# Patient Record
Sex: MALE | Race: AMERICAN INDIAN OR ALASKA NATIVE | NOT HISPANIC OR LATINO | URBAN - METROPOLITAN AREA
[De-identification: names, ages, dates, MRNs, and addresses within clinical notes are randomized per-mention and may not be internally consistent; named-entity substitution may affect disease eponyms.]

---

## 2021-01-01 ENCOUNTER — INPATIENT (INPATIENT)
Facility: HOSPITAL | Age: 76
LOS: 1 days | Discharge: HOME | DRG: 639 | End: 2021-01-11
Attending: INTERNAL MEDICINE | Admitting: INTERNAL MEDICINE
Payer: MEDICARE

## 2021-01-01 ENCOUNTER — INPATIENT (INPATIENT)
Facility: HOSPITAL | Age: 76
LOS: 21 days | DRG: 871 | End: 2021-05-08
Attending: GENERAL PRACTICE | Admitting: GENERAL PRACTICE
Payer: COMMERCIAL

## 2021-01-01 VITALS
DIASTOLIC BLOOD PRESSURE: 82 MMHG | RESPIRATION RATE: 16 BRPM | TEMPERATURE: 98 F | OXYGEN SATURATION: 99 % | HEART RATE: 88 BPM | SYSTOLIC BLOOD PRESSURE: 130 MMHG

## 2021-01-01 VITALS
DIASTOLIC BLOOD PRESSURE: 81 MMHG | TEMPERATURE: 98 F | WEIGHT: 175.05 LBS | OXYGEN SATURATION: 99 % | HEIGHT: 68 IN | SYSTOLIC BLOOD PRESSURE: 129 MMHG | RESPIRATION RATE: 18 BRPM | HEART RATE: 91 BPM

## 2021-01-01 VITALS
DIASTOLIC BLOOD PRESSURE: 76 MMHG | OXYGEN SATURATION: 92 % | WEIGHT: 173.94 LBS | TEMPERATURE: 98 F | SYSTOLIC BLOOD PRESSURE: 114 MMHG | HEIGHT: 68 IN | HEART RATE: 112 BPM | RESPIRATION RATE: 91 BRPM

## 2021-01-01 VITALS
HEART RATE: 88 BPM | DIASTOLIC BLOOD PRESSURE: 60 MMHG | RESPIRATION RATE: 22 BRPM | SYSTOLIC BLOOD PRESSURE: 118 MMHG | OXYGEN SATURATION: 94 %

## 2021-01-01 DIAGNOSIS — Z29.9 ENCOUNTER FOR PROPHYLACTIC MEASURES, UNSPECIFIED: ICD-10-CM

## 2021-01-01 DIAGNOSIS — E11.9 TYPE 2 DIABETES MELLITUS WITHOUT COMPLICATIONS: ICD-10-CM

## 2021-01-01 DIAGNOSIS — Z02.9 ENCOUNTER FOR ADMINISTRATIVE EXAMINATIONS, UNSPECIFIED: ICD-10-CM

## 2021-01-01 DIAGNOSIS — J96.01 ACUTE RESPIRATORY FAILURE WITH HYPOXIA: ICD-10-CM

## 2021-01-01 DIAGNOSIS — I10 ESSENTIAL (PRIMARY) HYPERTENSION: ICD-10-CM

## 2021-01-01 DIAGNOSIS — E11.65 TYPE 2 DIABETES MELLITUS WITH HYPERGLYCEMIA: ICD-10-CM

## 2021-01-01 DIAGNOSIS — U07.1 COVID-19: ICD-10-CM
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
## 2021-01-01 DIAGNOSIS — E11.10 TYPE 2 DIABETES MELLITUS WITH KETOACIDOSIS WITHOUT COMA: ICD-10-CM

## 2021-01-01 DIAGNOSIS — N17.9 ACUTE KIDNEY FAILURE, UNSPECIFIED: ICD-10-CM

## 2021-01-01 DIAGNOSIS — E87.1 HYPO-OSMOLALITY AND HYPONATREMIA: ICD-10-CM

## 2021-01-01 DIAGNOSIS — J18.9 PNEUMONIA, UNSPECIFIED ORGANISM: ICD-10-CM

## 2021-01-01 DIAGNOSIS — E87.6 HYPOKALEMIA: ICD-10-CM

## 2021-01-01 LAB
A1C WITH ESTIMATED AVERAGE GLUCOSE RESULT: 13.4 % — HIGH (ref 4–5.6)
A1C WITH ESTIMATED AVERAGE GLUCOSE RESULT: 13.6 % — HIGH (ref 4–5.6)
A1C WITH ESTIMATED AVERAGE GLUCOSE RESULT: 13.8 % — HIGH (ref 4–5.6)
A1C WITH ESTIMATED AVERAGE GLUCOSE RESULT: 14 % — HIGH (ref 4–5.6)
ALBUMIN SERPL ELPH-MCNC: 1.5 G/DL — LOW (ref 3.5–5)
ALBUMIN SERPL ELPH-MCNC: 2 G/DL — LOW (ref 3.5–5)
ALBUMIN SERPL ELPH-MCNC: 2 G/DL — LOW (ref 3.5–5)
ALBUMIN SERPL ELPH-MCNC: 2.1 G/DL — LOW (ref 3.5–5)
ALBUMIN SERPL ELPH-MCNC: 2.2 G/DL — LOW (ref 3.5–5)
ALBUMIN SERPL ELPH-MCNC: 2.2 G/DL — LOW (ref 3.5–5)
ALBUMIN SERPL ELPH-MCNC: 2.4 G/DL — LOW (ref 3.5–5)
ALBUMIN SERPL ELPH-MCNC: 2.5 G/DL — LOW (ref 3.5–5)
ALBUMIN SERPL ELPH-MCNC: 2.6 G/DL — LOW (ref 3.5–5)
ALBUMIN SERPL ELPH-MCNC: 2.7 G/DL — LOW (ref 3.5–5)
ALBUMIN SERPL ELPH-MCNC: 2.8 G/DL — LOW (ref 3.5–5)
ALBUMIN SERPL ELPH-MCNC: 3 G/DL — LOW (ref 3.5–5)
ALBUMIN SERPL ELPH-MCNC: 4 G/DL — SIGNIFICANT CHANGE UP (ref 3.3–5)
ALP SERPL-CCNC: 100 U/L — SIGNIFICANT CHANGE UP (ref 40–120)
ALP SERPL-CCNC: 100 U/L — SIGNIFICANT CHANGE UP (ref 40–120)
ALP SERPL-CCNC: 111 U/L — SIGNIFICANT CHANGE UP (ref 40–120)
ALP SERPL-CCNC: 113 U/L — SIGNIFICANT CHANGE UP (ref 40–120)
ALP SERPL-CCNC: 128 U/L — HIGH (ref 40–120)
ALP SERPL-CCNC: 69 U/L — SIGNIFICANT CHANGE UP (ref 40–120)
ALP SERPL-CCNC: 70 U/L — SIGNIFICANT CHANGE UP (ref 40–120)
ALP SERPL-CCNC: 70 U/L — SIGNIFICANT CHANGE UP (ref 40–120)
ALP SERPL-CCNC: 72 U/L — SIGNIFICANT CHANGE UP (ref 40–120)
ALP SERPL-CCNC: 81 U/L — SIGNIFICANT CHANGE UP (ref 40–120)
ALP SERPL-CCNC: 86 U/L — SIGNIFICANT CHANGE UP (ref 40–120)
ALP SERPL-CCNC: 92 U/L — SIGNIFICANT CHANGE UP (ref 40–120)
ALP SERPL-CCNC: 95 U/L — SIGNIFICANT CHANGE UP (ref 40–120)
ALP SERPL-CCNC: 98 U/L — SIGNIFICANT CHANGE UP (ref 40–120)
ALP SERPL-CCNC: 98 U/L — SIGNIFICANT CHANGE UP (ref 40–120)
ALT FLD-CCNC: 14 U/L — SIGNIFICANT CHANGE UP (ref 10–45)
ALT FLD-CCNC: 26 U/L DA — SIGNIFICANT CHANGE UP (ref 10–60)
ALT FLD-CCNC: 26 U/L DA — SIGNIFICANT CHANGE UP (ref 10–60)
ALT FLD-CCNC: 29 U/L DA — SIGNIFICANT CHANGE UP (ref 10–60)
ALT FLD-CCNC: 31 U/L DA — SIGNIFICANT CHANGE UP (ref 10–60)
ALT FLD-CCNC: 32 U/L DA — SIGNIFICANT CHANGE UP (ref 10–60)
ALT FLD-CCNC: 33 U/L DA — SIGNIFICANT CHANGE UP (ref 10–60)
ALT FLD-CCNC: 33 U/L DA — SIGNIFICANT CHANGE UP (ref 10–60)
ALT FLD-CCNC: 37 U/L DA — SIGNIFICANT CHANGE UP (ref 10–60)
ALT FLD-CCNC: 38 U/L DA — SIGNIFICANT CHANGE UP (ref 10–60)
ALT FLD-CCNC: 41 U/L DA — SIGNIFICANT CHANGE UP (ref 10–60)
ALT FLD-CCNC: 42 U/L DA — SIGNIFICANT CHANGE UP (ref 10–60)
ALT FLD-CCNC: 51 U/L DA — SIGNIFICANT CHANGE UP (ref 10–60)
ANION GAP SERPL CALC-SCNC: 10 MMOL/L — SIGNIFICANT CHANGE UP (ref 5–17)
ANION GAP SERPL CALC-SCNC: 10 MMOL/L — SIGNIFICANT CHANGE UP (ref 5–17)
ANION GAP SERPL CALC-SCNC: 11 MMOL/L — SIGNIFICANT CHANGE UP (ref 5–17)
ANION GAP SERPL CALC-SCNC: 12 MMOL/L — SIGNIFICANT CHANGE UP (ref 5–17)
ANION GAP SERPL CALC-SCNC: 12 MMOL/L — SIGNIFICANT CHANGE UP (ref 5–17)
ANION GAP SERPL CALC-SCNC: 14 MMOL/L — SIGNIFICANT CHANGE UP (ref 5–17)
ANION GAP SERPL CALC-SCNC: 15 MMOL/L — SIGNIFICANT CHANGE UP (ref 5–17)
ANION GAP SERPL CALC-SCNC: 15 MMOL/L — SIGNIFICANT CHANGE UP (ref 5–17)
ANION GAP SERPL CALC-SCNC: 16 MMOL/L — SIGNIFICANT CHANGE UP (ref 5–17)
ANION GAP SERPL CALC-SCNC: 16 MMOL/L — SIGNIFICANT CHANGE UP (ref 5–17)
ANION GAP SERPL CALC-SCNC: 20 MMOL/L — HIGH (ref 5–17)
ANION GAP SERPL CALC-SCNC: 4 MMOL/L — LOW (ref 5–17)
ANION GAP SERPL CALC-SCNC: 5 MMOL/L — SIGNIFICANT CHANGE UP (ref 5–17)
ANION GAP SERPL CALC-SCNC: 5 MMOL/L — SIGNIFICANT CHANGE UP (ref 5–17)
ANION GAP SERPL CALC-SCNC: 6 MMOL/L — SIGNIFICANT CHANGE UP (ref 5–17)
ANION GAP SERPL CALC-SCNC: 8 MMOL/L — SIGNIFICANT CHANGE UP (ref 5–17)
ANION GAP SERPL CALC-SCNC: 9 MMOL/L — SIGNIFICANT CHANGE UP (ref 5–17)
APPEARANCE UR: CLEAR — SIGNIFICANT CHANGE UP
APTT BLD: 171.4 SEC — CRITICAL HIGH (ref 27.5–35.5)
APTT BLD: 29.7 SEC — SIGNIFICANT CHANGE UP (ref 27.5–35.5)
APTT BLD: 73.4 SEC — HIGH (ref 27.5–35.5)
APTT BLD: 90.8 SEC — HIGH (ref 27.5–35.5)
APTT BLD: >200 SEC — CRITICAL HIGH (ref 27.5–35.5)
AST SERPL-CCNC: 15 U/L — SIGNIFICANT CHANGE UP (ref 10–40)
AST SERPL-CCNC: 33 U/L — SIGNIFICANT CHANGE UP (ref 10–40)
AST SERPL-CCNC: 35 U/L — SIGNIFICANT CHANGE UP (ref 10–40)
AST SERPL-CCNC: 36 U/L — SIGNIFICANT CHANGE UP (ref 10–40)
AST SERPL-CCNC: 38 U/L — SIGNIFICANT CHANGE UP (ref 10–40)
AST SERPL-CCNC: 38 U/L — SIGNIFICANT CHANGE UP (ref 10–40)
AST SERPL-CCNC: 40 U/L — SIGNIFICANT CHANGE UP (ref 10–40)
AST SERPL-CCNC: 42 U/L — HIGH (ref 10–40)
AST SERPL-CCNC: 43 U/L — HIGH (ref 10–40)
AST SERPL-CCNC: 47 U/L — HIGH (ref 10–40)
AST SERPL-CCNC: 47 U/L — HIGH (ref 10–40)
AST SERPL-CCNC: 51 U/L — HIGH (ref 10–40)
AST SERPL-CCNC: 51 U/L — HIGH (ref 10–40)
AST SERPL-CCNC: 52 U/L — HIGH (ref 10–40)
AST SERPL-CCNC: 63 U/L — HIGH (ref 10–40)
B PERT DNA SPEC QL NAA+PROBE: SIGNIFICANT CHANGE UP
BACTERIA # UR AUTO: NEGATIVE — SIGNIFICANT CHANGE UP
BASE EXCESS BLDA CALC-SCNC: -0.8 MMOL/L — SIGNIFICANT CHANGE UP (ref -2–3)
BASE EXCESS BLDA CALC-SCNC: -1.5 MMOL/L — SIGNIFICANT CHANGE UP (ref -2–3)
BASE EXCESS BLDA CALC-SCNC: -3.4 MMOL/L — LOW (ref -2–3)
BASE EXCESS BLDA CALC-SCNC: -4.5 MMOL/L — LOW (ref -2–3)
BASE EXCESS BLDA CALC-SCNC: -6.4 MMOL/L — LOW (ref -2–3)
BASE EXCESS BLDV CALC-SCNC: -1.1 MMOL/L — SIGNIFICANT CHANGE UP (ref -2–2)
BASE EXCESS BLDV CALC-SCNC: -2.6 MMOL/L — LOW (ref -2–2)
BASE EXCESS BLDV CALC-SCNC: -3.9 MMOL/L — LOW (ref -2–2)
BASOPHILS # BLD AUTO: 0 K/UL — SIGNIFICANT CHANGE UP (ref 0–0.2)
BASOPHILS # BLD AUTO: 0.01 K/UL — SIGNIFICANT CHANGE UP (ref 0–0.2)
BASOPHILS # BLD AUTO: 0.02 K/UL — SIGNIFICANT CHANGE UP (ref 0–0.2)
BASOPHILS # BLD AUTO: 0.03 K/UL — SIGNIFICANT CHANGE UP (ref 0–0.2)
BASOPHILS NFR BLD AUTO: 0 % — SIGNIFICANT CHANGE UP (ref 0–2)
BASOPHILS NFR BLD AUTO: 0.1 % — SIGNIFICANT CHANGE UP (ref 0–2)
BASOPHILS NFR BLD AUTO: 0.2 % — SIGNIFICANT CHANGE UP (ref 0–2)
BASOPHILS NFR BLD AUTO: 0.3 % — SIGNIFICANT CHANGE UP (ref 0–2)
BASOPHILS NFR BLD AUTO: 0.4 % — SIGNIFICANT CHANGE UP (ref 0–2)
BILIRUB SERPL-MCNC: 0.6 MG/DL — SIGNIFICANT CHANGE UP (ref 0.2–1.2)
BILIRUB SERPL-MCNC: 0.7 MG/DL — SIGNIFICANT CHANGE UP (ref 0.2–1.2)
BILIRUB SERPL-MCNC: 0.9 MG/DL — SIGNIFICANT CHANGE UP (ref 0.2–1.2)
BILIRUB SERPL-MCNC: 0.9 MG/DL — SIGNIFICANT CHANGE UP (ref 0.2–1.2)
BILIRUB SERPL-MCNC: 1 MG/DL — SIGNIFICANT CHANGE UP (ref 0.2–1.2)
BILIRUB SERPL-MCNC: 1.3 MG/DL — HIGH (ref 0.2–1.2)
BILIRUB UR-MCNC: NEGATIVE — SIGNIFICANT CHANGE UP
BLOOD GAS COMMENTS ARTERIAL: SIGNIFICANT CHANGE UP
BUN SERPL-MCNC: 13 MG/DL — SIGNIFICANT CHANGE UP (ref 7–18)
BUN SERPL-MCNC: 15 MG/DL — SIGNIFICANT CHANGE UP (ref 7–18)
BUN SERPL-MCNC: 15 MG/DL — SIGNIFICANT CHANGE UP (ref 7–18)
BUN SERPL-MCNC: 17 MG/DL — SIGNIFICANT CHANGE UP (ref 7–18)
BUN SERPL-MCNC: 18 MG/DL — SIGNIFICANT CHANGE UP (ref 7–18)
BUN SERPL-MCNC: 19 MG/DL — SIGNIFICANT CHANGE UP (ref 7–23)
BUN SERPL-MCNC: 21 MG/DL — HIGH (ref 7–18)
BUN SERPL-MCNC: 22 MG/DL — HIGH (ref 7–18)
BUN SERPL-MCNC: 23 MG/DL — HIGH (ref 7–18)
BUN SERPL-MCNC: 27 MG/DL — HIGH (ref 7–18)
BUN SERPL-MCNC: 32 MG/DL — HIGH (ref 7–18)
BUN SERPL-MCNC: 33 MG/DL — HIGH (ref 7–18)
BUN SERPL-MCNC: 33 MG/DL — HIGH (ref 7–18)
BUN SERPL-MCNC: 34 MG/DL — HIGH (ref 7–18)
BUN SERPL-MCNC: 35 MG/DL — HIGH (ref 7–18)
BUN SERPL-MCNC: 36 MG/DL — HIGH (ref 7–18)
BUN SERPL-MCNC: 37 MG/DL — HIGH (ref 7–18)
BUN SERPL-MCNC: 38 MG/DL — HIGH (ref 7–18)
BUN SERPL-MCNC: 42 MG/DL — HIGH (ref 7–18)
BUN SERPL-MCNC: 42 MG/DL — HIGH (ref 7–18)
BUN SERPL-MCNC: 44 MG/DL — HIGH (ref 7–18)
BUN SERPL-MCNC: 44 MG/DL — HIGH (ref 7–18)
BUN SERPL-MCNC: 45 MG/DL — HIGH (ref 7–18)
BUN SERPL-MCNC: 46 MG/DL — HIGH (ref 7–18)
C PNEUM DNA SPEC QL NAA+PROBE: SIGNIFICANT CHANGE UP
CA-I SERPL-SCNC: 1.11 MMOL/L — LOW (ref 1.12–1.3)
CA-I SERPL-SCNC: 1.18 MMOL/L — SIGNIFICANT CHANGE UP (ref 1.12–1.3)
CA-I SERPL-SCNC: 1.25 MMOL/L — SIGNIFICANT CHANGE UP (ref 1.12–1.3)
CALCIUM SERPL-MCNC: 7.9 MG/DL — LOW (ref 8.4–10.5)
CALCIUM SERPL-MCNC: 8.1 MG/DL — LOW (ref 8.4–10.5)
CALCIUM SERPL-MCNC: 8.2 MG/DL — LOW (ref 8.4–10.5)
CALCIUM SERPL-MCNC: 8.4 MG/DL — SIGNIFICANT CHANGE UP (ref 8.4–10.5)
CALCIUM SERPL-MCNC: 8.5 MG/DL — SIGNIFICANT CHANGE UP (ref 8.4–10.5)
CALCIUM SERPL-MCNC: 8.6 MG/DL — SIGNIFICANT CHANGE UP (ref 8.4–10.5)
CALCIUM SERPL-MCNC: 8.7 MG/DL — SIGNIFICANT CHANGE UP (ref 8.4–10.5)
CALCIUM SERPL-MCNC: 8.7 MG/DL — SIGNIFICANT CHANGE UP (ref 8.4–10.5)
CALCIUM SERPL-MCNC: 8.8 MG/DL — SIGNIFICANT CHANGE UP (ref 8.4–10.5)
CALCIUM SERPL-MCNC: 8.9 MG/DL — SIGNIFICANT CHANGE UP (ref 8.4–10.5)
CALCIUM SERPL-MCNC: 9.7 MG/DL — SIGNIFICANT CHANGE UP (ref 8.4–10.5)
CHLORIDE BLDV-SCNC: 105 MMOL/L — SIGNIFICANT CHANGE UP (ref 96–108)
CHLORIDE BLDV-SCNC: 108 MMOL/L — SIGNIFICANT CHANGE UP (ref 96–108)
CHLORIDE BLDV-SCNC: 98 MMOL/L — SIGNIFICANT CHANGE UP (ref 96–108)
CHLORIDE SERPL-SCNC: 100 MMOL/L — SIGNIFICANT CHANGE UP (ref 96–108)
CHLORIDE SERPL-SCNC: 100 MMOL/L — SIGNIFICANT CHANGE UP (ref 96–108)
CHLORIDE SERPL-SCNC: 103 MMOL/L — SIGNIFICANT CHANGE UP (ref 96–108)
CHLORIDE SERPL-SCNC: 104 MMOL/L — SIGNIFICANT CHANGE UP (ref 96–108)
CHLORIDE SERPL-SCNC: 105 MMOL/L — SIGNIFICANT CHANGE UP (ref 96–108)
CHLORIDE SERPL-SCNC: 106 MMOL/L — SIGNIFICANT CHANGE UP (ref 96–108)
CHLORIDE SERPL-SCNC: 108 MMOL/L — SIGNIFICANT CHANGE UP (ref 96–108)
CHLORIDE SERPL-SCNC: 109 MMOL/L — HIGH (ref 96–108)
CHLORIDE SERPL-SCNC: 110 MMOL/L — HIGH (ref 96–108)
CHLORIDE SERPL-SCNC: 111 MMOL/L — HIGH (ref 96–108)
CHLORIDE SERPL-SCNC: 111 MMOL/L — HIGH (ref 96–108)
CHLORIDE SERPL-SCNC: 112 MMOL/L — HIGH (ref 96–108)
CHLORIDE SERPL-SCNC: 113 MMOL/L — HIGH (ref 96–108)
CHLORIDE SERPL-SCNC: 114 MMOL/L — HIGH (ref 96–108)
CHLORIDE SERPL-SCNC: 114 MMOL/L — HIGH (ref 96–108)
CHLORIDE SERPL-SCNC: 115 MMOL/L — HIGH (ref 96–108)
CHLORIDE SERPL-SCNC: 116 MMOL/L — HIGH (ref 96–108)
CHLORIDE SERPL-SCNC: 116 MMOL/L — HIGH (ref 96–108)
CHLORIDE SERPL-SCNC: 94 MMOL/L — LOW (ref 96–108)
CHLORIDE SERPL-SCNC: 94 MMOL/L — LOW (ref 96–108)
CHLORIDE SERPL-SCNC: 96 MMOL/L — SIGNIFICANT CHANGE UP (ref 96–108)
CHLORIDE SERPL-SCNC: 98 MMOL/L — SIGNIFICANT CHANGE UP (ref 96–108)
CHLORIDE SERPL-SCNC: 98 MMOL/L — SIGNIFICANT CHANGE UP (ref 96–108)
CHOLEST SERPL-MCNC: 96 MG/DL — SIGNIFICANT CHANGE UP
CO2 BLDV-SCNC: 23 MMOL/L — SIGNIFICANT CHANGE UP (ref 22–30)
CO2 BLDV-SCNC: 24 MMOL/L — SIGNIFICANT CHANGE UP (ref 22–30)
CO2 BLDV-SCNC: 25 MMOL/L — SIGNIFICANT CHANGE UP (ref 22–30)
CO2 SERPL-SCNC: 15 MMOL/L — LOW (ref 22–31)
CO2 SERPL-SCNC: 17 MMOL/L — LOW (ref 22–31)
CO2 SERPL-SCNC: 19 MMOL/L — LOW (ref 22–31)
CO2 SERPL-SCNC: 20 MMOL/L — LOW (ref 22–31)
CO2 SERPL-SCNC: 20 MMOL/L — LOW (ref 22–31)
CO2 SERPL-SCNC: 21 MMOL/L — LOW (ref 22–31)
CO2 SERPL-SCNC: 22 MMOL/L — SIGNIFICANT CHANGE UP (ref 22–31)
CO2 SERPL-SCNC: 23 MMOL/L — SIGNIFICANT CHANGE UP (ref 22–31)
CO2 SERPL-SCNC: 24 MMOL/L — SIGNIFICANT CHANGE UP (ref 22–31)
CO2 SERPL-SCNC: 24 MMOL/L — SIGNIFICANT CHANGE UP (ref 22–31)
CO2 SERPL-SCNC: 25 MMOL/L — SIGNIFICANT CHANGE UP (ref 22–31)
CO2 SERPL-SCNC: 26 MMOL/L — SIGNIFICANT CHANGE UP (ref 22–31)
CO2 SERPL-SCNC: 26 MMOL/L — SIGNIFICANT CHANGE UP (ref 22–31)
CO2 SERPL-SCNC: 27 MMOL/L — SIGNIFICANT CHANGE UP (ref 22–31)
CO2 SERPL-SCNC: 28 MMOL/L — SIGNIFICANT CHANGE UP (ref 22–31)
COLOR SPEC: SIGNIFICANT CHANGE UP
COLOR SPEC: YELLOW — SIGNIFICANT CHANGE UP
COLOR SPEC: YELLOW — SIGNIFICANT CHANGE UP
COVID-19 SPIKE DOMAIN AB INTERP: NEGATIVE — SIGNIFICANT CHANGE UP
COVID-19 SPIKE DOMAIN ANTIBODY RESULT: 0.59 U/ML — SIGNIFICANT CHANGE UP
CREAT ?TM UR-MCNC: 51 MG/DL — SIGNIFICANT CHANGE UP
CREAT SERPL-MCNC: 0.76 MG/DL — SIGNIFICANT CHANGE UP (ref 0.5–1.3)
CREAT SERPL-MCNC: 0.84 MG/DL — SIGNIFICANT CHANGE UP (ref 0.5–1.3)
CREAT SERPL-MCNC: 0.84 MG/DL — SIGNIFICANT CHANGE UP (ref 0.5–1.3)
CREAT SERPL-MCNC: 0.89 MG/DL — SIGNIFICANT CHANGE UP (ref 0.5–1.3)
CREAT SERPL-MCNC: 0.89 MG/DL — SIGNIFICANT CHANGE UP (ref 0.5–1.3)
CREAT SERPL-MCNC: 0.9 MG/DL — SIGNIFICANT CHANGE UP (ref 0.5–1.3)
CREAT SERPL-MCNC: 0.92 MG/DL — SIGNIFICANT CHANGE UP (ref 0.5–1.3)
CREAT SERPL-MCNC: 1.01 MG/DL — SIGNIFICANT CHANGE UP (ref 0.5–1.3)
CREAT SERPL-MCNC: 1.02 MG/DL — SIGNIFICANT CHANGE UP (ref 0.5–1.3)
CREAT SERPL-MCNC: 1.06 MG/DL — SIGNIFICANT CHANGE UP (ref 0.5–1.3)
CREAT SERPL-MCNC: 1.09 MG/DL — SIGNIFICANT CHANGE UP (ref 0.5–1.3)
CREAT SERPL-MCNC: 1.12 MG/DL — SIGNIFICANT CHANGE UP (ref 0.5–1.3)
CREAT SERPL-MCNC: 1.13 MG/DL — SIGNIFICANT CHANGE UP (ref 0.5–1.3)
CREAT SERPL-MCNC: 1.15 MG/DL — SIGNIFICANT CHANGE UP (ref 0.5–1.3)
CREAT SERPL-MCNC: 1.15 MG/DL — SIGNIFICANT CHANGE UP (ref 0.5–1.3)
CREAT SERPL-MCNC: 1.16 MG/DL — SIGNIFICANT CHANGE UP (ref 0.5–1.3)
CREAT SERPL-MCNC: 1.21 MG/DL — SIGNIFICANT CHANGE UP (ref 0.5–1.3)
CREAT SERPL-MCNC: 1.23 MG/DL — SIGNIFICANT CHANGE UP (ref 0.5–1.3)
CREAT SERPL-MCNC: 1.33 MG/DL — HIGH (ref 0.5–1.3)
CREAT SERPL-MCNC: 1.38 MG/DL — HIGH (ref 0.5–1.3)
CREAT SERPL-MCNC: 1.47 MG/DL — HIGH (ref 0.5–1.3)
CREAT SERPL-MCNC: 1.49 MG/DL — HIGH (ref 0.5–1.3)
CREAT SERPL-MCNC: 1.61 MG/DL — HIGH (ref 0.5–1.3)
CREAT SERPL-MCNC: 1.93 MG/DL — HIGH (ref 0.5–1.3)
CREAT SERPL-MCNC: 1.93 MG/DL — HIGH (ref 0.5–1.3)
CREAT SERPL-MCNC: 2 MG/DL — HIGH (ref 0.5–1.3)
CREAT SERPL-MCNC: 2.26 MG/DL — HIGH (ref 0.5–1.3)
CREAT SERPL-MCNC: 2.32 MG/DL — HIGH (ref 0.5–1.3)
CRP SERPL-MCNC: 102 MG/L — HIGH
CRP SERPL-MCNC: 113 MG/L — HIGH
CRP SERPL-MCNC: 116 MG/L — HIGH
CRP SERPL-MCNC: 122 MG/L — HIGH
CRP SERPL-MCNC: 125 MG/L — HIGH
CRP SERPL-MCNC: 140 MG/L — HIGH
CRP SERPL-MCNC: 142 MG/L — HIGH
CRP SERPL-MCNC: 148 MG/L — HIGH
CRP SERPL-MCNC: 175 MG/L — HIGH
CRP SERPL-MCNC: 184 MG/L — HIGH
CRP SERPL-MCNC: 39 MG/L — HIGH
CRP SERPL-MCNC: 48 MG/L — HIGH
CRP SERPL-MCNC: 53 MG/L — HIGH
CRP SERPL-MCNC: 76 MG/L — HIGH
CULTURE RESULTS: SIGNIFICANT CHANGE UP
D DIMER BLD IA.RAPID-MCNC: 1267 NG/ML DDU — HIGH
D DIMER BLD IA.RAPID-MCNC: 1717 NG/ML DDU — HIGH
D DIMER BLD IA.RAPID-MCNC: 1862 NG/ML DDU — HIGH
D DIMER BLD IA.RAPID-MCNC: 1903 NG/ML DDU — HIGH
D DIMER BLD IA.RAPID-MCNC: 262 NG/ML DDU — HIGH
D DIMER BLD IA.RAPID-MCNC: 263 NG/ML DDU — HIGH
D DIMER BLD IA.RAPID-MCNC: 324 NG/ML DDU — HIGH
D DIMER BLD IA.RAPID-MCNC: 3442 NG/ML DDU — HIGH
D DIMER BLD IA.RAPID-MCNC: 3458 NG/ML DDU — HIGH
D DIMER BLD IA.RAPID-MCNC: 4893 NG/ML DDU — HIGH
D DIMER BLD IA.RAPID-MCNC: 523 NG/ML DDU — HIGH
D DIMER BLD IA.RAPID-MCNC: 6387 NG/ML DDU — HIGH
D DIMER BLD IA.RAPID-MCNC: 6487 NG/ML DDU — HIGH
D DIMER BLD IA.RAPID-MCNC: 7390 NG/ML DDU — HIGH
D DIMER BLD IA.RAPID-MCNC: HIGH NG/ML DDU
DIFF PNL FLD: ABNORMAL
DIFF PNL FLD: ABNORMAL
DIFF PNL FLD: NEGATIVE — SIGNIFICANT CHANGE UP
EOSINOPHIL # BLD AUTO: 0 K/UL — SIGNIFICANT CHANGE UP (ref 0–0.5)
EOSINOPHIL # BLD AUTO: 0.03 K/UL — SIGNIFICANT CHANGE UP (ref 0–0.5)
EOSINOPHIL # BLD AUTO: 0.07 K/UL — SIGNIFICANT CHANGE UP (ref 0–0.5)
EOSINOPHIL # BLD AUTO: 0.07 K/UL — SIGNIFICANT CHANGE UP (ref 0–0.5)
EOSINOPHIL # BLD AUTO: 0.08 K/UL — SIGNIFICANT CHANGE UP (ref 0–0.5)
EOSINOPHIL # BLD AUTO: 0.28 K/UL — SIGNIFICANT CHANGE UP (ref 0–0.5)
EOSINOPHIL # BLD AUTO: 0.37 K/UL — SIGNIFICANT CHANGE UP (ref 0–0.5)
EOSINOPHIL NFR BLD AUTO: 0 % — SIGNIFICANT CHANGE UP (ref 0–6)
EOSINOPHIL NFR BLD AUTO: 0.2 % — SIGNIFICANT CHANGE UP (ref 0–6)
EOSINOPHIL NFR BLD AUTO: 0.5 % — SIGNIFICANT CHANGE UP (ref 0–6)
EOSINOPHIL NFR BLD AUTO: 0.5 % — SIGNIFICANT CHANGE UP (ref 0–6)
EOSINOPHIL NFR BLD AUTO: 0.9 % — SIGNIFICANT CHANGE UP (ref 0–6)
EOSINOPHIL NFR BLD AUTO: 2.4 % — SIGNIFICANT CHANGE UP (ref 0–6)
EOSINOPHIL NFR BLD AUTO: 3.4 % — SIGNIFICANT CHANGE UP (ref 0–6)
EPI CELLS # UR: 0 /HPF — SIGNIFICANT CHANGE UP
ERYTHROCYTE [SEDIMENTATION RATE] IN BLOOD: 42 MM/HR — HIGH (ref 0–20)
ESTIMATED AVERAGE GLUCOSE: 338 MG/DL — HIGH (ref 68–114)
ESTIMATED AVERAGE GLUCOSE: 344 MG/DL — HIGH (ref 68–114)
ESTIMATED AVERAGE GLUCOSE: 349 MG/DL — HIGH (ref 68–114)
ESTIMATED AVERAGE GLUCOSE: 355 MG/DL — HIGH (ref 68–114)
FERRITIN SERPL-MCNC: 1309 NG/ML — HIGH (ref 30–400)
FERRITIN SERPL-MCNC: 1376 NG/ML — HIGH (ref 30–400)
FERRITIN SERPL-MCNC: 1384 NG/ML — HIGH (ref 30–400)
FERRITIN SERPL-MCNC: 1410 NG/ML — HIGH (ref 30–400)
FERRITIN SERPL-MCNC: 1437 NG/ML — HIGH (ref 30–400)
FERRITIN SERPL-MCNC: 1699 NG/ML — HIGH (ref 30–400)
FERRITIN SERPL-MCNC: 1878 NG/ML — HIGH (ref 30–400)
FERRITIN SERPL-MCNC: 2509 NG/ML — HIGH (ref 30–400)
FERRITIN SERPL-MCNC: 2520 NG/ML — HIGH (ref 30–400)
FERRITIN SERPL-MCNC: 663 NG/ML — HIGH (ref 30–400)
FERRITIN SERPL-MCNC: 701 NG/ML — HIGH (ref 30–400)
FERRITIN SERPL-MCNC: 842 NG/ML — HIGH (ref 30–400)
FERRITIN SERPL-MCNC: 944 NG/ML — HIGH (ref 30–400)
FERRITIN SERPL-MCNC: 973 NG/ML — HIGH (ref 30–400)
FERRITIN SERPL-MCNC: 998 NG/ML — HIGH (ref 30–400)
FLUAV H1 2009 PAND RNA SPEC QL NAA+PROBE: SIGNIFICANT CHANGE UP
FLUAV H1 RNA SPEC QL NAA+PROBE: SIGNIFICANT CHANGE UP
FLUAV H3 RNA SPEC QL NAA+PROBE: SIGNIFICANT CHANGE UP
FLUAV SUBTYP SPEC NAA+PROBE: SIGNIFICANT CHANGE UP
FLUBV RNA SPEC QL NAA+PROBE: SIGNIFICANT CHANGE UP
GAS PNL BLDV: 128 MMOL/L — LOW (ref 135–145)
GAS PNL BLDV: 130 MMOL/L — LOW (ref 135–145)
GAS PNL BLDV: 133 MMOL/L — LOW (ref 135–145)
GAS PNL BLDV: SIGNIFICANT CHANGE UP
GLUCOSE BLDC GLUCOMTR-MCNC: 107 MG/DL — HIGH (ref 70–99)
GLUCOSE BLDC GLUCOMTR-MCNC: 107 MG/DL — HIGH (ref 70–99)
GLUCOSE BLDC GLUCOMTR-MCNC: 109 MG/DL — HIGH (ref 70–99)
GLUCOSE BLDC GLUCOMTR-MCNC: 123 MG/DL — HIGH (ref 70–99)
GLUCOSE BLDC GLUCOMTR-MCNC: 124 MG/DL — HIGH (ref 70–99)
GLUCOSE BLDC GLUCOMTR-MCNC: 124 MG/DL — HIGH (ref 70–99)
GLUCOSE BLDC GLUCOMTR-MCNC: 126 MG/DL — HIGH (ref 70–99)
GLUCOSE BLDC GLUCOMTR-MCNC: 128 MG/DL — HIGH (ref 70–99)
GLUCOSE BLDC GLUCOMTR-MCNC: 129 MG/DL — HIGH (ref 70–99)
GLUCOSE BLDC GLUCOMTR-MCNC: 138 MG/DL — HIGH (ref 70–99)
GLUCOSE BLDC GLUCOMTR-MCNC: 145 MG/DL — HIGH (ref 70–99)
GLUCOSE BLDC GLUCOMTR-MCNC: 147 MG/DL — HIGH (ref 70–99)
GLUCOSE BLDC GLUCOMTR-MCNC: 153 MG/DL — HIGH (ref 70–99)
GLUCOSE BLDC GLUCOMTR-MCNC: 155 MG/DL — HIGH (ref 70–99)
GLUCOSE BLDC GLUCOMTR-MCNC: 163 MG/DL — HIGH (ref 70–99)
GLUCOSE BLDC GLUCOMTR-MCNC: 166 MG/DL — HIGH (ref 70–99)
GLUCOSE BLDC GLUCOMTR-MCNC: 176 MG/DL — HIGH (ref 70–99)
GLUCOSE BLDC GLUCOMTR-MCNC: 182 MG/DL — HIGH (ref 70–99)
GLUCOSE BLDC GLUCOMTR-MCNC: 185 MG/DL — HIGH (ref 70–99)
GLUCOSE BLDC GLUCOMTR-MCNC: 200 MG/DL — HIGH (ref 70–99)
GLUCOSE BLDC GLUCOMTR-MCNC: 205 MG/DL — HIGH (ref 70–99)
GLUCOSE BLDC GLUCOMTR-MCNC: 211 MG/DL — HIGH (ref 70–99)
GLUCOSE BLDC GLUCOMTR-MCNC: 216 MG/DL — HIGH (ref 70–99)
GLUCOSE BLDC GLUCOMTR-MCNC: 220 MG/DL — HIGH (ref 70–99)
GLUCOSE BLDC GLUCOMTR-MCNC: 221 MG/DL — HIGH (ref 70–99)
GLUCOSE BLDC GLUCOMTR-MCNC: 248 MG/DL — HIGH (ref 70–99)
GLUCOSE BLDC GLUCOMTR-MCNC: 249 MG/DL — HIGH (ref 70–99)
GLUCOSE BLDC GLUCOMTR-MCNC: 253 MG/DL — HIGH (ref 70–99)
GLUCOSE BLDC GLUCOMTR-MCNC: 258 MG/DL — HIGH (ref 70–99)
GLUCOSE BLDC GLUCOMTR-MCNC: 263 MG/DL — HIGH (ref 70–99)
GLUCOSE BLDC GLUCOMTR-MCNC: 276 MG/DL — HIGH (ref 70–99)
GLUCOSE BLDC GLUCOMTR-MCNC: 292 MG/DL — HIGH (ref 70–99)
GLUCOSE BLDC GLUCOMTR-MCNC: 299 MG/DL — HIGH (ref 70–99)
GLUCOSE BLDC GLUCOMTR-MCNC: 304 MG/DL — HIGH (ref 70–99)
GLUCOSE BLDC GLUCOMTR-MCNC: 318 MG/DL — HIGH (ref 70–99)
GLUCOSE BLDC GLUCOMTR-MCNC: 321 MG/DL — HIGH (ref 70–99)
GLUCOSE BLDC GLUCOMTR-MCNC: 325 MG/DL — HIGH (ref 70–99)
GLUCOSE BLDC GLUCOMTR-MCNC: 329 MG/DL — HIGH (ref 70–99)
GLUCOSE BLDC GLUCOMTR-MCNC: 334 MG/DL — HIGH (ref 70–99)
GLUCOSE BLDC GLUCOMTR-MCNC: 339 MG/DL — HIGH (ref 70–99)
GLUCOSE BLDC GLUCOMTR-MCNC: 353 MG/DL — HIGH (ref 70–99)
GLUCOSE BLDC GLUCOMTR-MCNC: 364 MG/DL — HIGH (ref 70–99)
GLUCOSE BLDC GLUCOMTR-MCNC: 365 MG/DL — HIGH (ref 70–99)
GLUCOSE BLDC GLUCOMTR-MCNC: 380 MG/DL — HIGH (ref 70–99)
GLUCOSE BLDC GLUCOMTR-MCNC: 394 MG/DL — HIGH (ref 70–99)
GLUCOSE BLDC GLUCOMTR-MCNC: 81 MG/DL — SIGNIFICANT CHANGE UP (ref 70–99)
GLUCOSE BLDC GLUCOMTR-MCNC: 87 MG/DL — SIGNIFICANT CHANGE UP (ref 70–99)
GLUCOSE BLDV-MCNC: 346 MG/DL — HIGH (ref 70–99)
GLUCOSE BLDV-MCNC: 351 MG/DL — HIGH (ref 70–99)
GLUCOSE BLDV-MCNC: 532 MG/DL — CRITICAL HIGH (ref 70–99)
GLUCOSE SERPL-MCNC: 108 MG/DL — HIGH (ref 70–99)
GLUCOSE SERPL-MCNC: 116 MG/DL — HIGH (ref 70–99)
GLUCOSE SERPL-MCNC: 119 MG/DL — HIGH (ref 70–99)
GLUCOSE SERPL-MCNC: 141 MG/DL — HIGH (ref 70–99)
GLUCOSE SERPL-MCNC: 142 MG/DL — HIGH (ref 70–99)
GLUCOSE SERPL-MCNC: 148 MG/DL — HIGH (ref 70–99)
GLUCOSE SERPL-MCNC: 148 MG/DL — HIGH (ref 70–99)
GLUCOSE SERPL-MCNC: 149 MG/DL — HIGH (ref 70–99)
GLUCOSE SERPL-MCNC: 149 MG/DL — HIGH (ref 70–99)
GLUCOSE SERPL-MCNC: 150 MG/DL — HIGH (ref 70–99)
GLUCOSE SERPL-MCNC: 152 MG/DL — HIGH (ref 70–99)
GLUCOSE SERPL-MCNC: 156 MG/DL — HIGH (ref 70–99)
GLUCOSE SERPL-MCNC: 165 MG/DL — HIGH (ref 70–99)
GLUCOSE SERPL-MCNC: 168 MG/DL — HIGH (ref 70–99)
GLUCOSE SERPL-MCNC: 174 MG/DL — HIGH (ref 70–99)
GLUCOSE SERPL-MCNC: 185 MG/DL — HIGH (ref 70–99)
GLUCOSE SERPL-MCNC: 235 MG/DL — HIGH (ref 70–99)
GLUCOSE SERPL-MCNC: 258 MG/DL — HIGH (ref 70–99)
GLUCOSE SERPL-MCNC: 280 MG/DL — HIGH (ref 70–99)
GLUCOSE SERPL-MCNC: 317 MG/DL — HIGH (ref 70–99)
GLUCOSE SERPL-MCNC: 321 MG/DL — HIGH (ref 70–99)
GLUCOSE SERPL-MCNC: 323 MG/DL — HIGH (ref 70–99)
GLUCOSE SERPL-MCNC: 335 MG/DL — HIGH (ref 70–99)
GLUCOSE SERPL-MCNC: 553 MG/DL — CRITICAL HIGH (ref 70–99)
GLUCOSE SERPL-MCNC: 67 MG/DL — LOW (ref 70–99)
GLUCOSE SERPL-MCNC: 84 MG/DL — SIGNIFICANT CHANGE UP (ref 70–99)
GLUCOSE SERPL-MCNC: 86 MG/DL — SIGNIFICANT CHANGE UP (ref 70–99)
GLUCOSE SERPL-MCNC: 99 MG/DL — SIGNIFICANT CHANGE UP (ref 70–99)
GLUCOSE UR QL: 1000 MG/DL
GLUCOSE UR QL: 1000 MG/DL
GLUCOSE UR QL: ABNORMAL
HADV DNA SPEC QL NAA+PROBE: SIGNIFICANT CHANGE UP
HCO3 BLDA-SCNC: 12 MMOL/L — LOW (ref 21–28)
HCO3 BLDA-SCNC: 18 MMOL/L — LOW (ref 21–28)
HCO3 BLDA-SCNC: 19 MMOL/L — LOW (ref 21–28)
HCO3 BLDA-SCNC: 20 MMOL/L — LOW (ref 21–28)
HCO3 BLDA-SCNC: 22 MMOL/L — SIGNIFICANT CHANGE UP (ref 21–28)
HCO3 BLDV-SCNC: 22 MMOL/L — SIGNIFICANT CHANGE UP (ref 21–29)
HCO3 BLDV-SCNC: 23 MMOL/L — SIGNIFICANT CHANGE UP (ref 21–29)
HCO3 BLDV-SCNC: 24 MMOL/L — SIGNIFICANT CHANGE UP (ref 21–29)
HCOV PNL SPEC NAA+PROBE: SIGNIFICANT CHANGE UP
HCT VFR BLD CALC: 35.1 % — LOW (ref 39–50)
HCT VFR BLD CALC: 36.2 % — LOW (ref 39–50)
HCT VFR BLD CALC: 37.6 % — LOW (ref 39–50)
HCT VFR BLD CALC: 37.9 % — LOW (ref 39–50)
HCT VFR BLD CALC: 37.9 % — LOW (ref 39–50)
HCT VFR BLD CALC: 39.2 % — SIGNIFICANT CHANGE UP (ref 39–50)
HCT VFR BLD CALC: 42 % — SIGNIFICANT CHANGE UP (ref 39–50)
HCT VFR BLD CALC: 42.3 % — SIGNIFICANT CHANGE UP (ref 39–50)
HCT VFR BLD CALC: 42.6 % — SIGNIFICANT CHANGE UP (ref 39–50)
HCT VFR BLD CALC: 42.6 % — SIGNIFICANT CHANGE UP (ref 39–50)
HCT VFR BLD CALC: 43.2 % — SIGNIFICANT CHANGE UP (ref 39–50)
HCT VFR BLD CALC: 43.4 % — SIGNIFICANT CHANGE UP (ref 39–50)
HCT VFR BLD CALC: 43.5 % — SIGNIFICANT CHANGE UP (ref 39–50)
HCT VFR BLD CALC: 43.6 % — SIGNIFICANT CHANGE UP (ref 39–50)
HCT VFR BLD CALC: 44.2 % — SIGNIFICANT CHANGE UP (ref 39–50)
HCT VFR BLD CALC: 44.4 % — SIGNIFICANT CHANGE UP (ref 39–50)
HCT VFR BLD CALC: 44.5 % — SIGNIFICANT CHANGE UP (ref 39–50)
HCT VFR BLD CALC: 44.5 % — SIGNIFICANT CHANGE UP (ref 39–50)
HCT VFR BLD CALC: 44.6 % — SIGNIFICANT CHANGE UP (ref 39–50)
HCT VFR BLD CALC: 44.8 % — SIGNIFICANT CHANGE UP (ref 39–50)
HCT VFR BLD CALC: 44.8 % — SIGNIFICANT CHANGE UP (ref 39–50)
HCT VFR BLD CALC: 45.8 % — SIGNIFICANT CHANGE UP (ref 39–50)
HCT VFR BLD CALC: 45.9 % — SIGNIFICANT CHANGE UP (ref 39–50)
HCT VFR BLD CALC: 46.2 % — SIGNIFICANT CHANGE UP (ref 39–50)
HCT VFR BLD CALC: 46.2 % — SIGNIFICANT CHANGE UP (ref 39–50)
HCT VFR BLDA CALC: 41 % — SIGNIFICANT CHANGE UP (ref 39–50)
HCT VFR BLDA CALC: 45 % — SIGNIFICANT CHANGE UP (ref 39–50)
HCT VFR BLDA CALC: 47 % — SIGNIFICANT CHANGE UP (ref 39–50)
HCV AB S/CO SERPL IA: 0.09 S/CO — SIGNIFICANT CHANGE UP (ref 0–0.99)
HCV AB SERPL-IMP: SIGNIFICANT CHANGE UP
HDLC SERPL-MCNC: 42 MG/DL — SIGNIFICANT CHANGE UP
HEPARIN-PF4 AB RESULT: <0.6 U/ML — SIGNIFICANT CHANGE UP (ref 0–0.9)
HGB BLD CALC-MCNC: 13.3 G/DL — SIGNIFICANT CHANGE UP (ref 13–17)
HGB BLD CALC-MCNC: 14.6 G/DL — SIGNIFICANT CHANGE UP (ref 13–17)
HGB BLD CALC-MCNC: 15.3 G/DL — SIGNIFICANT CHANGE UP (ref 13–17)
HGB BLD-MCNC: 11.8 G/DL — LOW (ref 13–17)
HGB BLD-MCNC: 11.9 G/DL — LOW (ref 13–17)
HGB BLD-MCNC: 12.2 G/DL — LOW (ref 13–17)
HGB BLD-MCNC: 12.4 G/DL — LOW (ref 13–17)
HGB BLD-MCNC: 12.5 G/DL — LOW (ref 13–17)
HGB BLD-MCNC: 12.6 G/DL — LOW (ref 13–17)
HGB BLD-MCNC: 13.7 G/DL — SIGNIFICANT CHANGE UP (ref 13–17)
HGB BLD-MCNC: 14.1 G/DL — SIGNIFICANT CHANGE UP (ref 13–17)
HGB BLD-MCNC: 14.1 G/DL — SIGNIFICANT CHANGE UP (ref 13–17)
HGB BLD-MCNC: 14.2 G/DL — SIGNIFICANT CHANGE UP (ref 13–17)
HGB BLD-MCNC: 14.3 G/DL — SIGNIFICANT CHANGE UP (ref 13–17)
HGB BLD-MCNC: 14.5 G/DL — SIGNIFICANT CHANGE UP (ref 13–17)
HGB BLD-MCNC: 14.5 G/DL — SIGNIFICANT CHANGE UP (ref 13–17)
HGB BLD-MCNC: 14.8 G/DL — SIGNIFICANT CHANGE UP (ref 13–17)
HGB BLD-MCNC: 14.9 G/DL — SIGNIFICANT CHANGE UP (ref 13–17)
HGB BLD-MCNC: 15 G/DL — SIGNIFICANT CHANGE UP (ref 13–17)
HGB BLD-MCNC: 15.1 G/DL — SIGNIFICANT CHANGE UP (ref 13–17)
HGB BLD-MCNC: 15.2 G/DL — SIGNIFICANT CHANGE UP (ref 13–17)
HGB BLD-MCNC: 15.3 G/DL — SIGNIFICANT CHANGE UP (ref 13–17)
HGB BLD-MCNC: 15.7 G/DL — SIGNIFICANT CHANGE UP (ref 13–17)
HGB BLD-MCNC: 15.7 G/DL — SIGNIFICANT CHANGE UP (ref 13–17)
HMPV RNA SPEC QL NAA+PROBE: SIGNIFICANT CHANGE UP
HOROWITZ INDEX BLDA+IHG-RTO: 100 — SIGNIFICANT CHANGE UP
HOROWITZ INDEX BLDA+IHG-RTO: 80 — SIGNIFICANT CHANGE UP
HPIV1 RNA SPEC QL NAA+PROBE: SIGNIFICANT CHANGE UP
HPIV2 RNA SPEC QL NAA+PROBE: SIGNIFICANT CHANGE UP
HPIV3 RNA SPEC QL NAA+PROBE: SIGNIFICANT CHANGE UP
HPIV4 RNA SPEC QL NAA+PROBE: SIGNIFICANT CHANGE UP
HYALINE CASTS # UR AUTO: 0 /LPF — SIGNIFICANT CHANGE UP (ref 0–7)
IMM GRANULOCYTES NFR BLD AUTO: 0.3 % — SIGNIFICANT CHANGE UP (ref 0–1.5)
IMM GRANULOCYTES NFR BLD AUTO: 0.6 % — SIGNIFICANT CHANGE UP (ref 0–1.5)
IMM GRANULOCYTES NFR BLD AUTO: 0.9 % — SIGNIFICANT CHANGE UP (ref 0–1.5)
IMM GRANULOCYTES NFR BLD AUTO: 0.9 % — SIGNIFICANT CHANGE UP (ref 0–1.5)
IMM GRANULOCYTES NFR BLD AUTO: 1.1 % — SIGNIFICANT CHANGE UP (ref 0–1.5)
IMM GRANULOCYTES NFR BLD AUTO: 1.1 % — SIGNIFICANT CHANGE UP (ref 0–1.5)
IMM GRANULOCYTES NFR BLD AUTO: 1.2 % — SIGNIFICANT CHANGE UP (ref 0–1.5)
IMM GRANULOCYTES NFR BLD AUTO: 1.3 % — SIGNIFICANT CHANGE UP (ref 0–1.5)
INR BLD: 1.19 RATIO — HIGH (ref 0.88–1.16)
INR BLD: 1.26 RATIO — HIGH (ref 0.88–1.16)
INR BLD: 1.28 RATIO — HIGH (ref 0.88–1.16)
INR BLD: 1.36 RATIO — HIGH (ref 0.88–1.16)
KETONES UR-MCNC: ABNORMAL
KETONES UR-MCNC: NEGATIVE — SIGNIFICANT CHANGE UP
KETONES UR-MCNC: NEGATIVE — SIGNIFICANT CHANGE UP
LACTATE BLDV-MCNC: 1.3 MMOL/L — SIGNIFICANT CHANGE UP (ref 0.7–2)
LACTATE BLDV-MCNC: 1.3 MMOL/L — SIGNIFICANT CHANGE UP (ref 0.7–2)
LACTATE BLDV-MCNC: 2.2 MMOL/L — HIGH (ref 0.7–2)
LACTATE BLDV-MCNC: 2.6 MMOL/L — HIGH (ref 0.7–2)
LACTATE SERPL-SCNC: 1.3 MMOL/L — SIGNIFICANT CHANGE UP (ref 0.7–2)
LDH SERPL L TO P-CCNC: 267 U/L — HIGH (ref 120–225)
LDH SERPL L TO P-CCNC: 281 U/L — HIGH (ref 120–225)
LDH SERPL L TO P-CCNC: 298 U/L — HIGH (ref 120–225)
LDH SERPL L TO P-CCNC: 334 U/L — HIGH (ref 120–225)
LDH SERPL L TO P-CCNC: 357 U/L — HIGH (ref 120–225)
LDH SERPL L TO P-CCNC: 384 U/L — HIGH (ref 120–225)
LDH SERPL L TO P-CCNC: 387 U/L — HIGH (ref 120–225)
LDH SERPL L TO P-CCNC: 393 U/L — HIGH (ref 120–225)
LDH SERPL L TO P-CCNC: 407 U/L — HIGH (ref 120–225)
LDH SERPL L TO P-CCNC: 452 U/L — HIGH (ref 120–225)
LDH SERPL L TO P-CCNC: 477 U/L — HIGH (ref 120–225)
LDH SERPL L TO P-CCNC: 594 U/L — HIGH (ref 120–225)
LEUKOCYTE ESTERASE UR-ACNC: NEGATIVE — SIGNIFICANT CHANGE UP
LIPID PNL WITH DIRECT LDL SERPL: 39 MG/DL — SIGNIFICANT CHANGE UP
LYMPHOCYTES # BLD AUTO: 0.67 K/UL — LOW (ref 1–3.3)
LYMPHOCYTES # BLD AUTO: 0.83 K/UL — LOW (ref 1–3.3)
LYMPHOCYTES # BLD AUTO: 0.86 K/UL — LOW (ref 1–3.3)
LYMPHOCYTES # BLD AUTO: 0.9 K/UL — LOW (ref 1–3.3)
LYMPHOCYTES # BLD AUTO: 0.91 K/UL — LOW (ref 1–3.3)
LYMPHOCYTES # BLD AUTO: 1.01 K/UL — SIGNIFICANT CHANGE UP (ref 1–3.3)
LYMPHOCYTES # BLD AUTO: 1.02 K/UL — SIGNIFICANT CHANGE UP (ref 1–3.3)
LYMPHOCYTES # BLD AUTO: 1.09 K/UL — SIGNIFICANT CHANGE UP (ref 1–3.3)
LYMPHOCYTES # BLD AUTO: 1.12 K/UL — SIGNIFICANT CHANGE UP (ref 1–3.3)
LYMPHOCYTES # BLD AUTO: 1.39 K/UL — SIGNIFICANT CHANGE UP (ref 1–3.3)
LYMPHOCYTES # BLD AUTO: 12.4 % — LOW (ref 13–44)
LYMPHOCYTES # BLD AUTO: 17.4 % — SIGNIFICANT CHANGE UP (ref 13–44)
LYMPHOCYTES # BLD AUTO: 2.19 K/UL — SIGNIFICANT CHANGE UP (ref 1–3.3)
LYMPHOCYTES # BLD AUTO: 29.1 % — SIGNIFICANT CHANGE UP (ref 13–44)
LYMPHOCYTES # BLD AUTO: 5 % — LOW (ref 13–44)
LYMPHOCYTES # BLD AUTO: 6.1 % — LOW (ref 13–44)
LYMPHOCYTES # BLD AUTO: 6.2 % — LOW (ref 13–44)
LYMPHOCYTES # BLD AUTO: 6.2 % — LOW (ref 13–44)
LYMPHOCYTES # BLD AUTO: 6.6 % — LOW (ref 13–44)
LYMPHOCYTES # BLD AUTO: 6.7 % — LOW (ref 13–44)
LYMPHOCYTES # BLD AUTO: 7.9 % — LOW (ref 13–44)
LYMPHOCYTES # BLD AUTO: 8.8 % — LOW (ref 13–44)
MAGNESIUM SERPL-MCNC: 1.7 MG/DL — SIGNIFICANT CHANGE UP (ref 1.6–2.6)
MAGNESIUM SERPL-MCNC: 1.8 MG/DL — SIGNIFICANT CHANGE UP (ref 1.6–2.6)
MAGNESIUM SERPL-MCNC: 1.9 MG/DL — SIGNIFICANT CHANGE UP (ref 1.6–2.6)
MAGNESIUM SERPL-MCNC: 1.9 MG/DL — SIGNIFICANT CHANGE UP (ref 1.6–2.6)
MAGNESIUM SERPL-MCNC: 2 MG/DL — SIGNIFICANT CHANGE UP (ref 1.6–2.6)
MAGNESIUM SERPL-MCNC: 2.1 MG/DL — SIGNIFICANT CHANGE UP (ref 1.6–2.6)
MAGNESIUM SERPL-MCNC: 2.1 MG/DL — SIGNIFICANT CHANGE UP (ref 1.6–2.6)
MAGNESIUM SERPL-MCNC: 2.2 MG/DL — SIGNIFICANT CHANGE UP (ref 1.6–2.6)
MAGNESIUM SERPL-MCNC: 2.3 MG/DL — SIGNIFICANT CHANGE UP (ref 1.6–2.6)
MAGNESIUM SERPL-MCNC: 2.4 MG/DL — SIGNIFICANT CHANGE UP (ref 1.6–2.6)
MAGNESIUM SERPL-MCNC: 2.4 MG/DL — SIGNIFICANT CHANGE UP (ref 1.6–2.6)
MAGNESIUM SERPL-MCNC: 2.5 MG/DL — SIGNIFICANT CHANGE UP (ref 1.6–2.6)
MANUAL SMEAR VERIFICATION: SIGNIFICANT CHANGE UP
MCHC RBC-ENTMCNC: 27.9 PG — SIGNIFICANT CHANGE UP (ref 27–34)
MCHC RBC-ENTMCNC: 28.1 PG — SIGNIFICANT CHANGE UP (ref 27–34)
MCHC RBC-ENTMCNC: 28.2 PG — SIGNIFICANT CHANGE UP (ref 27–34)
MCHC RBC-ENTMCNC: 28.3 PG — SIGNIFICANT CHANGE UP (ref 27–34)
MCHC RBC-ENTMCNC: 28.4 PG — SIGNIFICANT CHANGE UP (ref 27–34)
MCHC RBC-ENTMCNC: 28.5 PG — SIGNIFICANT CHANGE UP (ref 27–34)
MCHC RBC-ENTMCNC: 28.6 PG — SIGNIFICANT CHANGE UP (ref 27–34)
MCHC RBC-ENTMCNC: 28.6 PG — SIGNIFICANT CHANGE UP (ref 27–34)
MCHC RBC-ENTMCNC: 28.8 PG — SIGNIFICANT CHANGE UP (ref 27–34)
MCHC RBC-ENTMCNC: 28.9 PG — SIGNIFICANT CHANGE UP (ref 27–34)
MCHC RBC-ENTMCNC: 29.8 PG — SIGNIFICANT CHANGE UP (ref 27–34)
MCHC RBC-ENTMCNC: 31.9 GM/DL — LOW (ref 32–36)
MCHC RBC-ENTMCNC: 32 GM/DL — SIGNIFICANT CHANGE UP (ref 32–36)
MCHC RBC-ENTMCNC: 32.2 GM/DL — SIGNIFICANT CHANGE UP (ref 32–36)
MCHC RBC-ENTMCNC: 32.4 GM/DL — SIGNIFICANT CHANGE UP (ref 32–36)
MCHC RBC-ENTMCNC: 32.4 GM/DL — SIGNIFICANT CHANGE UP (ref 32–36)
MCHC RBC-ENTMCNC: 32.5 GM/DL — SIGNIFICANT CHANGE UP (ref 32–36)
MCHC RBC-ENTMCNC: 32.5 GM/DL — SIGNIFICANT CHANGE UP (ref 32–36)
MCHC RBC-ENTMCNC: 32.7 GM/DL — SIGNIFICANT CHANGE UP (ref 32–36)
MCHC RBC-ENTMCNC: 32.9 GM/DL — SIGNIFICANT CHANGE UP (ref 32–36)
MCHC RBC-ENTMCNC: 33 GM/DL — SIGNIFICANT CHANGE UP (ref 32–36)
MCHC RBC-ENTMCNC: 33.1 GM/DL — SIGNIFICANT CHANGE UP (ref 32–36)
MCHC RBC-ENTMCNC: 33.1 GM/DL — SIGNIFICANT CHANGE UP (ref 32–36)
MCHC RBC-ENTMCNC: 33.2 GM/DL — SIGNIFICANT CHANGE UP (ref 32–36)
MCHC RBC-ENTMCNC: 33.5 GM/DL — SIGNIFICANT CHANGE UP (ref 32–36)
MCHC RBC-ENTMCNC: 33.6 GM/DL — SIGNIFICANT CHANGE UP (ref 32–36)
MCHC RBC-ENTMCNC: 33.8 GM/DL — SIGNIFICANT CHANGE UP (ref 32–36)
MCHC RBC-ENTMCNC: 33.9 GM/DL — SIGNIFICANT CHANGE UP (ref 32–36)
MCHC RBC-ENTMCNC: 33.9 GM/DL — SIGNIFICANT CHANGE UP (ref 32–36)
MCHC RBC-ENTMCNC: 34 GM/DL — SIGNIFICANT CHANGE UP (ref 32–36)
MCHC RBC-ENTMCNC: 34 GM/DL — SIGNIFICANT CHANGE UP (ref 32–36)
MCHC RBC-ENTMCNC: 34.2 GM/DL — SIGNIFICANT CHANGE UP (ref 32–36)
MCHC RBC-ENTMCNC: 34.2 GM/DL — SIGNIFICANT CHANGE UP (ref 32–36)
MCHC RBC-ENTMCNC: 34.4 GM/DL — SIGNIFICANT CHANGE UP (ref 32–36)
MCHC RBC-ENTMCNC: 34.5 GM/DL — SIGNIFICANT CHANGE UP (ref 32–36)
MCHC RBC-ENTMCNC: 35 GM/DL — SIGNIFICANT CHANGE UP (ref 32–36)
MCV RBC AUTO: 81.7 FL — SIGNIFICANT CHANGE UP (ref 80–100)
MCV RBC AUTO: 82.8 FL — SIGNIFICANT CHANGE UP (ref 80–100)
MCV RBC AUTO: 82.9 FL — SIGNIFICANT CHANGE UP (ref 80–100)
MCV RBC AUTO: 83.2 FL — SIGNIFICANT CHANGE UP (ref 80–100)
MCV RBC AUTO: 83.3 FL — SIGNIFICANT CHANGE UP (ref 80–100)
MCV RBC AUTO: 83.4 FL — SIGNIFICANT CHANGE UP (ref 80–100)
MCV RBC AUTO: 83.8 FL — SIGNIFICANT CHANGE UP (ref 80–100)
MCV RBC AUTO: 84 FL — SIGNIFICANT CHANGE UP (ref 80–100)
MCV RBC AUTO: 84.1 FL — SIGNIFICANT CHANGE UP (ref 80–100)
MCV RBC AUTO: 84.2 FL — SIGNIFICANT CHANGE UP (ref 80–100)
MCV RBC AUTO: 84.5 FL — SIGNIFICANT CHANGE UP (ref 80–100)
MCV RBC AUTO: 85.2 FL — SIGNIFICANT CHANGE UP (ref 80–100)
MCV RBC AUTO: 85.6 FL — SIGNIFICANT CHANGE UP (ref 80–100)
MCV RBC AUTO: 85.9 FL — SIGNIFICANT CHANGE UP (ref 80–100)
MCV RBC AUTO: 86.2 FL — SIGNIFICANT CHANGE UP (ref 80–100)
MCV RBC AUTO: 86.3 FL — SIGNIFICANT CHANGE UP (ref 80–100)
MCV RBC AUTO: 86.6 FL — SIGNIFICANT CHANGE UP (ref 80–100)
MCV RBC AUTO: 86.7 FL — SIGNIFICANT CHANGE UP (ref 80–100)
MCV RBC AUTO: 86.8 FL — SIGNIFICANT CHANGE UP (ref 80–100)
MCV RBC AUTO: 87.8 FL — SIGNIFICANT CHANGE UP (ref 80–100)
MCV RBC AUTO: 88.2 FL — SIGNIFICANT CHANGE UP (ref 80–100)
MCV RBC AUTO: 88.3 FL — SIGNIFICANT CHANGE UP (ref 80–100)
MCV RBC AUTO: 88.4 FL — SIGNIFICANT CHANGE UP (ref 80–100)
MCV RBC AUTO: 88.9 FL — SIGNIFICANT CHANGE UP (ref 80–100)
MCV RBC AUTO: 89.3 FL — SIGNIFICANT CHANGE UP (ref 80–100)
MONOCYTES # BLD AUTO: 0.43 K/UL — SIGNIFICANT CHANGE UP (ref 0–0.9)
MONOCYTES # BLD AUTO: 0.48 K/UL — SIGNIFICANT CHANGE UP (ref 0–0.9)
MONOCYTES # BLD AUTO: 0.61 K/UL — SIGNIFICANT CHANGE UP (ref 0–0.9)
MONOCYTES # BLD AUTO: 0.68 K/UL — SIGNIFICANT CHANGE UP (ref 0–0.9)
MONOCYTES # BLD AUTO: 0.73 K/UL — SIGNIFICANT CHANGE UP (ref 0–0.9)
MONOCYTES # BLD AUTO: 0.73 K/UL — SIGNIFICANT CHANGE UP (ref 0–0.9)
MONOCYTES # BLD AUTO: 0.82 K/UL — SIGNIFICANT CHANGE UP (ref 0–0.9)
MONOCYTES # BLD AUTO: 0.83 K/UL — SIGNIFICANT CHANGE UP (ref 0–0.9)
MONOCYTES # BLD AUTO: 0.84 K/UL — SIGNIFICANT CHANGE UP (ref 0–0.9)
MONOCYTES # BLD AUTO: 0.97 K/UL — HIGH (ref 0–0.9)
MONOCYTES # BLD AUTO: 0.98 K/UL — HIGH (ref 0–0.9)
MONOCYTES NFR BLD AUTO: 2.9 % — SIGNIFICANT CHANGE UP (ref 2–14)
MONOCYTES NFR BLD AUTO: 5 % — SIGNIFICANT CHANGE UP (ref 2–14)
MONOCYTES NFR BLD AUTO: 5.6 % — SIGNIFICANT CHANGE UP (ref 2–14)
MONOCYTES NFR BLD AUTO: 5.9 % — SIGNIFICANT CHANGE UP (ref 2–14)
MONOCYTES NFR BLD AUTO: 6 % — SIGNIFICANT CHANGE UP (ref 2–14)
MONOCYTES NFR BLD AUTO: 6.3 % — SIGNIFICANT CHANGE UP (ref 2–14)
MONOCYTES NFR BLD AUTO: 6.9 % — SIGNIFICANT CHANGE UP (ref 2–14)
MONOCYTES NFR BLD AUTO: 7.7 % — SIGNIFICANT CHANGE UP (ref 2–14)
MONOCYTES NFR BLD AUTO: 9.7 % — SIGNIFICANT CHANGE UP (ref 2–14)
MRSA PCR RESULT.: SIGNIFICANT CHANGE UP
NEUTROPHILS # BLD AUTO: 12.82 K/UL — HIGH (ref 1.8–7.4)
NEUTROPHILS # BLD AUTO: 13.1 K/UL — HIGH (ref 1.8–7.4)
NEUTROPHILS # BLD AUTO: 13.2 K/UL — HIGH (ref 1.8–7.4)
NEUTROPHILS # BLD AUTO: 14.41 K/UL — HIGH (ref 1.8–7.4)
NEUTROPHILS # BLD AUTO: 14.8 K/UL — HIGH (ref 1.8–7.4)
NEUTROPHILS # BLD AUTO: 4.49 K/UL — SIGNIFICANT CHANGE UP (ref 1.8–7.4)
NEUTROPHILS # BLD AUTO: 6.06 K/UL — SIGNIFICANT CHANGE UP (ref 1.8–7.4)
NEUTROPHILS # BLD AUTO: 7.01 K/UL — SIGNIFICANT CHANGE UP (ref 1.8–7.4)
NEUTROPHILS # BLD AUTO: 8.75 K/UL — HIGH (ref 1.8–7.4)
NEUTROPHILS # BLD AUTO: 9.27 K/UL — HIGH (ref 1.8–7.4)
NEUTROPHILS # BLD AUTO: 9.42 K/UL — HIGH (ref 1.8–7.4)
NEUTROPHILS NFR BLD AUTO: 59.6 % — SIGNIFICANT CHANGE UP (ref 43–77)
NEUTROPHILS NFR BLD AUTO: 75.6 % — SIGNIFICANT CHANGE UP (ref 43–77)
NEUTROPHILS NFR BLD AUTO: 79.9 % — HIGH (ref 43–77)
NEUTROPHILS NFR BLD AUTO: 80.2 % — HIGH (ref 43–77)
NEUTROPHILS NFR BLD AUTO: 81.6 % — HIGH (ref 43–77)
NEUTROPHILS NFR BLD AUTO: 85.3 % — HIGH (ref 43–77)
NEUTROPHILS NFR BLD AUTO: 86 % — HIGH (ref 43–77)
NEUTROPHILS NFR BLD AUTO: 86.4 % — HIGH (ref 43–77)
NEUTROPHILS NFR BLD AUTO: 86.8 % — HIGH (ref 43–77)
NEUTROPHILS NFR BLD AUTO: 89 % — HIGH (ref 43–77)
NEUTROPHILS NFR BLD AUTO: 89.2 % — HIGH (ref 43–77)
NITRITE UR-MCNC: NEGATIVE — SIGNIFICANT CHANGE UP
NON HDL CHOLESTEROL: 54 MG/DL — SIGNIFICANT CHANGE UP
NRBC # BLD: 0 /100 WBCS — SIGNIFICANT CHANGE UP (ref 0–0)
NRBC # BLD: 0 /100 — SIGNIFICANT CHANGE UP (ref 0–0)
NT-PROBNP SERPL-SCNC: 398 PG/ML — SIGNIFICANT CHANGE UP (ref 0–450)
NT-PROBNP SERPL-SCNC: 423 PG/ML — SIGNIFICANT CHANGE UP (ref 0–450)
OSMOLALITY SERPL: 312 MOSMOL/KG — HIGH (ref 280–301)
OSMOLALITY UR: 466 MOS/KG — SIGNIFICANT CHANGE UP (ref 50–1200)
OTHER CELLS CSF MANUAL: 17 ML/DL — LOW (ref 18–22)
PCO2 BLDA: 23 MMHG — LOW (ref 35–48)
PCO2 BLDA: 25 MMHG — LOW (ref 35–48)
PCO2 BLDA: 29 MMHG — LOW (ref 35–48)
PCO2 BLDA: 37 MMHG — SIGNIFICANT CHANGE UP (ref 35–48)
PCO2 BLDA: 46 MMHG — SIGNIFICANT CHANGE UP (ref 35–48)
PCO2 BLDV: 37 MMHG — SIGNIFICANT CHANGE UP (ref 35–50)
PCO2 BLDV: 42 MMHG — SIGNIFICANT CHANGE UP (ref 35–50)
PCO2 BLDV: 48 MMHG — SIGNIFICANT CHANGE UP (ref 35–50)
PF4 HEPARIN CMPLX AB SER-ACNC: NEGATIVE — SIGNIFICANT CHANGE UP
PH BLDA: 7.29 — LOW (ref 7.35–7.45)
PH BLDA: 7.32 — LOW (ref 7.35–7.45)
PH BLDA: 7.48 — HIGH (ref 7.35–7.45)
PH BLDA: 7.5 — HIGH (ref 7.35–7.45)
PH BLDA: 7.51 — HIGH (ref 7.35–7.45)
PH BLDV: 7.31 — LOW (ref 7.35–7.45)
PH BLDV: 7.32 — LOW (ref 7.35–7.45)
PH BLDV: 7.41 — SIGNIFICANT CHANGE UP (ref 7.35–7.45)
PH UR: 5 — SIGNIFICANT CHANGE UP (ref 5–8)
PH UR: 6 — SIGNIFICANT CHANGE UP (ref 5–8)
PH UR: 6.5 — SIGNIFICANT CHANGE UP (ref 5–8)
PHOSPHATE SERPL-MCNC: 2.6 MG/DL — SIGNIFICANT CHANGE UP (ref 2.5–4.5)
PHOSPHATE SERPL-MCNC: 2.9 MG/DL — SIGNIFICANT CHANGE UP (ref 2.5–4.5)
PHOSPHATE SERPL-MCNC: 3 MG/DL — SIGNIFICANT CHANGE UP (ref 2.5–4.5)
PHOSPHATE SERPL-MCNC: 3.1 MG/DL — SIGNIFICANT CHANGE UP (ref 2.5–4.5)
PHOSPHATE SERPL-MCNC: 3.1 MG/DL — SIGNIFICANT CHANGE UP (ref 2.5–4.5)
PHOSPHATE SERPL-MCNC: 3.2 MG/DL — SIGNIFICANT CHANGE UP (ref 2.5–4.5)
PHOSPHATE SERPL-MCNC: 3.2 MG/DL — SIGNIFICANT CHANGE UP (ref 2.5–4.5)
PHOSPHATE SERPL-MCNC: 3.3 MG/DL — SIGNIFICANT CHANGE UP (ref 2.5–4.5)
PHOSPHATE SERPL-MCNC: 3.5 MG/DL — SIGNIFICANT CHANGE UP (ref 2.5–4.5)
PHOSPHATE SERPL-MCNC: 3.6 MG/DL — SIGNIFICANT CHANGE UP (ref 2.5–4.5)
PHOSPHATE SERPL-MCNC: 3.8 MG/DL — SIGNIFICANT CHANGE UP (ref 2.5–4.5)
PHOSPHATE SERPL-MCNC: 4.1 MG/DL — SIGNIFICANT CHANGE UP (ref 2.5–4.5)
PHOSPHATE SERPL-MCNC: 4.3 MG/DL — SIGNIFICANT CHANGE UP (ref 2.5–4.5)
PHOSPHATE SERPL-MCNC: 4.5 MG/DL — SIGNIFICANT CHANGE UP (ref 2.5–4.5)
PLAT MORPH BLD: NORMAL — SIGNIFICANT CHANGE UP
PLATELET # BLD AUTO: 103 K/UL — LOW (ref 150–400)
PLATELET # BLD AUTO: 105 K/UL — LOW (ref 150–400)
PLATELET # BLD AUTO: 113 K/UL — LOW (ref 150–400)
PLATELET # BLD AUTO: 115 K/UL — LOW (ref 150–400)
PLATELET # BLD AUTO: 120 K/UL — LOW (ref 150–400)
PLATELET # BLD AUTO: 130 K/UL — LOW (ref 150–400)
PLATELET # BLD AUTO: 147 K/UL — LOW (ref 150–400)
PLATELET # BLD AUTO: 153 K/UL — SIGNIFICANT CHANGE UP (ref 150–400)
PLATELET # BLD AUTO: 158 K/UL — SIGNIFICANT CHANGE UP (ref 150–400)
PLATELET # BLD AUTO: 169 K/UL — SIGNIFICANT CHANGE UP (ref 150–400)
PLATELET # BLD AUTO: 176 K/UL — SIGNIFICANT CHANGE UP (ref 150–400)
PLATELET # BLD AUTO: 183 K/UL — SIGNIFICANT CHANGE UP (ref 150–400)
PLATELET # BLD AUTO: 232 K/UL — SIGNIFICANT CHANGE UP (ref 150–400)
PLATELET # BLD AUTO: 246 K/UL — SIGNIFICANT CHANGE UP (ref 150–400)
PLATELET # BLD AUTO: 250 K/UL — SIGNIFICANT CHANGE UP (ref 150–400)
PLATELET # BLD AUTO: 323 K/UL — SIGNIFICANT CHANGE UP (ref 150–400)
PLATELET # BLD AUTO: 374 K/UL — SIGNIFICANT CHANGE UP (ref 150–400)
PLATELET # BLD AUTO: 385 K/UL — SIGNIFICANT CHANGE UP (ref 150–400)
PLATELET # BLD AUTO: 388 K/UL — SIGNIFICANT CHANGE UP (ref 150–400)
PLATELET # BLD AUTO: 390 K/UL — SIGNIFICANT CHANGE UP (ref 150–400)
PLATELET # BLD AUTO: 399 K/UL — SIGNIFICANT CHANGE UP (ref 150–400)
PLATELET # BLD AUTO: 401 K/UL — HIGH (ref 150–400)
PLATELET # BLD AUTO: 408 K/UL — HIGH (ref 150–400)
PLATELET # BLD AUTO: 434 K/UL — HIGH (ref 150–400)
PLATELET # BLD AUTO: 95 K/UL — LOW (ref 150–400)
PLATELET COUNT - ESTIMATE: NORMAL — SIGNIFICANT CHANGE UP
PO2 BLDA: 126 MMHG — HIGH (ref 83–108)
PO2 BLDA: 63 MMHG — LOW (ref 83–108)
PO2 BLDA: 63 MMHG — LOW (ref 83–108)
PO2 BLDA: 76 MMHG — LOW (ref 83–108)
PO2 BLDA: 76 MMHG — LOW (ref 83–108)
PO2 BLDV: 27 MMHG — SIGNIFICANT CHANGE UP (ref 25–45)
PO2 BLDV: 40 MMHG — SIGNIFICANT CHANGE UP (ref 25–45)
PO2 BLDV: 64 MMHG — HIGH (ref 25–45)
POTASSIUM BLDV-SCNC: 3.8 MMOL/L — SIGNIFICANT CHANGE UP (ref 3.5–5.3)
POTASSIUM BLDV-SCNC: 4.1 MMOL/L — SIGNIFICANT CHANGE UP (ref 3.5–5.3)
POTASSIUM BLDV-SCNC: 4.2 MMOL/L — SIGNIFICANT CHANGE UP (ref 3.5–5.3)
POTASSIUM SERPL-MCNC: 3.2 MMOL/L — LOW (ref 3.5–5.3)
POTASSIUM SERPL-MCNC: 3.3 MMOL/L — LOW (ref 3.5–5.3)
POTASSIUM SERPL-MCNC: 3.3 MMOL/L — LOW (ref 3.5–5.3)
POTASSIUM SERPL-MCNC: 3.4 MMOL/L — LOW (ref 3.5–5.3)
POTASSIUM SERPL-MCNC: 3.4 MMOL/L — LOW (ref 3.5–5.3)
POTASSIUM SERPL-MCNC: 3.5 MMOL/L — SIGNIFICANT CHANGE UP (ref 3.5–5.3)
POTASSIUM SERPL-MCNC: 3.5 MMOL/L — SIGNIFICANT CHANGE UP (ref 3.5–5.3)
POTASSIUM SERPL-MCNC: 3.6 MMOL/L — SIGNIFICANT CHANGE UP (ref 3.5–5.3)
POTASSIUM SERPL-MCNC: 3.7 MMOL/L — SIGNIFICANT CHANGE UP (ref 3.5–5.3)
POTASSIUM SERPL-MCNC: 3.8 MMOL/L — SIGNIFICANT CHANGE UP (ref 3.5–5.3)
POTASSIUM SERPL-MCNC: 4 MMOL/L — SIGNIFICANT CHANGE UP (ref 3.5–5.3)
POTASSIUM SERPL-MCNC: 4.1 MMOL/L — SIGNIFICANT CHANGE UP (ref 3.5–5.3)
POTASSIUM SERPL-MCNC: 4.3 MMOL/L — SIGNIFICANT CHANGE UP (ref 3.5–5.3)
POTASSIUM SERPL-MCNC: 4.4 MMOL/L — SIGNIFICANT CHANGE UP (ref 3.5–5.3)
POTASSIUM SERPL-MCNC: 4.5 MMOL/L — SIGNIFICANT CHANGE UP (ref 3.5–5.3)
POTASSIUM SERPL-MCNC: 4.7 MMOL/L — SIGNIFICANT CHANGE UP (ref 3.5–5.3)
POTASSIUM SERPL-SCNC: 3.2 MMOL/L — LOW (ref 3.5–5.3)
POTASSIUM SERPL-SCNC: 3.3 MMOL/L — LOW (ref 3.5–5.3)
POTASSIUM SERPL-SCNC: 3.3 MMOL/L — LOW (ref 3.5–5.3)
POTASSIUM SERPL-SCNC: 3.4 MMOL/L — LOW (ref 3.5–5.3)
POTASSIUM SERPL-SCNC: 3.4 MMOL/L — LOW (ref 3.5–5.3)
POTASSIUM SERPL-SCNC: 3.5 MMOL/L — SIGNIFICANT CHANGE UP (ref 3.5–5.3)
POTASSIUM SERPL-SCNC: 3.5 MMOL/L — SIGNIFICANT CHANGE UP (ref 3.5–5.3)
POTASSIUM SERPL-SCNC: 3.6 MMOL/L — SIGNIFICANT CHANGE UP (ref 3.5–5.3)
POTASSIUM SERPL-SCNC: 3.7 MMOL/L — SIGNIFICANT CHANGE UP (ref 3.5–5.3)
POTASSIUM SERPL-SCNC: 3.8 MMOL/L — SIGNIFICANT CHANGE UP (ref 3.5–5.3)
POTASSIUM SERPL-SCNC: 4 MMOL/L — SIGNIFICANT CHANGE UP (ref 3.5–5.3)
POTASSIUM SERPL-SCNC: 4.1 MMOL/L — SIGNIFICANT CHANGE UP (ref 3.5–5.3)
POTASSIUM SERPL-SCNC: 4.3 MMOL/L — SIGNIFICANT CHANGE UP (ref 3.5–5.3)
POTASSIUM SERPL-SCNC: 4.4 MMOL/L — SIGNIFICANT CHANGE UP (ref 3.5–5.3)
POTASSIUM SERPL-SCNC: 4.5 MMOL/L — SIGNIFICANT CHANGE UP (ref 3.5–5.3)
POTASSIUM SERPL-SCNC: 4.7 MMOL/L — SIGNIFICANT CHANGE UP (ref 3.5–5.3)
PROCALCITONIN SERPL-MCNC: 0.16 NG/ML — HIGH (ref 0.02–0.1)
PROT SERPL-MCNC: 6.5 G/DL — SIGNIFICANT CHANGE UP (ref 6–8.3)
PROT SERPL-MCNC: 6.5 G/DL — SIGNIFICANT CHANGE UP (ref 6–8.3)
PROT SERPL-MCNC: 6.8 G/DL — SIGNIFICANT CHANGE UP (ref 6–8.3)
PROT SERPL-MCNC: 6.8 G/DL — SIGNIFICANT CHANGE UP (ref 6–8.3)
PROT SERPL-MCNC: 6.9 G/DL — SIGNIFICANT CHANGE UP (ref 6–8.3)
PROT SERPL-MCNC: 6.9 G/DL — SIGNIFICANT CHANGE UP (ref 6–8.3)
PROT SERPL-MCNC: 7.3 G/DL — SIGNIFICANT CHANGE UP (ref 6–8.3)
PROT SERPL-MCNC: 7.3 G/DL — SIGNIFICANT CHANGE UP (ref 6–8.3)
PROT SERPL-MCNC: 7.4 G/DL — SIGNIFICANT CHANGE UP (ref 6–8.3)
PROT SERPL-MCNC: 7.4 G/DL — SIGNIFICANT CHANGE UP (ref 6–8.3)
PROT SERPL-MCNC: 7.5 G/DL — SIGNIFICANT CHANGE UP (ref 6–8.3)
PROT SERPL-MCNC: 7.7 G/DL — SIGNIFICANT CHANGE UP (ref 6–8.3)
PROT SERPL-MCNC: 8.1 G/DL — SIGNIFICANT CHANGE UP (ref 6–8.3)
PROT SERPL-MCNC: 8.1 G/DL — SIGNIFICANT CHANGE UP (ref 6–8.3)
PROT SERPL-MCNC: 8.2 G/DL — SIGNIFICANT CHANGE UP (ref 6–8.3)
PROT UR-MCNC: 30 MG/DL
PROT UR-MCNC: 30 MG/DL
PROT UR-MCNC: NEGATIVE — SIGNIFICANT CHANGE UP
PROTHROM AB SERPL-ACNC: 14.1 SEC — HIGH (ref 10.6–13.6)
PROTHROM AB SERPL-ACNC: 14.8 SEC — HIGH (ref 10.6–13.6)
PROTHROM AB SERPL-ACNC: 15.1 SEC — HIGH (ref 10.6–13.6)
PROTHROM AB SERPL-ACNC: 16 SEC — HIGH (ref 10.6–13.6)
RAPID RVP RESULT: DETECTED
RAPID RVP RESULT: DETECTED
RBC # BLD: 4.17 M/UL — LOW (ref 4.2–5.8)
RBC # BLD: 4.19 M/UL — LOW (ref 4.2–5.8)
RBC # BLD: 4.34 M/UL — SIGNIFICANT CHANGE UP (ref 4.2–5.8)
RBC # BLD: 4.37 M/UL — SIGNIFICANT CHANGE UP (ref 4.2–5.8)
RBC # BLD: 4.39 M/UL — SIGNIFICANT CHANGE UP (ref 4.2–5.8)
RBC # BLD: 4.45 M/UL — SIGNIFICANT CHANGE UP (ref 4.2–5.8)
RBC # BLD: 4.83 M/UL — SIGNIFICANT CHANGE UP (ref 4.2–5.8)
RBC # BLD: 4.88 M/UL — SIGNIFICANT CHANGE UP (ref 4.2–5.8)
RBC # BLD: 4.96 M/UL — SIGNIFICANT CHANGE UP (ref 4.2–5.8)
RBC # BLD: 4.99 M/UL — SIGNIFICANT CHANGE UP (ref 4.2–5.8)
RBC # BLD: 5.01 M/UL — SIGNIFICANT CHANGE UP (ref 4.2–5.8)
RBC # BLD: 5.04 M/UL — SIGNIFICANT CHANGE UP (ref 4.2–5.8)
RBC # BLD: 5.07 M/UL — SIGNIFICANT CHANGE UP (ref 4.2–5.8)
RBC # BLD: 5.08 M/UL — SIGNIFICANT CHANGE UP (ref 4.2–5.8)
RBC # BLD: 5.18 M/UL — SIGNIFICANT CHANGE UP (ref 4.2–5.8)
RBC # BLD: 5.23 M/UL — SIGNIFICANT CHANGE UP (ref 4.2–5.8)
RBC # BLD: 5.23 M/UL — SIGNIFICANT CHANGE UP (ref 4.2–5.8)
RBC # BLD: 5.24 M/UL — SIGNIFICANT CHANGE UP (ref 4.2–5.8)
RBC # BLD: 5.29 M/UL — SIGNIFICANT CHANGE UP (ref 4.2–5.8)
RBC # BLD: 5.35 M/UL — SIGNIFICANT CHANGE UP (ref 4.2–5.8)
RBC # BLD: 5.36 M/UL — SIGNIFICANT CHANGE UP (ref 4.2–5.8)
RBC # BLD: 5.37 M/UL — SIGNIFICANT CHANGE UP (ref 4.2–5.8)
RBC # BLD: 5.37 M/UL — SIGNIFICANT CHANGE UP (ref 4.2–5.8)
RBC # BLD: 5.5 M/UL — SIGNIFICANT CHANGE UP (ref 4.2–5.8)
RBC # BLD: 5.51 M/UL — SIGNIFICANT CHANGE UP (ref 4.2–5.8)
RBC # FLD: 12 % — SIGNIFICANT CHANGE UP (ref 10.3–14.5)
RBC # FLD: 12.3 % — SIGNIFICANT CHANGE UP (ref 10.3–14.5)
RBC # FLD: 12.3 % — SIGNIFICANT CHANGE UP (ref 10.3–14.5)
RBC # FLD: 12.5 % — SIGNIFICANT CHANGE UP (ref 10.3–14.5)
RBC # FLD: 12.5 % — SIGNIFICANT CHANGE UP (ref 10.3–14.5)
RBC # FLD: 12.6 % — SIGNIFICANT CHANGE UP (ref 10.3–14.5)
RBC # FLD: 12.8 % — SIGNIFICANT CHANGE UP (ref 10.3–14.5)
RBC # FLD: 12.9 % — SIGNIFICANT CHANGE UP (ref 10.3–14.5)
RBC # FLD: 12.9 % — SIGNIFICANT CHANGE UP (ref 10.3–14.5)
RBC # FLD: 13 % — SIGNIFICANT CHANGE UP (ref 10.3–14.5)
RBC # FLD: 13.1 % — SIGNIFICANT CHANGE UP (ref 10.3–14.5)
RBC # FLD: 13.2 % — SIGNIFICANT CHANGE UP (ref 10.3–14.5)
RBC # FLD: 13.5 % — SIGNIFICANT CHANGE UP (ref 10.3–14.5)
RBC BLD AUTO: NORMAL — SIGNIFICANT CHANGE UP
RBC CASTS # UR COMP ASSIST: 2 /HPF — SIGNIFICANT CHANGE UP (ref 0–4)
RBC CASTS # UR COMP ASSIST: ABNORMAL /HPF (ref 0–2)
RV+EV RNA SPEC QL NAA+PROBE: SIGNIFICANT CHANGE UP
S AUREUS DNA NOSE QL NAA+PROBE: SIGNIFICANT CHANGE UP
SAO2 % BLDA: 91 % — SIGNIFICANT CHANGE UP
SAO2 % BLDA: 93 % — SIGNIFICANT CHANGE UP
SAO2 % BLDA: 94 % — SIGNIFICANT CHANGE UP
SAO2 % BLDA: 96 % — SIGNIFICANT CHANGE UP
SAO2 % BLDA: 99 % — SIGNIFICANT CHANGE UP
SAO2 % BLDV: 43 % — LOW (ref 67–88)
SAO2 % BLDV: 71 % — SIGNIFICANT CHANGE UP (ref 67–88)
SAO2 % BLDV: 92 % — HIGH (ref 67–88)
SARS-COV-2 IGG SERPL QL IA: NEGATIVE — SIGNIFICANT CHANGE UP
SARS-COV-2 IGG+IGM SERPL QL IA: 0.59 U/ML — SIGNIFICANT CHANGE UP
SARS-COV-2 IGG+IGM SERPL QL IA: NEGATIVE — SIGNIFICANT CHANGE UP
SARS-COV-2 IGM SERPL IA-ACNC: <0.1 INDEX — SIGNIFICANT CHANGE UP
SARS-COV-2 RNA SPEC QL NAA+PROBE: DETECTED
SARS-COV-2 RNA SPEC QL NAA+PROBE: DETECTED
SARS-COV-2 RNA SPEC QL NAA+PROBE: SIGNIFICANT CHANGE UP
SODIUM SERPL-SCNC: 125 MMOL/L — LOW (ref 135–145)
SODIUM SERPL-SCNC: 127 MMOL/L — LOW (ref 135–145)
SODIUM SERPL-SCNC: 129 MMOL/L — LOW (ref 135–145)
SODIUM SERPL-SCNC: 130 MMOL/L — LOW (ref 135–145)
SODIUM SERPL-SCNC: 133 MMOL/L — LOW (ref 135–145)
SODIUM SERPL-SCNC: 133 MMOL/L — LOW (ref 135–145)
SODIUM SERPL-SCNC: 134 MMOL/L — LOW (ref 135–145)
SODIUM SERPL-SCNC: 135 MMOL/L — SIGNIFICANT CHANGE UP (ref 135–145)
SODIUM SERPL-SCNC: 136 MMOL/L — SIGNIFICANT CHANGE UP (ref 135–145)
SODIUM SERPL-SCNC: 138 MMOL/L — SIGNIFICANT CHANGE UP (ref 135–145)
SODIUM SERPL-SCNC: 139 MMOL/L — SIGNIFICANT CHANGE UP (ref 135–145)
SODIUM SERPL-SCNC: 141 MMOL/L — SIGNIFICANT CHANGE UP (ref 135–145)
SODIUM SERPL-SCNC: 142 MMOL/L — SIGNIFICANT CHANGE UP (ref 135–145)
SODIUM SERPL-SCNC: 144 MMOL/L — SIGNIFICANT CHANGE UP (ref 135–145)
SODIUM SERPL-SCNC: 145 MMOL/L — SIGNIFICANT CHANGE UP (ref 135–145)
SODIUM SERPL-SCNC: 145 MMOL/L — SIGNIFICANT CHANGE UP (ref 135–145)
SODIUM SERPL-SCNC: 146 MMOL/L — HIGH (ref 135–145)
SODIUM UR-SCNC: 32 MMOL/L — SIGNIFICANT CHANGE UP
SODIUM UR-SCNC: 7 MMOL/L — SIGNIFICANT CHANGE UP
SP GR SPEC: 1.01 — SIGNIFICANT CHANGE UP (ref 1.01–1.02)
SP GR SPEC: 1.01 — SIGNIFICANT CHANGE UP (ref 1.01–1.02)
SP GR SPEC: 1.02 — SIGNIFICANT CHANGE UP (ref 1.01–1.02)
SPECIMEN SOURCE: SIGNIFICANT CHANGE UP
SRA INTERP SER-IMP: SIGNIFICANT CHANGE UP
TRIGL SERPL-MCNC: 77 MG/DL — SIGNIFICANT CHANGE UP
TROPONIN I SERPL-MCNC: <0.015 NG/ML — SIGNIFICANT CHANGE UP (ref 0–0.04)
TSH SERPL-MCNC: 0.96 UU/ML — SIGNIFICANT CHANGE UP (ref 0.34–4.82)
UROBILINOGEN FLD QL: NEGATIVE — SIGNIFICANT CHANGE UP
VARIANT LYMPHS # BLD: 1 % — SIGNIFICANT CHANGE UP (ref 0–6)
WBC # BLD: 10.74 K/UL — HIGH (ref 3.8–10.5)
WBC # BLD: 10.91 K/UL — HIGH (ref 3.8–10.5)
WBC # BLD: 11.43 K/UL — HIGH (ref 3.8–10.5)
WBC # BLD: 11.47 K/UL — HIGH (ref 3.8–10.5)
WBC # BLD: 11.54 K/UL — HIGH (ref 3.8–10.5)
WBC # BLD: 12 K/UL — HIGH (ref 3.8–10.5)
WBC # BLD: 12.64 K/UL — HIGH (ref 3.8–10.5)
WBC # BLD: 12.81 K/UL — HIGH (ref 3.8–10.5)
WBC # BLD: 13.06 K/UL — HIGH (ref 3.8–10.5)
WBC # BLD: 13.51 K/UL — HIGH (ref 3.8–10.5)
WBC # BLD: 13.69 K/UL — HIGH (ref 3.8–10.5)
WBC # BLD: 13.72 K/UL — HIGH (ref 3.8–10.5)
WBC # BLD: 14.31 K/UL — HIGH (ref 3.8–10.5)
WBC # BLD: 14.56 K/UL — HIGH (ref 3.8–10.5)
WBC # BLD: 14.73 K/UL — HIGH (ref 3.8–10.5)
WBC # BLD: 14.76 K/UL — HIGH (ref 3.8–10.5)
WBC # BLD: 14.8 K/UL — HIGH (ref 3.8–10.5)
WBC # BLD: 15.38 K/UL — HIGH (ref 3.8–10.5)
WBC # BLD: 16.56 K/UL — HIGH (ref 3.8–10.5)
WBC # BLD: 16.63 K/UL — HIGH (ref 3.8–10.5)
WBC # BLD: 16.71 K/UL — HIGH (ref 3.8–10.5)
WBC # BLD: 16.74 K/UL — HIGH (ref 3.8–10.5)
WBC # BLD: 7.53 K/UL — SIGNIFICANT CHANGE UP (ref 3.8–10.5)
WBC # BLD: 8.01 K/UL — SIGNIFICANT CHANGE UP (ref 3.8–10.5)
WBC # BLD: 8.78 K/UL — SIGNIFICANT CHANGE UP (ref 3.8–10.5)
WBC # FLD AUTO: 10.74 K/UL — HIGH (ref 3.8–10.5)
WBC # FLD AUTO: 10.91 K/UL — HIGH (ref 3.8–10.5)
WBC # FLD AUTO: 11.43 K/UL — HIGH (ref 3.8–10.5)
WBC # FLD AUTO: 11.47 K/UL — HIGH (ref 3.8–10.5)
WBC # FLD AUTO: 11.54 K/UL — HIGH (ref 3.8–10.5)
WBC # FLD AUTO: 12 K/UL — HIGH (ref 3.8–10.5)
WBC # FLD AUTO: 12.64 K/UL — HIGH (ref 3.8–10.5)
WBC # FLD AUTO: 12.81 K/UL — HIGH (ref 3.8–10.5)
WBC # FLD AUTO: 13.06 K/UL — HIGH (ref 3.8–10.5)
WBC # FLD AUTO: 13.51 K/UL — HIGH (ref 3.8–10.5)
WBC # FLD AUTO: 13.69 K/UL — HIGH (ref 3.8–10.5)
WBC # FLD AUTO: 13.72 K/UL — HIGH (ref 3.8–10.5)
WBC # FLD AUTO: 14.31 K/UL — HIGH (ref 3.8–10.5)
WBC # FLD AUTO: 14.56 K/UL — HIGH (ref 3.8–10.5)
WBC # FLD AUTO: 14.73 K/UL — HIGH (ref 3.8–10.5)
WBC # FLD AUTO: 14.76 K/UL — HIGH (ref 3.8–10.5)
WBC # FLD AUTO: 14.8 K/UL — HIGH (ref 3.8–10.5)
WBC # FLD AUTO: 15.38 K/UL — HIGH (ref 3.8–10.5)
WBC # FLD AUTO: 16.56 K/UL — HIGH (ref 3.8–10.5)
WBC # FLD AUTO: 16.63 K/UL — HIGH (ref 3.8–10.5)
WBC # FLD AUTO: 16.71 K/UL — HIGH (ref 3.8–10.5)
WBC # FLD AUTO: 16.74 K/UL — HIGH (ref 3.8–10.5)
WBC # FLD AUTO: 7.53 K/UL — SIGNIFICANT CHANGE UP (ref 3.8–10.5)
WBC # FLD AUTO: 8.01 K/UL — SIGNIFICANT CHANGE UP (ref 3.8–10.5)
WBC # FLD AUTO: 8.78 K/UL — SIGNIFICANT CHANGE UP (ref 3.8–10.5)
WBC UR QL: 0 /HPF — SIGNIFICANT CHANGE UP (ref 0–5)
WBC UR QL: SIGNIFICANT CHANGE UP /HPF (ref 0–5)

## 2021-01-01 PROCEDURE — 36415 COLL VENOUS BLD VENIPUNCTURE: CPT

## 2021-01-01 PROCEDURE — 84295 ASSAY OF SERUM SODIUM: CPT

## 2021-01-01 PROCEDURE — 71045 X-RAY EXAM CHEST 1 VIEW: CPT | Mod: 26

## 2021-01-01 PROCEDURE — 85027 COMPLETE CBC AUTOMATED: CPT

## 2021-01-01 PROCEDURE — 97530 THERAPEUTIC ACTIVITIES: CPT

## 2021-01-01 PROCEDURE — 97162 PT EVAL MOD COMPLEX 30 MIN: CPT

## 2021-01-01 PROCEDURE — 86022 PLATELET ANTIBODIES: CPT

## 2021-01-01 PROCEDURE — 85025 COMPLETE CBC W/AUTO DIFF WBC: CPT

## 2021-01-01 PROCEDURE — 80048 BASIC METABOLIC PNL TOTAL CA: CPT

## 2021-01-01 PROCEDURE — 85014 HEMATOCRIT: CPT

## 2021-01-01 PROCEDURE — 99291 CRITICAL CARE FIRST HOUR: CPT | Mod: GC

## 2021-01-01 PROCEDURE — 84443 ASSAY THYROID STIM HORMONE: CPT

## 2021-01-01 PROCEDURE — 84300 ASSAY OF URINE SODIUM: CPT

## 2021-01-01 PROCEDURE — 82803 BLOOD GASES ANY COMBINATION: CPT

## 2021-01-01 PROCEDURE — 70450 CT HEAD/BRAIN W/O DYE: CPT

## 2021-01-01 PROCEDURE — 87641 MR-STAPH DNA AMP PROBE: CPT

## 2021-01-01 PROCEDURE — 99285 EMERGENCY DEPT VISIT HI MDM: CPT

## 2021-01-01 PROCEDURE — 82330 ASSAY OF CALCIUM: CPT

## 2021-01-01 PROCEDURE — 71045 X-RAY EXAM CHEST 1 VIEW: CPT

## 2021-01-01 PROCEDURE — 0225U NFCT DS DNA&RNA 21 SARSCOV2: CPT

## 2021-01-01 PROCEDURE — 82435 ASSAY OF BLOOD CHLORIDE: CPT

## 2021-01-01 PROCEDURE — 93005 ELECTROCARDIOGRAM TRACING: CPT

## 2021-01-01 PROCEDURE — U0003: CPT

## 2021-01-01 PROCEDURE — 82728 ASSAY OF FERRITIN: CPT

## 2021-01-01 PROCEDURE — P9047: CPT

## 2021-01-01 PROCEDURE — 87086 URINE CULTURE/COLONY COUNT: CPT

## 2021-01-01 PROCEDURE — 71275 CT ANGIOGRAPHY CHEST: CPT

## 2021-01-01 PROCEDURE — 80053 COMPREHEN METABOLIC PANEL: CPT

## 2021-01-01 PROCEDURE — 84132 ASSAY OF SERUM POTASSIUM: CPT

## 2021-01-01 PROCEDURE — 99291 CRITICAL CARE FIRST HOUR: CPT | Mod: 25

## 2021-01-01 PROCEDURE — 86140 C-REACTIVE PROTEIN: CPT

## 2021-01-01 PROCEDURE — 85610 PROTHROMBIN TIME: CPT

## 2021-01-01 PROCEDURE — 82010 KETONE BODYS QUAN: CPT

## 2021-01-01 PROCEDURE — 80061 LIPID PANEL: CPT

## 2021-01-01 PROCEDURE — 83935 ASSAY OF URINE OSMOLALITY: CPT

## 2021-01-01 PROCEDURE — 83880 ASSAY OF NATRIURETIC PEPTIDE: CPT

## 2021-01-01 PROCEDURE — 94640 AIRWAY INHALATION TREATMENT: CPT

## 2021-01-01 PROCEDURE — 84100 ASSAY OF PHOSPHORUS: CPT

## 2021-01-01 PROCEDURE — 87640 STAPH A DNA AMP PROBE: CPT

## 2021-01-01 PROCEDURE — 70450 CT HEAD/BRAIN W/O DYE: CPT | Mod: 26,77

## 2021-01-01 PROCEDURE — 82962 GLUCOSE BLOOD TEST: CPT

## 2021-01-01 PROCEDURE — 82310 ASSAY OF CALCIUM: CPT

## 2021-01-01 PROCEDURE — 96374 THER/PROPH/DIAG INJ IV PUSH: CPT

## 2021-01-01 PROCEDURE — 93970 EXTREMITY STUDY: CPT | Mod: 26

## 2021-01-01 PROCEDURE — 70450 CT HEAD/BRAIN W/O DYE: CPT | Mod: 26

## 2021-01-01 PROCEDURE — 85652 RBC SED RATE AUTOMATED: CPT

## 2021-01-01 PROCEDURE — 83036 HEMOGLOBIN GLYCOSYLATED A1C: CPT

## 2021-01-01 PROCEDURE — 93970 EXTREMITY STUDY: CPT

## 2021-01-01 PROCEDURE — 99291 CRITICAL CARE FIRST HOUR: CPT

## 2021-01-01 PROCEDURE — 83930 ASSAY OF BLOOD OSMOLALITY: CPT

## 2021-01-01 PROCEDURE — 86803 HEPATITIS C AB TEST: CPT

## 2021-01-01 PROCEDURE — 86769 SARS-COV-2 COVID-19 ANTIBODY: CPT

## 2021-01-01 PROCEDURE — 71275 CT ANGIOGRAPHY CHEST: CPT | Mod: 26

## 2021-01-01 PROCEDURE — 81001 URINALYSIS AUTO W/SCOPE: CPT

## 2021-01-01 PROCEDURE — U0005: CPT

## 2021-01-01 PROCEDURE — 99285 EMERGENCY DEPT VISIT HI MDM: CPT | Mod: 25

## 2021-01-01 PROCEDURE — 97110 THERAPEUTIC EXERCISES: CPT

## 2021-01-01 PROCEDURE — 85018 HEMOGLOBIN: CPT

## 2021-01-01 PROCEDURE — 83735 ASSAY OF MAGNESIUM: CPT

## 2021-01-01 PROCEDURE — 82947 ASSAY GLUCOSE BLOOD QUANT: CPT

## 2021-01-01 PROCEDURE — 83605 ASSAY OF LACTIC ACID: CPT

## 2021-01-01 PROCEDURE — 83615 LACTATE (LD) (LDH) ENZYME: CPT

## 2021-01-01 PROCEDURE — 85730 THROMBOPLASTIN TIME PARTIAL: CPT

## 2021-01-01 PROCEDURE — 83970 ASSAY OF PARATHORMONE: CPT

## 2021-01-01 PROCEDURE — 97116 GAIT TRAINING THERAPY: CPT

## 2021-01-01 PROCEDURE — 82570 ASSAY OF URINE CREATININE: CPT

## 2021-01-01 PROCEDURE — 84145 PROCALCITONIN (PCT): CPT

## 2021-01-01 PROCEDURE — 85379 FIBRIN DEGRADATION QUANT: CPT

## 2021-01-01 PROCEDURE — 84484 ASSAY OF TROPONIN QUANT: CPT

## 2021-01-01 PROCEDURE — 94002 VENT MGMT INPAT INIT DAY: CPT

## 2021-01-01 RX ORDER — DEXMEDETOMIDINE HYDROCHLORIDE IN 0.9% SODIUM CHLORIDE 4 UG/ML
0.2 INJECTION INTRAVENOUS
Qty: 200 | Refills: 0 | Status: DISCONTINUED | OUTPATIENT
Start: 2021-01-01 | End: 2021-01-01

## 2021-01-01 RX ORDER — REMDESIVIR 5 MG/ML
INJECTION INTRAVENOUS
Refills: 0 | Status: COMPLETED | OUTPATIENT
Start: 2021-01-01 | End: 2021-01-01

## 2021-01-01 RX ORDER — ISOPROPYL ALCOHOL, BENZOCAINE .7; .06 ML/ML; ML/ML
1 SWAB TOPICAL
Qty: 1 | Refills: 0
Start: 2021-01-01 | End: 2021-01-01

## 2021-01-01 RX ORDER — FENTANYL CITRATE 50 UG/ML
0.5 INJECTION INTRAVENOUS
Qty: 2500 | Refills: 0 | Status: DISCONTINUED | OUTPATIENT
Start: 2021-01-01 | End: 2021-01-01

## 2021-01-01 RX ORDER — DEXTROSE 50 % IN WATER 50 %
25 SYRINGE (ML) INTRAVENOUS ONCE
Refills: 0 | Status: DISCONTINUED | OUTPATIENT
Start: 2021-01-01 | End: 2021-01-01

## 2021-01-01 RX ORDER — MIDAZOLAM HYDROCHLORIDE 1 MG/ML
0.02 INJECTION, SOLUTION INTRAMUSCULAR; INTRAVENOUS
Qty: 100 | Refills: 0 | Status: DISCONTINUED | OUTPATIENT
Start: 2021-01-01 | End: 2021-01-01

## 2021-01-01 RX ORDER — HALOPERIDOL DECANOATE 100 MG/ML
2 INJECTION INTRAMUSCULAR ONCE
Refills: 0 | Status: COMPLETED | OUTPATIENT
Start: 2021-01-01 | End: 2021-01-01

## 2021-01-01 RX ORDER — DEXTROSE 50 % IN WATER 50 %
12.5 SYRINGE (ML) INTRAVENOUS ONCE
Refills: 0 | Status: DISCONTINUED | OUTPATIENT
Start: 2021-01-01 | End: 2021-01-01

## 2021-01-01 RX ORDER — GLUCAGON INJECTION, SOLUTION 0.5 MG/.1ML
1 INJECTION, SOLUTION SUBCUTANEOUS ONCE
Refills: 0 | Status: DISCONTINUED | OUTPATIENT
Start: 2021-01-01 | End: 2021-01-01

## 2021-01-01 RX ORDER — HEPARIN SODIUM 5000 [USP'U]/ML
INJECTION INTRAVENOUS; SUBCUTANEOUS
Qty: 25000 | Refills: 0 | Status: DISCONTINUED | OUTPATIENT
Start: 2021-01-01 | End: 2021-01-01

## 2021-01-01 RX ORDER — PROPOFOL 10 MG/ML
5 INJECTION, EMULSION INTRAVENOUS
Qty: 1000 | Refills: 0 | Status: DISCONTINUED | OUTPATIENT
Start: 2021-01-01 | End: 2021-01-01

## 2021-01-01 RX ORDER — SODIUM CHLORIDE 9 MG/ML
1000 INJECTION, SOLUTION INTRAVENOUS
Refills: 0 | Status: DISCONTINUED | OUTPATIENT
Start: 2021-01-01 | End: 2021-01-01

## 2021-01-01 RX ORDER — CEFEPIME 1 G/1
2000 INJECTION, POWDER, FOR SOLUTION INTRAMUSCULAR; INTRAVENOUS ONCE
Refills: 0 | Status: COMPLETED | OUTPATIENT
Start: 2021-01-01 | End: 2021-01-01

## 2021-01-01 RX ORDER — CHLORHEXIDINE GLUCONATE 213 G/1000ML
1 SOLUTION TOPICAL
Refills: 0 | Status: DISCONTINUED | OUTPATIENT
Start: 2021-01-01 | End: 2021-01-01

## 2021-01-01 RX ORDER — HALOPERIDOL DECANOATE 100 MG/ML
5 INJECTION INTRAMUSCULAR ONCE
Refills: 0 | Status: COMPLETED | OUTPATIENT
Start: 2021-01-01 | End: 2021-01-01

## 2021-01-01 RX ORDER — APIXABAN 2.5 MG/1
2.5 TABLET, FILM COATED ORAL EVERY 12 HOURS
Refills: 0 | Status: DISCONTINUED | OUTPATIENT
Start: 2021-01-01 | End: 2021-01-01

## 2021-01-01 RX ORDER — INSULIN LISPRO 100/ML
VIAL (ML) SUBCUTANEOUS
Refills: 0 | Status: DISCONTINUED | OUTPATIENT
Start: 2021-01-01 | End: 2021-01-01

## 2021-01-01 RX ORDER — INSULIN GLARGINE 100 [IU]/ML
14 INJECTION, SOLUTION SUBCUTANEOUS AT BEDTIME
Refills: 0 | Status: DISCONTINUED | OUTPATIENT
Start: 2021-01-01 | End: 2021-01-01

## 2021-01-01 RX ORDER — SODIUM CHLORIDE 9 MG/ML
1000 INJECTION INTRAMUSCULAR; INTRAVENOUS; SUBCUTANEOUS
Refills: 0 | Status: DISCONTINUED | OUTPATIENT
Start: 2021-01-01 | End: 2021-01-01

## 2021-01-01 RX ORDER — ATORVASTATIN CALCIUM 80 MG/1
40 TABLET, FILM COATED ORAL AT BEDTIME
Refills: 0 | Status: DISCONTINUED | OUTPATIENT
Start: 2021-01-01 | End: 2021-01-01

## 2021-01-01 RX ORDER — DEXMEDETOMIDINE HYDROCHLORIDE IN 0.9% SODIUM CHLORIDE 4 UG/ML
0.05 INJECTION INTRAVENOUS
Qty: 400 | Refills: 0 | Status: DISCONTINUED | OUTPATIENT
Start: 2021-01-01 | End: 2021-01-01

## 2021-01-01 RX ORDER — ENOXAPARIN SODIUM 100 MG/ML
40 INJECTION SUBCUTANEOUS DAILY
Refills: 0 | Status: DISCONTINUED | OUTPATIENT
Start: 2021-01-01 | End: 2021-01-01

## 2021-01-01 RX ORDER — INSULIN LISPRO 100/ML
5 VIAL (ML) SUBCUTANEOUS
Refills: 0 | Status: DISCONTINUED | OUTPATIENT
Start: 2021-01-01 | End: 2021-01-01

## 2021-01-01 RX ORDER — SODIUM CHLORIDE 9 MG/ML
1000 INJECTION INTRAMUSCULAR; INTRAVENOUS; SUBCUTANEOUS ONCE
Refills: 0 | Status: COMPLETED | OUTPATIENT
Start: 2021-01-01 | End: 2021-01-01

## 2021-01-01 RX ORDER — DEXTROSE 50 % IN WATER 50 %
15 SYRINGE (ML) INTRAVENOUS ONCE
Refills: 0 | Status: DISCONTINUED | OUTPATIENT
Start: 2021-01-01 | End: 2021-01-01

## 2021-01-01 RX ORDER — INSULIN GLARGINE 100 [IU]/ML
10 INJECTION, SOLUTION SUBCUTANEOUS AT BEDTIME
Refills: 0 | Status: DISCONTINUED | OUTPATIENT
Start: 2021-01-01 | End: 2021-01-01

## 2021-01-01 RX ORDER — LISINOPRIL 2.5 MG/1
5 TABLET ORAL DAILY
Refills: 0 | Status: DISCONTINUED | OUTPATIENT
Start: 2021-01-01 | End: 2021-01-01

## 2021-01-01 RX ORDER — DRONABINOL 2.5 MG
2.5 CAPSULE ORAL
Refills: 0 | Status: DISCONTINUED | OUTPATIENT
Start: 2021-01-01 | End: 2021-01-01

## 2021-01-01 RX ORDER — POTASSIUM CHLORIDE 20 MEQ
40 PACKET (EA) ORAL EVERY 4 HOURS
Refills: 0 | Status: COMPLETED | OUTPATIENT
Start: 2021-01-01 | End: 2021-01-01

## 2021-01-01 RX ORDER — CEFEPIME 1 G/1
2000 INJECTION, POWDER, FOR SOLUTION INTRAMUSCULAR; INTRAVENOUS EVERY 8 HOURS
Refills: 0 | Status: DISCONTINUED | OUTPATIENT
Start: 2021-01-01 | End: 2021-01-01

## 2021-01-01 RX ORDER — POTASSIUM CHLORIDE 20 MEQ
40 PACKET (EA) ORAL ONCE
Refills: 0 | Status: COMPLETED | OUTPATIENT
Start: 2021-01-01 | End: 2021-01-01

## 2021-01-01 RX ORDER — SODIUM CHLORIDE 9 MG/ML
500 INJECTION INTRAMUSCULAR; INTRAVENOUS; SUBCUTANEOUS ONCE
Refills: 0 | Status: COMPLETED | OUTPATIENT
Start: 2021-01-01 | End: 2021-01-01

## 2021-01-01 RX ORDER — PHENYLEPHRINE HYDROCHLORIDE 10 MG/ML
0.2 INJECTION INTRAVENOUS
Qty: 40 | Refills: 0 | Status: DISCONTINUED | OUTPATIENT
Start: 2021-01-01 | End: 2021-01-01

## 2021-01-01 RX ORDER — ALBUMIN HUMAN 25 %
50 VIAL (ML) INTRAVENOUS EVERY 8 HOURS
Refills: 0 | Status: COMPLETED | OUTPATIENT
Start: 2021-01-01 | End: 2021-01-01

## 2021-01-01 RX ORDER — ROCURONIUM BROMIDE 10 MG/ML
80 VIAL (ML) INTRAVENOUS ONCE
Refills: 0 | Status: COMPLETED | OUTPATIENT
Start: 2021-01-01 | End: 2021-01-01

## 2021-01-01 RX ORDER — SENNA PLUS 8.6 MG/1
2 TABLET ORAL AT BEDTIME
Refills: 0 | Status: DISCONTINUED | OUTPATIENT
Start: 2021-01-01 | End: 2021-01-01

## 2021-01-01 RX ORDER — MIRTAZAPINE 45 MG/1
1 TABLET, ORALLY DISINTEGRATING ORAL
Qty: 0 | Refills: 0 | DISCHARGE

## 2021-01-01 RX ORDER — LISINOPRIL 2.5 MG/1
1 TABLET ORAL
Qty: 30 | Refills: 0
Start: 2021-01-01 | End: 2021-01-01

## 2021-01-01 RX ORDER — OMEPRAZOLE 10 MG/1
1 CAPSULE, DELAYED RELEASE ORAL
Qty: 30 | Refills: 0
Start: 2021-01-01 | End: 2021-01-01

## 2021-01-01 RX ORDER — METFORMIN HYDROCHLORIDE 850 MG/1
1 TABLET ORAL
Qty: 60 | Refills: 0
Start: 2021-01-01 | End: 2021-01-01

## 2021-01-01 RX ORDER — OMEPRAZOLE 10 MG/1
1 CAPSULE, DELAYED RELEASE ORAL
Qty: 0 | Refills: 0 | DISCHARGE

## 2021-01-01 RX ORDER — ALBUTEROL 90 UG/1
2 AEROSOL, METERED ORAL EVERY 6 HOURS
Refills: 0 | Status: DISCONTINUED | OUTPATIENT
Start: 2021-01-01 | End: 2021-01-01

## 2021-01-01 RX ORDER — INSULIN LISPRO 100/ML
4 VIAL (ML) SUBCUTANEOUS ONCE
Refills: 0 | Status: COMPLETED | OUTPATIENT
Start: 2021-01-01 | End: 2021-01-01

## 2021-01-01 RX ORDER — DEXAMETHASONE 0.5 MG/5ML
6 ELIXIR ORAL DAILY
Refills: 0 | Status: COMPLETED | OUTPATIENT
Start: 2021-01-01 | End: 2021-01-01

## 2021-01-01 RX ORDER — SODIUM CHLORIDE 9 MG/ML
500 INJECTION INTRAMUSCULAR; INTRAVENOUS; SUBCUTANEOUS
Refills: 0 | Status: COMPLETED | OUTPATIENT
Start: 2021-01-01 | End: 2021-01-01

## 2021-01-01 RX ORDER — OLANZAPINE 15 MG/1
2.5 TABLET, FILM COATED ORAL
Refills: 0 | Status: DISCONTINUED | OUTPATIENT
Start: 2021-01-01 | End: 2021-01-01

## 2021-01-01 RX ORDER — QUETIAPINE FUMARATE 200 MG/1
25 TABLET, FILM COATED ORAL AT BEDTIME
Refills: 0 | Status: DISCONTINUED | OUTPATIENT
Start: 2021-01-01 | End: 2021-01-01

## 2021-01-01 RX ORDER — DEXMEDETOMIDINE HYDROCHLORIDE IN 0.9% SODIUM CHLORIDE 4 UG/ML
0.6 INJECTION INTRAVENOUS
Qty: 400 | Refills: 0 | Status: DISCONTINUED | OUTPATIENT
Start: 2021-01-01 | End: 2021-01-01

## 2021-01-01 RX ORDER — APIXABAN 2.5 MG/1
5 TABLET, FILM COATED ORAL EVERY 12 HOURS
Refills: 0 | Status: DISCONTINUED | OUTPATIENT
Start: 2021-01-01 | End: 2021-01-01

## 2021-01-01 RX ORDER — CEFEPIME 1 G/1
INJECTION, POWDER, FOR SOLUTION INTRAMUSCULAR; INTRAVENOUS
Refills: 0 | Status: DISCONTINUED | OUTPATIENT
Start: 2021-01-01 | End: 2021-01-01

## 2021-01-01 RX ORDER — SODIUM BICARBONATE 1 MEQ/ML
0.05 SYRINGE (ML) INTRAVENOUS
Qty: 75 | Refills: 0 | Status: DISCONTINUED | OUTPATIENT
Start: 2021-01-01 | End: 2021-01-01

## 2021-01-01 RX ORDER — PANTOPRAZOLE SODIUM 20 MG/1
40 TABLET, DELAYED RELEASE ORAL
Refills: 0 | Status: DISCONTINUED | OUTPATIENT
Start: 2021-01-01 | End: 2021-01-01

## 2021-01-01 RX ORDER — FAMOTIDINE 10 MG/ML
20 INJECTION INTRAVENOUS
Refills: 0 | Status: DISCONTINUED | OUTPATIENT
Start: 2021-01-01 | End: 2021-01-01

## 2021-01-01 RX ORDER — HALOPERIDOL DECANOATE 100 MG/ML
10 INJECTION INTRAMUSCULAR ONCE
Refills: 0 | Status: COMPLETED | OUTPATIENT
Start: 2021-01-01 | End: 2021-01-01

## 2021-01-01 RX ORDER — HEPARIN SODIUM 5000 [USP'U]/ML
5000 INJECTION INTRAVENOUS; SUBCUTANEOUS EVERY 12 HOURS
Refills: 0 | Status: DISCONTINUED | OUTPATIENT
Start: 2021-01-01 | End: 2021-01-01

## 2021-01-01 RX ORDER — REMDESIVIR 5 MG/ML
100 INJECTION INTRAVENOUS EVERY 24 HOURS
Refills: 0 | Status: COMPLETED | OUTPATIENT
Start: 2021-01-01 | End: 2021-01-01

## 2021-01-01 RX ORDER — SUCCINYLCHOLINE CHLORIDE 100 MG/5ML
10 SYRINGE (ML) INTRAVENOUS ONCE
Refills: 0 | Status: DISCONTINUED | OUTPATIENT
Start: 2021-01-01 | End: 2021-01-01

## 2021-01-01 RX ORDER — ACETAMINOPHEN 500 MG
650 TABLET ORAL ONCE
Refills: 0 | Status: COMPLETED | OUTPATIENT
Start: 2021-01-01 | End: 2021-01-01

## 2021-01-01 RX ORDER — ATORVASTATIN CALCIUM 80 MG/1
1 TABLET, FILM COATED ORAL
Qty: 0 | Refills: 0 | DISCHARGE

## 2021-01-01 RX ORDER — PROPOFOL 10 MG/ML
10 INJECTION, EMULSION INTRAVENOUS
Qty: 1000 | Refills: 0 | Status: DISCONTINUED | OUTPATIENT
Start: 2021-01-01 | End: 2021-01-01

## 2021-01-01 RX ORDER — REMDESIVIR 5 MG/ML
200 INJECTION INTRAVENOUS EVERY 24 HOURS
Refills: 0 | Status: COMPLETED | OUTPATIENT
Start: 2021-01-01 | End: 2021-01-01

## 2021-01-01 RX ORDER — COLCHICINE 0.6 MG
1.2 TABLET ORAL ONCE
Refills: 0 | Status: COMPLETED | OUTPATIENT
Start: 2021-01-01 | End: 2021-01-01

## 2021-01-01 RX ORDER — LOSARTAN POTASSIUM 100 MG/1
0 TABLET, FILM COATED ORAL
Qty: 0 | Refills: 0 | DISCHARGE

## 2021-01-01 RX ORDER — ASCORBIC ACID 60 MG
500 TABLET,CHEWABLE ORAL DAILY
Refills: 0 | Status: DISCONTINUED | OUTPATIENT
Start: 2021-01-01 | End: 2021-01-01

## 2021-01-01 RX ORDER — ACETAMINOPHEN 500 MG
975 TABLET ORAL ONCE
Refills: 0 | Status: COMPLETED | OUTPATIENT
Start: 2021-01-01 | End: 2021-01-01

## 2021-01-01 RX ORDER — ZINC SULFATE TAB 220 MG (50 MG ZINC EQUIVALENT) 220 (50 ZN) MG
220 TAB ORAL DAILY
Refills: 0 | Status: DISCONTINUED | OUTPATIENT
Start: 2021-01-01 | End: 2021-01-01

## 2021-01-01 RX ORDER — INSULIN GLARGINE 100 [IU]/ML
20 INJECTION, SOLUTION SUBCUTANEOUS AT BEDTIME
Refills: 0 | Status: DISCONTINUED | OUTPATIENT
Start: 2021-01-01 | End: 2021-01-01

## 2021-01-01 RX ORDER — AMLODIPINE BESYLATE 2.5 MG/1
2.5 TABLET ORAL ONCE
Refills: 0 | Status: COMPLETED | OUTPATIENT
Start: 2021-01-01 | End: 2021-01-01

## 2021-01-01 RX ORDER — ACETAMINOPHEN 500 MG
650 TABLET ORAL EVERY 6 HOURS
Refills: 0 | Status: DISCONTINUED | OUTPATIENT
Start: 2021-01-01 | End: 2021-01-01

## 2021-01-01 RX ORDER — LOSARTAN POTASSIUM 100 MG/1
50 TABLET, FILM COATED ORAL ONCE
Refills: 0 | Status: COMPLETED | OUTPATIENT
Start: 2021-01-01 | End: 2021-01-01

## 2021-01-01 RX ORDER — SODIUM CHLORIDE 9 MG/ML
1000 INJECTION INTRAMUSCULAR; INTRAVENOUS; SUBCUTANEOUS
Refills: 0 | Status: COMPLETED | OUTPATIENT
Start: 2021-01-01 | End: 2021-01-01

## 2021-01-01 RX ORDER — CHOLECALCIFEROL (VITAMIN D3) 125 MCG
400 CAPSULE ORAL DAILY
Refills: 0 | Status: DISCONTINUED | OUTPATIENT
Start: 2021-01-01 | End: 2021-01-01

## 2021-01-01 RX ORDER — DEXAMETHASONE 0.5 MG/5ML
6 ELIXIR ORAL ONCE
Refills: 0 | Status: COMPLETED | OUTPATIENT
Start: 2021-01-01 | End: 2021-01-01

## 2021-01-01 RX ORDER — INSULIN NPH HUM/REG INSULIN HM 70-30/ML
10 VIAL (ML) SUBCUTANEOUS
Qty: 0 | Refills: 0 | DISCHARGE

## 2021-01-01 RX ORDER — MODAFINIL 200 MG/1
100 TABLET ORAL ONCE
Refills: 0 | Status: DISCONTINUED | OUTPATIENT
Start: 2021-01-01 | End: 2021-01-01

## 2021-01-01 RX ORDER — SODIUM BICARBONATE 1 MEQ/ML
650 SYRINGE (ML) INTRAVENOUS THREE TIMES A DAY
Refills: 0 | Status: DISCONTINUED | OUTPATIENT
Start: 2021-01-01 | End: 2021-01-01

## 2021-01-01 RX ORDER — ONDANSETRON 8 MG/1
1 TABLET, FILM COATED ORAL
Qty: 0 | Refills: 0 | DISCHARGE

## 2021-01-01 RX ORDER — ETOMIDATE 2 MG/ML
50 INJECTION INTRAVENOUS ONCE
Refills: 0 | Status: DISCONTINUED | OUTPATIENT
Start: 2021-01-01 | End: 2021-01-01

## 2021-01-01 RX ORDER — INSULIN LISPRO 100/ML
6 VIAL (ML) SUBCUTANEOUS ONCE
Refills: 0 | Status: COMPLETED | OUTPATIENT
Start: 2021-01-01 | End: 2021-01-01

## 2021-01-01 RX ORDER — POTASSIUM CHLORIDE 20 MEQ
20 PACKET (EA) ORAL ONCE
Refills: 0 | Status: DISCONTINUED | OUTPATIENT
Start: 2021-01-01 | End: 2021-01-01

## 2021-01-01 RX ORDER — INSULIN LISPRO 100/ML
6 VIAL (ML) SUBCUTANEOUS
Refills: 0 | Status: DISCONTINUED | OUTPATIENT
Start: 2021-01-01 | End: 2021-01-01

## 2021-01-01 RX ORDER — LOSARTAN POTASSIUM 100 MG/1
50 TABLET, FILM COATED ORAL DAILY
Refills: 0 | Status: DISCONTINUED | OUTPATIENT
Start: 2021-01-01 | End: 2021-01-01

## 2021-01-01 RX ORDER — ATORVASTATIN CALCIUM 80 MG/1
0 TABLET, FILM COATED ORAL
Qty: 0 | Refills: 0 | DISCHARGE

## 2021-01-01 RX ORDER — MONTELUKAST 4 MG/1
10 TABLET, CHEWABLE ORAL DAILY
Refills: 0 | Status: DISCONTINUED | OUTPATIENT
Start: 2021-01-01 | End: 2021-01-01

## 2021-01-01 RX ORDER — INSULIN GLARGINE 100 [IU]/ML
12 INJECTION, SOLUTION SUBCUTANEOUS AT BEDTIME
Refills: 0 | Status: DISCONTINUED | OUTPATIENT
Start: 2021-01-01 | End: 2021-01-01

## 2021-01-01 RX ORDER — COLCHICINE 0.6 MG
0.6 TABLET ORAL DAILY
Refills: 0 | Status: DISCONTINUED | OUTPATIENT
Start: 2021-01-01 | End: 2021-01-01

## 2021-01-01 RX ORDER — LOSARTAN POTASSIUM 100 MG/1
1 TABLET, FILM COATED ORAL
Qty: 0 | Refills: 0 | DISCHARGE

## 2021-01-01 RX ORDER — INSULIN LISPRO 100/ML
VIAL (ML) SUBCUTANEOUS EVERY 6 HOURS
Refills: 0 | Status: DISCONTINUED | OUTPATIENT
Start: 2021-01-01 | End: 2021-01-01

## 2021-01-01 RX ORDER — DEXMEDETOMIDINE HYDROCHLORIDE IN 0.9% SODIUM CHLORIDE 4 UG/ML
0.2 INJECTION INTRAVENOUS
Qty: 400 | Refills: 0 | Status: DISCONTINUED | OUTPATIENT
Start: 2021-01-01 | End: 2021-01-01

## 2021-01-01 RX ORDER — CEFTRIAXONE 500 MG/1
1000 INJECTION, POWDER, FOR SOLUTION INTRAMUSCULAR; INTRAVENOUS EVERY 24 HOURS
Refills: 0 | Status: COMPLETED | OUTPATIENT
Start: 2021-01-01 | End: 2021-01-01

## 2021-01-01 RX ORDER — POLYETHYLENE GLYCOL 3350 17 G/17G
17 POWDER, FOR SOLUTION ORAL DAILY
Refills: 0 | Status: DISCONTINUED | OUTPATIENT
Start: 2021-01-01 | End: 2021-01-01

## 2021-01-01 RX ORDER — INSULIN LISPRO 100/ML
4 VIAL (ML) SUBCUTANEOUS
Refills: 0 | Status: DISCONTINUED | OUTPATIENT
Start: 2021-01-01 | End: 2021-01-01

## 2021-01-01 RX ORDER — METFORMIN HYDROCHLORIDE 850 MG/1
1 TABLET ORAL
Qty: 0 | Refills: 0 | DISCHARGE

## 2021-01-01 RX ORDER — INSULIN GLARGINE 100 [IU]/ML
24 INJECTION, SOLUTION SUBCUTANEOUS AT BEDTIME
Refills: 0 | Status: DISCONTINUED | OUTPATIENT
Start: 2021-01-01 | End: 2021-01-01

## 2021-01-01 RX ADMIN — ATORVASTATIN CALCIUM 40 MILLIGRAM(S): 80 TABLET, FILM COATED ORAL at 21:44

## 2021-01-01 RX ADMIN — Medication 5 UNIT(S): at 17:29

## 2021-01-01 RX ADMIN — Medication 4 UNIT(S): at 08:40

## 2021-01-01 RX ADMIN — FAMOTIDINE 20 MILLIGRAM(S): 10 INJECTION INTRAVENOUS at 18:19

## 2021-01-01 RX ADMIN — Medication 400 UNIT(S): at 12:07

## 2021-01-01 RX ADMIN — Medication 4 UNIT(S): at 12:17

## 2021-01-01 RX ADMIN — Medication 4 UNIT(S): at 18:59

## 2021-01-01 RX ADMIN — FAMOTIDINE 20 MILLIGRAM(S): 10 INJECTION INTRAVENOUS at 17:16

## 2021-01-01 RX ADMIN — Medication 650 MILLIGRAM(S): at 05:23

## 2021-01-01 RX ADMIN — Medication 6 MILLIGRAM(S): at 05:18

## 2021-01-01 RX ADMIN — AMLODIPINE BESYLATE 2.5 MILLIGRAM(S): 2.5 TABLET ORAL at 16:26

## 2021-01-01 RX ADMIN — Medication 500 MILLIGRAM(S): at 11:50

## 2021-01-01 RX ADMIN — CHLORHEXIDINE GLUCONATE 1 APPLICATION(S): 213 SOLUTION TOPICAL at 05:28

## 2021-01-01 RX ADMIN — CHLORHEXIDINE GLUCONATE 1 APPLICATION(S): 213 SOLUTION TOPICAL at 05:32

## 2021-01-01 RX ADMIN — HEPARIN SODIUM 800 UNIT(S)/HR: 5000 INJECTION INTRAVENOUS; SUBCUTANEOUS at 06:00

## 2021-01-01 RX ADMIN — QUETIAPINE FUMARATE 25 MILLIGRAM(S): 200 TABLET, FILM COATED ORAL at 22:53

## 2021-01-01 RX ADMIN — ATORVASTATIN CALCIUM 40 MILLIGRAM(S): 80 TABLET, FILM COATED ORAL at 22:13

## 2021-01-01 RX ADMIN — Medication 650 MILLIGRAM(S): at 05:33

## 2021-01-01 RX ADMIN — INSULIN GLARGINE 10 UNIT(S): 100 INJECTION, SOLUTION SUBCUTANEOUS at 21:56

## 2021-01-01 RX ADMIN — ATORVASTATIN CALCIUM 40 MILLIGRAM(S): 80 TABLET, FILM COATED ORAL at 22:22

## 2021-01-01 RX ADMIN — PANTOPRAZOLE SODIUM 40 MILLIGRAM(S): 20 TABLET, DELAYED RELEASE ORAL at 06:34

## 2021-01-01 RX ADMIN — Medication 50 MILLILITER(S): at 13:53

## 2021-01-01 RX ADMIN — ATORVASTATIN CALCIUM 40 MILLIGRAM(S): 80 TABLET, FILM COATED ORAL at 21:34

## 2021-01-01 RX ADMIN — Medication 2: at 08:16

## 2021-01-01 RX ADMIN — FAMOTIDINE 20 MILLIGRAM(S): 10 INJECTION INTRAVENOUS at 05:32

## 2021-01-01 RX ADMIN — Medication 4 UNIT(S): at 16:00

## 2021-01-01 RX ADMIN — CHLORHEXIDINE GLUCONATE 1 APPLICATION(S): 213 SOLUTION TOPICAL at 05:25

## 2021-01-01 RX ADMIN — POLYETHYLENE GLYCOL 3350 17 GRAM(S): 17 POWDER, FOR SOLUTION ORAL at 12:08

## 2021-01-01 RX ADMIN — ZINC SULFATE TAB 220 MG (50 MG ZINC EQUIVALENT) 220 MILLIGRAM(S): 220 (50 ZN) TAB at 11:26

## 2021-01-01 RX ADMIN — Medication 2: at 12:16

## 2021-01-01 RX ADMIN — ATORVASTATIN CALCIUM 40 MILLIGRAM(S): 80 TABLET, FILM COATED ORAL at 21:01

## 2021-01-01 RX ADMIN — Medication 2.5 MILLIGRAM(S): at 12:29

## 2021-01-01 RX ADMIN — APIXABAN 2.5 MILLIGRAM(S): 2.5 TABLET, FILM COATED ORAL at 17:43

## 2021-01-01 RX ADMIN — POLYETHYLENE GLYCOL 3350 17 GRAM(S): 17 POWDER, FOR SOLUTION ORAL at 11:40

## 2021-01-01 RX ADMIN — FAMOTIDINE 20 MILLIGRAM(S): 10 INJECTION INTRAVENOUS at 18:08

## 2021-01-01 RX ADMIN — Medication 2: at 12:18

## 2021-01-01 RX ADMIN — Medication 6 UNIT(S): at 12:02

## 2021-01-01 RX ADMIN — Medication 500 MILLIGRAM(S): at 11:28

## 2021-01-01 RX ADMIN — Medication 4 UNIT(S): at 06:14

## 2021-01-01 RX ADMIN — Medication 4: at 11:46

## 2021-01-01 RX ADMIN — APIXABAN 2.5 MILLIGRAM(S): 2.5 TABLET, FILM COATED ORAL at 05:02

## 2021-01-01 RX ADMIN — Medication 650 MILLIGRAM(S): at 06:15

## 2021-01-01 RX ADMIN — Medication 500 MILLIGRAM(S): at 12:20

## 2021-01-01 RX ADMIN — CHLORHEXIDINE GLUCONATE 1 APPLICATION(S): 213 SOLUTION TOPICAL at 05:02

## 2021-01-01 RX ADMIN — LOSARTAN POTASSIUM 50 MILLIGRAM(S): 100 TABLET, FILM COATED ORAL at 05:38

## 2021-01-01 RX ADMIN — MONTELUKAST 10 MILLIGRAM(S): 4 TABLET, CHEWABLE ORAL at 11:55

## 2021-01-01 RX ADMIN — Medication 3: at 17:12

## 2021-01-01 RX ADMIN — APIXABAN 2.5 MILLIGRAM(S): 2.5 TABLET, FILM COATED ORAL at 17:26

## 2021-01-01 RX ADMIN — POLYETHYLENE GLYCOL 3350 17 GRAM(S): 17 POWDER, FOR SOLUTION ORAL at 12:36

## 2021-01-01 RX ADMIN — Medication 1: at 16:54

## 2021-01-01 RX ADMIN — Medication 400 UNIT(S): at 11:08

## 2021-01-01 RX ADMIN — APIXABAN 2.5 MILLIGRAM(S): 2.5 TABLET, FILM COATED ORAL at 05:23

## 2021-01-01 RX ADMIN — Medication 6 MILLIGRAM(S): at 05:38

## 2021-01-01 RX ADMIN — LOSARTAN POTASSIUM 50 MILLIGRAM(S): 100 TABLET, FILM COATED ORAL at 05:57

## 2021-01-01 RX ADMIN — REMDESIVIR 500 MILLIGRAM(S): 5 INJECTION INTRAVENOUS at 22:22

## 2021-01-01 RX ADMIN — ENOXAPARIN SODIUM 40 MILLIGRAM(S): 100 INJECTION SUBCUTANEOUS at 11:36

## 2021-01-01 RX ADMIN — Medication 650 MILLIGRAM(S): at 23:34

## 2021-01-01 RX ADMIN — Medication 400 UNIT(S): at 11:39

## 2021-01-01 RX ADMIN — MONTELUKAST 10 MILLIGRAM(S): 4 TABLET, CHEWABLE ORAL at 11:03

## 2021-01-01 RX ADMIN — Medication 6: at 08:28

## 2021-01-01 RX ADMIN — Medication 6 UNIT(S): at 17:27

## 2021-01-01 RX ADMIN — Medication 4 UNIT(S): at 15:58

## 2021-01-01 RX ADMIN — CHLORHEXIDINE GLUCONATE 1 APPLICATION(S): 213 SOLUTION TOPICAL at 05:17

## 2021-01-01 RX ADMIN — Medication 650 MILLIGRAM(S): at 10:34

## 2021-01-01 RX ADMIN — Medication 6 UNIT(S): at 16:47

## 2021-01-01 RX ADMIN — Medication 4 UNIT(S): at 18:34

## 2021-01-01 RX ADMIN — Medication 4 UNIT(S): at 10:42

## 2021-01-01 RX ADMIN — CEFTRIAXONE 100 MILLIGRAM(S): 500 INJECTION, POWDER, FOR SOLUTION INTRAMUSCULAR; INTRAVENOUS at 13:23

## 2021-01-01 RX ADMIN — MONTELUKAST 10 MILLIGRAM(S): 4 TABLET, CHEWABLE ORAL at 12:07

## 2021-01-01 RX ADMIN — Medication 6 UNIT(S): at 19:35

## 2021-01-01 RX ADMIN — Medication 6 UNIT(S): at 08:16

## 2021-01-01 RX ADMIN — Medication 1: at 16:00

## 2021-01-01 RX ADMIN — Medication 4 UNIT(S): at 16:24

## 2021-01-01 RX ADMIN — ATORVASTATIN CALCIUM 40 MILLIGRAM(S): 80 TABLET, FILM COATED ORAL at 21:03

## 2021-01-01 RX ADMIN — Medication 6 UNIT(S): at 11:55

## 2021-01-01 RX ADMIN — Medication 6 UNIT(S): at 11:56

## 2021-01-01 RX ADMIN — Medication 500 MILLIGRAM(S): at 11:08

## 2021-01-01 RX ADMIN — INSULIN GLARGINE 12 UNIT(S): 100 INJECTION, SOLUTION SUBCUTANEOUS at 23:17

## 2021-01-01 RX ADMIN — ZINC SULFATE TAB 220 MG (50 MG ZINC EQUIVALENT) 220 MILLIGRAM(S): 220 (50 ZN) TAB at 11:40

## 2021-01-01 RX ADMIN — FAMOTIDINE 20 MILLIGRAM(S): 10 INJECTION INTRAVENOUS at 05:33

## 2021-01-01 RX ADMIN — INSULIN GLARGINE 24 UNIT(S): 100 INJECTION, SOLUTION SUBCUTANEOUS at 21:54

## 2021-01-01 RX ADMIN — FAMOTIDINE 20 MILLIGRAM(S): 10 INJECTION INTRAVENOUS at 05:17

## 2021-01-01 RX ADMIN — CHLORHEXIDINE GLUCONATE 1 APPLICATION(S): 213 SOLUTION TOPICAL at 06:11

## 2021-01-01 RX ADMIN — FAMOTIDINE 20 MILLIGRAM(S): 10 INJECTION INTRAVENOUS at 05:57

## 2021-01-01 RX ADMIN — Medication 50 MEQ/KG/HR: at 05:33

## 2021-01-01 RX ADMIN — Medication 1: at 05:23

## 2021-01-01 RX ADMIN — ATORVASTATIN CALCIUM 40 MILLIGRAM(S): 80 TABLET, FILM COATED ORAL at 21:24

## 2021-01-01 RX ADMIN — Medication 1.2 MILLIGRAM(S): at 11:54

## 2021-01-01 RX ADMIN — Medication 400 UNIT(S): at 12:56

## 2021-01-01 RX ADMIN — Medication 1: at 06:10

## 2021-01-01 RX ADMIN — ATORVASTATIN CALCIUM 40 MILLIGRAM(S): 80 TABLET, FILM COATED ORAL at 22:05

## 2021-01-01 RX ADMIN — FAMOTIDINE 20 MILLIGRAM(S): 10 INJECTION INTRAVENOUS at 17:33

## 2021-01-01 RX ADMIN — ENOXAPARIN SODIUM 40 MILLIGRAM(S): 100 INJECTION SUBCUTANEOUS at 11:11

## 2021-01-01 RX ADMIN — FAMOTIDINE 20 MILLIGRAM(S): 10 INJECTION INTRAVENOUS at 17:02

## 2021-01-01 RX ADMIN — ZINC SULFATE TAB 220 MG (50 MG ZINC EQUIVALENT) 220 MILLIGRAM(S): 220 (50 ZN) TAB at 12:00

## 2021-01-01 RX ADMIN — Medication 10: at 12:19

## 2021-01-01 RX ADMIN — ENOXAPARIN SODIUM 40 MILLIGRAM(S): 100 INJECTION SUBCUTANEOUS at 11:57

## 2021-01-01 RX ADMIN — Medication 8: at 08:56

## 2021-01-01 RX ADMIN — LISINOPRIL 5 MILLIGRAM(S): 2.5 TABLET ORAL at 05:58

## 2021-01-01 RX ADMIN — INSULIN GLARGINE 10 UNIT(S): 100 INJECTION, SOLUTION SUBCUTANEOUS at 22:40

## 2021-01-01 RX ADMIN — Medication 650 MILLIGRAM(S): at 13:38

## 2021-01-01 RX ADMIN — FAMOTIDINE 20 MILLIGRAM(S): 10 INJECTION INTRAVENOUS at 05:38

## 2021-01-01 RX ADMIN — ZINC SULFATE TAB 220 MG (50 MG ZINC EQUIVALENT) 220 MILLIGRAM(S): 220 (50 ZN) TAB at 12:21

## 2021-01-01 RX ADMIN — Medication 400 UNIT(S): at 12:20

## 2021-01-01 RX ADMIN — HEPARIN SODIUM 5000 UNIT(S): 5000 INJECTION INTRAVENOUS; SUBCUTANEOUS at 05:57

## 2021-01-01 RX ADMIN — Medication 2: at 08:37

## 2021-01-01 RX ADMIN — CHLORHEXIDINE GLUCONATE 1 APPLICATION(S): 213 SOLUTION TOPICAL at 05:18

## 2021-01-01 RX ADMIN — ZINC SULFATE TAB 220 MG (50 MG ZINC EQUIVALENT) 220 MILLIGRAM(S): 220 (50 ZN) TAB at 12:58

## 2021-01-01 RX ADMIN — SODIUM CHLORIDE 80 MILLILITER(S): 9 INJECTION INTRAMUSCULAR; INTRAVENOUS; SUBCUTANEOUS at 17:42

## 2021-01-01 RX ADMIN — Medication 1: at 06:14

## 2021-01-01 RX ADMIN — FAMOTIDINE 20 MILLIGRAM(S): 10 INJECTION INTRAVENOUS at 05:04

## 2021-01-01 RX ADMIN — Medication 4 UNIT(S): at 11:15

## 2021-01-01 RX ADMIN — Medication 400 UNIT(S): at 11:03

## 2021-01-01 RX ADMIN — Medication 3: at 15:42

## 2021-01-01 RX ADMIN — ATORVASTATIN CALCIUM 40 MILLIGRAM(S): 80 TABLET, FILM COATED ORAL at 21:38

## 2021-01-01 RX ADMIN — INSULIN GLARGINE 24 UNIT(S): 100 INJECTION, SOLUTION SUBCUTANEOUS at 21:34

## 2021-01-01 RX ADMIN — APIXABAN 2.5 MILLIGRAM(S): 2.5 TABLET, FILM COATED ORAL at 17:02

## 2021-01-01 RX ADMIN — OLANZAPINE 2.5 MILLIGRAM(S): 15 TABLET, FILM COATED ORAL at 06:02

## 2021-01-01 RX ADMIN — REMDESIVIR 500 MILLIGRAM(S): 5 INJECTION INTRAVENOUS at 22:13

## 2021-01-01 RX ADMIN — Medication 650 MILLIGRAM(S): at 05:05

## 2021-01-01 RX ADMIN — Medication 4 UNIT(S): at 11:31

## 2021-01-01 RX ADMIN — ATORVASTATIN CALCIUM 40 MILLIGRAM(S): 80 TABLET, FILM COATED ORAL at 22:40

## 2021-01-01 RX ADMIN — PROPOFOL 2.37 MICROGRAM(S)/KG/MIN: 10 INJECTION, EMULSION INTRAVENOUS at 19:58

## 2021-01-01 RX ADMIN — Medication 1: at 16:15

## 2021-01-01 RX ADMIN — SODIUM CHLORIDE 75 MILLILITER(S): 9 INJECTION INTRAMUSCULAR; INTRAVENOUS; SUBCUTANEOUS at 22:05

## 2021-01-01 RX ADMIN — ATORVASTATIN CALCIUM 40 MILLIGRAM(S): 80 TABLET, FILM COATED ORAL at 22:48

## 2021-01-01 RX ADMIN — Medication 3: at 11:27

## 2021-01-01 RX ADMIN — Medication 500 MILLIGRAM(S): at 11:26

## 2021-01-01 RX ADMIN — SODIUM CHLORIDE 500 MILLILITER(S): 9 INJECTION INTRAMUSCULAR; INTRAVENOUS; SUBCUTANEOUS at 09:34

## 2021-01-01 RX ADMIN — LOSARTAN POTASSIUM 50 MILLIGRAM(S): 100 TABLET, FILM COATED ORAL at 06:24

## 2021-01-01 RX ADMIN — Medication 2: at 15:58

## 2021-01-01 RX ADMIN — Medication 400 UNIT(S): at 11:55

## 2021-01-01 RX ADMIN — INSULIN GLARGINE 12 UNIT(S): 100 INJECTION, SOLUTION SUBCUTANEOUS at 22:59

## 2021-01-01 RX ADMIN — Medication 2: at 11:00

## 2021-01-01 RX ADMIN — CHLORHEXIDINE GLUCONATE 1 APPLICATION(S): 213 SOLUTION TOPICAL at 05:13

## 2021-01-01 RX ADMIN — Medication 650 MILLIGRAM(S): at 23:16

## 2021-01-01 RX ADMIN — FAMOTIDINE 20 MILLIGRAM(S): 10 INJECTION INTRAVENOUS at 17:43

## 2021-01-01 RX ADMIN — Medication 4 UNIT(S): at 12:10

## 2021-01-01 RX ADMIN — FAMOTIDINE 20 MILLIGRAM(S): 10 INJECTION INTRAVENOUS at 17:01

## 2021-01-01 RX ADMIN — HALOPERIDOL DECANOATE 5 MILLIGRAM(S): 100 INJECTION INTRAMUSCULAR at 23:16

## 2021-01-01 RX ADMIN — FAMOTIDINE 20 MILLIGRAM(S): 10 INJECTION INTRAVENOUS at 17:59

## 2021-01-01 RX ADMIN — PHENYLEPHRINE HYDROCHLORIDE 5.92 MICROGRAM(S)/KG/MIN: 10 INJECTION INTRAVENOUS at 16:09

## 2021-01-01 RX ADMIN — Medication 400 UNIT(S): at 11:28

## 2021-01-01 RX ADMIN — APIXABAN 2.5 MILLIGRAM(S): 2.5 TABLET, FILM COATED ORAL at 17:42

## 2021-01-01 RX ADMIN — FAMOTIDINE 20 MILLIGRAM(S): 10 INJECTION INTRAVENOUS at 05:02

## 2021-01-01 RX ADMIN — SENNA PLUS 2 TABLET(S): 8.6 TABLET ORAL at 22:48

## 2021-01-01 RX ADMIN — Medication 1 TABLET(S): at 12:21

## 2021-01-01 RX ADMIN — Medication 1: at 11:31

## 2021-01-01 RX ADMIN — Medication 40 MILLIEQUIVALENT(S): at 05:47

## 2021-01-01 RX ADMIN — Medication 1: at 08:12

## 2021-01-01 RX ADMIN — ATORVASTATIN CALCIUM 40 MILLIGRAM(S): 80 TABLET, FILM COATED ORAL at 23:16

## 2021-01-01 RX ADMIN — Medication 400 UNIT(S): at 11:13

## 2021-01-01 RX ADMIN — Medication 1 TABLET(S): at 11:23

## 2021-01-01 RX ADMIN — FAMOTIDINE 20 MILLIGRAM(S): 10 INJECTION INTRAVENOUS at 17:42

## 2021-01-01 RX ADMIN — QUETIAPINE FUMARATE 25 MILLIGRAM(S): 200 TABLET, FILM COATED ORAL at 21:44

## 2021-01-01 RX ADMIN — ALBUTEROL 2 PUFF(S): 90 AEROSOL, METERED ORAL at 11:55

## 2021-01-01 RX ADMIN — POLYETHYLENE GLYCOL 3350 17 GRAM(S): 17 POWDER, FOR SOLUTION ORAL at 12:00

## 2021-01-01 RX ADMIN — APIXABAN 2.5 MILLIGRAM(S): 2.5 TABLET, FILM COATED ORAL at 06:09

## 2021-01-01 RX ADMIN — Medication 650 MILLIGRAM(S): at 17:59

## 2021-01-01 RX ADMIN — HALOPERIDOL DECANOATE 10 MILLIGRAM(S): 100 INJECTION INTRAMUSCULAR at 00:05

## 2021-01-01 RX ADMIN — POLYETHYLENE GLYCOL 3350 17 GRAM(S): 17 POWDER, FOR SOLUTION ORAL at 11:29

## 2021-01-01 RX ADMIN — INSULIN GLARGINE 10 UNIT(S): 100 INJECTION, SOLUTION SUBCUTANEOUS at 22:22

## 2021-01-01 RX ADMIN — Medication 4: at 08:15

## 2021-01-01 RX ADMIN — SENNA PLUS 2 TABLET(S): 8.6 TABLET ORAL at 21:24

## 2021-01-01 RX ADMIN — Medication 650 MILLIGRAM(S): at 21:44

## 2021-01-01 RX ADMIN — Medication 4 UNIT(S): at 11:51

## 2021-01-01 RX ADMIN — Medication 650 MILLIGRAM(S): at 23:47

## 2021-01-01 RX ADMIN — Medication 6 UNIT(S): at 08:22

## 2021-01-01 RX ADMIN — Medication 40 MILLIEQUIVALENT(S): at 12:07

## 2021-01-01 RX ADMIN — MONTELUKAST 10 MILLIGRAM(S): 4 TABLET, CHEWABLE ORAL at 11:37

## 2021-01-01 RX ADMIN — Medication 500 MILLIGRAM(S): at 11:37

## 2021-01-01 RX ADMIN — SODIUM CHLORIDE 1000 MILLILITER(S): 9 INJECTION INTRAMUSCULAR; INTRAVENOUS; SUBCUTANEOUS at 18:27

## 2021-01-01 RX ADMIN — CHLORHEXIDINE GLUCONATE 1 APPLICATION(S): 213 SOLUTION TOPICAL at 05:29

## 2021-01-01 RX ADMIN — Medication 40 MILLIEQUIVALENT(S): at 13:46

## 2021-01-01 RX ADMIN — INSULIN GLARGINE 20 UNIT(S): 100 INJECTION, SOLUTION SUBCUTANEOUS at 21:21

## 2021-01-01 RX ADMIN — Medication 40 MILLIEQUIVALENT(S): at 09:01

## 2021-01-01 RX ADMIN — Medication 4 UNIT(S): at 08:38

## 2021-01-01 RX ADMIN — INSULIN GLARGINE 12 UNIT(S): 100 INJECTION, SOLUTION SUBCUTANEOUS at 21:03

## 2021-01-01 RX ADMIN — Medication 6 MILLIGRAM(S): at 15:42

## 2021-01-01 RX ADMIN — Medication 50 MILLILITER(S): at 21:01

## 2021-01-01 RX ADMIN — Medication 0.6 MILLIGRAM(S): at 11:37

## 2021-01-01 RX ADMIN — INSULIN GLARGINE 12 UNIT(S): 100 INJECTION, SOLUTION SUBCUTANEOUS at 23:40

## 2021-01-01 RX ADMIN — FAMOTIDINE 20 MILLIGRAM(S): 10 INJECTION INTRAVENOUS at 17:21

## 2021-01-01 RX ADMIN — INSULIN GLARGINE 12 UNIT(S): 100 INJECTION, SOLUTION SUBCUTANEOUS at 21:42

## 2021-01-01 RX ADMIN — Medication 4 UNIT(S): at 11:00

## 2021-01-01 RX ADMIN — Medication 1 TABLET(S): at 12:07

## 2021-01-01 RX ADMIN — FAMOTIDINE 20 MILLIGRAM(S): 10 INJECTION INTRAVENOUS at 05:12

## 2021-01-01 RX ADMIN — ZINC SULFATE TAB 220 MG (50 MG ZINC EQUIVALENT) 220 MILLIGRAM(S): 220 (50 ZN) TAB at 11:03

## 2021-01-01 RX ADMIN — SENNA PLUS 2 TABLET(S): 8.6 TABLET ORAL at 21:03

## 2021-01-01 RX ADMIN — MONTELUKAST 10 MILLIGRAM(S): 4 TABLET, CHEWABLE ORAL at 12:00

## 2021-01-01 RX ADMIN — Medication 6 UNIT(S): at 17:10

## 2021-01-01 RX ADMIN — LOSARTAN POTASSIUM 50 MILLIGRAM(S): 100 TABLET, FILM COATED ORAL at 05:18

## 2021-01-01 RX ADMIN — FAMOTIDINE 20 MILLIGRAM(S): 10 INJECTION INTRAVENOUS at 17:19

## 2021-01-01 RX ADMIN — Medication 1: at 11:01

## 2021-01-01 RX ADMIN — CHLORHEXIDINE GLUCONATE 1 APPLICATION(S): 213 SOLUTION TOPICAL at 05:23

## 2021-01-01 RX ADMIN — APIXABAN 2.5 MILLIGRAM(S): 2.5 TABLET, FILM COATED ORAL at 17:40

## 2021-01-01 RX ADMIN — Medication 1: at 16:48

## 2021-01-01 RX ADMIN — INSULIN GLARGINE 10 UNIT(S): 100 INJECTION, SOLUTION SUBCUTANEOUS at 21:51

## 2021-01-01 RX ADMIN — CEFTRIAXONE 100 MILLIGRAM(S): 500 INJECTION, POWDER, FOR SOLUTION INTRAMUSCULAR; INTRAVENOUS at 15:39

## 2021-01-01 RX ADMIN — Medication 4 UNIT(S): at 15:43

## 2021-01-01 RX ADMIN — Medication 400 UNIT(S): at 11:05

## 2021-01-01 RX ADMIN — Medication 1: at 11:06

## 2021-01-01 RX ADMIN — Medication 3: at 10:41

## 2021-01-01 RX ADMIN — Medication 400 UNIT(S): at 11:16

## 2021-01-01 RX ADMIN — Medication 50 MILLILITER(S): at 13:01

## 2021-01-01 RX ADMIN — Medication 1: at 11:14

## 2021-01-01 RX ADMIN — FAMOTIDINE 20 MILLIGRAM(S): 10 INJECTION INTRAVENOUS at 17:13

## 2021-01-01 RX ADMIN — FAMOTIDINE 20 MILLIGRAM(S): 10 INJECTION INTRAVENOUS at 17:40

## 2021-01-01 RX ADMIN — Medication 650 MILLIGRAM(S): at 13:10

## 2021-01-01 RX ADMIN — Medication 50 MEQ/KG/HR: at 12:12

## 2021-01-01 RX ADMIN — Medication 40 MILLIEQUIVALENT(S): at 11:54

## 2021-01-01 RX ADMIN — Medication 6 UNIT(S): at 08:12

## 2021-01-01 RX ADMIN — ZINC SULFATE TAB 220 MG (50 MG ZINC EQUIVALENT) 220 MILLIGRAM(S): 220 (50 ZN) TAB at 11:50

## 2021-01-01 RX ADMIN — Medication 1: at 15:45

## 2021-01-01 RX ADMIN — DEXMEDETOMIDINE HYDROCHLORIDE IN 0.9% SODIUM CHLORIDE 3.95 MICROGRAM(S)/KG/HR: 4 INJECTION INTRAVENOUS at 16:10

## 2021-01-01 RX ADMIN — Medication 6 MILLIGRAM(S): at 06:25

## 2021-01-01 RX ADMIN — Medication 650 MILLIGRAM(S): at 05:41

## 2021-01-01 RX ADMIN — Medication 650 MILLIGRAM(S): at 21:17

## 2021-01-01 RX ADMIN — HEPARIN SODIUM 0 UNIT(S)/HR: 5000 INJECTION INTRAVENOUS; SUBCUTANEOUS at 16:46

## 2021-01-01 RX ADMIN — Medication 4 UNIT(S): at 09:01

## 2021-01-01 RX ADMIN — Medication 2: at 17:10

## 2021-01-01 RX ADMIN — Medication 50 MILLILITER(S): at 05:12

## 2021-01-01 RX ADMIN — FAMOTIDINE 20 MILLIGRAM(S): 10 INJECTION INTRAVENOUS at 17:05

## 2021-01-01 RX ADMIN — ATORVASTATIN CALCIUM 40 MILLIGRAM(S): 80 TABLET, FILM COATED ORAL at 21:51

## 2021-01-01 RX ADMIN — DEXMEDETOMIDINE HYDROCHLORIDE IN 0.9% SODIUM CHLORIDE 11.8 MICROGRAM(S)/KG/HR: 4 INJECTION INTRAVENOUS at 18:52

## 2021-01-01 RX ADMIN — INSULIN GLARGINE 14 UNIT(S): 100 INJECTION, SOLUTION SUBCUTANEOUS at 23:25

## 2021-01-01 RX ADMIN — SENNA PLUS 2 TABLET(S): 8.6 TABLET ORAL at 21:04

## 2021-01-01 RX ADMIN — Medication 1: at 12:02

## 2021-01-01 RX ADMIN — Medication 500 MILLIGRAM(S): at 11:38

## 2021-01-01 RX ADMIN — ZINC SULFATE TAB 220 MG (50 MG ZINC EQUIVALENT) 220 MILLIGRAM(S): 220 (50 ZN) TAB at 11:54

## 2021-01-01 RX ADMIN — Medication 650 MILLIGRAM(S): at 13:52

## 2021-01-01 RX ADMIN — ZINC SULFATE TAB 220 MG (50 MG ZINC EQUIVALENT) 220 MILLIGRAM(S): 220 (50 ZN) TAB at 11:13

## 2021-01-01 RX ADMIN — HEPARIN SODIUM 0 UNIT(S)/HR: 5000 INJECTION INTRAVENOUS; SUBCUTANEOUS at 06:08

## 2021-01-01 RX ADMIN — Medication 4: at 17:07

## 2021-01-01 RX ADMIN — Medication 5 UNIT(S): at 08:55

## 2021-01-01 RX ADMIN — Medication 6 MILLIGRAM(S): at 05:32

## 2021-01-01 RX ADMIN — FAMOTIDINE 20 MILLIGRAM(S): 10 INJECTION INTRAVENOUS at 05:08

## 2021-01-01 RX ADMIN — SODIUM CHLORIDE 500 MILLILITER(S): 9 INJECTION INTRAMUSCULAR; INTRAVENOUS; SUBCUTANEOUS at 23:07

## 2021-01-01 RX ADMIN — Medication 400 UNIT(S): at 12:21

## 2021-01-01 RX ADMIN — INSULIN GLARGINE 12 UNIT(S): 100 INJECTION, SOLUTION SUBCUTANEOUS at 22:07

## 2021-01-01 RX ADMIN — PANTOPRAZOLE SODIUM 40 MILLIGRAM(S): 20 TABLET, DELAYED RELEASE ORAL at 05:58

## 2021-01-01 RX ADMIN — Medication 4 UNIT(S): at 05:17

## 2021-01-01 RX ADMIN — LOSARTAN POTASSIUM 50 MILLIGRAM(S): 100 TABLET, FILM COATED ORAL at 06:10

## 2021-01-01 RX ADMIN — INSULIN GLARGINE 14 UNIT(S): 100 INJECTION, SOLUTION SUBCUTANEOUS at 22:00

## 2021-01-01 RX ADMIN — Medication 4 UNIT(S): at 12:18

## 2021-01-01 RX ADMIN — Medication 6: at 12:35

## 2021-01-01 RX ADMIN — FAMOTIDINE 20 MILLIGRAM(S): 10 INJECTION INTRAVENOUS at 05:23

## 2021-01-01 RX ADMIN — Medication 5: at 22:22

## 2021-01-01 RX ADMIN — ATORVASTATIN CALCIUM 40 MILLIGRAM(S): 80 TABLET, FILM COATED ORAL at 21:04

## 2021-01-01 RX ADMIN — Medication 4: at 11:00

## 2021-01-01 RX ADMIN — Medication 4 UNIT(S): at 05:25

## 2021-01-01 RX ADMIN — Medication 6 MILLIGRAM(S): at 05:08

## 2021-01-01 RX ADMIN — APIXABAN 2.5 MILLIGRAM(S): 2.5 TABLET, FILM COATED ORAL at 17:33

## 2021-01-01 RX ADMIN — Medication 5 UNIT(S): at 12:19

## 2021-01-01 RX ADMIN — ENOXAPARIN SODIUM 40 MILLIGRAM(S): 100 INJECTION SUBCUTANEOUS at 11:48

## 2021-01-01 RX ADMIN — ALBUTEROL 2 PUFF(S): 90 AEROSOL, METERED ORAL at 21:38

## 2021-01-01 RX ADMIN — Medication 400 UNIT(S): at 11:40

## 2021-01-01 RX ADMIN — POLYETHYLENE GLYCOL 3350 17 GRAM(S): 17 POWDER, FOR SOLUTION ORAL at 11:03

## 2021-01-01 RX ADMIN — Medication 4 UNIT(S): at 16:55

## 2021-01-01 RX ADMIN — SODIUM CHLORIDE 100 MILLILITER(S): 9 INJECTION INTRAMUSCULAR; INTRAVENOUS; SUBCUTANEOUS at 16:32

## 2021-01-01 RX ADMIN — ENOXAPARIN SODIUM 40 MILLIGRAM(S): 100 INJECTION SUBCUTANEOUS at 11:45

## 2021-01-01 RX ADMIN — Medication 4: at 11:56

## 2021-01-01 RX ADMIN — Medication 4 UNIT(S): at 16:16

## 2021-01-01 RX ADMIN — Medication 1: at 08:39

## 2021-01-01 RX ADMIN — CHLORHEXIDINE GLUCONATE 1 APPLICATION(S): 213 SOLUTION TOPICAL at 06:02

## 2021-01-01 RX ADMIN — FAMOTIDINE 20 MILLIGRAM(S): 10 INJECTION INTRAVENOUS at 05:41

## 2021-01-01 RX ADMIN — SENNA PLUS 2 TABLET(S): 8.6 TABLET ORAL at 21:51

## 2021-01-01 RX ADMIN — FAMOTIDINE 20 MILLIGRAM(S): 10 INJECTION INTRAVENOUS at 06:09

## 2021-01-01 RX ADMIN — Medication 500 MILLIGRAM(S): at 11:02

## 2021-01-01 RX ADMIN — MONTELUKAST 10 MILLIGRAM(S): 4 TABLET, CHEWABLE ORAL at 12:22

## 2021-01-01 RX ADMIN — MONTELUKAST 10 MILLIGRAM(S): 4 TABLET, CHEWABLE ORAL at 11:28

## 2021-01-01 RX ADMIN — Medication 5: at 11:48

## 2021-01-01 RX ADMIN — ENOXAPARIN SODIUM 40 MILLIGRAM(S): 100 INJECTION SUBCUTANEOUS at 12:07

## 2021-01-01 RX ADMIN — CHLORHEXIDINE GLUCONATE 1 APPLICATION(S): 213 SOLUTION TOPICAL at 05:05

## 2021-01-01 RX ADMIN — SODIUM CHLORIDE 1000 MILLILITER(S): 9 INJECTION INTRAMUSCULAR; INTRAVENOUS; SUBCUTANEOUS at 13:25

## 2021-01-01 RX ADMIN — INSULIN GLARGINE 12 UNIT(S): 100 INJECTION, SOLUTION SUBCUTANEOUS at 21:01

## 2021-01-01 RX ADMIN — Medication 650 MILLIGRAM(S): at 09:34

## 2021-01-01 RX ADMIN — REMDESIVIR 500 MILLIGRAM(S): 5 INJECTION INTRAVENOUS at 21:23

## 2021-01-01 RX ADMIN — Medication 6 MILLIGRAM(S): at 05:41

## 2021-01-01 RX ADMIN — INSULIN GLARGINE 10 UNIT(S): 100 INJECTION, SOLUTION SUBCUTANEOUS at 22:02

## 2021-01-01 RX ADMIN — INSULIN GLARGINE 12 UNIT(S): 100 INJECTION, SOLUTION SUBCUTANEOUS at 21:47

## 2021-01-01 RX ADMIN — Medication 3: at 18:18

## 2021-01-01 RX ADMIN — POLYETHYLENE GLYCOL 3350 17 GRAM(S): 17 POWDER, FOR SOLUTION ORAL at 11:14

## 2021-01-01 RX ADMIN — Medication 500 MILLIGRAM(S): at 11:16

## 2021-01-01 RX ADMIN — ATORVASTATIN CALCIUM 40 MILLIGRAM(S): 80 TABLET, FILM COATED ORAL at 21:06

## 2021-01-01 RX ADMIN — ATORVASTATIN CALCIUM 40 MILLIGRAM(S): 80 TABLET, FILM COATED ORAL at 21:09

## 2021-01-01 RX ADMIN — DEXMEDETOMIDINE HYDROCHLORIDE IN 0.9% SODIUM CHLORIDE 3.95 MICROGRAM(S)/KG/HR: 4 INJECTION INTRAVENOUS at 18:08

## 2021-01-01 RX ADMIN — SODIUM CHLORIDE 500 MILLILITER(S): 9 INJECTION INTRAMUSCULAR; INTRAVENOUS; SUBCUTANEOUS at 12:07

## 2021-01-01 RX ADMIN — Medication 650 MILLIGRAM(S): at 00:45

## 2021-01-01 RX ADMIN — Medication 2.5 MILLIGRAM(S): at 17:33

## 2021-01-01 RX ADMIN — HALOPERIDOL DECANOATE 2 MILLIGRAM(S): 100 INJECTION INTRAMUSCULAR at 20:14

## 2021-01-01 RX ADMIN — Medication 500 MILLIGRAM(S): at 12:07

## 2021-01-01 RX ADMIN — Medication 40 MILLIEQUIVALENT(S): at 10:19

## 2021-01-01 RX ADMIN — Medication 4 UNIT(S): at 11:37

## 2021-01-01 RX ADMIN — FAMOTIDINE 20 MILLIGRAM(S): 10 INJECTION INTRAVENOUS at 17:24

## 2021-01-01 RX ADMIN — MONTELUKAST 10 MILLIGRAM(S): 4 TABLET, CHEWABLE ORAL at 12:56

## 2021-01-01 RX ADMIN — Medication 6 MILLIGRAM(S): at 05:49

## 2021-01-01 RX ADMIN — Medication 2: at 18:58

## 2021-01-01 RX ADMIN — Medication 4 UNIT(S): at 15:45

## 2021-01-01 RX ADMIN — APIXABAN 2.5 MILLIGRAM(S): 2.5 TABLET, FILM COATED ORAL at 06:01

## 2021-01-01 RX ADMIN — HEPARIN SODIUM 5000 UNIT(S): 5000 INJECTION INTRAVENOUS; SUBCUTANEOUS at 17:27

## 2021-01-01 RX ADMIN — APIXABAN 2.5 MILLIGRAM(S): 2.5 TABLET, FILM COATED ORAL at 05:17

## 2021-01-01 RX ADMIN — ATORVASTATIN CALCIUM 40 MILLIGRAM(S): 80 TABLET, FILM COATED ORAL at 23:24

## 2021-01-01 RX ADMIN — Medication 80 MILLIGRAM(S): at 13:35

## 2021-01-01 RX ADMIN — Medication 500 MILLIGRAM(S): at 11:54

## 2021-01-01 RX ADMIN — MODAFINIL 100 MILLIGRAM(S): 200 TABLET ORAL at 12:01

## 2021-01-01 RX ADMIN — ATORVASTATIN CALCIUM 40 MILLIGRAM(S): 80 TABLET, FILM COATED ORAL at 21:17

## 2021-01-01 RX ADMIN — FAMOTIDINE 20 MILLIGRAM(S): 10 INJECTION INTRAVENOUS at 19:00

## 2021-01-01 RX ADMIN — INSULIN GLARGINE 12 UNIT(S): 100 INJECTION, SOLUTION SUBCUTANEOUS at 21:09

## 2021-01-01 RX ADMIN — Medication 6 MILLIGRAM(S): at 06:10

## 2021-01-01 RX ADMIN — Medication 4 UNIT(S): at 05:23

## 2021-01-01 RX ADMIN — ALBUTEROL 2 PUFF(S): 90 AEROSOL, METERED ORAL at 21:48

## 2021-01-01 RX ADMIN — Medication 500 MILLIGRAM(S): at 11:39

## 2021-01-01 RX ADMIN — FAMOTIDINE 20 MILLIGRAM(S): 10 INJECTION INTRAVENOUS at 06:02

## 2021-01-01 RX ADMIN — APIXABAN 2.5 MILLIGRAM(S): 2.5 TABLET, FILM COATED ORAL at 17:19

## 2021-01-01 RX ADMIN — POLYETHYLENE GLYCOL 3350 17 GRAM(S): 17 POWDER, FOR SOLUTION ORAL at 11:28

## 2021-01-01 RX ADMIN — Medication 2: at 11:50

## 2021-01-01 RX ADMIN — SENNA PLUS 2 TABLET(S): 8.6 TABLET ORAL at 21:44

## 2021-01-01 RX ADMIN — Medication 40 MILLIEQUIVALENT(S): at 11:15

## 2021-01-01 RX ADMIN — Medication 500 MILLIGRAM(S): at 12:56

## 2021-01-01 RX ADMIN — OLANZAPINE 2.5 MILLIGRAM(S): 15 TABLET, FILM COATED ORAL at 22:45

## 2021-01-01 RX ADMIN — APIXABAN 2.5 MILLIGRAM(S): 2.5 TABLET, FILM COATED ORAL at 17:05

## 2021-01-01 RX ADMIN — Medication 650 MILLIGRAM(S): at 05:17

## 2021-01-01 RX ADMIN — INSULIN GLARGINE 12 UNIT(S): 100 INJECTION, SOLUTION SUBCUTANEOUS at 21:46

## 2021-01-01 RX ADMIN — Medication 5 UNIT(S): at 08:28

## 2021-01-01 RX ADMIN — LOSARTAN POTASSIUM 50 MILLIGRAM(S): 100 TABLET, FILM COATED ORAL at 05:49

## 2021-01-01 RX ADMIN — SODIUM CHLORIDE 1000 MILLILITER(S): 9 INJECTION INTRAMUSCULAR; INTRAVENOUS; SUBCUTANEOUS at 11:52

## 2021-01-01 RX ADMIN — Medication 975 MILLIGRAM(S): at 17:26

## 2021-01-01 RX ADMIN — REMDESIVIR 500 MILLIGRAM(S): 5 INJECTION INTRAVENOUS at 22:03

## 2021-01-01 RX ADMIN — Medication 5: at 08:17

## 2021-01-01 RX ADMIN — CHLORHEXIDINE GLUCONATE 1 APPLICATION(S): 213 SOLUTION TOPICAL at 05:09

## 2021-01-01 RX ADMIN — MONTELUKAST 10 MILLIGRAM(S): 4 TABLET, CHEWABLE ORAL at 11:26

## 2021-01-01 RX ADMIN — ENOXAPARIN SODIUM 40 MILLIGRAM(S): 100 INJECTION SUBCUTANEOUS at 11:03

## 2021-01-01 RX ADMIN — LISINOPRIL 5 MILLIGRAM(S): 2.5 TABLET ORAL at 06:34

## 2021-01-01 RX ADMIN — SENNA PLUS 2 TABLET(S): 8.6 TABLET ORAL at 21:17

## 2021-01-01 RX ADMIN — SENNA PLUS 2 TABLET(S): 8.6 TABLET ORAL at 22:53

## 2021-01-01 RX ADMIN — CHLORHEXIDINE GLUCONATE 1 APPLICATION(S): 213 SOLUTION TOPICAL at 05:41

## 2021-01-01 RX ADMIN — Medication 5: at 08:25

## 2021-01-01 RX ADMIN — ZINC SULFATE TAB 220 MG (50 MG ZINC EQUIVALENT) 220 MILLIGRAM(S): 220 (50 ZN) TAB at 13:21

## 2021-01-01 RX ADMIN — FAMOTIDINE 20 MILLIGRAM(S): 10 INJECTION INTRAVENOUS at 05:28

## 2021-01-01 RX ADMIN — ZINC SULFATE TAB 220 MG (50 MG ZINC EQUIVALENT) 220 MILLIGRAM(S): 220 (50 ZN) TAB at 11:37

## 2021-01-01 RX ADMIN — Medication 5 UNIT(S): at 12:34

## 2021-01-01 RX ADMIN — ATORVASTATIN CALCIUM 40 MILLIGRAM(S): 80 TABLET, FILM COATED ORAL at 22:02

## 2021-01-01 RX ADMIN — HEPARIN SODIUM 800 UNIT(S)/HR: 5000 INJECTION INTRAVENOUS; SUBCUTANEOUS at 17:30

## 2021-01-01 RX ADMIN — HEPARIN SODIUM 1400 UNIT(S)/HR: 5000 INJECTION INTRAVENOUS; SUBCUTANEOUS at 21:15

## 2021-01-01 RX ADMIN — Medication 650 MILLIGRAM(S): at 13:29

## 2021-01-01 RX ADMIN — SENNA PLUS 2 TABLET(S): 8.6 TABLET ORAL at 22:59

## 2021-01-01 RX ADMIN — Medication 4 UNIT(S): at 08:24

## 2021-01-01 RX ADMIN — Medication 400 UNIT(S): at 12:02

## 2021-01-01 RX ADMIN — Medication 4 UNIT(S): at 11:28

## 2021-01-01 RX ADMIN — INSULIN GLARGINE 24 UNIT(S): 100 INJECTION, SOLUTION SUBCUTANEOUS at 22:06

## 2021-01-01 RX ADMIN — Medication 500 MILLIGRAM(S): at 11:03

## 2021-01-01 RX ADMIN — POLYETHYLENE GLYCOL 3350 17 GRAM(S): 17 POWDER, FOR SOLUTION ORAL at 11:09

## 2021-01-01 RX ADMIN — Medication 400 UNIT(S): at 11:37

## 2021-01-01 RX ADMIN — CEFEPIME 100 MILLIGRAM(S): 1 INJECTION, POWDER, FOR SOLUTION INTRAMUSCULAR; INTRAVENOUS at 16:09

## 2021-01-01 RX ADMIN — HEPARIN SODIUM 800 UNIT(S)/HR: 5000 INJECTION INTRAVENOUS; SUBCUTANEOUS at 23:53

## 2021-01-01 RX ADMIN — Medication 650 MILLIGRAM(S): at 08:10

## 2021-01-01 RX ADMIN — ZINC SULFATE TAB 220 MG (50 MG ZINC EQUIVALENT) 220 MILLIGRAM(S): 220 (50 ZN) TAB at 11:28

## 2021-01-01 RX ADMIN — POLYETHYLENE GLYCOL 3350 17 GRAM(S): 17 POWDER, FOR SOLUTION ORAL at 11:50

## 2021-01-01 RX ADMIN — FAMOTIDINE 20 MILLIGRAM(S): 10 INJECTION INTRAVENOUS at 05:25

## 2021-01-01 RX ADMIN — Medication 6 MILLIGRAM(S): at 05:27

## 2021-01-01 RX ADMIN — Medication 50 MILLILITER(S): at 22:02

## 2021-01-01 RX ADMIN — CEFTRIAXONE 100 MILLIGRAM(S): 500 INJECTION, POWDER, FOR SOLUTION INTRAMUSCULAR; INTRAVENOUS at 14:27

## 2021-01-01 RX ADMIN — INSULIN GLARGINE 10 UNIT(S): 100 INJECTION, SOLUTION SUBCUTANEOUS at 22:13

## 2021-01-01 RX ADMIN — ZINC SULFATE TAB 220 MG (50 MG ZINC EQUIVALENT) 220 MILLIGRAM(S): 220 (50 ZN) TAB at 11:16

## 2021-01-01 RX ADMIN — FAMOTIDINE 20 MILLIGRAM(S): 10 INJECTION INTRAVENOUS at 17:27

## 2021-01-01 RX ADMIN — FAMOTIDINE 20 MILLIGRAM(S): 10 INJECTION INTRAVENOUS at 05:18

## 2021-01-01 RX ADMIN — POLYETHYLENE GLYCOL 3350 17 GRAM(S): 17 POWDER, FOR SOLUTION ORAL at 12:22

## 2021-01-01 RX ADMIN — PROPOFOL 4.73 MICROGRAM(S)/KG/MIN: 10 INJECTION, EMULSION INTRAVENOUS at 15:48

## 2021-01-01 RX ADMIN — ALBUTEROL 2 PUFF(S): 90 AEROSOL, METERED ORAL at 12:58

## 2021-01-01 RX ADMIN — Medication 50 MILLILITER(S): at 06:09

## 2021-01-01 RX ADMIN — POLYETHYLENE GLYCOL 3350 17 GRAM(S): 17 POWDER, FOR SOLUTION ORAL at 12:30

## 2021-01-01 RX ADMIN — APIXABAN 2.5 MILLIGRAM(S): 2.5 TABLET, FILM COATED ORAL at 05:28

## 2021-01-01 RX ADMIN — REMDESIVIR 500 MILLIGRAM(S): 5 INJECTION INTRAVENOUS at 21:38

## 2021-01-01 RX ADMIN — Medication 500 MILLIGRAM(S): at 12:00

## 2021-01-01 RX ADMIN — ZINC SULFATE TAB 220 MG (50 MG ZINC EQUIVALENT) 220 MILLIGRAM(S): 220 (50 ZN) TAB at 12:06

## 2021-01-01 RX ADMIN — Medication 4 UNIT(S): at 11:06

## 2021-01-01 RX ADMIN — Medication 1: at 11:15

## 2021-01-01 RX ADMIN — ATORVASTATIN CALCIUM 40 MILLIGRAM(S): 80 TABLET, FILM COATED ORAL at 22:58

## 2021-01-01 RX ADMIN — APIXABAN 2.5 MILLIGRAM(S): 2.5 TABLET, FILM COATED ORAL at 17:21

## 2021-01-01 RX ADMIN — APIXABAN 2.5 MILLIGRAM(S): 2.5 TABLET, FILM COATED ORAL at 06:57

## 2021-01-01 RX ADMIN — Medication 650 MILLIGRAM(S): at 13:18

## 2021-01-01 RX ADMIN — HEPARIN SODIUM 1100 UNIT(S)/HR: 5000 INJECTION INTRAVENOUS; SUBCUTANEOUS at 08:09

## 2021-01-01 RX ADMIN — Medication 400 UNIT(S): at 12:36

## 2021-01-01 RX ADMIN — Medication 400 UNIT(S): at 11:50

## 2021-01-01 RX ADMIN — LOSARTAN POTASSIUM 50 MILLIGRAM(S): 100 TABLET, FILM COATED ORAL at 16:26

## 2021-01-01 RX ADMIN — QUETIAPINE FUMARATE 25 MILLIGRAM(S): 200 TABLET, FILM COATED ORAL at 22:59

## 2021-01-01 RX ADMIN — ALBUTEROL 2 PUFF(S): 90 AEROSOL, METERED ORAL at 10:15

## 2021-01-01 RX ADMIN — CHLORHEXIDINE GLUCONATE 1 APPLICATION(S): 213 SOLUTION TOPICAL at 06:08

## 2021-01-01 RX ADMIN — Medication 650 MILLIGRAM(S): at 05:08

## 2021-01-01 RX ADMIN — ENOXAPARIN SODIUM 40 MILLIGRAM(S): 100 INJECTION SUBCUTANEOUS at 11:55

## 2021-01-01 RX ADMIN — Medication 6 MILLIGRAM(S): at 05:57

## 2021-01-01 RX ADMIN — Medication 2: at 17:28

## 2021-01-01 RX ADMIN — ATORVASTATIN CALCIUM 40 MILLIGRAM(S): 80 TABLET, FILM COATED ORAL at 22:53

## 2021-01-01 RX ADMIN — APIXABAN 2.5 MILLIGRAM(S): 2.5 TABLET, FILM COATED ORAL at 05:25

## 2021-01-01 RX ADMIN — Medication 1: at 18:51

## 2021-01-01 RX ADMIN — APIXABAN 2.5 MILLIGRAM(S): 2.5 TABLET, FILM COATED ORAL at 05:12

## 2021-01-09 NOTE — ED ADULT NURSE REASSESSMENT NOTE - NS ED NURSE REASSESS COMMENT FT1
Patient removed IV and exited to waiting room after getting dressed. Code flight called prior to finding patient. Patient assisted back to green room 26. IV placed VSS taken. Call bell placed within reach instructed to notify RN if assistance is needed. RN explained patient needs to stay until discharged due to elevated blood sugar. Patient verbalized understanding.

## 2021-01-09 NOTE — ED PROVIDER NOTE - NS ED ROS FT
REVIEW OF SYSTEMS:  General: no fever, no chills  HEENT: no headache, no vision changes  Cardiac: no chest pain, no palpitations  Respiratory: no cough, no shortness of breath  Gastrointestinal: no abdominal pain, no nausea, no vomiting, no diarrhea  Genitourinary: no hematuria, no dysuria, +urinary frequency  Extremities: no extremity swelling, no extremity pain  Neuro: no focal weakness, no numbness/tingling of the extremities, no decreased sensation  Heme: no easy bleeding, no easy bruising  Skin: no jaundice,  no rashes, no lesions  All other ROS as documented in HPI  -Murray Arango, PGY-3

## 2021-01-09 NOTE — ED ADULT NURSE NOTE - INTERVENTIONS DEFINITIONS
[place] to call system/Non-slip footwear when patient is off stretcher/Physically safe environment: no spills, clutter or unnecessary equipment/Stretcher in lowest position, wheels locked, appropriate side rails in place/Monitor gait and stability

## 2021-01-09 NOTE — H&P ADULT - NSHPLABSRESULTS_GEN_ALL_CORE
15.0   7.53  )-----------( 232      ( 09 Jan 2021 11:57 )             43.5     01-09    129<L>  |  94<L>  |  19  ----------------------------<  553<HH>  4.5   |  19<L>  |  0.89    Ca    9.7      09 Jan 2021 11:57    TPro  7.4  /  Alb  4.0  /  TBili  0.6  /  DBili  x   /  AST  15  /  ALT  14  /  AlkPhos  113  01-09

## 2021-01-09 NOTE — ED PROVIDER NOTE - OBJECTIVE STATEMENT
76M PMH HTN, DM on metformin p/w hyperglycemia. Pt granddaughter translating at request of pt. States moved from [place] several weeks ago. Has since run out of medications and has not seen a new PMD. States FSG going up increasing to >500 so brought to ER. Pt states has some intermittent urinary frequency. Otherwise in usoh.

## 2021-01-09 NOTE — ED ADULT TRIAGE NOTE - CCCP TRG CHIEF CMPLNT
Infectious Disease consulted
Message left on Dr. Almonte answering machine regarding consult.
Tele office spoke with Lizzie to request consult.
Tele office spoke with Lizzie to request consult.
Tia Chavira aware of consult.
ran out of meds

## 2021-01-09 NOTE — H&P ADULT - NSHPPHYSICALEXAM_GEN_ALL_CORE
pt. seen and examined , NAD    Vital Signs Last 24 Hrs  T(C): 36.8 (09 Jan 2021 22:08), Max: 36.8 (09 Jan 2021 22:08)  T(F): 98.3 (09 Jan 2021 22:08), Max: 98.3 (09 Jan 2021 22:08)  HR: 82 (09 Jan 2021 22:08) (82 - 95)  BP: 125/76 (09 Jan 2021 22:08) (109/72 - 164/110)  BP(mean): 97 (09 Jan 2021 18:26) (97 - 97)  RR: 16 (09 Jan 2021 22:08) (16 - 20)  SpO2: 98% (09 Jan 2021 22:08) (97% - 100%)    heent: nc/at  neck: supple, no JVD  lungs: B/L clear, no w/r/r  heart: s1s2 nml  abd: soft, NABS  ext: no e/c/c, pulses 2+  neuro: aaox3 , no focal deficit

## 2021-01-09 NOTE — ED PROVIDER NOTE - PHYSICAL EXAMINATION
Physical Exam:  Gen: NAD, AOx3, non-toxic appearing, able to ambulate without assistance  Head: NCAT  HEENT: EOMI, PEERLA, normal conjunctiva, tongue midline, oral mucosa moist  Lung: CTAB, no respiratory distress, no wheezes/rhonchi/rales B/L, speaking in full sentences  CV: RRR, no murmurs, rubs or gallops  Abd: soft, NT, ND, no guarding, no rigidity, no rebound tenderness, no CVA tenderness   MSK: no visible deformities, ROM normal in UE/LE, no back pain  Neuro: No focal sensory or motor deficits  Skin: Warm, well perfused, no rash, no leg swelling  Psych: normal affect, calm  ~Murray Arango M.D. PGY-3

## 2021-01-09 NOTE — ED ADULT NURSE NOTE - DOES PATIENT HAVE ADVANCE DIRECTIVE
Pt states had Shingrix Vac at [facility] on 04/16/19. Outside immunization scanned into pt chart.   No

## 2021-01-09 NOTE — H&P ADULT - ATTENDING COMMENTS
advance care planning : d/w patinet and daughter , remain full code for now , want to get CPR/ Chest compression / intubation if required

## 2021-01-09 NOTE — H&P ADULT - HISTORY OF PRESENT ILLNESS
76M PMH HTN, DM on metformin p/w hyperglycemia. Pt granddaughter translating at request of pt. States moved from [place] several weeks ago. Has since run out of medications and has not seen a new PMD. States FSG going up increasing to >500 so brought to ER. Pt states has some intermittent urinary frequency.

## 2021-01-09 NOTE — ED PROVIDER NOTE - ATTENDING CONTRIBUTION TO CARE
Private Physician None  Translated by Granddaughter. Declined translation service.  76y male moved from [place] 3m ago. Pt ran out of Metformin 3d ago. Family reports elevated glucose. No c/p,sob,palps,Nvdc, fever and chills. cough,covid, PE WDWN male awake alert normocephalic atraumatic neck supple chest clear ap abd soft +bs no mass guarding cv no rgm neuro no focal defects  Chance Ross MD, Facep

## 2021-01-09 NOTE — ED ADULT NURSE NOTE - OBJECTIVE STATEMENT
77 y/o male pmh HTN, diabetes on metformin arrives to ED c/o "running out of medication". Patient is reese speaking, granddaugther Deion at bedside translating for patient. Reports he has not taken his blood pressure and diabetes medication x 4 days due to running out. Patient recently moved to [place] from [place] (3 months) and does not have PCP to refill medication. Patient home , and ER . Patient is a/ox4, well appearing, respirations unlabored, abdomen soft NT/ND, no n/v/d. No fever/chills. No dizziness/headache. Denies urinary frequency but reports he urinates more often sometimes. Patient seen and evaluated by ED MD. IV 20g placed to left AC, labs drawn and sent. No covid+ contacts.

## 2021-01-09 NOTE — ED PROVIDER NOTE - CLINICAL SUMMARY MEDICAL DECISION MAKING FREE TEXT BOX
76M presents with hyperglycemia. r/o DKA/HHS. Pt well appearing. Will check a1c poss cdu for insulin if needed.

## 2021-01-10 NOTE — PROGRESS NOTE ADULT - SUBJECTIVE AND OBJECTIVE BOX
Patient is a 76y old  Male who presents with a chief complaint of elevated BS / ran out of medication (2021 17:44)      INTERVAL HPI/OVERNIGHT EVENTS:  T(C): 36.7 (01-10-21 @ 21:22), Max: 36.7 (01-10-21 @ 13:48)  HR: 85 (01-10-21 @ 21:22) (80 - 85)  BP: 123/60 (01-10-21 @ 21:22) (123/60 - 155/84)  RR: 18 (01-10-21 @ 21:22) (17 - 18)  SpO2: 97% (01-10-21 @ 21:22) (95% - 97%)  Wt(kg): --  I&O's Summary    2021 07:01  -  10 Emery 2021 07:00  --------------------------------------------------------  IN: 120 mL / OUT: 0 mL / NET: 120 mL    10 Emery 2021 07:01  -  10 Emery 2021 23:18  --------------------------------------------------------  IN: 120 mL / OUT: 0 mL / NET: 120 mL        PAST MEDICAL & SURGICAL HISTORY:  DM (diabetes mellitus)    HTN (hypertension)    No significant past surgical history        SOCIAL HISTORY  Alcohol:  Tobacco:  Illicit substance use:    FAMILY HISTORY:    REVIEW OF SYSTEMS:  CONSTITUTIONAL: No fever, weight loss, or fatigue  EYES: No eye pain, visual disturbances, or discharge  ENMT:  No difficulty hearing, tinnitus, vertigo; No sinus or throat pain  NECK: No pain or stiffness  RESPIRATORY: No cough, wheezing, chills or hemoptysis; No shortness of breath  CARDIOVASCULAR: No chest pain, palpitations, dizziness, or leg swelling  GASTROINTESTINAL: No abdominal or epigastric pain. No nausea, vomiting, or hematemesis; No diarrhea or constipation. No melena or hematochezia.  GENITOURINARY: No dysuria, frequency, hematuria, or incontinence  NEUROLOGICAL: No headaches, memory loss, loss of strength, numbness, or tremors  SKIN: No itching, burning, rashes, or lesions   LYMPH NODES: No enlarged glands  ENDOCRINE: No heat or cold intolerance; No hair loss  MUSCULOSKELETAL: No joint pain or swelling; No muscle, back, or extremity pain  PSYCHIATRIC: No depression, anxiety, mood swings, or difficulty sleeping  HEME/LYMPH: No easy bruising, or bleeding gums  ALLERY AND IMMUNOLOGIC: No hives or eczema    RADIOLOGY & ADDITIONAL TESTS:    Imaging Personally Reviewed:  [ ] YES  [ ] NO    Consultant(s) Notes Reviewed:  [ ] YES  [ ] NO    PHYSICAL EXAM:  GENERAL: NAD, well-groomed, well-developed  HEAD:  Atraumatic, Normocephalic  EYES: EOMI, PERRLA, conjunctiva and sclera clear  ENMT: No tonsillar erythema, exudates, or enlargement; Moist mucous membranes, Good dentition, No lesions  NECK: Supple, No JVD, Normal thyroid  NERVOUS SYSTEM:  Alert & Oriented X3, Good concentration; Motor Strength 5/5 B/L upper and lower extremities; DTRs 2+ intact and symmetric  CHEST/LUNG: Clear to percussion bilaterally; No rales, rhonchi, wheezing, or rubs  HEART: Regular rate and rhythm; No murmurs, rubs, or gallops  ABDOMEN: Soft, Nontender, Nondistended; Bowel sounds present  EXTREMITIES:  2+ Peripheral Pulses, No clubbing, cyanosis, or edema  LYMPH: No lymphadenopathy noted  SKIN: No rashes or lesions    LABS:                        15.0   7.53  )-----------( 232      ( 2021 11:57 )             43.5         129<L>  |  94<L>  |  19  ----------------------------<  553<HH>  4.5   |  19<L>  |  0.89    Ca    9.7      2021 11:57    TPro  7.4  /  Alb  4.0  /  TBili  0.6  /  DBili  x   /  AST  15  /  ALT  14  /  AlkPhos  113        Urinalysis Basic - ( 10 Emery 2021 01:06 )    Color: Light Yellow / Appearance: Clear / S.018 / pH: x  Gluc: x / Ketone: Negative  / Bili: Negative / Urobili: Negative   Blood: x / Protein: Negative / Nitrite: Negative   Leuk Esterase: Negative / RBC: 2 /hpf / WBC 0 /HPF   Sq Epi: x / Non Sq Epi: 0 /hpf / Bacteria: Negative      CAPILLARY BLOOD GLUCOSE      POCT Blood Glucose.: 205 mg/dL (10 Emery 2021 21:27)  POCT Blood Glucose.: 182 mg/dL (10 Emery 2021 17:12)  POCT Blood Glucose.: 365 mg/dL (10 Emery 2021 12:12)  POCT Blood Glucose.: 334 mg/dL (10 Emery 2021 08:14)        Urinalysis Basic - ( 10 Emery 2021 01:06 )    Color: Light Yellow / Appearance: Clear / S.018 / pH: x  Gluc: x / Ketone: Negative  / Bili: Negative / Urobili: Negative   Blood: x / Protein: Negative / Nitrite: Negative   Leuk Esterase: Negative / RBC: 2 /hpf / WBC 0 /HPF   Sq Epi: x / Non Sq Epi: 0 /hpf / Bacteria: Negative        MEDICATIONS  (STANDING):  dextrose 40% Gel 15 Gram(s) Oral once  dextrose 5%. 1000 milliLiter(s) (50 mL/Hr) IV Continuous <Continuous>  dextrose 5%. 1000 milliLiter(s) (100 mL/Hr) IV Continuous <Continuous>  dextrose 50% Injectable 25 Gram(s) IV Push once  dextrose 50% Injectable 12.5 Gram(s) IV Push once  dextrose 50% Injectable 25 Gram(s) IV Push once  glucagon  Injectable 1 milliGRAM(s) IntraMuscular once  heparin   Injectable 5000 Unit(s) SubCutaneous every 12 hours  insulin glargine Injectable (LANTUS) 14 Unit(s) SubCutaneous at bedtime  insulin lispro (ADMELOG) corrective regimen sliding scale   SubCutaneous three times a day before meals  insulin lispro Injectable (ADMELOG) 5 Unit(s) SubCutaneous before breakfast  insulin lispro Injectable (ADMELOG) 5 Unit(s) SubCutaneous before lunch  insulin lispro Injectable (ADMELOG) 5 Unit(s) SubCutaneous before dinner  lisinopril 5 milliGRAM(s) Oral daily  pantoprazole    Tablet 40 milliGRAM(s) Oral before breakfast    MEDICATIONS  (PRN):      Care Discussed with Consultants/Other Providers [ ] YES  [ ] NO Patient is a 76y old  Male who presents with a chief complaint of elevated BS / ran out of medication (2021 17:44)    doing well , denies any c/o , FSBS 250 range     INTERVAL HPI/OVERNIGHT EVENTS:  T(C): 36.7 (01-10-21 @ 21:22), Max: 36.7 (01-10-21 @ 13:48)  HR: 85 (01-10-21 @ 21:22) (80 - 85)  BP: 123/60 (01-10-21 @ 21:22) (123/60 - 155/84)  RR: 18 (01-10-21 @ 21:22) (17 - 18)  SpO2: 97% (01-10-21 @ 21:22) (95% - 97%)  Wt(kg): --  I&O's Summary    2021 07:  -  10 Emery 2021 07:00  --------------------------------------------------------  IN: 120 mL / OUT: 0 mL / NET: 120 mL    10 Emery 2021 07:  -  10 Emery 2021 23:18  --------------------------------------------------------  IN: 120 mL / OUT: 0 mL / NET: 120 mL        PAST MEDICAL & SURGICAL HISTORY:  DM (diabetes mellitus)    HTN (hypertension)    No significant past surgical history        SOCIAL HISTORY  Alcohol:  Tobacco:  Illicit substance use:    FAMILY HISTORY:    REVIEW OF SYSTEMS:  CONSTITUTIONAL: No fever, weight loss, or fatigue  EYES: No eye pain, visual disturbances, or discharge  ENMT:  No difficulty hearing, tinnitus, vertigo; No sinus or throat pain  NECK: No pain or stiffness  RESPIRATORY: No cough, wheezing, chills or hemoptysis; No shortness of breath  CARDIOVASCULAR: No chest pain, palpitations, dizziness, or leg swelling  GASTROINTESTINAL: No abdominal or epigastric pain. No nausea, vomiting, or hematemesis; No diarrhea or constipation. No melena or hematochezia.  GENITOURINARY: No dysuria, frequency, hematuria, or incontinence  NEUROLOGICAL: No headaches, memory loss, loss of strength, numbness, or tremors  SKIN: No itching, burning, rashes, or lesions   LYMPH NODES: No enlarged glands  ENDOCRINE: No heat or cold intolerance; No hair loss  MUSCULOSKELETAL: No joint pain or swelling; No muscle, back, or extremity pain  PSYCHIATRIC: No depression, anxiety, mood swings, or difficulty sleeping  HEME/LYMPH: No easy bruising, or bleeding gums  ALLERY AND IMMUNOLOGIC: No hives or eczema    RADIOLOGY & ADDITIONAL TESTS:    Imaging Personally Reviewed:  [ ] YES  [ ] NO    Consultant(s) Notes Reviewed:  [ ] YES  [ ] NO    PHYSICAL EXAM:  GENERAL: NAD, well-groomed, well-developed  HEAD:  Atraumatic, Normocephalic  EYES: EOMI, PERRLA, conjunctiva and sclera clear  ENMT: No tonsillar erythema, exudates, or enlargement; Moist mucous membranes, Good dentition, No lesions  NECK: Supple, No JVD, Normal thyroid  NERVOUS SYSTEM:  Alert & Oriented X3, Good concentration; Motor Strength 5/5 B/L upper and lower extremities; DTRs 2+ intact and symmetric  CHEST/LUNG: Clear to percussion bilaterally; No rales, rhonchi, wheezing, or rubs  HEART: Regular rate and rhythm; No murmurs, rubs, or gallops  ABDOMEN: Soft, Nontender, Nondistended; Bowel sounds present  EXTREMITIES:  2+ Peripheral Pulses, No clubbing, cyanosis, or edema  LYMPH: No lymphadenopathy noted  SKIN: No rashes or lesions    LABS:                        15.0   7.53  )-----------( 232      ( 2021 11:57 )             43.5         129<L>  |  94<L>  |  19  ----------------------------<  553<HH>  4.5   |  19<L>  |  0.89    Ca    9.7      2021 11:57    TPro  7.4  /  Alb  4.0  /  TBili  0.6  /  DBili  x   /  AST  15  /  ALT  14  /  AlkPhos  113        Urinalysis Basic - ( 10 Emery 2021 01:06 )    Color: Light Yellow / Appearance: Clear / S.018 / pH: x  Gluc: x / Ketone: Negative  / Bili: Negative / Urobili: Negative   Blood: x / Protein: Negative / Nitrite: Negative   Leuk Esterase: Negative / RBC: 2 /hpf / WBC 0 /HPF   Sq Epi: x / Non Sq Epi: 0 /hpf / Bacteria: Negative      CAPILLARY BLOOD GLUCOSE      POCT Blood Glucose.: 205 mg/dL (10 Emery 2021 21:27)  POCT Blood Glucose.: 182 mg/dL (10 Emery 2021 17:12)  POCT Blood Glucose.: 365 mg/dL (10 Emery 2021 12:12)  POCT Blood Glucose.: 334 mg/dL (10 Emery 2021 08:14)        Urinalysis Basic - ( 10 Emery 2021 01:06 )    Color: Light Yellow / Appearance: Clear / S.018 / pH: x  Gluc: x / Ketone: Negative  / Bili: Negative / Urobili: Negative   Blood: x / Protein: Negative / Nitrite: Negative   Leuk Esterase: Negative / RBC: 2 /hpf / WBC 0 /HPF   Sq Epi: x / Non Sq Epi: 0 /hpf / Bacteria: Negative        MEDICATIONS  (STANDING):  dextrose 40% Gel 15 Gram(s) Oral once  dextrose 5%. 1000 milliLiter(s) (50 mL/Hr) IV Continuous <Continuous>  dextrose 5%. 1000 milliLiter(s) (100 mL/Hr) IV Continuous <Continuous>  dextrose 50% Injectable 25 Gram(s) IV Push once  dextrose 50% Injectable 12.5 Gram(s) IV Push once  dextrose 50% Injectable 25 Gram(s) IV Push once  glucagon  Injectable 1 milliGRAM(s) IntraMuscular once  heparin   Injectable 5000 Unit(s) SubCutaneous every 12 hours  insulin glargine Injectable (LANTUS) 14 Unit(s) SubCutaneous at bedtime  insulin lispro (ADMELOG) corrective regimen sliding scale   SubCutaneous three times a day before meals  insulin lispro Injectable (ADMELOG) 5 Unit(s) SubCutaneous before breakfast  insulin lispro Injectable (ADMELOG) 5 Unit(s) SubCutaneous before lunch  insulin lispro Injectable (ADMELOG) 5 Unit(s) SubCutaneous before dinner  lisinopril 5 milliGRAM(s) Oral daily  pantoprazole    Tablet 40 milliGRAM(s) Oral before breakfast    MEDICATIONS  (PRN):      Care Discussed with Consultants/Other Providers [ ] YES  [ ] NO

## 2021-01-10 NOTE — PROGRESS NOTE ADULT - ATTENDING COMMENTS
d/w grand daughter this afternoon in [place] , ok to be d/c back home in am , she will pick him . will d/c home on metformin 1000 mg bid and lisinopril 5 mg daily . will d/c all Insulin . family says his cugars were controlled when he has medication . patient will follow with me in office [phone] , told grand daughter to call and make appointment in 1 week .

## 2021-01-10 NOTE — PROGRESS NOTE ADULT - PROBLEM SELECTOR PROBLEM 2
He is doing well with his current diet plus medication regimen.   Now on atorvastatin.  ACC/AHA ASCVD (arteriorsclerotic cardiovascular disease) 10 year risk estimate approximately 12 % on 2015 data.   Lab Results   Component Value Date    CHOLESTEROL 169 01/21/2016    HDL 60 01/21/2016    CALCLDL 92 01/21/2016    TRIGLYCERIDE 87 01/21/2016     Lab Results   Component Value Date    GPT 34 01/21/2016   Plan:  Continue current regimen.  See lab orders.    DM (diabetes mellitus)

## 2021-01-11 NOTE — DISCHARGE NOTE NURSING/CASE MANAGEMENT/SOCIAL WORK - PATIENT PORTAL LINK FT
You can access the FollowMyHealth Patient Portal offered by [facility] by registering at the following website: [email or web address]. By joining [employer] [facility]’s FollowMyHealth portal, you will also be able to view your health information using other applications (apps) compatible with our system.

## 2021-01-11 NOTE — DISCHARGE NOTE PROVIDER - NSDCMRMEDTOKEN_GEN_ALL_CORE_FT
losartan 50 mg oral tablet: 1 tab(s) orally once a day  metFORMIN 1000 mg oral tablet: 1 tab(s) orally 2 times a day  mirtazapine 7.5 mg oral tablet: 1 tab(s) orally once a day  omeprazole 20 mg oral delayed release capsule: 1 cap(s) orally once a day  Zofran ODT 4 mg oral tablet, disintegratin tab(s) orally every 4 to 6 hours, As Needed   Alcohol swabs: 1 application subcutaneous once a day   Glucometer: 1 application subcutaneous once a day   Lancets: 1 application subcutaneous once a day   lisinopril 5 mg oral tablet: 1 tab(s) orally once a day  metFORMIN 1000 mg oral tablet: 1 tab(s) orally 2 times a day  mirtazapine 7.5 mg oral tablet: 1 tab(s) orally once a day  omeprazole 20 mg oral delayed release capsule: 1 cap(s) orally once a day  Test strips: 1 application subcutaneous once a day

## 2021-01-11 NOTE — DISCHARGE NOTE PROVIDER - CARE PROVIDER_API CALL
Meliton Benites)  [place] [address]  Phone: [phone]  Fax: [phone]  Follow Up Time: 1 week

## 2021-01-11 NOTE — DISCHARGE NOTE PROVIDER - HOSPITAL COURSE
77 y/o M with PMHx of HTN and DM on metformin p/w hyperglycemia. Pt granddaughter translating at request of pt. States moved from [place] several weeks ago. Has since run out of medications and has not seen a new PMD. States FSG going up increasing to >500 so brought to ER. Pt states has some intermittent urinary frequency.  At presentation concerning for DKA that now resolved with insulin and IVF. A1C 14.0 and now FS within normal range. Will discharge home with Metformin and follow   DKA (diabetic ketoacidoses).  Plan: IV fluids   -Insulin   check HbA1c   -started on lantus   -house endo consult in am.      Problem/Plan - 2:  ·  Problem: DM (diabetes mellitus).  Plan: started Lantus   sod level low 2/2 elevated sugars   -repeat in am.      Problem/Plan - 3:  ·  Problem: HTN (hypertension).  Plan: started on lisinopril.     Attending Attestation:   d/w grand daughter this afternoon in [place] , ok to be d/c back home in am , she will pick him . will d/c home on metformin 1000 mg bid and lisinopril 5 mg daily . will d/c all Insulin . family says his cugars were controlled when he has medication . patient will follow with me in office [phone] , told grand daughter to call and make appointment in 1 week . 77 y/o M with PMHx of HTN and DM on metformin p/w hyperglycemia. Pt granddaughter translating at request of pt. States moved from [place] several weeks ago. Has since run out of medications and has not seen a new PMD. States FSG going up increasing to >500 so brought to ER. Pt states has some intermittent urinary frequency.  At presentation concerning for DKA that now resolved with insulin and IVF. A1C 14.0 and now FS within normal range. Will discharge home with Metformin and lisinopril and follow with Dr. Benites in office [phone] in 1 week.    Pt cleared for DC by Dr. Benites with follow up with him.

## 2021-01-11 NOTE — DISCHARGE NOTE PROVIDER - NSDCCPCAREPLAN_GEN_ALL_CORE_FT
PRINCIPAL DISCHARGE DIAGNOSIS  Diagnosis: DKA (diabetic ketoacidoses)  Assessment and Plan of Treatment: At presentation concerning for DKA that now resolved with insulin and IVF. A1C 14.0 and now FS within normal range. Will discharge home with Metformin and lisinopril and follow with Dr. Benites in office [phone] in 1 week.      SECONDARY DISCHARGE DIAGNOSES  Diagnosis: HTN (hypertension)  Assessment and Plan of Treatment: Continue with lisinopril

## 2021-04-16 PROBLEM — I10 ESSENTIAL (PRIMARY) HYPERTENSION: Chronic | Status: ACTIVE | Noted: 2021-01-01

## 2021-04-16 PROBLEM — E11.9 TYPE 2 DIABETES MELLITUS WITHOUT COMPLICATIONS: Chronic | Status: ACTIVE | Noted: 2021-01-01

## 2021-04-16 NOTE — ED PROVIDER NOTE - CONSTITUTIONAL, MLM
normal... Well appearing, awake, alert, oriented to person, place, time/situation and in no apparent distress. Well appearing, awake, alert, oriented to person, place, time/situation and in mild resp distress

## 2021-04-16 NOTE — ED PROVIDER NOTE - PROGRESS NOTE DETAILS
pe ruled out by age adjusted dimer. discussed the case with the admitting MD who accepts the patient for admission

## 2021-04-16 NOTE — H&P ADULT - ASSESSMENT
76 M with PMH of HTN and DM presents to ED with complaint of hiccups since this morning. Patient reports hiccups are associated with cough, fever and chills x 1 day.    In ED, /76, , saO2 89% on RA, improved to 95% on 5L NC       Pt is admitted for Acute hypoxic respiratory failure and Hyponatremia

## 2021-04-16 NOTE — H&P ADULT - HISTORY OF PRESENT ILLNESS
76 M with PMH of HTN and DM presents to ED with complaint of hiccups since this morning. Patient reports hiccups are associated with cough, fever and chills x 1 day. He also endorses myalgias. Tmax at home was 100.3F.He denies having had the COVID-19 vaccination or having had COVID-19 in the past. Per daughter patient has had poor appetite and weakness x1 week. She also reports noticing that he becomes short of breath on minimal exertion. Patient was at PMD office last week and was advised to go to ED for hyperglycemia at that time, but refused. Pt was admitted to [facility] in January for DKA. Pt denies any chest pain, palpitations, rinary symptoms or any other acute complaints     In ED, /76, , saO2 89% on RA, improved to 95% on 5L NC

## 2021-04-16 NOTE — ED PROVIDER NOTE - CLINICAL SUMMARY MEDICAL DECISION MAKING FREE TEXT BOX
77 y/o male presenting to [facility] with complaint of hiccups. Further history reveals patient has been having fevers, chills, myalgias for past week with increasing shortness of breath at home. Patient has not previously had COVID-19 or vaccination in the past. Patient denies chest pain. EKG not markedly changed from prior on comparison.

## 2021-04-16 NOTE — H&P ADULT - ATTENDING COMMENTS
76 M with PMH of HTN and DM presents to ED with complaint of hiccups since this morning. Patient reports hiccups are associated with cough, fever and chills x 1 day.    T(C): 36.7 (04-16-21 @ 20:17), Max: 38.4 (04-16-21 @ 16:57)  HR: 77 (04-16-21 @ 20:17) (77 - 112)  BP: 111/70 (04-16-21 @ 20:17) (111/70 - 122/77)  RR: 18 (04-16-21 @ 20:17) (16 - 91)  SpO2: 100% (04-16-21 @ 20:17) (89% - 100%)    Acute hypoxic respiratory failure   COVID pneumonia   DM   HTN     Remdesivir, Decadron and inhalers.   HISS, follow up A1C.

## 2021-04-16 NOTE — ED PROVIDER NOTE - OBJECTIVE STATEMENT
77 y/o male with PMHx of uncontrolled DM and HTN presents to the ED with chief complaint of hiccups since this morning. Patient reports associated cough, fever and chills x 1 day. He also endorses myalgias. He denies having had the COVID-19 vaccination or having had COVID-19 in the past. Collateral history from daughter in ED reveals patient has had poor appetite and weakness x1 week. She also reports noticing that he becomes short of breath on minimal exertion. She says Tmax at home was 100.3F. Patient was at PMD office last week and was advised to go to ED for hyperglycemia at that time, but refused.

## 2021-04-16 NOTE — ED ADULT NURSE NOTE - OBJECTIVE STATEMENT
bought in by family for hiccups as per pt he's having heart problems - ekg done, pt appears week and emaciated

## 2021-04-16 NOTE — ED ADULT NURSE NOTE - NSIMPLEMENTINTERV_GEN_ALL_ED
Implemented All Fall with Harm Risk Interventions:  [place] to call system. Call bell, personal items and telephone within reach. Instruct patient to call for assistance. Room bathroom lighting operational. Non-slip footwear when patient is off stretcher. Physically safe environment: no spills, clutter or unnecessary equipment. Stretcher in lowest position, wheels locked, appropriate side rails in place. Provide visual cue, wrist band, yellow gown, etc. Monitor gait and stability. Monitor for mental status changes and reorient to person, place, and time. Review medications for side effects contributing to fall risk. Reinforce activity limits and safety measures with patient and family. Provide visual clues: red socks.

## 2021-04-16 NOTE — H&P ADULT - PROBLEM SELECTOR PLAN 2
p/w Na 125 likely hypovolemic hyponatremia  started on NS   f/u Serum osm, urine osm and urine Na levels  Monitor Na q6-8 hrs   Goal Na is 130-132 by tomorrow 3 PM p/w Na 125 , corrected for blood glucose is 127   can be SIADH due to COVID  Will fluid restrict 1200 cc for now   f/u Serum osm, urine osm and urine Na levels  Monitor Na q6-8 hrs   Goal Na is 130-132 by tomorrow 3 PM

## 2021-04-16 NOTE — H&P ADULT - PROBLEM SELECTOR PLAN 1
p/w Hiccups, cough and fever   COVID positive  CXR shows bilateral airspace opacities  Saturating 95% on 5L [place]   D-dimer 262  Started on decadron, lovenox   f/u CRP, Procalcitonin, Ferritin   f/u COVID Ab, DC remdesivir is antibodies are present  Monitor oxygen saturation

## 2021-04-16 NOTE — ED PROVIDER NOTE - CARE PLAN
Principal Discharge DX:	Acute respiratory failure with hypoxia  Goal:	probably covid 19   Principal Discharge DX:	Acute respiratory failure with hypoxia  Goal:	probably covid 19  Secondary Diagnosis:	Hyponatremia

## 2021-04-17 NOTE — CONSULT NOTE ADULT - SUBJECTIVE AND OBJECTIVE BOX
MEHRDAD SUTHERLAND  Patient is a 76y old  Male who presents with a chief complaint of   HPI:  76 M with PMH of HTN and DM presents to ED with complaint of hiccups since this morning. He was noted to have COVID and hyponatremia. Responded to NS.    PAST MEDICAL & SURGICAL HISTORY:  HTN (hypertension)    DM (diabetes mellitus)    No significant past surgical history      MEDICATIONS  (STANDING):  atorvastatin 40 milliGRAM(s) Oral at bedtime  dexAMETHasone  Injectable 6 milliGRAM(s) IV Push daily  enoxaparin Injectable 40 milliGRAM(s) SubCutaneous daily  insulin glargine Injectable (LANTUS) 10 Unit(s) SubCutaneous at bedtime  insulin lispro (ADMELOG) corrective regimen sliding scale   SubCutaneous three times a day before meals  losartan 50 milliGRAM(s) Oral daily  remdesivir  IVPB 100 milliGRAM(s) IV Intermittent every 24 hours  remdesivir  IVPB   IV Intermittent     Allergies    No Known Allergies    Intolerances      FAMILY HISTORY:  No pertinent family history in first degree relatives        REVIEW OF SYSTEMS    General:  No fever, chills or night sweats.    Ophthalmologic: No changes in vision.  	  ENMT: No difficulty swallowing. 	    Respiratory and Thorax: No cough, wheezes or dyspnea.  	  Cardiovascular: No chest pains, tiredness or palpitations.	    Gastrointestinal: No dyspepsia, constipation or diarrhea.	    Genitourinary:	 No dysuria, hematuria or frequency.    Musculoskeletal: No joint pains or swelling. No muscle pains.    Neurological:	No weakness or numbness. No seizures.    Hematology/Lymphatics: No heat or cold intolerance.    Endocrine: No polyuria or polydipsia.	      Vital Signs Last 24 Hrs  T(C): 36.4 (2021 05:58), Max: 38.4 (2021 16:57)  T(F): 97.6 (2021 05:58), Max: 101.1 (2021 16:57)  HR: 78 (2021 05:58) (77 - 112)  BP: 118/78 (2021 05:58) (111/70 - 122/77)  BP(mean): 7 (2021 16:57) (7 - 7)  RR: 18 (2021 05:58) (16 - 91)  SpO2: 100% (2021 05:58) (89% - 100%)    PHYSICAL EXAMINATION:  Constitutional:   appears comfortable and not distressed. Not diaphoretic.    Respiratory: The lungs are clear to auscultation. No dullness and expansion is normal.    Cardiovascular: S1 and S2 are normal. No mummurs, rubs or gallops are present.    Gastrointestinal: The abdomen is soft. No tenderness is present. No masses are present. Bowel sounds are normal.    Genitourinary: The bladder is not distended. No CVA tenderness is present.    Extremities: No edema is noted. No deformities are present.    Neurological: Cognition is normal. Tone, power and sensation are normal. Gait is steady.    Skin: No lesions are seen  or palpated.                            15.7   8.01  )-----------( 250      ( 2021 06:26 )             46.2     04-17    130<L>  |  98  |  22<H>  ----------------------------<  321<H>  4.0   |  17<L>  |  1.13    Ca    8.7      2021 06:26  Phos  4.1     04-17  Mg     2.1     04-17    TPro  8.2  /  Alb  2.7<L>  /  TBili  0.6  /  DBili  x   /  AST  47<H>  /  ALT  26  /  AlkPhos  70  04-17    LIVER FUNCTIONS - ( 2021 06:26 )  Alb: 2.7 g/dL / Pro: 8.2 g/dL / ALK PHOS: 70 U/L / ALT: 26 U/L DA / AST: 47 U/L / GGT: x           Urinalysis Basic - ( 2021 15:47 )    Color: Yellow / Appearance: Clear / S.015 / pH: x  Gluc: x / Ketone: Moderate  / Bili: Negative / Urobili: Negative   Blood: x / Protein: 30 mg/dL / Nitrite: Negative   Leuk Esterase: Negative / RBC: 5-10 /HPF / WBC 0-2 /HPF   Sq Epi: x / Non Sq Epi: x / Bacteria: x      Osmolality, Random Urine: 466 mos/kg (21 @ 23:15)

## 2021-04-17 NOTE — PROGRESS NOTE ADULT - SUBJECTIVE AND OBJECTIVE BOX
Patient is a 76y old  Male who presents with a chief complaint of     SUBJECTIVE / OVERNIGHT EVENTS: no events over night     MEDICATIONS  (STANDING):  atorvastatin 40 milliGRAM(s) Oral at bedtime  dexAMETHasone  Injectable 6 milliGRAM(s) IV Push daily  enoxaparin Injectable 40 milliGRAM(s) SubCutaneous daily  insulin glargine Injectable (LANTUS) 10 Unit(s) SubCutaneous at bedtime  insulin lispro (ADMELOG) corrective regimen sliding scale   SubCutaneous three times a day before meals  losartan 50 milliGRAM(s) Oral daily  remdesivir  IVPB 100 milliGRAM(s) IV Intermittent every 24 hours  remdesivir  IVPB   IV Intermittent     MEDICATIONS  (PRN):      CAPILLARY BLOOD GLUCOSE      T(C): 36.3 (04-17-21 @ 21:42), Max: 36.6 (04-17-21 @ 14:55)  HR: 86 (04-17-21 @ 21:42) (86 - 87)  BP: 108/70 (04-17-21 @ 21:42) (108/70 - 113/67)  RR: 17 (04-17-21 @ 21:42) (17 - 18)  SpO2: 100% (04-17-21 @ 21:42) (93% - 100%)      MEDICATIONS  (STANDING):  atorvastatin 40 milliGRAM(s) Oral at bedtime  dexAMETHasone  Injectable 6 milliGRAM(s) IV Push daily  enoxaparin Injectable 40 milliGRAM(s) SubCutaneous daily  insulin glargine Injectable (LANTUS) 10 Unit(s) SubCutaneous at bedtime  insulin lispro (ADMELOG) corrective regimen sliding scale   SubCutaneous three times a day before meals  losartan 50 milliGRAM(s) Oral daily  remdesivir  IVPB 100 milliGRAM(s) IV Intermittent every 24 hours  remdesivir  IVPB   IV Intermittent     MEDICATIONS  (PRN):      PHYSICAL EXAM:  GENERAL: NAD, well-developed  HEAD:  Atraumatic, Normocephalic  EYES: EOMI, PERRLA, conjunctiva and sclera clear  NECK: Supple, No JVD  CHEST/LUNG: Clear to auscultation bilaterally; No wheeze  HEART: Regular rate and rhythm; No murmurs, rubs, or gallops  ABDOMEN: Soft, Nontender, Nondistended; Bowel sounds present  EXTREMITIES:  2+ Peripheral Pulses, No clubbing, cyanosis, or edema  PSYCH: AAOx3  NEUROLOGY: non-focal  SKIN: No rashes or lesions                          15.7   8.01  )-----------( 250      ( 17 Apr 2021 06:26 )             46.2       CARDIAC MARKERS ( 16 Apr 2021 15:10 )  <0.015 ng/mL / x     / x     / x     / x          LIVER FUNCTIONS - ( 17 Apr 2021 06:26 )  Alb: 2.7 g/dL / Pro: 8.2 g/dL / ALK PHOS: 70 U/L / ALT: 26 U/L DA / AST: 47 U/L / GGT: x           PT/INR - ( 16 Apr 2021 15:10 )   PT: 14.1 sec;   INR: 1.19 ratio           130|98|22<321  4.0|17|1.13  8.7,2.1,4.1  04-17 @ 06:26      CAPILLARY BLOOD GLUCOSE      POCT Blood Glucose.: 263 mg/dL (17 Apr 2021 21:55)  POCT Blood Glucose.: 276 mg/dL (17 Apr 2021 16:27)  POCT Blood Glucose.: 364 mg/dL (17 Apr 2021 11:37)  POCT Blood Glucose.: 339 mg/dL (17 Apr 2021 08:07)      RADIOLOGY & ADDITIONAL TESTS:    Imaging Personally Reviewed:    Consultant(s) Notes Reviewed:      Care Discussed with Consultants/Other Providers:

## 2021-04-17 NOTE — CONSULT NOTE ADULT - ASSESSMENT
Hyponatremia:  Clinically hypovolemic with low urine Na and high U osmolality. Likely Hypovolemic Hyponatremia.  - Follow up BMP.  - Repeat NS 1 L at 50 ml/hour if no further response.  - Send Serum Osmolality as total protein is high.  - Serum Uric Acid.

## 2021-04-18 NOTE — DISCHARGE NOTE PROVIDER - NSDCMRMEDTOKEN_GEN_ALL_CORE_FT
atorvastatin 40 mg oral tablet: 1 tab(s) orally once a day  HumuLIN 70/30 subcutaneous suspension: 10  subcutaneous 3 times a day  losartan 50 mg oral tablet: 1 tab(s) orally once a day  metFORMIN 1000 mg oral tablet: 1 tab(s) orally 2 times a day

## 2021-04-18 NOTE — DISCHARGE NOTE PROVIDER - CARE PROVIDER_API CALL
Wally Santana  Internal Medicine  [address] [place] [address], [place] [address]  Phone: [phone]  Fax: [phone]  Follow Up Time: 1 week

## 2021-04-18 NOTE — DISCHARGE NOTE PROVIDER - HOSPITAL COURSE
76 M with PMH of HTN and DM presents to ED with complaint of hiccups since this morning. Patient reports hiccups are associated with cough, fever and chills. Found to be COVID positive on admission. admitted to medicine for acute hypoxemic respiratory failure due to bilateral pneumonia in setting of COVID 19. Started on Remdesivir, dexamethasone and supplemental oxygen. Pt was also found to have Hyponatremia likely due to hypovolemia, Followed by Nephro, started on IVF. Pt was also found to have uncontrolled type 2 diabetes worsening with steroid use. Endo Dr. Hall were consulted     NOT COMPLETE 4/18  Please note that this a brief summary of hospital course please refer to daily progress notes and consult notes for full course and events

## 2021-04-18 NOTE — PROGRESS NOTE ADULT - SUBJECTIVE AND OBJECTIVE BOX
MEHRDAD SUTHERLAND  76y  Patient is a 76y old  Male who presents with a chief complaint of   HPI:  Followed for hyponatremia in the setting of COVID. Started on NS with improvement. No new complaints.      HEALTH ISSUES - PROBLEM Dx:  Acute respiratory failure with hypoxia  Acute respiratory failure with hypoxia    Hyponatremia  Hyponatremia    DM (diabetes mellitus)  DM (diabetes mellitus)    HTN (hypertension)  HTN (hypertension)    Need for prophylactic measure  Need for prophylactic measure          MEDICATIONS  (STANDING):  atorvastatin 40 milliGRAM(s) Oral at bedtime  dexAMETHasone  Injectable 6 milliGRAM(s) IV Push daily  enoxaparin Injectable 40 milliGRAM(s) SubCutaneous daily  insulin glargine Injectable (LANTUS) 10 Unit(s) SubCutaneous at bedtime  insulin lispro (ADMELOG) corrective regimen sliding scale   SubCutaneous three times a day before meals  losartan 50 milliGRAM(s) Oral daily  remdesivir  IVPB 100 milliGRAM(s) IV Intermittent every 24 hours  remdesivir  IVPB   IV Intermittent     MEDICATIONS  (PRN):    Vital Signs Last 24 Hrs  T(C): 36.6 (18 Apr 2021 14:15), Max: 36.6 (18 Apr 2021 14:15)  T(F): 97.8 (18 Apr 2021 14:15), Max: 97.8 (18 Apr 2021 14:15)  HR: 90 (18 Apr 2021 14:15) (86 - 96)  BP: 137/79 (18 Apr 2021 14:15) (108/70 - 139/89)  BP(mean): --  RR: 16 (18 Apr 2021 14:15) (16 - 17)  SpO2: 98% (18 Apr 2021 14:15) (98% - 100%)  Daily     Daily     PHYSICAL EXAM:  Constitutional:  He appears comfortable and not distressed. Not diaphoretic.    Respiratory: The lungs are clear to auscultation. No dullness and expansion is normal.    Cardiovascular: S1 and S2 are normal. No mummurs, rubs or gallops are present.    Gastrointestinal: The abdomen is soft. No tenderness is present. No masses are present. Bowel sounds are normal.    Genitourinary: The bladder is not distended. No CVA tenderness is present.    Extremities: No edema is noted. No deformities are present.    Neurological: Cognition is normal. Tone, power and sensation are normal.    Skin: No lesions are seen  or palpated.    Lymph Nodes: No lymphadenopathy is present.                            15.7   16.74 )-----------( 388      ( 18 Apr 2021 11:11 )             45.9     04-18    133<L>  |  98  |  35<H>  ----------------------------<  317<H>  4.3   |  15<L>  |  1.12    Ca    8.8      18 Apr 2021 11:11  Phos  3.1     04-18  Mg     2.4     04-18    TPro  8.1  /  Alb  2.5<L>  /  TBili  0.7  /  DBili  x   /  AST  47<H>  /  ALT  32  /  AlkPhos  69  04-18    Osmolality, Random Urine: 466 mos/kg (04.16.21 @ 23:15)   Sodium, Random Urine: 7 mmol/L (04.16.21 @ 23:15)   Urinalysis + Microscopic Examination (04.16.21 @ 15:47)   Blood, Urine: Moderate   Color: Yellow   Glucose Qualitative, Urine: 1000 mg/dL   Nitrite: Negative   Ketone - Urine: Moderate   Bilirubin: Negative   Protein, Urine: 30 mg/dL   Urine Appearance: Clear   Urobilinogen: Negative   Specific Gravity: 1.015   pH Urine: 5.0   Leukocyte Esterase Concentration: Negative   Red Blood Cell - Urine: 5-10 /HPF   White Blood Cell - Urine: 0-2 /HPF

## 2021-04-18 NOTE — PROGRESS NOTE ADULT - SUBJECTIVE AND OBJECTIVE BOX
Patient is a 76y old  Male who presents with a chief complaint of     SUBJECTIVE / OVERNIGHT EVENTS: no events over night     T(C): 36.4 (04-18-21 @ 20:42), Max: 36.6 (04-18-21 @ 14:15)  HR: 92 (04-18-21 @ 20:42) (90 - 92)  BP: 104/66 (04-18-21 @ 20:42) (104/66 - 137/79)  RR: 18 (04-18-21 @ 20:42) (16 - 18)  SpO2: 94% (04-18-21 @ 20:42) (94% - 98%)    MEDICATIONS  (STANDING):  atorvastatin 40 milliGRAM(s) Oral at bedtime  dexAMETHasone  Injectable 6 milliGRAM(s) IV Push daily  enoxaparin Injectable 40 milliGRAM(s) SubCutaneous daily  insulin glargine Injectable (LANTUS) 10 Unit(s) SubCutaneous at bedtime  insulin lispro (ADMELOG) corrective regimen sliding scale   SubCutaneous three times a day before meals  losartan 50 milliGRAM(s) Oral daily  remdesivir  IVPB 100 milliGRAM(s) IV Intermittent every 24 hours  remdesivir  IVPB   IV Intermittent     MEDICATIONS  (PRN):    PHYSICAL EXAM:  GENERAL: NAD, well-developed  HEAD:  Atraumatic, Normocephalic  EYES: EOMI, PERRLA, conjunctiva and sclera clear  NECK: Supple, No JVD  CHEST/LUNG: Clear to auscultation bilaterally; No wheeze  HEART: Regular rate and rhythm; No murmurs, rubs, or gallops  ABDOMEN: Soft, Nontender, Nondistended; Bowel sounds present  EXTREMITIES:  2+ Peripheral Pulses, No clubbing, cyanosis, or edema  PSYCH: AAOx3  NEUROLOGY: non-focal  SKIN: No rashes or lesions                          15.7   8.01  )-----------( 250      ( 17 Apr 2021 06:26 )             46.2       CARDIAC MARKERS ( 16 Apr 2021 15:10 )  <0.015 ng/mL / x     / x     / x     / x          LIVER FUNCTIONS - ( 17 Apr 2021 06:26 )  Alb: 2.7 g/dL / Pro: 8.2 g/dL / ALK PHOS: 70 U/L / ALT: 26 U/L DA / AST: 47 U/L / GGT: x           PT/INR - ( 16 Apr 2021 15:10 )   PT: 14.1 sec;   INR: 1.19 ratio           130|98|22<321  4.0|17|1.13  8.7,2.1,4.1  04-17 @ 06:26      CAPILLARY BLOOD GLUCOSE      POCT Blood Glucose.: 263 mg/dL (17 Apr 2021 21:55)  POCT Blood Glucose.: 276 mg/dL (17 Apr 2021 16:27)  POCT Blood Glucose.: 364 mg/dL (17 Apr 2021 11:37)  POCT Blood Glucose.: 339 mg/dL (17 Apr 2021 08:07)      RADIOLOGY & ADDITIONAL TESTS:    Imaging Personally Reviewed:    Consultant(s) Notes Reviewed:      Care Discussed with Consultants/Other Providers:

## 2021-04-18 NOTE — PROGRESS NOTE ADULT - ASSESSMENT
Hyponatremia:  Clinically hypovolemic with low urine Na and high Urine Osmolality. Likely Hypovolemic Hyponatremia.  - Follow up BMP.  - Repeat NS 1 L at 50 ml/hour if no further response.  - Send Serum Osmolality as total protein is high.  - Serum Uric Acid.    INOCENCIA:  The urine Na is low and the BUN/Cr ratio is high, suspected hypoperfusion.  - Monitor BMP.  - Hydrate as needed.    CKD 2/3:  Has risk (Diabetes) and minimal proteinuria.  - Monitor BMP.    Metabolic Acidosis:  Mixed AG and NAG. He may have some Tubulo-interstitial Disease  - Monitor for now.  - Hydrate.  - if no improvement, start PO NaHCO3

## 2021-04-18 NOTE — DISCHARGE NOTE PROVIDER - NSDCCPCAREPLAN_GEN_ALL_CORE_FT
PRINCIPAL DISCHARGE DIAGNOSIS  Diagnosis: Acute respiratory failure with hypoxia  Assessment and Plan of Treatment: You were found to have pneumonia due to Covid 19. You required oxygen. You were treated with Steroids and Remdesivir  Please maintain quarentine for 14 days. Seek immediate medical attention if you develop shortness of breath. See additional instructions below.   CORONAVIRUS INSTRUCTIONS:   Based on your current clinical status and stability, it has been determined that you no longer need hospitalization and can recover while remaining in self-quarantine at home. You should follow the prevention steps below until a healthcare provider or local or state health department says you can return to your normal activities.   1. You should restrict activities outside your home, except for getting medical care.   2. Do not go to work, school, or public areas.   3. Avoid using public transportation, ride-sharing, or taxis.   4. Separate yourself from other people and animals in your home as much as possible.  When you are around other people (e.g., sharing a room or vehicle) you should wear a facemask.  5. Wash your hands often with soap and water for at least 20 seconds, especially after blowing your nose, coughing, or sneezing; going to the bathroom; and before eating or preparing food.  6. Cover your mouth and nose with a tissue when you cough or sneeze. Throw used tissues in a lined trash can. Immediately wash your hands with soap and water for at least 20 seconds  7. High touch surfaces include counters, tabletops, doorknobs, bathroom fixtures, toilets, phones, keyboards, tablets, and bedside tables.  8. Avoid sharing dishes, drinking glasses, cups, eating utensils, towels, or bedding with other people or pets in your home. After using these items, they should be washed thoroughly with soap and water.  You are strongly advised to seek prompt medical attention if your illness worsens or you develop new symptoms like fever or difficulty breathing.   Please call   to speak with your discharging physician if you have any questions.        SECONDARY DISCHARGE DIAGNOSES  Diagnosis: HTN (hypertension)  Assessment and Plan of Treatment: Low salt diet  Activity as tolerated.  Take all medication as prescribed.  Follow up with your medical doctor for routine blood pressure monitoring at your next visit.  Notify your doctor if you have any of the following symptoms:   Dizziness, Lightheadedness, Blurry vision, Headache, Chest pain, Shortness of breath      Diagnosis: Insulin dependent type 2 diabetes mellitus, uncontrolled  Assessment and Plan of Treatment: HgA1C this admission 13.8 which is very high   You are on Oral diabetes medicine may be given to help control your blood sugar levels, take insulin, check your blood glucose before meals and at bedtime.  It's important not to skip any meals.  Keep a log of your blood glucose results and always take it with you to your doctor appointments.  About nutrition:  learn to keep track of sugar and starchy foods (carbohydrates). Do not skip meals. Your blood sugar level may drop too low if you have taken insulin and do not eat.  How to prevent complications: Diabetes that is not well controlled can lead to health problems. Examples include foot sores, retinopathy (vision loss), and peripheral neuropathy (loss of feeling in your hands and feet). Also, risk for dementia can increase with blood sugar levels that are too high or too low for long periods of time.

## 2021-04-19 NOTE — PROGRESS NOTE ADULT - PROBLEM SELECTOR PLAN 1
-bilateral pneumonia in setting of COVID 19  -cont Remdesivir 3/5  -cont Dexamethasone   -CTA negative PE   -cont  with NC oxygen, titrated up to 4L this morning, monitor pulse oximetry frequently, titrate O2 down as tolerated   -obtain daily room air O2 saturations once O2 requirements stabilizes   -prone patient as tolerated   -cont prophylactic Lovenox   -cont supportive care   -daily labs CBC/CMP/CRP  -3 day labs ESR//D-dimer/LDH/Ferritin  -maintain on airborne and contact isolation   -consider need for extended prophylaxis prior to discharge based on D-Dimer and calculated VTE risk -bilateral pneumonia in setting of COVID 19  -cont Remdesivir 3/5  -cont Dexamethasone   -cont  with NC oxygen, titrated up to 4L this morning, monitor pulse oximetry frequently, titrate O2 down as tolerated   -obtain daily room air O2 saturations once O2 requirements stabilizes   -prone patient as tolerated   -cont prophylactic Lovenox   -cont supportive care   -daily labs CBC/CMP/CRP  -3 day labs ESR/D-dimer/LDH/Ferritin  -maintain on airborne and contact isolation   -consider need for extended prophylaxis prior to discharge based on D-Dimer and calculated VTE risk

## 2021-04-19 NOTE — CONSULT NOTE ADULT - ASSESSMENT
76 M with PMH of HTN and DM presents to ED with complaint of hiccups since this morning. Patient reports hiccups are associated with cough, fever and chills x 1 day. Found to have covid and uncont dm. Pt does not remember his home meds ? insulin tx as out pt and metformin

## 2021-04-19 NOTE — PROGRESS NOTE ADULT - ASSESSMENT
76 M with PMH of HTN and DM presents to ED with complaint of hiccups.  Found to be COVID positive on admission. admitted to medicine for acute hypoxemic respiratory failure due to bilateral pneumonia in setting of COVID 19. Started on Remdesivir, dexamethasone and supplemental oxygen. Pt was also found to have Hyponatremia likely due to hypovolemia, Followed by Nephro, started on IVF. Pt was also found to have uncontrolled type 2 diabetes worsening with steroid use. Martin Hall were consulted  76 M with PMH of HTN and DM presents to ED with complaint of hiccups.  Found to be COVID positive on admission. admitted to medicine for acute hypoxemic respiratory failure due to bilateral pneumonia in setting of COVID 19. Started on Remdesivir, dexamethasone and supplemental oxygen. Pt was also found to have Hyponatremia likely due to hypovolemia, Followed by Nephro, started on IVF with improvement.  Pt was also found to have uncontrolled type 2 diabetes worsening with steroid use. Endo Dr. Hall were consulted and Insulin regimen was adjusted   Worsening hypoxia, [place] 2L titrated up to 4L

## 2021-04-19 NOTE — PROGRESS NOTE ADULT - ASSESSMENT
_________________________________________________________________________________________  ========>>  M E D I C A L   A T T E N D I N G    F O L L O W  U P  N O T E  <<=========  -----------------------------------------------------------------------------------------------------    - Patient evaluated by me, chart reviewed.   - Patient today overall doing ok    ==================>> REVIEW OF SYSTEM <<=================    GEN: no fever, no chills, no pain  RESP: no SOB, no cough, no sputum  CVS: no chest pain  GI: no abdominal pain, no nausea  : no dysuria, no frequency  Neuro: no headache, no dizziness  Derm : no itching, no rash    ==================>> PHYSICAL EXAM <<=================    GEN: A&O X 2-3 , NAD , comfortable, pleasant, calm , cachectic   HEENT: NCAT, PERRL, MMM, hearing intact  Neck: supple , no JVD appreciated  CVS: S1S2 , regular , No M/R/G appreciated  PULM: CTA B/L,  no W/R/R appreciated  ABD.: soft. non tender, non distended,  bowel sounds present  Extrem: intact pulses , no edema   PSYCH : normal mood,  not anxious                            ( Note Written / Date of service :  04-19-21 )    ==================>> MEDICATIONS <<====================    MEDICATIONS  (STANDING):  atorvastatin 40 milliGRAM(s) Oral at bedtime  dexAMETHasone  Injectable 6 milliGRAM(s) IV Push daily  dextrose 40% Gel 15 Gram(s) Oral once  dextrose 5%. 1000 milliLiter(s) (50 mL/Hr) IV Continuous <Continuous>  dextrose 5%. 1000 milliLiter(s) (100 mL/Hr) IV Continuous <Continuous>  dextrose 50% Injectable 25 Gram(s) IV Push once  dextrose 50% Injectable 12.5 Gram(s) IV Push once  dextrose 50% Injectable 25 Gram(s) IV Push once  enoxaparin Injectable 40 milliGRAM(s) SubCutaneous daily  glucagon  Injectable 1 milliGRAM(s) IntraMuscular once  insulin glargine Injectable (LANTUS) 20 Unit(s) SubCutaneous at bedtime  insulin lispro (ADMELOG) corrective regimen sliding scale   SubCutaneous three times a day before meals  insulin lispro Injectable (ADMELOG) 6 Unit(s) SubCutaneous three times a day before meals  losartan 50 milliGRAM(s) Oral daily  remdesivir  IVPB 100 milliGRAM(s) IV Intermittent every 24 hours  remdesivir  IVPB   IV Intermittent     ___________  Active diet:  Diet, Regular:   Consistent Carbohydrate Evening Snacks  DASH/TLC Sodium & Cholesterol Restricted  1200mL Fluid Restriction ([id number])  Lacto Veg (Accepts Milk & Milk Products)  ___________________    ==================>> VITAL SIGNS <<==================    T(C): 36.5 (04-19-21 @ 05:10), Max: 36.6 (04-18-21 @ 14:15)  HR: 98 (04-19-21 @ 05:10) (90 - 98)  BP: 110/67 (04-19-21 @ 05:10) (104/66 - 137/79)  RR: 18 (04-19-21 @ 06:03) (16 - 18)  SpO2: 92% (04-19-21 @ 06:03) (90% - 98%)       POCT Blood Glucose.: 304 mg/dL (19 Apr 2021 11:15)  POCT Blood Glucose.: 380 mg/dL (19 Apr 2021 08:03)  POCT Blood Glucose.: 253 mg/dL (18 Apr 2021 21:36)  POCT Blood Glucose.: 318 mg/dL (18 Apr 2021 16:48)     ==================>> LAB AND IMAGING <<==================                        15.3   16.63 )-----------( 401      ( 19 Apr 2021 05:56 )             44.5        WBC count:   16.63 <<== ,  16.74 <<== ,  8.01 <<== ,  8.78 <<==     04-19    136  |  103  |  36<H>  ----------------------------<  323<H>  4.3   |  17<L>  |  1.01    Ca    8.8      19 Apr 2021 05:56  Phos  3.0     04-19  Mg     2.5     04-19    TPro  7.7  /  Alb  2.5<L>  /  TBili  0.6  /  DBili  x   /  AST  38  /  ALT  29  /  AlkPhos  70  04-19    TSH:      0.96   (04-17-21)           Lipid profile:  (04-17-21)     Total: 96     LDL  : (p)     HDL  :42     TG   :77     HgA1C:   (04-17-21)          (04-17-21)      13.8,   (04-17-21)          (04-17-21)      13.4,   (01-10-21)          (01-10-21)      14.0    _______________________  C U L T U R E S :    Culture - Urine (collected 16 Apr 2021 22:00)  Source: .Urine Clean Catch (Midstream)  Final Report (18 Apr 2021 12:46):    >100,000 CFU/ml Aerococcus species    "Aerococcus spp. are predictably susceptible to penicillin,    ampicillin, tetracycline, and vancomycin.  All isolates are    resistant to sulfonamides"    <10,000 CFU/ml of other organisms    SARS-CoV-2: Detected (04-16-21 @ 15:10)    ___________________________________________________________________________________  ===============>>  A S S E S S M E N T   A N D   P L A N <<===============  ------------------------------------------------------------------------------------------    · Assessment	  76 M with PMH of HTN and DM presented to ED with complaint of hiccups.  Found to be COVID positive on admission. admitted to medicine for acute hypoxemic respiratory failure due to bilateral pneumonia in setting of COVID 19. Started on Remdesivir, dexamethasone and supplemental oxygen. Pt was also found to have Hyponatremia likely due to hypovolemia, Followed by Nephro, started on IVF with improvement.  Pt was also found to have uncontrolled type 2 diabetes worsening with steroid use. Endo Dr. Hall were consulted and Insulin regimen was adjusted   Worsening hypoxia, NC 2L titrated up to 4L     Problem/Plan - 1:  ·  Problem: Acute respiratory failure with hypoxia.  Plan: -bilateral pneumonia in setting of COVID 19  - finish Remdesivir / Dexamethasone per protocol  ( likely cause of leukocytosis and hyperglycemia)   -cont  with NC oxygen and titrated down as tolerated   -cont prophylactic Lovenox   -cont supportive care   -daily labs CBC/CMP/CRP...   - Continue Current medications otherwise and monitor.  supportive care  -nutrition / PTOOB    Problem/Plan - 2:  ·  Problem: Insulin dependent type 2 diabetes mellitus, uncontrolled, in setting of infection and steroids home   -endo consulted and appreciated recom and mgmt   -diabetic diet   -a1c 13.8  - Continue Current medications per endo otherwise and monitor FS    Problem/Plan - 3:  ·  Problem: HTN (hypertension).    -controlled, cont home dose of Losartan   -mon BP.     Problem/Plan - 4:  ·  Problem: Hyponatremia.  Plan: -likely due to hypovolemia, better   -Nephro following.     -GI/DVT Prophylaxis per protocol.    ___________________________  H. MARILEE Gayle.  (covering today)   Pager: [phone]

## 2021-04-19 NOTE — PROGRESS NOTE ADULT - SUBJECTIVE AND OBJECTIVE BOX
[facility]   MD BART FLANNERY MD RUORU WONG, PA    TEL:  OFFICE: [phone]  DR AYERS CELL: [phone]  JOSÉ MIGUEL ABDI CELL: [phone]  DR. AGUIRRE CELL: [phone]  DR. MCCLAIN CELL: [phone]    FROM 5 PM - 7 AM PLEASE CALL ANSWERING SERVICE: [phone]    RENAL FOLLOW UP NOTE--Date of Service 04-19-21 @ 09:25  --------------------------------------------------------------------------------  HPI:      Pt  covid 19+    PAST HISTORY  --------------------------------------------------------------------------------  No significant changes to PMH, PSH, FHx, SHx, unless otherwise noted    ALLERGIES & MEDICATIONS  --------------------------------------------------------------------------------  Allergies    No Known Allergies    Intolerances      Standing Inpatient Medications  atorvastatin 40 milliGRAM(s) Oral at bedtime  dexAMETHasone  Injectable 6 milliGRAM(s) IV Push daily  enoxaparin Injectable 40 milliGRAM(s) SubCutaneous daily  insulin glargine Injectable (LANTUS) 10 Unit(s) SubCutaneous at bedtime  insulin lispro (ADMELOG) corrective regimen sliding scale   SubCutaneous three times a day before meals  losartan 50 milliGRAM(s) Oral daily  remdesivir  IVPB 100 milliGRAM(s) IV Intermittent every 24 hours  remdesivir  IVPB   IV Intermittent     PRN Inpatient Medications      REVIEW OF SYSTEMS  --------------------------------------------------------------------------------  General: no fever    MSK: no edema     VITALS/PHYSICAL EXAM  --------------------------------------------------------------------------------  T(C): 36.5 (04-19-21 @ 05:10), Max: 36.6 (04-18-21 @ 14:15)  HR: 98 (04-19-21 @ 05:10) (90 - 98)  BP: 110/67 (04-19-21 @ 05:10) (104/66 - 137/79)  RR: 18 (04-19-21 @ 06:03) (16 - 18)  SpO2: 92% (04-19-21 @ 06:03) (90% - 98%)  Wt(kg): --          LABS/STUDIES  --------------------------------------------------------------------------------              15.3   16.63 >-----------<  401      [04-19-21 @ 05:56]              44.5     136  |  103  |  36  ----------------------------<  323      [04-19-21 @ 05:56]  4.3   |  17  |  1.01        Ca     8.8     [04-19-21 @ 05:56]      Mg     2.5     [04-19-21 @ 05:56]      Phos  3.0     [04-19-21 @ 05:56]    TPro  7.7  /  Alb  2.5  /  TBili  0.6  /  DBili  x   /  AST  38  /  ALT  29  /  AlkPhos  70  [04-19-21 @ 05:56]        Serum Osmolality 312      [04-19-21 @ 05:56]    Creatinine Trend:  SCr 1.01 [04-19 @ 05:56]  SCr 1.12 [04-18 @ 11:11]  SCr 1.13 [04-17 @ 06:26]  SCr 1.02 [04-16 @ 21:06]  SCr 1.09 [04-16 @ 15:10]    Urinalysis - [04-16-21 @ 15:47]      Color Yellow / Appearance Clear / SG 1.015 / pH 5.0      Gluc 1000 / Ketone Moderate  / Bili Negative / Urobili Negative       Blood Moderate / Protein 30 / Leuk Est Negative / Nitrite Negative      RBC 5-10 / WBC 0-2 / Hyaline  / Gran  / Sq Epi  / Non Sq Epi  / Bacteria     Urine Sodium 7      [04-16-21 @ 23:15]  Urine Osmolality 466      [04-16-21 @ 23:15]    Ferritin 998      [04-17-21 @ 01:12]  TSH 0.96      [04-17-21 @ 06:26]  Lipid: chol 96, TG 77, HDL 42, LDL --      [04-17-21 @ 06:26]

## 2021-04-19 NOTE — PROGRESS NOTE ADULT - ASSESSMENT
Hyponatremia:  Clinically hypovolemic with low urine Na and high Urine Osmolality. Likely Hypovolemic Hyponatremia.  -Improved serum Na  -Monitor serum Na  -Avoid overcorrection >8mEq in 24 hrs       INOCENCIA:  The urine Na is low and the BUN/Cr ratio is high, suspected hypoperfusion.  -Improved renal function   - Monitor BMP.  - Hydrate as needed.    CKD 2/3:  Has risk (Diabetes) and minimal proteinuria.  - Monitor BMP.    Metabolic Acidosis:  Mixed AG and NAG. He may have some Tubulo-interstitial Disease  - Monitor for now.  - if no improvement, start PO NaHCO3 Hyponatremia:  Clinically hypovolemic with low urine Na and high Urine Osmolality. Likely Hypovolemic Hyponatremia.  -Improved serum Na  -Monitor serum Na  -Avoid overcorrection >8mEq in 24 hrs       INOCENCIA:  The urine Na is low and the BUN/Cr ratio is high, suspected hypoperfusion.  -Improved renal function   - Monitor BMP.  - Hydrate as needed.    CKD 2/3:  Has risk (Diabetes) and minimal proteinuria.  - Monitor BMP.    Metabolic Acidosis:  Mixed AG and NAG. He may have some Tubulo-interstitial Disease  - Monitor for now.  - if no improvement, start PO NaHCO3    COVID   MOnitor temp/ O2  COntinue current care

## 2021-04-19 NOTE — PROGRESS NOTE ADULT - PROBLEM SELECTOR PLAN 3
-insulin dependent type 2 diabetes with hyperglycemia, worsening with steroid use. non compliant with PO meds and Insulin at home   -add premeal Admelog and increase Lantus to 20 units   -diabetic diet   -a1c 13.8  -Endo Dr. Hall

## 2021-04-19 NOTE — PROGRESS NOTE ADULT - SUBJECTIVE AND OBJECTIVE BOX
Patient is a 76y old  Male who presents with a chief complaint of     OVERNIGHT EVENTS: pt is primarily Rl speaking (examiner is fluent in Rl) no acute events overnight, sitting up in bed, poor PO appetite       REVIEW OF SYSTEMS:  CONSTITUTIONAL: No fever, chills  ENMT:  No difficulty hearing, no change in vision  NECK: No pain or stiffness  RESPIRATORY: No cough, SOB  CARDIOVASCULAR: No chest pain, palpitations  GASTROINTESTINAL: No abdominal pain. No nausea, vomiting, or diarrhea  GENITOURINARY: No dysuria  NEUROLOGICAL: No HA  SKIN: No itching, burning, rashes, or lesions   MUSCULOSKELETAL: No joint pain or swelling; No muscle, back, or extremity pain  PSYCHIATRIC: No depression, anxiety    T(C): 36.5 (04-19-21 @ 05:10), Max: 36.6 (04-18-21 @ 14:15)  HR: 98 (04-19-21 @ 05:10) (90 - 98)  BP: 110/67 (04-19-21 @ 05:10) (104/66 - 137/79)  RR: 18 (04-19-21 @ 06:03) (16 - 18)  SpO2: 92% (04-19-21 @ 06:03) (90% - 98%)  Wt(kg): --Vital Signs Last 24 Hrs  T(C): 36.5 (19 Apr 2021 05:10), Max: 36.6 (18 Apr 2021 14:15)  T(F): 97.7 (19 Apr 2021 05:10), Max: 97.8 (18 Apr 2021 14:15)  HR: 98 (19 Apr 2021 05:10) (90 - 98)  BP: 110/67 (19 Apr 2021 05:10) (104/66 - 137/79)  BP(mean): --  RR: 18 (19 Apr 2021 06:03) (16 - 18)  SpO2: 92% (19 Apr 2021 06:03) (90% - 98%)    MEDICATIONS  (STANDING):  atorvastatin 40 milliGRAM(s) Oral at bedtime  dexAMETHasone  Injectable 6 milliGRAM(s) IV Push daily  enoxaparin Injectable 40 milliGRAM(s) SubCutaneous daily  insulin glargine Injectable (LANTUS) 10 Unit(s) SubCutaneous at bedtime  insulin lispro (ADMELOG) corrective regimen sliding scale   SubCutaneous three times a day before meals  losartan 50 milliGRAM(s) Oral daily  remdesivir  IVPB 100 milliGRAM(s) IV Intermittent every 24 hours  remdesivir  IVPB   IV Intermittent     MEDICATIONS  (PRN):      PHYSICAL EXAM:  GENERAL: cachectic, frail   EYES: clear conjunctiva  ENMT: Moist mucous membranes  NECK: Supple, No JVD  CHEST/LUNG: Clear to diminished  bilaterally; No rales, rhonchi, wheezing, or rubs  HEART: S1, S2, Regular rate and rhythm  ABDOMEN: Soft, Nontender, Nondistended; Bowel sounds present  NEURO: Alert & Oriented X3  EXTREMITIES: No LE edema, no calf tenderness  SKIN: No rashes or lesions    Consultant(s) Notes Reviewed:  [x ] YES  [ ] NO  Care Discussed with Consultants/Other Providers [ x] YES  [ ] NO    LABS:                        15.3   16.63 )-----------( 401      ( 19 Apr 2021 05:56 )             44.5     04-19    136  |  103  |  36<H>  ----------------------------<  323<H>  4.3   |  17<L>  |  1.01    Ca    8.8      19 Apr 2021 05:56  Phos  3.0     04-19  Mg     2.5     04-19    TPro  7.7  /  Alb  2.5<L>  /  TBili  0.6  /  DBili  x   /  AST  38  /  ALT  29  /  AlkPhos  70  04-19    CAPILLARY BLOOD GLUCOSE      POCT Blood Glucose.: 380 mg/dL (19 Apr 2021 08:03)  POCT Blood Glucose.: 253 mg/dL (18 Apr 2021 21:36)  POCT Blood Glucose.: 318 mg/dL (18 Apr 2021 16:48)  POCT Blood Glucose.: 325 mg/dL (18 Apr 2021 11:37)    RADIOLOGY & ADDITIONAL TESTS:    < from: Xray Chest 1 View-PORTABLE IMMEDIATE (Xray Chest 1 View-PORTABLE IMMEDIATE .) (04.16.21 @ 14:50) >    EXAM:  XR CHEST PORTABLE IMMED 1V                            PROCEDURE DATE:  04/16/2021          INTERPRETATION:  CLINICAL STATEMENT: Chest Pain.    TECHNIQUE: AP view of the chest.    COMPARISON: 1/9/2021    FINDINGS/  IMPRESSION:  New bilateral airspace opacities lung bases. No pleural effusion    Heart size cannot be accurately assessed in this projection.    < end of copied text >      Imaging Personally Reviewed:  [ ] YES  [ ] NO

## 2021-04-19 NOTE — CONSULT NOTE ADULT - PROBLEM SELECTOR RECOMMENDATION 9
hyperglycemic   on ? insulin tx as out pt and metformin  change lantus to 20 units   add admelog 6 ac tid  fsg ac and hs

## 2021-04-19 NOTE — CONSULT NOTE ADULT - SUBJECTIVE AND OBJECTIVE BOX
Patient is a 76y old  Male who presents with a chief complaint of     HPI:  76 M with PMH of HTN and DM presents to ED with complaint of hiccups since this morning. Patient reports hiccups are associated with cough, fever and chills x 1 day. He also endorses myalgias. Tmax at home was 100.3F.He denies having had the COVID-19 vaccination or having had COVID-19 in the past. Per daughter patient has had poor appetite and weakness x1 week. She also reports noticing that he becomes short of breath on minimal exertion. Patient was at PMD office last week and was advised to go to ED for hyperglycemia at that time, but refused. Pt was admitted to [facility] in January for DKA. Pt denies any chest pain, palpitations, rinary symptoms or any other acute complaints. Found to have uncont dm. Pt does not remember his home meds ? insulin tx as out pt and metformin     In ED, /76, , saO2 89% on RA, improved to 95% on 5L NC  (16 Apr 2021 16:51)      PAST MEDICAL & SURGICAL HISTORY:  HTN (hypertension)    DM (diabetes mellitus)    No significant past surgical history           MEDICATIONS  (STANDING):  atorvastatin 40 milliGRAM(s) Oral at bedtime  dexAMETHasone  Injectable 6 milliGRAM(s) IV Push daily  dextrose 40% Gel 15 Gram(s) Oral once  dextrose 5%. 1000 milliLiter(s) (50 mL/Hr) IV Continuous <Continuous>  dextrose 5%. 1000 milliLiter(s) (100 mL/Hr) IV Continuous <Continuous>  dextrose 50% Injectable 25 Gram(s) IV Push once  dextrose 50% Injectable 12.5 Gram(s) IV Push once  dextrose 50% Injectable 25 Gram(s) IV Push once  enoxaparin Injectable 40 milliGRAM(s) SubCutaneous daily  glucagon  Injectable 1 milliGRAM(s) IntraMuscular once  insulin glargine Injectable (LANTUS) 20 Unit(s) SubCutaneous at bedtime  insulin lispro (ADMELOG) corrective regimen sliding scale   SubCutaneous three times a day before meals  insulin lispro Injectable (ADMELOG) 6 Unit(s) SubCutaneous three times a day before meals  losartan 50 milliGRAM(s) Oral daily  remdesivir  IVPB 100 milliGRAM(s) IV Intermittent every 24 hours  remdesivir  IVPB   IV Intermittent     MEDICATIONS  (PRN):      FAMILY HISTORY:  No pertinent family history in first degree relatives        SOCIAL HISTORY:      REVIEW OF SYSTEMS:  CONSTITUTIONAL: No fever, weight loss, or fatigue  EYES: No eye pain, visual disturbances, or discharge  ENT:  No difficulty hearing, tinnitus, vertigo; No sinus or throat pain  NECK: No pain or stiffness  RESPIRATORY: No cough, wheezing, chills or hemoptysis; No Shortness of Breath  CARDIOVASCULAR: No chest pain, palpitations, passing out, dizziness, or leg swelling  GASTROINTESTINAL: No abdominal or epigastric pain. No nausea, vomiting, or hematemesis; No diarrhea or constipation. No melena or hematochezia.  GENITOURINARY: No dysuria, frequency, hematuria, or incontinence  NEUROLOGICAL: No headaches, memory loss, loss of strength, numbness, or tremors  SKIN: No itching, burning, rashes, or lesions   LYMPH Nodes: No enlarged glands  ENDOCRINE: No heat or cold intolerance; No hair loss  MUSCULOSKELETAL: No joint pain or swelling; No muscle, back, or extremity pain  PSYCHIATRIC: No depression, anxiety, mood swings, or difficulty sleeping  HEME/LYMPH: No easy bruising, or bleeding gums  ALLERGY AND IMMUNOLOGIC: No hives or eczema	        Vital Signs Last 24 Hrs  T(C): 36.5 (19 Apr 2021 05:10), Max: 36.6 (18 Apr 2021 14:15)  T(F): 97.7 (19 Apr 2021 05:10), Max: 97.8 (18 Apr 2021 14:15)  HR: 98 (19 Apr 2021 05:10) (90 - 98)  BP: 110/67 (19 Apr 2021 05:10) (104/66 - 137/79)  BP(mean): --  RR: 18 (19 Apr 2021 06:03) (16 - 18)  SpO2: 92% (19 Apr 2021 06:03) (90% - 98%)      Constitutional:    HEENT: nad    Neck:  No JVD, bruits or thyromegaly    Respiratory:  Clear without rales or rhonchi    Cardiovascular:  RR without murmur, rub or gallop.    Gastrointestinal: Soft without hepatosplenomegaly.    Extremities: without cyanosis, clubbing or edema.    Neurological:  Oriented   x  2    . No gross sensory or motor defects.        LABS:                        15.3   16.63 )-----------( 401      ( 19 Apr 2021 05:56 )             44.5     04-19    136  |  103  |  36<H>  ----------------------------<  323<H>  4.3   |  17<L>  |  1.01    Ca    8.8      19 Apr 2021 05:56  Phos  3.0     04-19  Mg     2.5     04-19    TPro  7.7  /  Alb  2.5<L>  /  TBili  0.6  /  DBili  x   /  AST  38  /  ALT  29  /  AlkPhos  70  04-19      A1C with Estimated Average Glucose (04.17.21 @ 10:52)    A1C with Estimated Average Glucose Result: 13.8: Method: Immunoassay            CAPILLARY BLOOD GLUCOSE      POCT Blood Glucose.: 380 mg/dL (19 Apr 2021 08:03)  POCT Blood Glucose.: 253 mg/dL (18 Apr 2021 21:36)  POCT Blood Glucose.: 318 mg/dL (18 Apr 2021 16:48)  POCT Blood Glucose.: 325 mg/dL (18 Apr 2021 11:37)      RADIOLOGY & ADDITIONAL STUDIES:

## 2021-04-20 NOTE — CONSULT NOTE ADULT - ASSESSMENT
76 M with PMH of HTN and DM presents to ED with complaint of hiccups since this morning. Patient reports hiccups are associated with cough, fever and chills x 1 day. Found to have covid and uncont dm. Pt does not remember his home meds ? insulin tx as out pt and metformin.   HBA1C 13.8         Problem/Recommendation - 1:  Problem: Uncontrolled diabetes mellitus.   Recommendation: hyperglycemic   on ? insulin tx as out pt and metformin  WILL increase lantus to 24 units   will continue with admelog 6 ac tid  fsg ac and hs.     Problem/Recommendation - 2:  ·  Problem: Acute respiratory failure with hypoxia.    Recommendation: cont tx as per prim team  Remdisivir and decadron.  76 M with PMH of HTN and DM presents to ED with complaint of hiccups since this morning. Patient reports hiccups are associated with cough, fever and chills x 1 day. Found to have covid and uncont dm. Pt does not remember his home meds ? insulin tx as out pt and metformin.   HBA1C 13.8         Problem/Recommendation - 1:  Problem: Uncontrolled diabetes mellitus.   Recommendation: hyperglycemic   on ? insulin tx as out pt and metformin  WILL increase lantus to 24 units   continue with admelog 6 ac tid  fsg ac and hs.     Problem/Recommendation - 2:  ·  Problem: Acute respiratory failure with hypoxia.    Recommendation: cont tx as per prim team  Remdisivir and decadron.

## 2021-04-20 NOTE — PROGRESS NOTE ADULT - ASSESSMENT
_________________________________________________________________________________________  ========>>  M E D I C A L   A T T E N D I N G    F O L L O W  U P  N O T E  <<=========  -----------------------------------------------------------------------------------------------------    - Patient seen and examined by me earlier today.   - Patient today overall doing ok  - pt with occasional cough, appetite / PO intake is fair     ==================>> REVIEW OF SYSTEM <<=================    GEN: no fever, no chills, no pain  RESP: no SOB, occasional cough, no sputum  CVS: no chest pain  GI: no abdominal pain, no nausea  : no dysuria, no frequency  Neuro: no headache, no dizziness  Derm : no itching, no rash    ==================>> PHYSICAL EXAM <<=================    GEN: A&O X 2-3 , NAD , comfortable, pleasant, calm , cachectic   HEENT: NCAT, PERRL, MMM, hearing intact  Neck: supple , no JVD appreciated  CVS: S1S2 , regular , No M/R/G appreciated  PULM: CTA B/L,  no W/R/R appreciated  ABD.: soft. non tender, non distended,  bowel sounds present  Extrem: intact pulses , no edema   PSYCH : normal mood,  not anxious                                 ( Note written / Date of service 04-20-21 )    ==================>> MEDICATIONS <<====================    atorvastatin 40 milliGRAM(s) Oral at bedtime  dexAMETHasone  Injectable 6 milliGRAM(s) IV Push daily  dextrose 40% Gel 15 Gram(s) Oral once  dextrose 5%. 1000 milliLiter(s) IV Continuous <Continuous>  dextrose 5%. 1000 milliLiter(s) IV Continuous <Continuous>  dextrose 50% Injectable 25 Gram(s) IV Push once  dextrose 50% Injectable 12.5 Gram(s) IV Push once  dextrose 50% Injectable 25 Gram(s) IV Push once  enoxaparin Injectable 40 milliGRAM(s) SubCutaneous daily  glucagon  Injectable 1 milliGRAM(s) IntraMuscular once  insulin glargine Injectable (LANTUS) 20 Unit(s) SubCutaneous at bedtime  insulin lispro (ADMELOG) corrective regimen sliding scale   SubCutaneous three times a day before meals  insulin lispro Injectable (ADMELOG) 6 Unit(s) SubCutaneous three times a day before meals  losartan 50 milliGRAM(s) Oral daily  remdesivir  IVPB 100 milliGRAM(s) IV Intermittent every 24 hours  remdesivir  IVPB   IV Intermittent     ___________  Active diet:  Diet, Regular:   Consistent Carbohydrate Evening Snacks  DASH/TLC Sodium & Cholesterol Restricted  1200mL Fluid Restriction ([id number])  Lacto Veg (Accepts Milk & Milk Products)  ___________________    ==================>> VITAL SIGNS <<==================    Vital Signs Last 24 HrsT(C): 36.4 (04-20-21 @ 04:27)  T(F): 97.5 (04-20-21 @ 04:27), Max: 97.5 (04-20-21 @ 04:27)  HR: 103 (04-20-21 @ 08:41) (96 - 103)  BP: 141/82 (04-20-21 @ 04:27)  RR: 19 (04-20-21 @ 08:41) (18 - 20)  SpO2: 92% (04-20-21 @ 08:41) (90% - 92%)      POCT Blood Glucose.: 200 mg/dL (20 Apr 2021 11:51)  POCT Blood Glucose.: 248 mg/dL (20 Apr 2021 08:01)  POCT Blood Glucose.: 166 mg/dL (19 Apr 2021 21:17)  POCT Blood Glucose.: 221 mg/dL (19 Apr 2021 16:44)     ==================>> LAB AND IMAGING <<==================                        14.8   14.73 )-----------( 408      ( 20 Apr 2021 07:44 )             42.3        04-20    138  |  106  |  35<H>  ----------------------------<  258<H>  3.7   |  20<L>  |  0.84    Ca    8.8      20 Apr 2021 07:44  Phos  3.0     04-19  Mg     2.5     04-19    TPro  7.4  /  Alb  2.5<L>  /  TBili  0.7  /  DBili  x   /  AST  43<H>  /  ALT  31  /  AlkPhos  86  04-20    WBC count:   14.73 <<== ,  16.63 <<== ,  16.74 <<== ,  8.01 <<== ,  8.78 <<==   Hemoglobin:   14.8 <<==,  15.3 <<==,  15.7 <<==,  15.7 <<==,  14.9 <<==  platelets:  408 <==, 401 <==, 388 <==, 250 <==, 246 <==    Creatinine:  0.84  <<==, 1.01  <<==, 1.12  <<==, 1.13  <<==, 1.02  <<==, 1.09  <<==  Sodium:   138  <==, 136  <==, 133  <==, 130  <==, 127  <==, 125  <==       AST:          43 <== , 38 <== , 47 <== , 47 <== , 51 <==      ALT:        31  <== , 29  <== , 32  <== , 26  <== , 26  <==      AP:        86  <=, 70  <=, 69  <=, 70  <=, 72  <=     Bili:        0.7  <=, 0.6  <=, 0.7  <=, 0.6  <=, 0.7  <=    ^^^ Inflammatory markers :  ^^^  C R P :          53 (04-20-21)  <<--, 184 (04-17-21)  <<--  P C T :         0.16 (04-17-21) <<--    Ferritin :         998 (04-17-21) <<--, 944 (04-16-21) <<--    D-Dimer :          263 (04-19-21) <<--, 262 (04-16-21) <<--    _______________________  C U L T U R E S :    Culture - Urine (collected 16 Apr 2021 22:00)  Source: .Urine Clean Catch (Midstream)  Final Report (18 Apr 2021 12:46):    >100,000 CFU/ml Aerococcus species    "Aerococcus spp. are predictably susceptible to penicillin,    ampicillin, tetracycline, and vancomycin.  All isolates are    resistant to sulfonamides"    <10,000 CFU/ml of other organisms    SARS-CoV-2: Detected (04-16-21 @ 15:10)    ___________________________________________________________________________________  ===============>>  A S S E S S M E N T   A N D   P L A N <<===============  ------------------------------------------------------------------------------------------    · Assessment	  76 M with PMH of HTN and DM presented to ED with complaint of hiccups.  Found to be COVID positive on admission. admitted to medicine for acute hypoxemic respiratory failure due to bilateral pneumonia in setting of COVID 19. Started on Remdesivir, dexamethasone and supplemental oxygen. Pt was also found to have Hyponatremia likely due to hypovolemia, Followed by Nephro, started on IVF with improvement.  Pt was also found to have uncontrolled type 2 diabetes worsening with steroid use. Endo Dr. Hall were consulted and Insulin regimen was adjusted   Worsening hypoxia, NC 2L titrated up to 4L     Problem/Plan - 1:  ·  Problem: Acute respiratory failure with hypoxia.  Plan: -bilateral pneumonia in setting of COVID 19  - finish Remdesivir / Dexamethasone per protocol  ( likely cause of leukocytosis and hyperglycemia)   -cont  with NC oxygen and titrated down as tolerated   -cont prophylactic Lovenox   -cont supportive care   -daily labs CBC/CMP/CRP...   - Continue Current medications otherwise and monitor.  supportive care  -nutrition / PTOOB    Problem/Plan - 2:  ·  Problem: Insulin dependent type 2 diabetes mellitus, uncontrolled, in setting of infection and steroids home   -endo consulted and appreciated recom and mgmt   -diabetic diet   -a1c 13.8  - Continue Current medications per endo otherwise and monitor FS    Problem/Plan - 3:  ·  Problem: HTN (hypertension).    -controlled, cont home dose of Losartan   -mon BP.     Problem/Plan - 4:  ·  Problem: Hyponatremia.  Plan: -likely due to hypovolemia, better   -Nephro following.     Problem/Plan - 5:  ·  Problem: UTI   Rocephin X 3 days as ordered    -GI/DVT Prophylaxis per protocol.   OOB to chair encouraged  PO intake and hydration encouraged     ___________________________  H. JUVE Gayle  (covering today)   Pager: [phone]

## 2021-04-20 NOTE — PROGRESS NOTE ADULT - SUBJECTIVE AND OBJECTIVE BOX
[facility] [place] [facility]   MD BART FLANNERY MD RUORU WONG, PA    TEL:  OFFICE: [phone]  DR AYERS CELL: [phone]  JOSÉ MIGUEL ABDI CELL: [phone]  DR. AGUIRRE CELL: [phone]  DR. MCCLAIN CELL: [phone]    FROM 5 PM - 7 AM PLEASE CALL ANSWERING SERVICE: [phone]    RENAL FOLLOW UP NOTE--Date of Service 04-20-21 @ 09:30  --------------------------------------------------------------------------------  HPI:      Pt covid 19+  PAST HISTORY  --------------------------------------------------------------------------------  No significant changes to PMH, PSH, FHx, SHx, unless otherwise noted    ALLERGIES & MEDICATIONS  --------------------------------------------------------------------------------  Allergies    No Known Allergies    Intolerances      Standing Inpatient Medications  atorvastatin 40 milliGRAM(s) Oral at bedtime  dexAMETHasone  Injectable 6 milliGRAM(s) IV Push daily  dextrose 40% Gel 15 Gram(s) Oral once  dextrose 5%. 1000 milliLiter(s) IV Continuous <Continuous>  dextrose 5%. 1000 milliLiter(s) IV Continuous <Continuous>  dextrose 50% Injectable 25 Gram(s) IV Push once  dextrose 50% Injectable 12.5 Gram(s) IV Push once  dextrose 50% Injectable 25 Gram(s) IV Push once  enoxaparin Injectable 40 milliGRAM(s) SubCutaneous daily  glucagon  Injectable 1 milliGRAM(s) IntraMuscular once  insulin glargine Injectable (LANTUS) 20 Unit(s) SubCutaneous at bedtime  insulin lispro (ADMELOG) corrective regimen sliding scale   SubCutaneous three times a day before meals  insulin lispro Injectable (ADMELOG) 6 Unit(s) SubCutaneous three times a day before meals  losartan 50 milliGRAM(s) Oral daily  remdesivir  IVPB 100 milliGRAM(s) IV Intermittent every 24 hours  remdesivir  IVPB   IV Intermittent     PRN Inpatient Medications      REVIEW OF SYSTEMS  --------------------------------------------------------------------------------  General: no fever    MSK: no edema     VITALS/PHYSICAL EXAM  --------------------------------------------------------------------------------  T(C): 36.4 (04-20-21 @ 04:27), Max: 36.4 (04-20-21 @ 04:27)  HR: 103 (04-20-21 @ 08:41) (96 - 103)  BP: 141/82 (04-20-21 @ 04:27) (123/81 - 147/91)  RR: 19 (04-20-21 @ 08:41) (17 - 20)  SpO2: 92% (04-20-21 @ 08:41) (90% - 93%)  Wt(kg): --          LABS/STUDIES  --------------------------------------------------------------------------------              14.8   14.73 >-----------<  408      [04-20-21 @ 07:44]              42.3     138  |  106  |  35  ----------------------------<  258      [04-20-21 @ 07:44]  3.7   |  20  |  0.84        Ca     8.8     [04-20-21 @ 07:44]      Mg     2.5     [04-19-21 @ 05:56]      Phos  3.0     [04-19-21 @ 05:56]    TPro  7.4  /  Alb  2.5  /  TBili  0.7  /  DBili  x   /  AST  43  /  ALT  31  /  AlkPhos  86  [04-20-21 @ 07:44]        Serum Osmolality 312      [04-19-21 @ 05:56]    Creatinine Trend:  SCr 0.84 [04-20 @ 07:44]  SCr 1.01 [04-19 @ 05:56]  SCr 1.12 [04-18 @ 11:11]  SCr 1.13 [04-17 @ 06:26]  SCr 1.02 [04-16 @ 21:06]    Urinalysis - [04-16-21 @ 15:47]      Color Yellow / Appearance Clear / SG 1.015 / pH 5.0      Gluc 1000 / Ketone Moderate  / Bili Negative / Urobili Negative       Blood Moderate / Protein 30 / Leuk Est Negative / Nitrite Negative      RBC 5-10 / WBC 0-2 / Hyaline  / Gran  / Sq Epi  / Non Sq Epi  / Bacteria     Urine Sodium 7      [04-16-21 @ 23:15]  Urine Osmolality 466      [04-16-21 @ 23:15]    Ferritin 998      [04-17-21 @ 01:12]  TSH 0.96      [04-17-21 @ 06:26]  Lipid: chol 96, TG 77, HDL 42, LDL --      [04-17-21 @ 06:26]

## 2021-04-20 NOTE — PROGRESS NOTE ADULT - PROBLEM SELECTOR PLAN 3
-insulin dependent type 2 diabetes with hyperglycemia, worsening with steroid use. - non compliant with PO meds and Insulin at home   - HbA1c 13.8  - add premeal Admelog and increase Lantus to 20 units   - ISS per protocol  - accuchcarole AC, HS  - diabetic diet   - Endo Dr. Hall

## 2021-04-20 NOTE — PROGRESS NOTE ADULT - ASSESSMENT
Hyponatremia:  Clinically hypovolemic with low urine Na and high Urine Osmolality. Likely Hypovolemic Hyponatremia.  -Improved serum Na  -Monitor serum Na  -Avoid overcorrection >8mEq in 24 hrs       INOCENCIA:  The urine Na is low and the BUN/Cr ratio is high, suspected hypoperfusion.  -Improved renal function   - Monitor BMP.  - Hydrate as needed.    CKD 2/3:  Has risk (Diabetes) and minimal proteinuria.  - Monitor BMP.    Metabolic Acidosis:  Mixed AG and NAG. He may have some Tubulo-interstitial Disease  - Monitor for now.  - if no improvement, start PO NaHCO3    COVID   MOnitor temp/ O2  COntinue current care

## 2021-04-20 NOTE — PROGRESS NOTE ADULT - ASSESSMENT
76 year old with past medical history of DM who presented to ED with c/o hiccups, found to be COVID-19 positive and hypoxic - admitted to medicine for acute hypoxemic respiratory failure due to multifocal COVID PNA. Started on course of dexamethasone, remdesivir and supplemental oxygen. Nephrology consulted for hyponatremia in the setting of dehydration 2/2 insensible losses, Endocrinology following for uncontrolled type 2 diabetes mellitus.

## 2021-04-20 NOTE — PROGRESS NOTE ADULT - PROBLEM SELECTOR PLAN 1
-bilateral pneumonia in setting of COVID 19  -cont Remdesivir 4/5  -cont Dexamethasone   -cont  with NC oxygen, titrated up to 4L this morning, monitor pulse oximetry frequently, titrate O2 down as tolerated   -obtain ambulatory O2 saturations once requirements stabilizes  -patient may prone as tolerated  -Lovenox  - Zinc, Vitamin C, Vitamin D  - Montelukast, Albuterol  - Pepcid BID  -cont supportive care   -daily labs CBC/CMP/CRP  -3 day labs ESR/D-dimer/LDH/Ferritin  -maintain on airborne and contact isolation   -consider need for extended prophylaxis prior to discharge based on D-Dimer and calculated VTE risk

## 2021-04-20 NOTE — CONSULT NOTE ADULT - SUBJECTIVE AND OBJECTIVE BOX
Patient is a 76y old  Male who presents with a chief complaint of     HPI:  76 M with PMH of HTN and DM presents to ED with complaint of hiccups since this morning. Patient reports hiccups are associated with cough, fever and chills x 1 day. He also endorses myalgias. Tmax at home was 100.3F.He denies having had the COVID-19 vaccination or having had COVID-19 in the past. Per daughter patient has had poor appetite and weakness x1 week. She also reports noticing that he becomes short of breath on minimal exertion. Patient was at PMD office last week and was advised to go to ED for hyperglycemia at that time, but refused. Pt was admitted to [facility] in January for DKA. Pt denies any chest pain, palpitations, rinary symptoms or any other acute complaints     In ED, /76, , saO2 89% on RA, improved to 95% on 5L NC  (16 Apr 2021 16:51)      PAST MEDICAL & SURGICAL HISTORY:  HTN (hypertension)    DM (diabetes mellitus)    No significant past surgical history           MEDICATIONS  (STANDING):  atorvastatin 40 milliGRAM(s) Oral at bedtime  dexAMETHasone  Injectable 6 milliGRAM(s) IV Push daily  dextrose 40% Gel 15 Gram(s) Oral once  dextrose 5%. 1000 milliLiter(s) (50 mL/Hr) IV Continuous <Continuous>  dextrose 5%. 1000 milliLiter(s) (100 mL/Hr) IV Continuous <Continuous>  dextrose 50% Injectable 25 Gram(s) IV Push once  dextrose 50% Injectable 12.5 Gram(s) IV Push once  dextrose 50% Injectable 25 Gram(s) IV Push once  enoxaparin Injectable 40 milliGRAM(s) SubCutaneous daily  glucagon  Injectable 1 milliGRAM(s) IntraMuscular once  insulin glargine Injectable (LANTUS) 20 Unit(s) SubCutaneous at bedtime  insulin lispro (ADMELOG) corrective regimen sliding scale   SubCutaneous three times a day before meals  insulin lispro Injectable (ADMELOG) 6 Unit(s) SubCutaneous three times a day before meals  losartan 50 milliGRAM(s) Oral daily  remdesivir  IVPB 100 milliGRAM(s) IV Intermittent every 24 hours  remdesivir  IVPB   IV Intermittent     MEDICATIONS  (PRN):      FAMILY HISTORY:  No pertinent family history in first degree relatives        SOCIAL HISTORY:      REVIEW OF SYSTEMS:  CONSTITUTIONAL: No fever, weight loss, or fatigue  EYES: No eye pain, visual disturbances, or discharge  ENT:  No difficulty hearing, tinnitus, vertigo; No sinus or throat pain  NECK: No pain or stiffness  RESPIRATORY: No cough, wheezing, chills or hemoptysis; No Shortness of Breath  CARDIOVASCULAR: No chest pain, palpitations, passing out, dizziness, or leg swelling  GASTROINTESTINAL: No abdominal or epigastric pain. No nausea, vomiting, or hematemesis; No diarrhea or constipation. No melena or hematochezia.  GENITOURINARY: No dysuria, frequency, hematuria, or incontinence  NEUROLOGICAL: No headaches, memory loss, loss of strength, numbness, or tremors  SKIN: No itching, burning, rashes, or lesions   LYMPH Nodes: No enlarged glands  ENDOCRINE: No heat or cold intolerance; No hair loss  MUSCULOSKELETAL: No joint pain or swelling; No muscle, back, or extremity pain  PSYCHIATRIC: No depression, anxiety, mood swings, or difficulty sleeping  HEME/LYMPH: No easy bruising, or bleeding gums  ALLERGY AND IMMUNOLOGIC: No hives or eczema	        Vital Signs Last 24 Hrs  T(C): 36.4 (20 Apr 2021 04:27), Max: 36.4 (20 Apr 2021 04:27)  T(F): 97.5 (20 Apr 2021 04:27), Max: 97.5 (20 Apr 2021 04:27)  HR: 103 (20 Apr 2021 08:41) (96 - 103)  BP: 141/82 (20 Apr 2021 04:27) (123/81 - 147/91)  BP(mean): --  RR: 19 (20 Apr 2021 08:41) (17 - 20)  SpO2: 92% (20 Apr 2021 08:41) (90% - 93%)      Constitutional:    NC/AT:    HEENT:    Neck:  No JVD, bruits or thyromegaly    Respiratory:  Clear without rales or rhonchi    Cardiovascular:  RR without murmur, rub or gallop.    Gastrointestinal: Soft without hepatosplenomegaly.    Extremities: without cyanosis, clubbing or edema.    Neurological:  Oriented   x      . No gross sensory or motor defects.        LABS:                        14.8   14.73 )-----------( 408      ( 20 Apr 2021 07:44 )             42.3     04-20    138  |  106  |  35<H>  ----------------------------<  258<H>  3.7   |  20<L>  |  0.84    Ca    8.8      20 Apr 2021 07:44  Phos  3.0     04-19  Mg     2.5     04-19    TPro  7.4  /  Alb  2.5<L>  /  TBili  0.7  /  DBili  x   /  AST  43<H>  /  ALT  31  /  AlkPhos  86  04-20            CAPILLARY BLOOD GLUCOSE      POCT Blood Glucose.: 248 mg/dL (20 Apr 2021 08:01)  POCT Blood Glucose.: 166 mg/dL (19 Apr 2021 21:17)  POCT Blood Glucose.: 221 mg/dL (19 Apr 2021 16:44)      RADIOLOGY & ADDITIONAL STUDIES: Patient is a 76y old  Male who presents with a chief complaint of     HPI:  76 M with PMH of HTN and DM presents to ED with complaint of hiccups since this morning. Patient reports hiccups are associated with cough, fever and chills x 1 day. He also endorses myalgias. Tmax at home was 100.3F.He denies having had the COVID-19 vaccination or having had COVID-19 in the past. Per daughter patient has had poor appetite and weakness x1 week. She also reports noticing that he becomes short of breath on minimal exertion. Patient was at PMD office last week and was advised to go to ED for hyperglycemia at that time, but refused. Pt was admitted to [facility] in January for DKA. Pt denies any chest pain, palpitations, rinary symptoms or any other acute complaints     In ED, /76, , saO2 89% on RA, improved to 95% on 5L NC  (16 Apr 2021 16:51)      PAST MEDICAL & SURGICAL HISTORY:  HTN (hypertension)    DM (diabetes mellitus)    No significant past surgical history           MEDICATIONS  (STANDING):  atorvastatin 40 milliGRAM(s) Oral at bedtime  dexAMETHasone  Injectable 6 milliGRAM(s) IV Push daily  dextrose 40% Gel 15 Gram(s) Oral once  dextrose 5%. 1000 milliLiter(s) (50 mL/Hr) IV Continuous <Continuous>  dextrose 5%. 1000 milliLiter(s) (100 mL/Hr) IV Continuous <Continuous>  dextrose 50% Injectable 25 Gram(s) IV Push once  dextrose 50% Injectable 12.5 Gram(s) IV Push once  dextrose 50% Injectable 25 Gram(s) IV Push once  enoxaparin Injectable 40 milliGRAM(s) SubCutaneous daily  glucagon  Injectable 1 milliGRAM(s) IntraMuscular once  insulin glargine Injectable (LANTUS) 20 Unit(s) SubCutaneous at bedtime  insulin lispro (ADMELOG) corrective regimen sliding scale   SubCutaneous three times a day before meals  insulin lispro Injectable (ADMELOG) 6 Unit(s) SubCutaneous three times a day before meals  losartan 50 milliGRAM(s) Oral daily  remdesivir  IVPB 100 milliGRAM(s) IV Intermittent every 24 hours  remdesivir  IVPB   IV Intermittent     MEDICATIONS  (PRN):      FAMILY HISTORY:  No pertinent family history in first degree relatives        PHYSICAL EXAM:  GENERAL: NAD, speaks in full sentences, no signs of respiratory distress  HEAD:  Atraumatic, Normocephalic  EYES: EOMI, PERRLA, conjunctiva and sclera clear  NECK: Supple, No JVD  CHEST/LUNG:  No wheeze; No crackles; No accessory muscles used  HEART:s1,s2, No murmurs;   ABDOMEN: Soft, Nontender, Nondistended; Bowel sounds present;   SKIN: No rashes or lesions    Vital Signs Last 24 Hrs  T(C): 36.4 (20 Apr 2021 04:27), Max: 36.4 (20 Apr 2021 04:27)  T(F): 97.5 (20 Apr 2021 04:27), Max: 97.5 (20 Apr 2021 04:27)  HR: 103 (20 Apr 2021 08:41) (96 - 103)  BP: 141/82 (20 Apr 2021 04:27) (123/81 - 147/91)  BP(mean): --  RR: 19 (20 Apr 2021 08:41) (17 - 20)  SpO2: 92% (20 Apr 2021 08:41) (90% - 93%)      Constitutional:    NC/AT:    HEENT:    Neck:  No JVD, bruits or thyromegaly    Respiratory:  Clear without rales or rhonchi    Cardiovascular:  RR without murmur, rub or gallop.    Gastrointestinal: Soft without hepatosplenomegaly.    Extremities: without cyanosis, clubbing or edema.    Neurological:  Oriented   x      . No gross sensory or motor defects.        LABS:                        14.8   14.73 )-----------( 408      ( 20 Apr 2021 07:44 )             42.3     04-20    138  |  106  |  35<H>  ----------------------------<  258<H>  3.7   |  20<L>  |  0.84    Ca    8.8      20 Apr 2021 07:44  Phos  3.0     04-19  Mg     2.5     04-19    TPro  7.4  /  Alb  2.5<L>  /  TBili  0.7  /  DBili  x   /  AST  43<H>  /  ALT  31  /  AlkPhos  86  04-20            CAPILLARY BLOOD GLUCOSE      POCT Blood Glucose.: 248 mg/dL (20 Apr 2021 08:01)  POCT Blood Glucose.: 166 mg/dL (19 Apr 2021 21:17)  POCT Blood Glucose.: 221 mg/dL (19 Apr 2021 16:44)

## 2021-04-21 NOTE — PROGRESS NOTE ADULT - ASSESSMENT
_________________________________________________________________________________________  ========>>  M E D I C A L   A T T E N D I N G    F O L L O W  U P  N O T E  <<=========  -----------------------------------------------------------------------------------------------------    - Patient seen and examined by me earlier today.   - Patient today doing worse with increased O2 requirements >> placed on nonrebreather this morning   - pt with occasional cough, appetite / PO intake is fair     ==================>> REVIEW OF SYSTEM <<=================    GEN: no fever, no chills, no pain  RESP: no SOB, occasional cough, no sputum  CVS: no chest pain  GI: no abdominal pain, no nausea  : no dysuria, no frequency  Neuro: no headache, no dizziness  Derm : no itching, no rash    ==================>> PHYSICAL EXAM <<=================    GEN: A&O X 2-3 , NAD , comfortable, pleasant, calm , cachectic   HEENT: NCAT, PERRL, MMM, hearing intact  Neck: supple , no JVD appreciated  CVS: S1S2 , regular , No M/R/G appreciated  PULM: CTA B/L,  no W/R/R appreciated  ABD.: soft. non tender, non distended,  bowel sounds present  Extrem: intact pulses , no edema   PSYCH : normal mood,  not anxious                             ( Note written / Date of service 04-21-21 )    ==================>> MEDICATIONS <<====================    ascorbic acid 500 milliGRAM(s) Oral daily  atorvastatin 40 milliGRAM(s) Oral at bedtime  cefTRIAXone   IVPB 1000 milliGRAM(s) IV Intermittent every 24 hours  cholecalciferol 400 Unit(s) Oral daily  dexAMETHasone  Injectable 6 milliGRAM(s) IV Push daily  dextrose 40% Gel 15 Gram(s) Oral once  dextrose 5%. 1000 milliLiter(s) IV Continuous <Continuous>  dextrose 5%. 1000 milliLiter(s) IV Continuous <Continuous>  dextrose 50% Injectable 25 Gram(s) IV Push once  dextrose 50% Injectable 12.5 Gram(s) IV Push once  dextrose 50% Injectable 25 Gram(s) IV Push once  enoxaparin Injectable 40 milliGRAM(s) SubCutaneous daily  famotidine    Tablet 20 milliGRAM(s) Oral two times a day  glucagon  Injectable 1 milliGRAM(s) IntraMuscular once  insulin glargine Injectable (LANTUS) 24 Unit(s) SubCutaneous at bedtime  insulin lispro (ADMELOG) corrective regimen sliding scale   SubCutaneous three times a day before meals  insulin lispro Injectable (ADMELOG) 6 Unit(s) SubCutaneous three times a day before meals  losartan 50 milliGRAM(s) Oral daily  montelukast 10 milliGRAM(s) Oral daily  zinc sulfate 220 milliGRAM(s) Oral daily    MEDICATIONS  (PRN):  ALBUTerol    90 MICROgram(s) HFA Inhaler 2 Puff(s) Inhalation every 6 hours PRN Bronchospasm    ___________  Active diet:  Diet, Regular:   Consistent Carbohydrate Evening Snacks  DASH/TLC Sodium & Cholesterol Restricted  1200mL Fluid Restriction ([id number])  Lacto Veg (Accepts Milk & Milk Products)  ___________________    ==================>> VITAL SIGNS <<==================    Vital Signs Last 24 HrsT(C): 36.6 (04-21-21 @ 13:15)  T(F): 97.9 (04-21-21 @ 13:15), Max: 97.9 (04-21-21 @ 13:15)  HR: 96 (04-21-21 @ 13:15) (94 - 109)  BP: 119/89 (04-21-21 @ 13:15)  RR: 18 (04-21-21 @ 13:15) (15 - 22)  SpO2: 95% (04-21-21 @ 16:46) (90% - 98%)      POCT Blood Glucose.: 145 mg/dL (21 Apr 2021 11:25)  POCT Blood Glucose.: 145 mg/dL (21 Apr 2021 08:02)  POCT Blood Glucose.: 129 mg/dL (20 Apr 2021 21:08)     ==================>> LAB AND IMAGING <<==================                        15.1   13.06 )-----------( 434      ( 21 Apr 2021 06:39 )             44.4        04-21    141  |  109<H>  |  33<H>  ----------------------------<  150<H>  3.4<L>   |  22  |  1.15    Ca    8.6      21 Apr 2021 06:39    TPro  7.3  /  Alb  2.8<L>  /  TBili  0.6  /  DBili  x   /  AST  51<H>  /  ALT  38  /  AlkPhos  81  04-21    WBC count:   13.06 <<== ,  14.73 <<== ,  16.63 <<== ,  16.74 <<== ,  8.01 <<==   Hemoglobin:   15.1 <<==,  14.8 <<==,  15.3 <<==,  15.7 <<==,  15.7 <<==  platelets:  434 <==, 408 <==, 401 <==, 388 <==, 250 <==, 246 <==    Creatinine:  1.15  <<==, 0.84  <<==, 1.01  <<==, 1.12  <<==, 1.13  <<==, 1.02  <<==  Sodium:   141  <==, 138  <==, 136  <==, 133  <==, 130  <==, 127  <==, 125  <==       AST:          51 <== , 43 <== , 38 <== , 47 <== , 47 <==      ALT:        38  <== , 31  <== , 29  <== , 32  <== , 26  <==      AP:        81  <=, 86  <=, 70  <=, 69  <=, 70  <=     Bili:        0.6  <=, 0.7  <=, 0.6  <=, 0.7  <=, 0.6  <=    ^^^ Inflammatory markers :  ^^^  C R P :          53 (04-20-21)  <<--, 184 (04-17-21)  <<--  P C T :         0.16 (04-17-21) <<--    Ferritin :         842 (04-21-21) <<--, 998 (04-17-21) <<--, 944 (04-16-21) <<--    D-Dimer :          324 (04-21-21) <<--, 263 (04-19-21) <<--, 262 (04-16-21) <<--    _______________________  C U L T U R E S :    Culture - Urine (collected 16 Apr 2021 22:00)  Source: .Urine Clean Catch (Midstream)  Final Report (18 Apr 2021 12:46):    >100,000 CFU/ml Aerococcus species    "Aerococcus spp. are predictably susceptible to penicillin,    ampicillin, tetracycline, and vancomycin.  All isolates are    resistant to sulfonamides"    <10,000 CFU/ml of other organisms    SARS-CoV-2: Detected (04-16-21 @ 15:10)  ___________________________________________________________________________________  ===============>>  A S S E S S M E N T   A N D   P L A N <<===============  ------------------------------------------------------------------------------------------    · Assessment	  76 M with PMH of HTN and DM presented to ED with complaint of hiccups.  Found to be COVID positive on admission. admitted to medicine for acute hypoxemic respiratory failure due to bilateral pneumonia in setting of COVID 19. Started on Remdesivir, dexamethasone and supplemental oxygen. Pt was also found to have Hyponatremia likely due to hypovolemia, Followed by Nephro, started on IVF with improvement.  Pt was also found to have uncontrolled type 2 diabetes worsening with steroid use. Endo Dr. Hall were consulted and Insulin regimen was adjusted   Worsening hypoxia, NC 2L titrated up to 4L     Problem/Plan - 1:  ·  Problem: Acute respiratory failure with hypoxia.  Plan: -bilateral pneumonia in setting of COVID 19  - finish Remdesivir / Dexamethasone per protocol  ( likely cause of leukocytosis and hyperglycemia)   -cont  with oxygen and titrated down as tolerated   - short course of colchicine as pt clinically deteriorated today ( recent study showing potential benefit )  -pulm consult   -continuous pulse ox monitoring  -cont prophylactic Lovenox   -cont supportive care   -daily labs CBC/CMP/CRP...   - Continue Current medications otherwise and monitor.  supportive care  -nutrition / PTOOB    Problem/Plan - 2:  ·  Problem: Insulin dependent type 2 diabetes mellitus, uncontrolled, in setting of infection and steroids home   -endo consulted and appreciated recom and mgmt   -diabetic diet   -a1c 13.8  - Continue Current medications per endo otherwise and monitor FS    Problem/Plan - 3:  ·  Problem: HTN (hypertension).    -controlled, cont home dose of Losartan   -mon BP.     Problem/Plan - 4:  ·  Problem: Hyponatremia.  Plan: -likely due to hypovolemia, better   -Nephro following.     Problem/Plan - 5:  ·  Problem: UTI   Rocephin X 3 days as ordered    -GI/DVT Prophylaxis per protocol.   OOB to chair encouraged  PO intake and hydration encouraged     discussed with pt, RN, NP. staffll   ___________________________  H. JUVE Gayle  (covering today)   Pager: [phone]

## 2021-04-21 NOTE — PROGRESS NOTE ADULT - PROBLEM SELECTOR PLAN 5
DVT: Lovenox  GI: Pepcid -likely 2/2 insensible losses/dehydration  - corrected with IVF  - Nephro following

## 2021-04-21 NOTE — PROGRESS NOTE ADULT - SUBJECTIVE AND OBJECTIVE BOX
Interval Events: Pt BG in am 145        Allergies    No Known Allergies    Intolerances      Endocrine/Metabolic Medications:  atorvastatin 40 milliGRAM(s) Oral at bedtime  dexAMETHasone  Injectable 6 milliGRAM(s) IV Push daily  dextrose 40% Gel 15 Gram(s) Oral once  dextrose 50% Injectable 25 Gram(s) IV Push once  dextrose 50% Injectable 12.5 Gram(s) IV Push once  dextrose 50% Injectable 25 Gram(s) IV Push once  glucagon  Injectable 1 milliGRAM(s) IntraMuscular once  insulin glargine Injectable (LANTUS) 24 Unit(s) SubCutaneous at bedtime  insulin lispro (ADMELOG) corrective regimen sliding scale   SubCutaneous three times a day before meals  insulin lispro Injectable (ADMELOG) 6 Unit(s) SubCutaneous three times a day before meals      Vital Signs Last 24 Hrs  T(C): 36.1 (21 Apr 2021 04:41), Max: 36.6 (20 Apr 2021 14:22)  T(F): 97 (21 Apr 2021 04:41), Max: 97.9 (20 Apr 2021 14:22)  HR: 94 (21 Apr 2021 04:41) (94 - 109)  BP: 122/71 (21 Apr 2021 04:41) (112/72 - 142/84)  BP(mean): --  RR: 15 (21 Apr 2021 04:41) (15 - 22)  SpO2: 98% (21 Apr 2021 04:41) (88% - 98%)      PHYSICAL EXAM  All physical exam findings normal, except those marked:  General:	Alert, active, cooperative, NAD, well hydrated  .		[] Abnormal:  Neck		Normal: supple, no cervical adenopathy, no palpable thyroid  .		[] Abnormal:  Cardiovascular	Normal: regular rate, normal S1, S2, no murmurs  .		[] Abnormal:  Respiratory	Normal: no chest wall deformity, normal respiratory pattern, CTA B/L  .		[] Abnormal:  Abdominal	Normal: soft, ND, NT, bowel sounds present, no masses, no organomegaly  .		[] Abnormal:  		Normal normal genitalia, testes descended, circumcised/uncircumcised  .		Alejandro stage:			Breast alejandro:  .		Menstrual history:  .		[] Abnormal:  Extremities	Normal: FROM x4  .		[] Abnormal:  Skin		Normal: intact and not indurated, no rash, no acanthosis nigricans  .		[] Abnormal:  Neurologic	Normal: grossly intact  .		[] Abnormal:    LABS                        15.1   13.06 )-----------( 434      ( 21 Apr 2021 06:39 )             44.4                               141    |  109    |  33                  Calcium: 8.6   / iCa: x      (04-21 @ 06:39)    ----------------------------<  150       Magnesium: x                                3.4     |  22     |  1.15             Phosphorous: x        TPro  7.3    /  Alb  2.8    /  TBili  0.6    /  DBili  x      /  AST  51     /  ALT  38     /  AlkPhos  81     21 Apr 2021 06:39    CAPILLARY BLOOD GLUCOSE      POCT Blood Glucose.: 145 mg/dL (21 Apr 2021 11:25)  POCT Blood Glucose.: 145 mg/dL (21 Apr 2021 08:02)  POCT Blood Glucose.: 129 mg/dL (20 Apr 2021 21:08)  POCT Blood Glucose.: 163 mg/dL (20 Apr 2021 16:41)        Assesment/plan      76 M with PMH of HTN and DM presents to ED with complaint of hiccups since this morning. Patient reports hiccups are associated with cough, fever and chills x 1 day. Found to have covid and uncont dm. Pt does not remember his home meds ? insulin tx as out pt and metformin.   HBA1C 13.8         Problem/Recommendation - 1:  Problem: Uncontrolled diabetes mellitus.   Recommendation: hyperglycemic   on ? insulin tx as out pt and metformin  continue lantus to 24 units   continue with admelog 6 ac tid  fsg ac and hs.     Problem/Recommendation - 2:  ·  Problem: Acute respiratory failure with hypoxia.    Recommendation: cont tx as per prim team  Remdisivir and decadron.  Interval Events: Pt BG in am 145      Allergies    No Known Allergies    Intolerances      Endocrine/Metabolic Medications:  atorvastatin 40 milliGRAM(s) Oral at bedtime  dexAMETHasone  Injectable 6 milliGRAM(s) IV Push daily  dextrose 40% Gel 15 Gram(s) Oral once  dextrose 50% Injectable 25 Gram(s) IV Push once  dextrose 50% Injectable 12.5 Gram(s) IV Push once  dextrose 50% Injectable 25 Gram(s) IV Push once  glucagon  Injectable 1 milliGRAM(s) IntraMuscular once  insulin glargine Injectable (LANTUS) 24 Unit(s) SubCutaneous at bedtime  insulin lispro (ADMELOG) corrective regimen sliding scale   SubCutaneous three times a day before meals  insulin lispro Injectable (ADMELOG) 6 Unit(s) SubCutaneous three times a day before meals      Vital Signs Last 24 Hrs  T(C): 36.1 (21 Apr 2021 04:41), Max: 36.6 (20 Apr 2021 14:22)  T(F): 97 (21 Apr 2021 04:41), Max: 97.9 (20 Apr 2021 14:22)  HR: 94 (21 Apr 2021 04:41) (94 - 109)  BP: 122/71 (21 Apr 2021 04:41) (112/72 - 142/84)  BP(mean): --  RR: 15 (21 Apr 2021 04:41) (15 - 22)  SpO2: 98% (21 Apr 2021 04:41) (88% - 98%)      PHYSICAL EXAM  All physical exam findings normal, except those marked:  General:	Alert, active, cooperative, NAD, well hydrated  .		[] Abnormal:  Neck		Normal: supple, no cervical adenopathy, no palpable thyroid  .		[] Abnormal:  Cardiovascular	Normal: regular rate, normal S1, S2, no murmurs  .		[] Abnormal:  Respiratory	Normal: no chest wall deformity, normal respiratory pattern, CTA B/L  .		[] Abnormal:  Abdominal	Normal: soft, ND, NT, bowel sounds present, no masses, no organomegaly  .		[] Abnormal:  		Normal normal genitalia, testes descended, circumcised/uncircumcised  .		Alejandro stage:			Breast alejandro:  .		Menstrual history:  .		[] Abnormal:  Extremities	Normal: FROM x4  .		[] Abnormal:  Skin		Normal: intact and not indurated, no rash, no acanthosis nigricans  .		[] Abnormal:  Neurologic	Normal: grossly intact  .		[] Abnormal:    LABS                        15.1   13.06 )-----------( 434      ( 21 Apr 2021 06:39 )             44.4                               141    |  109    |  33                  Calcium: 8.6   / iCa: x      (04-21 @ 06:39)    ----------------------------<  150       Magnesium: x                                3.4     |  22     |  1.15             Phosphorous: x        TPro  7.3    /  Alb  2.8    /  TBili  0.6    /  DBili  x      /  AST  51     /  ALT  38     /  AlkPhos  81     21 Apr 2021 06:39    CAPILLARY BLOOD GLUCOSE      POCT Blood Glucose.: 145 mg/dL (21 Apr 2021 11:25)  POCT Blood Glucose.: 145 mg/dL (21 Apr 2021 08:02)  POCT Blood Glucose.: 129 mg/dL (20 Apr 2021 21:08)  POCT Blood Glucose.: 163 mg/dL (20 Apr 2021 16:41)        Assesment/plan      76 M with PMH of HTN and DM presents to ED with complaint of hiccups since this morning. Patient reports hiccups are associated with cough, fever and chills x 1 day. Found to have covid and uncont dm. Pt does not remember his home meds ? insulin tx as out pt and metformin.   HBA1C 13.8         Problem/Recommendation - 1:  Problem: Uncontrolled diabetes mellitus.   Recommendation: hyperglycemic- better controlled  on ? insulin tx as out pt and metformin  continue lantus to 24 units   continue with admelog 6 ac tid- hold if poor po intake  moderate correction doses  fsg ac and hs.     Problem/Recommendation - 2:  ·  Problem: Acute respiratory failure with hypoxia.- on Nrb   Recommendation: cont tx as per prim team  Remdisivir and decadron.

## 2021-04-21 NOTE — PROGRESS NOTE ADULT - ASSESSMENT
76 year old with past medical history of DM who presented to ED with c/o hiccups, found to be COVID-19 positive and hypoxic - admitted to medicine for acute hypoxemic respiratory failure due to multifocal COVID PNA. Started on course of dexamethasone, remdesivir and supplemental oxygen. Nephrology consulted for hyponatremia in the setting of dehydration 2/2 insensible losses, Endocrinology following for uncontrolled type 2 diabetes mellitus. Patient with increased WOB overnight 4/20 requiring nonrebreather mask, pulmonology consulted and plan to transfer patient to telemetry for continuous pulse oximetry monitoring.     - attempted to speak with family x 2 this morning about increased oxygen requirements, no answer - will re-attempt.    76 year old with past medical history of DM who presented to ED with c/o hiccups, found to be COVID-19 positive and hypoxic - admitted to medicine for acute hypoxemic respiratory failure due to multifocal COVID PNA. Started on course of dexamethasone, remdesivir and supplemental oxygen. Nephrology consulted for hyponatremia in the setting of dehydration 2/2 insensible losses, Endocrinology following for uncontrolled type 2 diabetes mellitus. Patient with increased WOB overnight 4/20 requiring nonrebreather mask, pulmonology consulted. Patient's WOB improved throughout the day, down-titrated to 6L nasal cannula with O2 saturations in the high 90's.     - spoke with family and updated on patient status

## 2021-04-21 NOTE — PROGRESS NOTE ADULT - SUBJECTIVE AND OBJECTIVE BOX
NP Note discussed with  Primary Attending    Patient is a 76y old  Male who presents with a chief complaint of     INTERVAL HPI/OVERNIGHT EVENTS: increased WOB requiring NRB overnight      MEDICATIONS  (STANDING):  atorvastatin 40 milliGRAM(s) Oral at bedtime  cefTRIAXone   IVPB 1000 milliGRAM(s) IV Intermittent every 24 hours  dexAMETHasone  Injectable 6 milliGRAM(s) IV Push daily  dextrose 40% Gel 15 Gram(s) Oral once  dextrose 5%. 1000 milliLiter(s) (50 mL/Hr) IV Continuous <Continuous>  dextrose 5%. 1000 milliLiter(s) (100 mL/Hr) IV Continuous <Continuous>  dextrose 50% Injectable 25 Gram(s) IV Push once  dextrose 50% Injectable 12.5 Gram(s) IV Push once  dextrose 50% Injectable 25 Gram(s) IV Push once  enoxaparin Injectable 40 milliGRAM(s) SubCutaneous daily  glucagon  Injectable 1 milliGRAM(s) IntraMuscular once  insulin glargine Injectable (LANTUS) 20 Unit(s) SubCutaneous at bedtime  insulin lispro (ADMELOG) corrective regimen sliding scale   SubCutaneous three times a day before meals  insulin lispro Injectable (ADMELOG) 6 Unit(s) SubCutaneous three times a day before meals  losartan 50 milliGRAM(s) Oral daily  remdesivir  IVPB 100 milliGRAM(s) IV Intermittent every 24 hours  remdesivir  IVPB   IV Intermittent     MEDICATIONS  (PRN):      __________________________________________________  REVIEW OF SYSTEMS:    CONSTITUTIONAL: No fever,   EYES: no acute visual disturbances  NECK: No pain or stiffness  RESPIRATORY: + shortness of breath  CARDIOVASCULAR: No chest pain, no palpitations  GASTROINTESTINAL: No pain. No nausea or vomiting; No diarrhea   NEUROLOGICAL: No headache or numbness, no tremors  MUSCULOSKELETAL: No joint pain, no muscle pain  GENITOURINARY: no dysuria, no frequency, no hesitancy  PSYCHIATRY: no depression , no anxiety  ALL OTHER  ROS negative        Vital Signs Last 24 Hrs  T(C): 36.6 (20 Apr 2021 14:22), Max: 36.6 (20 Apr 2021 14:22)  T(F): 97.9 (20 Apr 2021 14:22), Max: 97.9 (20 Apr 2021 14:22)  HR: 100 (20 Apr 2021 14:22) (96 - 103)  BP: 142/84 (20 Apr 2021 14:22) (141/82 - 147/91)  BP(mean): --  RR: 18 (20 Apr 2021 14:22) (18 - 20)  SpO2: 91% (20 Apr 2021 14:22) (90% - 92%)    ________________________________________________  PHYSICAL EXAM:  GENERAL: NAD  HEENT: Normocephalic;  conjunctivae and sclerae clear; moist mucous membranes;   NECK : supple  CHEST/LUNG: increased WOB, + rhonchi   HEART: S1 S2  regular; no murmurs, gallops or rubs  ABDOMEN: Soft, Nontender, Nondistended; Bowel sounds present  EXTREMITIES: no cyanosis; no edema; no calf tenderness  SKIN: warm and dry; no rash  NERVOUS SYSTEM:  anxious, awake and alert; Oriented  to place, person and time ; no new deficits    _________________________________________________  LABS:                        14.8   14.73 )-----------( 408      ( 20 Apr 2021 07:44 )             42.3     04-20    138  |  106  |  35<H>  ----------------------------<  258<H>  3.7   |  20<L>  |  0.84    Ca    8.8      20 Apr 2021 07:44  Phos  3.0     04-19  Mg     2.5     04-19    TPro  7.4  /  Alb  2.5<L>  /  TBili  0.7  /  DBili  x   /  AST  43<H>  /  ALT  31  /  AlkPhos  86  04-20        CAPILLARY BLOOD GLUCOSE      POCT Blood Glucose.: 200 mg/dL (20 Apr 2021 11:51)  POCT Blood Glucose.: 248 mg/dL (20 Apr 2021 08:01)  POCT Blood Glucose.: 166 mg/dL (19 Apr 2021 21:17)  POCT Blood Glucose.: 221 mg/dL (19 Apr 2021 16:44)        RADIOLOGY & ADDITIONAL TESTS:    Imaging  Reviewed:  YES  < from: Xray Chest 1 View-PORTABLE IMMEDIATE (Xray Chest 1 View-PORTABLE IMMEDIATE .) (04.16.21 @ 14:50) >  FINDINGS/  IMPRESSION:  New bilateral airspace opacities lung bases. No pleural effusion    Heart size cannot be accurately assessed in this projection.        Consultant(s) Notes Reviewed:   YES      Plan of care was discussed with patient and /or primary care giver; all questions and concerns were addressed

## 2021-04-21 NOTE — PROGRESS NOTE ADULT - SUBJECTIVE AND OBJECTIVE BOX
[facility] [place] [facility]   MD BART FLANNERY MD RUORU WONG, PA    TEL:  OFFICE: [phone]  DR AYERS CELL: [phone]  JOSÉ MIGUEL ABDI CELL: [phone]  DR. AGUIRRE CELL: [phone]  DR. MCCLAIN CELL: [phone]    FROM 5 PM - 7 AM PLEASE CALL ANSWERING SERVICE: [phone]    RENAL FOLLOW UP NOTE--Date of Service 04-21-21 @ 11:17  --------------------------------------------------------------------------------  HPI:      Pt covid 19+    PAST HISTORY  --------------------------------------------------------------------------------  No significant changes to PMH, PSH, FHx, SHx, unless otherwise noted    ALLERGIES & MEDICATIONS  --------------------------------------------------------------------------------  Allergies    No Known Allergies    Intolerances      Standing Inpatient Medications  ascorbic acid 500 milliGRAM(s) Oral daily  atorvastatin 40 milliGRAM(s) Oral at bedtime  cefTRIAXone   IVPB 1000 milliGRAM(s) IV Intermittent every 24 hours  cholecalciferol 400 Unit(s) Oral daily  colchicine 1.2 milliGRAM(s) Oral once  dexAMETHasone  Injectable 6 milliGRAM(s) IV Push daily  dextrose 40% Gel 15 Gram(s) Oral once  dextrose 5%. 1000 milliLiter(s) IV Continuous <Continuous>  dextrose 5%. 1000 milliLiter(s) IV Continuous <Continuous>  dextrose 50% Injectable 25 Gram(s) IV Push once  dextrose 50% Injectable 12.5 Gram(s) IV Push once  dextrose 50% Injectable 25 Gram(s) IV Push once  enoxaparin Injectable 40 milliGRAM(s) SubCutaneous daily  famotidine    Tablet 20 milliGRAM(s) Oral two times a day  glucagon  Injectable 1 milliGRAM(s) IntraMuscular once  insulin glargine Injectable (LANTUS) 24 Unit(s) SubCutaneous at bedtime  insulin lispro (ADMELOG) corrective regimen sliding scale   SubCutaneous three times a day before meals  insulin lispro Injectable (ADMELOG) 6 Unit(s) SubCutaneous three times a day before meals  losartan 50 milliGRAM(s) Oral daily  montelukast 10 milliGRAM(s) Oral daily  potassium chloride    Tablet ER 40 milliEquivalent(s) Oral once  zinc sulfate 220 milliGRAM(s) Oral daily    PRN Inpatient Medications  ALBUTerol    90 MICROgram(s) HFA Inhaler 2 Puff(s) Inhalation every 6 hours PRN      REVIEW OF SYSTEMS  --------------------------------------------------------------------------------  General: no fever    MSK: no edema     VITALS/PHYSICAL EXAM  --------------------------------------------------------------------------------  T(C): 36.1 (04-21-21 @ 04:41), Max: 36.6 (04-20-21 @ 14:22)  HR: 94 (04-21-21 @ 04:41) (94 - 109)  BP: 122/71 (04-21-21 @ 04:41) (112/72 - 142/84)  RR: 15 (04-21-21 @ 04:41) (15 - 22)  SpO2: 98% (04-21-21 @ 04:41) (88% - 98%)  Wt(kg): --        04-20-21 @ 07:01  -  04-21-21 @ 07:00  --------------------------------------------------------  IN: 0 mL / OUT: 2 mL / NET: -2 mL      LABS/STUDIES  --------------------------------------------------------------------------------              15.1   13.06 >-----------<  434      [04-21-21 @ 06:39]              44.4     141  |  109  |  33  ----------------------------<  150      [04-21-21 @ 06:39]  3.4   |  22  |  1.15        Ca     8.6     [04-21-21 @ 06:39]    TPro  7.3  /  Alb  2.8  /  TBili  0.6  /  DBili  x   /  AST  51  /  ALT  38  /  AlkPhos  81  [04-21-21 @ 06:39]              [04-21-21 @ 06:39]    Creatinine Trend:  SCr 1.15 [04-21 @ 06:39]  SCr 0.84 [04-20 @ 07:44]  SCr 1.01 [04-19 @ 05:56]  SCr 1.12 [04-18 @ 11:11]  SCr 1.13 [04-17 @ 06:26]    Urinalysis - [04-16-21 @ 15:47]      Color Yellow / Appearance Clear / SG 1.015 / pH 5.0      Gluc 1000 / Ketone Moderate  / Bili Negative / Urobili Negative       Blood Moderate / Protein 30 / Leuk Est Negative / Nitrite Negative      RBC 5-10 / WBC 0-2 / Hyaline  / Gran  / Sq Epi  / Non Sq Epi  / Bacteria     Urine Sodium 7      [04-16-21 @ 23:15]  Urine Osmolality 466      [04-16-21 @ 23:15]    Ferritin 998      [04-17-21 @ 01:12]  TSH 0.96      [04-17-21 @ 06:26]  Lipid: chol 96, TG 77, HDL 42, LDL --      [04-17-21 @ 06:26]

## 2021-04-21 NOTE — PROGRESS NOTE ADULT - PROBLEM SELECTOR PLAN 1
-bilateral pneumonia in setting of COVID-19  -completed remdesivir   -cont Dexamethasone   -requiring 15L NRB mask, titrate O2 down as tolerated   -obtain ambulatory O2 saturations once requirements stabilizes  -patient may prone as tolerated  -Lovenox  - Zinc, Vitamin C, Vitamin D  - Montelukast, Albuterol  - Pepcid BID  -cont supportive care   -daily labs CBC/CMP/CRP  -3 day labs ESR/D-dimer/LDH/Ferritin  -maintain on airborne and contact isolation   -consider need for extended prophylaxis prior to discharge based on D-Dimer and calculated VTE risk  - pulmonology consulted for increased WOB and increasing O2 requirements  - plan to transfer patient to telemetry unit for continuous pulse oximetry monitoring -bilateral pneumonia in setting of COVID-19  -completed remdesivir   -cont Dexamethasone   -6L via NC, titrate O2 down as tolerated   -obtain ambulatory O2 saturations once requirements stabilizes  -patient may prone as tolerated  -Lovenox  - Zinc, Vitamin C, Vitamin D  - Montelukast, Albuterol  - Pepcid BID  -cont supportive care   -daily labs CBC/CMP/CRP  -3 day labs ESR/D-dimer/LDH/Ferritin  -maintain on airborne and contact isolation   -consider need for extended prophylaxis prior to discharge based on D-Dimer and calculated VTE risk  - pulmonology consulted for increased WOB and increasing O2 requirements

## 2021-04-21 NOTE — PROGRESS NOTE ADULT - PROBLEM SELECTOR PLAN 4
-likely 2/2 insensible losses/dehydration  - corrected with IVF  - Nephro following -controlled, cont home dose of Losartan   -mon BP

## 2021-04-22 NOTE — PROGRESS NOTE ADULT - ASSESSMENT
Hyponatremia:  Clinically hypovolemic with low urine Na and high Urine Osmolality. Likely Hypovolemic Hyponatremia.  -Improved serum Na  -Monitor serum Na  -Avoid overcorrection >8mEq in 24 hrs       INOCENCIA:  The urine Na is low and the BUN/Cr ratio is high, suspected hypoperfusion initially   -Worsening renal function again today ,, check  UA, Urine lytes   -NS for 1 L today  - Monitor BMP.      CKD 2/3:  Has risk (Diabetes) and minimal proteinuria.  - Monitor BMP.    Metabolic Acidosis:  Mixed AG and NAG. He may have some Tubulo-interstitial Disease  - Monitor for now.  - if no improvement, start PO NaHCO3    COVID   MOnitor temp/ O2  COntinue current care

## 2021-04-22 NOTE — PROGRESS NOTE ADULT - PROBLEM SELECTOR PLAN 4
Controlled  Continue with home regimen with parameters  Continue with DASH diet   Continue to monitor BP

## 2021-04-22 NOTE — DIETITIAN INITIAL EVALUATION ADULT. - PROBLEM SELECTOR PLAN 2
p/w Na 125 , corrected for blood glucose is 127   can be SIADH due to COVID  Will fluid restrict 1200 cc for now   f/u Serum osm, urine osm and urine Na levels  Monitor Na q6-8 hrs   Goal Na is 130-132 by tomorrow 3 PM

## 2021-04-22 NOTE — DIETITIAN INITIAL EVALUATION ADULT. - OTHER INFO
Pt is on Airborne isolation. Pt w Covid +.  D/W NSG pt is alert but confused. PO intake ~50 % of meal.  D/W Grand daughter ( Mrs Becker ) @ [phone]. Per  grand daughter pt lives with his wife and Daughter at Home.  Usually Appetite is Good . Pt eats Lentil and  yoghurt at Home. But for last week Appetite is Poor. H/O DM for a long time. On OHA. Recent admissions at [facility] for DKA. Per Daughter Pt checks his Blood sugars and Normally runs Between 190-230. Endo Consult Noted . On insulin at present.. Per Grand Daughter PT HEIGHT IS ~ 5  FEET 7 INCHES- 5 FEET 8 INCHES. WT IS LESS THAN 140 LB. Wt loss of ~2-3 lbs . Not sure of weight loss.  Pt is On 6 liters of O2. Pt is [employer] Vegetarian. Will suggest Glucerna shakes BID

## 2021-04-22 NOTE — PROGRESS NOTE ADULT - SUBJECTIVE AND OBJECTIVE BOX
HPI:  76 YOM with PMH HTN, DM admitted for respiratory failure secondary to COVID-19.  Started on Dexamethasone, Remdesivir and supplemental oxygen.  Pt has completed course of Remdesivir.  SPO2 today 95% on 6L via n/c.    OVERNIGHT EVENTS:  No new overnight events     REVIEW OF SYSTEMS:      CONSTITUTIONAL: No fever  EYES: no acute visual disturbances  NECK: No pain or stiffness  RESPIRATORY: No cough; + shortness of breath  CARDIOVASCULAR: No chest pain, no palpitations  GASTROINTESTINAL: No pain. No nausea, vomiting or diarrhea   NEUROLOGICAL: No headache or numbness, no tremors  MUSCULOSKELETAL: No joint pain, no muscle pain  GENITOURINARY: no dysuria, no frequency, no hesitancy  PSYCHIATRY: no depression, no anxiety  ALL OTHER  ROS negative        Vital Signs Last 24 Hrs  T(C): 36.3 (2021 13:58), Max: 36.4 (2021 21:22)  T(F): 97.4 (2021 13:58), Max: 97.6 (2021 21:22)  HR: 96 (2021 13:58) (96 - 102)  BP: 119/77 (2021 13:58) (119/77 - 139/90)  BP(mean): --  RR: 18 (2021 13:58) (18 - 18)  SpO2: 93% (2021 13:58) (92% - 97%)    ________________________________________________  PHYSICAL EXAM:    GENERAL: NAD  HEENT: Normocephalic; conjunctivae and sclerae clear;  NECK : supple, no JVD  CHEST/LUNG: Clear to auscultation  HEART: S1 S2  regular  ABDOMEN: Soft, Nontender, Nondistended; Bowel sounds present  EXTREMITIES: no cyanosis; no LE edema; no calf tenderness  SKIN: warm and dry  NERVOUS SYSTEM:  Alert; no new deficits    _________________________________________________  CURRENT MEDICATIONS:    MEDICATIONS  (STANDING):  ascorbic acid 500 milliGRAM(s) Oral daily  atorvastatin 40 milliGRAM(s) Oral at bedtime  cholecalciferol 400 Unit(s) Oral daily  colchicine 0.6 milliGRAM(s) Oral daily  dexAMETHasone  Injectable 6 milliGRAM(s) IV Push daily  dextrose 40% Gel 15 Gram(s) Oral once  dextrose 5%. 1000 milliLiter(s) (50 mL/Hr) IV Continuous <Continuous>  dextrose 5%. 1000 milliLiter(s) (100 mL/Hr) IV Continuous <Continuous>  dextrose 50% Injectable 25 Gram(s) IV Push once  dextrose 50% Injectable 12.5 Gram(s) IV Push once  dextrose 50% Injectable 25 Gram(s) IV Push once  enoxaparin Injectable 40 milliGRAM(s) SubCutaneous daily  famotidine    Tablet 20 milliGRAM(s) Oral two times a day  glucagon  Injectable 1 milliGRAM(s) IntraMuscular once  insulin glargine Injectable (LANTUS) 24 Unit(s) SubCutaneous at bedtime  insulin lispro (ADMELOG) corrective regimen sliding scale   SubCutaneous three times a day before meals  insulin lispro Injectable (ADMELOG) 4 Unit(s) SubCutaneous three times a day before meals  losartan 50 milliGRAM(s) Oral daily  montelukast 10 milliGRAM(s) Oral daily  sodium chloride 0.9%. 1000 milliLiter(s) (75 mL/Hr) IV Continuous <Continuous>  zinc sulfate 220 milliGRAM(s) Oral daily    MEDICATIONS  (PRN):  ALBUTerol    90 MICROgram(s) HFA Inhaler 2 Puff(s) Inhalation every 6 hours PRN Bronchospasm      __________________________________________________  LABS:                          15.2   12.81 )-----------( 399      ( 2021 05:34 )             44.8     04-    144  |  112<H>  |  37<H>  ----------------------------<  168<H>  3.7   |  21<L>  |  1.47<H>    Ca    8.7      2021 05:34    TPro  7.5  /  Alb  2.4<L>  /  TBili  0.7  /  DBili  x   /  AST  40  /  ALT  37  /  AlkPhos  92  -      Urinalysis Basic - ( 2021 14:32 )    Color: Yellow / Appearance: Clear / S.015 / pH: x  Gluc: x / Ketone: Negative  / Bili: Negative / Urobili: Negative   Blood: x / Protein: 30 mg/dL / Nitrite: Negative   Leuk Esterase: Negative / RBC: 5-10 /HPF / WBC 0-2 /HPF   Sq Epi: x / Non Sq Epi: Few /HPF / Bacteria: Few /HPF      CAPILLARY BLOOD GLUCOSE      POCT Blood Glucose.: 128 mg/dL (2021 11:21)  POCT Blood Glucose.: 155 mg/dL (2021 08:03)  POCT Blood Glucose.: 138 mg/dL (2021 21:06)  POCT Blood Glucose.: 126 mg/dL (2021 17:04)      __________________________________________________  RADIOLOGY & ADDITIONAL TESTS:    Imaging Personally Reviewed:  YES    < from: Xray Chest 1 View-PORTABLE IMMEDIATE (Xray Chest 1 View-PORTABLE IMMEDIATE .) (21 @ 14:50) >  FINDINGS/  IMPRESSION:  New bilateral airspace opacities lung bases. No pleural effusion    Heart size cannot be accurately assessed in this projection.    < end of copied text >    Consultant(s) Notes Reviewed:   YES     Plan of care was discussed with patient and /or primary care giver; all questions and concerns were addressed and care was aligned with patient's wishes.    Plan discussed with attending and consulting physicians.

## 2021-04-22 NOTE — PROGRESS NOTE ADULT - PROBLEM SELECTOR PLAN 1
SPO2 95% on 6L via n/c  Continue with Decadron (day 7/10)  S/p complete course of Remdesivir  Continue with isolation precaution  Continue with PRN O2 support  Continue with supportive care  Follow up lab results

## 2021-04-22 NOTE — CHART NOTE - NSCHARTNOTEFT_GEN_A_CORE
EVENT: Paged by RN, pt c/o headache    HPI:  76 year old with past medical history of DM who presented to ED with c/o hiccups, found to be COVID-19 positive and hypoxic - admitted to medicine for acute hypoxemic respiratory failure due to multifocal COVID PNA. Started on course of dexamethasone, remdesivir and supplemental oxygen. Nephrology consulted for hyponatremia in the setting of dehydration 2/2 insensible losses, Endocrinology following for uncontrolled type 2 diabetes mellitus. Patient with increased WOB overnight 4/20 requiring nonrebreather mask, pulmonology consulted. Patient's WOB improved throughout the day, down-titrated to 6L nasal cannula with O2 saturations in the high 90's.     SUBJECTIVE: Pt reports feeling uncomfortable due to mild headache    OBJECTIVE:  Vital Signs Last 24 Hrs  T(C): 36.3 (22 Apr 2021 05:30), Max: 36.6 (21 Apr 2021 13:15)  T(F): 97.4 (22 Apr 2021 05:30), Max: 97.9 (21 Apr 2021 13:15)  HR: 96 (22 Apr 2021 05:30) (96 - 102)  BP: 135/81 (22 Apr 2021 05:30) (119/89 - 139/90)  BP(mean): --  RR: 18 (22 Apr 2021 05:30) (15 - 18)  SpO2: 95% (22 Apr 2021 05:30) (92% - 97%)    PHYSICAL EXAM:  Neuro: Awake and alert, oriented to person, place, and time  Cardiovascular: + S1, S2, no murmurs, rubs, or bruits  Respiratory: clear to auscultation bilaterally with good air entry   GI: Abdomen soft, non-tender, bowel sounds present   : Non distended;   Skin: warm and dry; no rash      LABS:                        15.2   12.81 )-----------( 399      ( 22 Apr 2021 05:34 )             44.8     04-21    141  |  109<H>  |  33<H>  ----------------------------<  150<H>  3.4<L>   |  22  |  1.15    Ca    8.6      21 Apr 2021 06:39    TPro  7.3  /  Alb  2.8<L>  /  TBili  0.6  /  DBili  x   /  AST  51<H>  /  ALT  38  /  AlkPhos  81  04-21        EKG:   IMAGING:    ASSESSMENT: Headache probably associated with Covid-19/ PNA (due to coughing)?    PLAN:     -Tylenol 650 mg PO x 1 dose  -Continue present/supportive care    FOLLOW UP / RESULT:     -Monitor effectiveness of pain medication  -Reassess pain per hospital policy

## 2021-04-22 NOTE — DIETITIAN INITIAL EVALUATION ADULT. - PERTINENT MEDS FT
MEDICATIONS  (STANDING):  ascorbic acid 500 milliGRAM(s) Oral daily  atorvastatin 40 milliGRAM(s) Oral at bedtime  cefTRIAXone   IVPB 1000 milliGRAM(s) IV Intermittent every 24 hours  cholecalciferol 400 Unit(s) Oral daily  colchicine 0.6 milliGRAM(s) Oral daily  dexAMETHasone  Injectable 6 milliGRAM(s) IV Push daily  dextrose 40% Gel 15 Gram(s) Oral once  dextrose 5%. 1000 milliLiter(s) (50 mL/Hr) IV Continuous <Continuous>  dextrose 5%. 1000 milliLiter(s) (100 mL/Hr) IV Continuous <Continuous>  dextrose 50% Injectable 25 Gram(s) IV Push once  dextrose 50% Injectable 12.5 Gram(s) IV Push once  dextrose 50% Injectable 25 Gram(s) IV Push once  enoxaparin Injectable 40 milliGRAM(s) SubCutaneous daily  famotidine    Tablet 20 milliGRAM(s) Oral two times a day  glucagon  Injectable 1 milliGRAM(s) IntraMuscular once  insulin glargine Injectable (LANTUS) 24 Unit(s) SubCutaneous at bedtime  insulin lispro (ADMELOG) corrective regimen sliding scale   SubCutaneous three times a day before meals  insulin lispro Injectable (ADMELOG) 4 Unit(s) SubCutaneous three times a day before meals  losartan 50 milliGRAM(s) Oral daily  montelukast 10 milliGRAM(s) Oral daily  sodium chloride 0.9%. 1000 milliLiter(s) (75 mL/Hr) IV Continuous <Continuous>  zinc sulfate 220 milliGRAM(s) Oral daily    MEDICATIONS  (PRN):  ALBUTerol    90 MICROgram(s) HFA Inhaler 2 Puff(s) Inhalation every 6 hours PRN Bronchospasm

## 2021-04-22 NOTE — PROGRESS NOTE ADULT - SUBJECTIVE AND OBJECTIVE BOX
Interval Events:  pt has decreased oral intake.      Allergies    No Known Allergies    Intolerances      Endocrine/Metabolic Medications:  atorvastatin 40 milliGRAM(s) Oral at bedtime  colchicine 0.6 milliGRAM(s) Oral daily  dexAMETHasone  Injectable 6 milliGRAM(s) IV Push daily  dextrose 40% Gel 15 Gram(s) Oral once  dextrose 50% Injectable 25 Gram(s) IV Push once  dextrose 50% Injectable 12.5 Gram(s) IV Push once  dextrose 50% Injectable 25 Gram(s) IV Push once  glucagon  Injectable 1 milliGRAM(s) IntraMuscular once  insulin glargine Injectable (LANTUS) 24 Unit(s) SubCutaneous at bedtime  insulin lispro (ADMELOG) corrective regimen sliding scale   SubCutaneous three times a day before meals  insulin lispro Injectable (ADMELOG) 4 Unit(s) SubCutaneous three times a day before meals      Vital Signs Last 24 Hrs  T(C): 36.3 (22 Apr 2021 05:30), Max: 36.6 (21 Apr 2021 13:15)  T(F): 97.4 (22 Apr 2021 05:30), Max: 97.9 (21 Apr 2021 13:15)  HR: 96 (22 Apr 2021 05:30) (96 - 102)  BP: 135/81 (22 Apr 2021 05:30) (119/89 - 139/90)  BP(mean): --  RR: 18 (22 Apr 2021 05:30) (15 - 18)  SpO2: 92% (22 Apr 2021 09:37) (92% - 97%)      PHYSICAL EXAM  All physical exam findings normal, except those marked:  General:	Alert, active, cooperative, NAD, well hydrated  .		[] Abnormal:  Neck		Normal: supple, no cervical adenopathy, no palpable thyroid  .		[] Abnormal:  Cardiovascular	Normal: regular rate, normal S1, S2, no murmurs  .		[] Abnormal:  Respiratory	Normal: no chest wall deformity, normal respiratory pattern, CTA B/L  .		[] Abnormal:  Abdominal	Normal: soft, ND, NT, bowel sounds present, no masses, no organomegaly  .		[] Abnormal:  		Normal normal genitalia, testes descended, circumcised/uncircumcised  .		Alejandro stage:			Breast alejandro:  .		Menstrual history:  .		[] Abnormal:  Extremities	Normal: FROM x4  .		[] Abnormal:  Skin		Normal: intact and not indurated, no rash, no acanthosis nigricans  .		[] Abnormal:  Neurologic	Normal: grossly intact  .		[] Abnormal:    LABS                        15.2   12.81 )-----------( 399      ( 22 Apr 2021 05:34 )             44.8                               144    |  112    |  37                  Calcium: 8.7   / iCa: x      (04-22 @ 05:34)    ----------------------------<  168       Magnesium: x                                3.7     |  21     |  1.47             Phosphorous: x        TPro  7.5    /  Alb  2.4    /  TBili  0.7    /  DBili  x      /  AST  40     /  ALT  37     /  AlkPhos  92     22 Apr 2021 05:34    CAPILLARY BLOOD GLUCOSE      POCT Blood Glucose.: 155 mg/dL (22 Apr 2021 08:03)  POCT Blood Glucose.: 138 mg/dL (21 Apr 2021 21:06)  POCT Blood Glucose.: 126 mg/dL (21 Apr 2021 17:04)  POCT Blood Glucose.: 145 mg/dL (21 Apr 2021 11:25)        Assesment/plan    76 M with PMH of HTN and DM presents to ED with complaint of hiccups since this morning. Patient reports hiccups are associated with cough, fever and chills x 1 day. Found to have covid and uncont dm. Pt does not remember his home meds ? insulin tx as out pt and metformin.   HBA1C 13.8         Problem/Recommendation - 1:  Problem: Uncontrolled diabetes mellitus.   Recommendation: hyperglycemic- better controlled  on ? insulin tx as out pt and metformin  continue lantus to 24 units   decreased admelog 4 ac tid- hold if poor po intake  moderate correction doses  fsg ac and hs.     Problem/Recommendation - 2:  ·  Problem: Acute respiratory failure with hypoxia.- on NC   Recommendation: cont tx as per prim team  Remdisivir and decadron.      Interval Events:  pt has decreased oral intake.      Allergies    No Known Allergies    Intolerances      Endocrine/Metabolic Medications:  atorvastatin 40 milliGRAM(s) Oral at bedtime  colchicine 0.6 milliGRAM(s) Oral daily  dexAMETHasone  Injectable 6 milliGRAM(s) IV Push daily  dextrose 40% Gel 15 Gram(s) Oral once  dextrose 50% Injectable 25 Gram(s) IV Push once  dextrose 50% Injectable 12.5 Gram(s) IV Push once  dextrose 50% Injectable 25 Gram(s) IV Push once  glucagon  Injectable 1 milliGRAM(s) IntraMuscular once  insulin glargine Injectable (LANTUS) 24 Unit(s) SubCutaneous at bedtime  insulin lispro (ADMELOG) corrective regimen sliding scale   SubCutaneous three times a day before meals  insulin lispro Injectable (ADMELOG) 4 Unit(s) SubCutaneous three times a day before meals      Vital Signs Last 24 Hrs  T(C): 36.3 (22 Apr 2021 05:30), Max: 36.6 (21 Apr 2021 13:15)  T(F): 97.4 (22 Apr 2021 05:30), Max: 97.9 (21 Apr 2021 13:15)  HR: 96 (22 Apr 2021 05:30) (96 - 102)  BP: 135/81 (22 Apr 2021 05:30) (119/89 - 139/90)  BP(mean): --  RR: 18 (22 Apr 2021 05:30) (15 - 18)  SpO2: 92% (22 Apr 2021 09:37) (92% - 97%)      PHYSICAL EXAM  All physical exam findings normal, except those marked:  General:	Alert, active, cooperative, NAD, well hydrated  .		[] Abnormal:  Neck		Normal: supple, no cervical adenopathy, no palpable thyroid  .		[] Abnormal:  Cardiovascular	Normal: regular rate, normal S1, S2, no murmurs  .		[] Abnormal:  Respiratory	Normal: no chest wall deformity, normal respiratory pattern, CTA B/L  .		[] Abnormal:  Abdominal	Normal: soft, ND, NT, bowel sounds present, no masses, no organomegaly  .		[] Abnormal:  		Normal normal genitalia, testes descended, circumcised/uncircumcised  .		Alejandro stage:			Breast alejandro:  .		Menstrual history:  .		[] Abnormal:  Extremities	Normal: FROM x4  .		[] Abnormal:  Skin		Normal: intact and not indurated, no rash, no acanthosis nigricans  .		[] Abnormal:  Neurologic	Normal: grossly intact  .		[] Abnormal:    LABS                        15.2   12.81 )-----------( 399      ( 22 Apr 2021 05:34 )             44.8                               144    |  112    |  37                  Calcium: 8.7   / iCa: x      (04-22 @ 05:34)    ----------------------------<  168       Magnesium: x                                3.7     |  21     |  1.47             Phosphorous: x        TPro  7.5    /  Alb  2.4    /  TBili  0.7    /  DBili  x      /  AST  40     /  ALT  37     /  AlkPhos  92     22 Apr 2021 05:34    CAPILLARY BLOOD GLUCOSE      POCT Blood Glucose.: 155 mg/dL (22 Apr 2021 08:03)  POCT Blood Glucose.: 138 mg/dL (21 Apr 2021 21:06)  POCT Blood Glucose.: 126 mg/dL (21 Apr 2021 17:04)  POCT Blood Glucose.: 145 mg/dL (21 Apr 2021 11:25)        Assesment/plan    76 M with PMH of HTN and DM presents to ED with complaint of hiccups since this morning. Patient reports hiccups are associated with cough, fever and chills x 1 day. Found to have covid and uncont dm. Pt does not remember his home meds ? insulin tx as out pt and metformin.   HBA1C 13.8         Problem/Recommendation - 1:  Problem: Uncontrolled diabetes mellitus.   Recommendation: hyperglycemic- better controlled  on ? insulin tx as out pt and metformin  continue lantus to 24 units   decreased admelog 4 ac tid- hold if poor po intake  moderate correction doses  fsg ac and hs.     Problem/Recommendation - 2:  ·  Problem: Acute respiratory failure with hypoxia.- on NC.    Recommendation: cont tx as per prim team  Remdisivir and decadron.

## 2021-04-22 NOTE — PROGRESS NOTE ADULT - SUBJECTIVE AND OBJECTIVE BOX
[facility]   MD BART FLANNERY MD RUORU WONG, PA    TEL:  OFFICE: [phone]  DR AYERS CELL: [phone]  JOSÉ MIGUEL ABDI CELL: [phone]  DR. AGUIRRE CELL: [phone]  DR. MCCLAIN CELL: [phone]    FROM 5 PM - 7 AM PLEASE CALL ANSWERING SERVICE: [phone]    RENAL FOLLOW UP NOTE--Date of Service 04-22-21 @ 08:08  --------------------------------------------------------------------------------  HPI:      Pt covid 19+    PAST HISTORY  --------------------------------------------------------------------------------  No significant changes to PMH, PSH, FHx, SHx, unless otherwise noted    ALLERGIES & MEDICATIONS  --------------------------------------------------------------------------------  Allergies    No Known Allergies    Intolerances      Standing Inpatient Medications  ascorbic acid 500 milliGRAM(s) Oral daily  atorvastatin 40 milliGRAM(s) Oral at bedtime  cefTRIAXone   IVPB 1000 milliGRAM(s) IV Intermittent every 24 hours  cholecalciferol 400 Unit(s) Oral daily  colchicine 0.6 milliGRAM(s) Oral daily  dexAMETHasone  Injectable 6 milliGRAM(s) IV Push daily  dextrose 40% Gel 15 Gram(s) Oral once  dextrose 5%. 1000 milliLiter(s) IV Continuous <Continuous>  dextrose 5%. 1000 milliLiter(s) IV Continuous <Continuous>  dextrose 50% Injectable 25 Gram(s) IV Push once  dextrose 50% Injectable 12.5 Gram(s) IV Push once  dextrose 50% Injectable 25 Gram(s) IV Push once  enoxaparin Injectable 40 milliGRAM(s) SubCutaneous daily  famotidine    Tablet 20 milliGRAM(s) Oral two times a day  glucagon  Injectable 1 milliGRAM(s) IntraMuscular once  insulin glargine Injectable (LANTUS) 24 Unit(s) SubCutaneous at bedtime  insulin lispro (ADMELOG) corrective regimen sliding scale   SubCutaneous three times a day before meals  insulin lispro Injectable (ADMELOG) 6 Unit(s) SubCutaneous three times a day before meals  losartan 50 milliGRAM(s) Oral daily  montelukast 10 milliGRAM(s) Oral daily  zinc sulfate 220 milliGRAM(s) Oral daily    PRN Inpatient Medications  ALBUTerol    90 MICROgram(s) HFA Inhaler 2 Puff(s) Inhalation every 6 hours PRN      REVIEW OF SYSTEMS  --------------------------------------------------------------------------------  General: no fever  MSK: no edema     VITALS/PHYSICAL EXAM  --------------------------------------------------------------------------------  T(C): 36.3 (04-22-21 @ 05:30), Max: 36.6 (04-21-21 @ 13:15)  HR: 96 (04-22-21 @ 05:30) (96 - 102)  BP: 135/81 (04-22-21 @ 05:30) (119/89 - 139/90)  RR: 18 (04-22-21 @ 05:30) (15 - 18)  SpO2: 95% (04-22-21 @ 05:30) (92% - 97%)  Wt(kg): --          LABS/STUDIES  --------------------------------------------------------------------------------              15.2   12.81 >-----------<  399      [04-22-21 @ 05:34]              44.8     144  |  112  |  37  ----------------------------<  168      [04-22-21 @ 05:34]  3.7   |  21  |  1.47        Ca     8.7     [04-22-21 @ 05:34]    TPro  7.5  /  Alb  2.4  /  TBili  0.7  /  DBili  x   /  AST  40  /  ALT  37  /  AlkPhos  92  [04-22-21 @ 05:34]              [04-21-21 @ 06:39]    Creatinine Trend:  SCr 1.47 [04-22 @ 05:34]  SCr 1.15 [04-21 @ 06:39]  SCr 0.84 [04-20 @ 07:44]  SCr 1.01 [04-19 @ 05:56]  SCr 1.12 [04-18 @ 11:11]    Urinalysis - [04-16-21 @ 15:47]      Color Yellow / Appearance Clear / SG 1.015 / pH 5.0      Gluc 1000 / Ketone Moderate  / Bili Negative / Urobili Negative       Blood Moderate / Protein 30 / Leuk Est Negative / Nitrite Negative      RBC 5-10 / WBC 0-2 / Hyaline  / Gran  / Sq Epi  / Non Sq Epi  / Bacteria     Urine Sodium 7      [04-16-21 @ 23:15]  Urine Osmolality 466      [04-16-21 @ 23:15]    Ferritin 842      [04-21-21 @ 09:31]  TSH 0.96      [04-17-21 @ 06:26]  Lipid: chol 96, TG 77, HDL 42, LDL --      [04-17-21 @ 06:26]

## 2021-04-22 NOTE — CONSULT NOTE ADULT - SUBJECTIVE AND OBJECTIVE BOX
Time of visit:    CHIEF COMPLAINT: Patient is a 76y old  Male who presents with a chief complaint of AHRF 2/2 COVID-19 (2021 13:12)      HPI:  76 M with PMH of HTN and DM presents to ED with complaint of hiccups since this morning. Patient reports hiccups are associated with cough, fever and chills x 1 day. He also endorses myalgias. Tmax at home was 100.3F.He denies having had the COVID-19 vaccination or having had COVID-19 in the past. Per daughter patient has had poor appetite and weakness x1 week. She also reports noticing that he becomes short of breath on minimal exertion. Patient was at PMD office last week and was advised to go to ED for hyperglycemia at that time, but refused. Pt was admitted to [facility] in January for DKA. Pt denies any chest pain, palpitations, rinary symptoms or any other acute complaints     In ED, /76, , saO2 89% on RA, improved to 95% on 5L NC  (2021 16:51)   Patient seen and examined.     PAST MEDICAL & SURGICAL HISTORY:  HTN (hypertension)    DM (diabetes mellitus)    No significant past surgical history        Allergies    No Known Allergies    Intolerances        MEDICATIONS  (STANDING):  ascorbic acid 500 milliGRAM(s) Oral daily  atorvastatin 40 milliGRAM(s) Oral at bedtime  cholecalciferol 400 Unit(s) Oral daily  colchicine 0.6 milliGRAM(s) Oral daily  dexAMETHasone  Injectable 6 milliGRAM(s) IV Push daily  dextrose 40% Gel 15 Gram(s) Oral once  dextrose 5%. 1000 milliLiter(s) (50 mL/Hr) IV Continuous <Continuous>  dextrose 5%. 1000 milliLiter(s) (100 mL/Hr) IV Continuous <Continuous>  dextrose 50% Injectable 25 Gram(s) IV Push once  dextrose 50% Injectable 12.5 Gram(s) IV Push once  dextrose 50% Injectable 25 Gram(s) IV Push once  enoxaparin Injectable 40 milliGRAM(s) SubCutaneous daily  famotidine    Tablet 20 milliGRAM(s) Oral two times a day  glucagon  Injectable 1 milliGRAM(s) IntraMuscular once  insulin glargine Injectable (LANTUS) 24 Unit(s) SubCutaneous at bedtime  insulin lispro (ADMELOG) corrective regimen sliding scale   SubCutaneous three times a day before meals  insulin lispro Injectable (ADMELOG) 4 Unit(s) SubCutaneous three times a day before meals  losartan 50 milliGRAM(s) Oral daily  montelukast 10 milliGRAM(s) Oral daily  sodium chloride 0.9%. 1000 milliLiter(s) (75 mL/Hr) IV Continuous <Continuous>  zinc sulfate 220 milliGRAM(s) Oral daily      MEDICATIONS  (PRN):  ALBUTerol    90 MICROgram(s) HFA Inhaler 2 Puff(s) Inhalation every 6 hours PRN Bronchospasm   Medications up to date at time of exam.    Medications up to date at time of exam.    FAMILY HISTORY:  No pertinent family history in first degree relatives        SOCIAL HISTORY  Smoking History: [   ] smoking/smoke exposure, [   ] former smoker  Living Condition: [   ] apartment, [   ] private house  Work History:   Travel History: denies recent travel  Illicit Substance Use: denies  Alcohol Use: denies    REVIEW OF SYSTEMS:    CONSTITUTIONAL:  denies fevers, chills, sweats, weight loss    HEENT:  denies diplopia or blurred vision, sore throat or runny nose.    CARDIOVASCULAR:  denies pressure, squeezing, tightness, or heaviness about the chest; no palpitations.    RESPIRATORY:  denies SOB, cough, MCKEON, wheezing.    GASTROINTESTINAL:  denies abdominal pain, nausea, vomiting or diarrhea.    GENITOURINARY: denies dysuria, frequency or urgency.    NEUROLOGIC:  denies numbness, tingling, seizures or weakness.    PSYCHIATRIC:  denies disorder of thought or mood.    MSK: denies swelling, redness      PHYSICAL EXAMINATION:    GENERAL: The patient is a well-developed, well-nourished, in no apparent distress.     Vital Signs Last 24 Hrs  T(C): 36.3 (2021 13:58), Max: 36.4 (2021 21:22)  T(F): 97.4 (2021 13:58), Max: 97.6 (2021 21:22)  HR: 96 (2021 13:58) (96 - 102)  BP: 119/77 (2021 13:58) (119/77 - 139/90)  BP(mean): --  RR: 18 (2021 13:58) (18 - 18)  SpO2: 93% (2021 13:58) (92% - 97%)   (if applicable)    Chest Tube (if applicable)    HEENT: Head is normocephalic and atraumatic. Extraocular muscles are intact. Mucous membranes are moist.     NECK: Supple, no palpable adenopathy.    LUNGS: Clear to auscultation, no wheezing, rales, or rhonchi.    HEART: Regular rate and rhythm without murmur.    ABDOMEN: Soft, nontender, and nondistended.  No hepatosplenomegaly is noted.    EXTREMITIES: Without any cyanosis, clubbing, rash, lesions or edema.    NEUROLOGIC: Awake, alert, oriented.     SKIN: Warm, dry, good turgor.      LABS:                        15.2   12.81 )-----------( 399      ( 2021 05:34 )             44.8         144  |  112<H>  |  37<H>  ----------------------------<  168<H>  3.7   |  21<L>  |  1.47<H>    Ca    8.7      2021 05:34    TPro  7.5  /  Alb  2.4<L>  /  TBili  0.7  /  DBili  x   /  AST  40  /  ALT  37  /  AlkPhos  92        Urinalysis Basic - ( 2021 14:32 )    Color: Yellow / Appearance: Clear / S.015 / pH: x  Gluc: x / Ketone: Negative  / Bili: Negative / Urobili: Negative   Blood: x / Protein: 30 mg/dL / Nitrite: Negative   Leuk Esterase: Negative / RBC: 5-10 /HPF / WBC 0-2 /HPF   Sq Epi: x / Non Sq Epi: Few /HPF / Bacteria: Few /HPF      MICROBIOLOGY: (if applicable)    RADIOLOGY & ADDITIONAL STUDIES:  EKG:   CXR:  ECHO:    IMPRESSION: 76y Male PAST MEDICAL & SURGICAL HISTORY:  HTN (hypertension)    DM (diabetes mellitus)    No significant past surgical history     Impression: 77 Y/O Male presented with Hiccups associated with cough , fever, chills x 1 Day due to Acute hypoxic respiratory failure secondary to Multifocal Covid Pneumonia.  Positive Covid 19 PCR on 21. Had an episode of increase work of breathing requiring Non rebreather Mask and it improved and now on Oxygen supplementation NC.    Suggestion:   O2 saturation 93% . Continue Oxygen supplementation 4L NC.  Day 6 /10 Dexamethasone Daily.   Continue Singulair 10 mg Daily.   Continue PRN Albuterol 2 puffs Q 6 hours.  DVT/ GI prophylactic.   Airborne and contact precautions.     Time of visit:    CHIEF COMPLAINT: Patient is a 76y old  Male who presents with a chief complaint of AHRF 2/2 COVID-19 (2021 13:12)      HPI:  76 M with PMH of HTN and DM presents to ED with complaint of hiccups since this morning. Patient reports hiccups are associated with cough, fever and chills x 1 day. He also endorses myalgias. Tmax at home was 100.3F.He denies having had the COVID-19 vaccination or having had COVID-19 in the past. Per daughter patient has had poor appetite and weakness x1 week. She also reports noticing that he becomes short of breath on minimal exertion. Patient was at PMD office last week and was advised to go to ED for hyperglycemia at that time, but refused. Pt was admitted to [facility] in January for DKA. Pt denies any chest pain, palpitations, rinary symptoms or any other acute complaints     In ED, /76, , saO2 89% on RA, improved to 95% on 5L NC  (2021 16:51)   Patient seen and examined.     PAST MEDICAL & SURGICAL HISTORY:  HTN (hypertension)    DM (diabetes mellitus)    No significant past surgical history        Allergies    No Known Allergies    Intolerances        MEDICATIONS  (STANDING):  ascorbic acid 500 milliGRAM(s) Oral daily  atorvastatin 40 milliGRAM(s) Oral at bedtime  cholecalciferol 400 Unit(s) Oral daily  colchicine 0.6 milliGRAM(s) Oral daily  dexAMETHasone  Injectable 6 milliGRAM(s) IV Push daily  dextrose 40% Gel 15 Gram(s) Oral once  dextrose 5%. 1000 milliLiter(s) (50 mL/Hr) IV Continuous <Continuous>  dextrose 5%. 1000 milliLiter(s) (100 mL/Hr) IV Continuous <Continuous>  dextrose 50% Injectable 25 Gram(s) IV Push once  dextrose 50% Injectable 12.5 Gram(s) IV Push once  dextrose 50% Injectable 25 Gram(s) IV Push once  enoxaparin Injectable 40 milliGRAM(s) SubCutaneous daily  famotidine    Tablet 20 milliGRAM(s) Oral two times a day  glucagon  Injectable 1 milliGRAM(s) IntraMuscular once  insulin glargine Injectable (LANTUS) 24 Unit(s) SubCutaneous at bedtime  insulin lispro (ADMELOG) corrective regimen sliding scale   SubCutaneous three times a day before meals  insulin lispro Injectable (ADMELOG) 4 Unit(s) SubCutaneous three times a day before meals  losartan 50 milliGRAM(s) Oral daily  montelukast 10 milliGRAM(s) Oral daily  sodium chloride 0.9%. 1000 milliLiter(s) (75 mL/Hr) IV Continuous <Continuous>  zinc sulfate 220 milliGRAM(s) Oral daily      MEDICATIONS  (PRN):  ALBUTerol    90 MICROgram(s) HFA Inhaler 2 Puff(s) Inhalation every 6 hours PRN Bronchospasm   Medications up to date at time of exam.    Medications up to date at time of exam.    FAMILY HISTORY:  No pertinent family history in first degree relatives        SOCIAL HISTORY  Smoking History: Denies smoking exposure.   Living Condition: [   ] apartment, [   ] private house  Work History:   Travel History: denies recent travel  Illicit Substance Use: denies  Alcohol Use: denies    REVIEW OF SYSTEMS:    CONSTITUTIONAL:  No fevers, chills. Denies night sweats.     HEENT:  denies diplopia or blurred vision, sore throat or runny nose.    CARDIOVASCULAR:  denies pressure, squeezing, tightness, or heaviness about the chest; no palpitations.    RESPIRATORY:  Mild  SOB on exertion. No cough, No wheezing.    GASTROINTESTINAL:  denies abdominal pain, nausea, vomiting or diarrhea.    GENITOURINARY: denies dysuria, frequency or urgency.    NEUROLOGIC:  denies numbness, tingling, seizures or weakness.    PSYCHIATRIC:  denies disorder of thought or mood.    MSK: denies swelling, redness      PHYSICAL EXAMINATION:    GENERAL: The patient is a well-developed, well-nourished, in no apparent distress.     Vital Signs Last 24 Hrs  T(C): 36.3 (2021 13:58), Max: 36.4 (2021 21:22)  T(F): 97.4 (2021 13:58), Max: 97.6 (2021 21:22)  HR: 96 (2021 13:58) (96 - 102)  BP: 119/77 (2021 13:58) (119/77 - 139/90)  BP(mean): --  RR: 18 (2021 13:58) (18 - 18)  SpO2: 93% (2021 13:58) (92% - 97%)   (if applicable)      HEENT: Head is normocephalic and atraumatic. Extraocular muscles are intact. Mucous membranes are moist.     NECK: Supple, no palpable adenopathy.    LUNGS: Fair air entrance. Clear to auscultation bilaterally with no wheezing, rales, or rhonchi. No use of accessory muscle.     HEART: S1 S2 Regular rate and no click/ rub.     ABDOMEN: Soft, nontender, and nondistended. Active bowel sounds.     EXTREMITIES: Without any cyanosis, clubbing, rash, lesions or edema.    NEUROLOGIC: Awake, alert, oriented.     SKIN: Warm and moist. Non diaphoretic.       LABS:                        15.2   12.81 )-----------( 399      ( 2021 05:34 )             44.8         144  |  112<H>  |  37<H>  ----------------------------<  168<H>  3.7   |  21<L>  |  1.47<H>    Ca    8.7      2021 05:34    TPro  7.5  /  Alb  2.4<L>  /  TBili  0.7  /  DBili  x   /  AST  40  /  ALT  37  /  AlkPhos  92        Urinalysis Basic - ( 2021 14:32 )    Color: Yellow / Appearance: Clear / S.015 / pH: x  Gluc: x / Ketone: Negative  / Bili: Negative / Urobili: Negative   Blood: x / Protein: 30 mg/dL / Nitrite: Negative   Leuk Esterase: Negative / RBC: 5-10 /HPF / WBC 0-2 /HPF   Sq Epi: x / Non Sq Epi: Few /HPF / Bacteria: Few /HPF      MICROBIOLOGY: (if applicable)    RADIOLOGY & ADDITIONAL STUDIES:  EKG:   CXR:  ECHO:    IMPRESSION: 76y Male PAST MEDICAL & SURGICAL HISTORY:  HTN (hypertension)    DM (diabetes mellitus)    No significant past surgical history     Impression: 75 Y/O Male presented with Hiccups associated with cough , fever, chills x 1 Day due to Acute hypoxic respiratory failure secondary to Multifocal Covid Pneumonia.  Positive Covid 19 PCR on 21. Had an episode of increase work of breathing requiring Non rebreather Mask and it improved and now on Oxygen supplementation NC.    Suggestion:   O2 saturation 93% . Continue Oxygen supplementation 4L NC.  Day 6 /10 Dexamethasone Daily.   Continue Singulair 10 mg Daily.   Continue PRN Albuterol 2 puffs Q 6 hours.  DVT/ GI prophylactic.   Airborne and contact precautions.     Time of visit:    CHIEF COMPLAINT: Patient is a 76y old  Male who presents with a chief complaint of AHRF 2/2 COVID-19 (2021 13:12)      HPI:  76 M with PMH of HTN and DM presents to ED with complaint of hiccups since this morning. Patient reports hiccups are associated with cough, fever and chills x 1 day. He also endorses myalgias. Tmax at home was 100.3F.He denies having had the COVID-19 vaccination or having had COVID-19 in the past. Per daughter patient has had poor appetite and weakness x1 week. She also reports noticing that he becomes short of breath on minimal exertion. Patient was at PMD office last week and was advised to go to ED for hyperglycemia at that time, but refused. Pt was admitted to [facility] in January for DKA. Pt denies any chest pain, palpitations, rinary symptoms or any other acute complaints     In ED, /76, , saO2 89% on RA, improved to 95% on 5L NC  (2021 16:51)   Patient seen and examined.     PAST MEDICAL & SURGICAL HISTORY:  HTN (hypertension)    DM (diabetes mellitus)    No significant past surgical history        Allergies    No Known Allergies    Intolerances        MEDICATIONS  (STANDING):  ascorbic acid 500 milliGRAM(s) Oral daily  atorvastatin 40 milliGRAM(s) Oral at bedtime  cholecalciferol 400 Unit(s) Oral daily  colchicine 0.6 milliGRAM(s) Oral daily  dexAMETHasone  Injectable 6 milliGRAM(s) IV Push daily  dextrose 40% Gel 15 Gram(s) Oral once  dextrose 5%. 1000 milliLiter(s) (50 mL/Hr) IV Continuous <Continuous>  dextrose 5%. 1000 milliLiter(s) (100 mL/Hr) IV Continuous <Continuous>  dextrose 50% Injectable 25 Gram(s) IV Push once  dextrose 50% Injectable 12.5 Gram(s) IV Push once  dextrose 50% Injectable 25 Gram(s) IV Push once  enoxaparin Injectable 40 milliGRAM(s) SubCutaneous daily  famotidine    Tablet 20 milliGRAM(s) Oral two times a day  glucagon  Injectable 1 milliGRAM(s) IntraMuscular once  insulin glargine Injectable (LANTUS) 24 Unit(s) SubCutaneous at bedtime  insulin lispro (ADMELOG) corrective regimen sliding scale   SubCutaneous three times a day before meals  insulin lispro Injectable (ADMELOG) 4 Unit(s) SubCutaneous three times a day before meals  losartan 50 milliGRAM(s) Oral daily  montelukast 10 milliGRAM(s) Oral daily  sodium chloride 0.9%. 1000 milliLiter(s) (75 mL/Hr) IV Continuous <Continuous>  zinc sulfate 220 milliGRAM(s) Oral daily      MEDICATIONS  (PRN):  ALBUTerol    90 MICROgram(s) HFA Inhaler 2 Puff(s) Inhalation every 6 hours PRN Bronchospasm   Medications up to date at time of exam.    Medications up to date at time of exam.    FAMILY HISTORY:  No pertinent family history in first degree relatives        SOCIAL HISTORY  Smoking History: Denies smoking exposure.   Living Condition: [   ] apartment, [   ] private house  Work History:   Travel History: denies recent travel  Illicit Substance Use: denies  Alcohol Use: denies    REVIEW OF SYSTEMS:    CONSTITUTIONAL:  No fevers, chills. Denies night sweats.     HEENT:  denies diplopia or blurred vision, sore throat or runny nose.    CARDIOVASCULAR:  denies pressure, squeezing, tightness, or heaviness about the chest; no palpitations.    RESPIRATORY:  Mild  SOB on exertion. No cough, No wheezing.    GASTROINTESTINAL:  denies abdominal pain, nausea, vomiting or diarrhea.    GENITOURINARY: denies dysuria, frequency or urgency.    NEUROLOGIC:  denies numbness, tingling, seizures or weakness.    PSYCHIATRIC:  denies disorder of thought or mood.    MSK: denies swelling, redness      PHYSICAL EXAMINATION:    GENERAL: The patient is a well-developed, well-nourished, in no apparent distress.     Vital Signs Last 24 Hrs  T(C): 36.3 (2021 13:58), Max: 36.4 (2021 21:22)  T(F): 97.4 (2021 13:58), Max: 97.6 (2021 21:22)  HR: 96 (2021 13:58) (96 - 102)  BP: 119/77 (2021 13:58) (119/77 - 139/90)  BP(mean): --  RR: 18 (2021 13:58) (18 - 18)  SpO2: 93% (2021 13:58) (92% - 97%)   (if applicable)      HEENT: Head is normocephalic and atraumatic. Extraocular muscles are intact. Mucous membranes are moist.     NECK: Supple, no palpable adenopathy.    LUNGS: Fair air entrance. Clear to auscultation bilaterally with no wheezing, rales, or rhonchi. No use of accessory muscle.     HEART: S1 S2 Regular rate and no click/ rub.     ABDOMEN: Soft, nontender, and nondistended. Active bowel sounds.     EXTREMITIES: Without any cyanosis, clubbing, rash, lesions or edema.    NEUROLOGIC: Awake, alert, oriented.     SKIN: Warm and moist. Non diaphoretic.       LABS:                        15.2   12.81 )-----------( 399      ( 2021 05:34 )             44.8         144  |  112<H>  |  37<H>  ----------------------------<  168<H>  3.7   |  21<L>  |  1.47<H>    Ca    8.7      2021 05:34    TPro  7.5  /  Alb  2.4<L>  /  TBili  0.7  /  DBili  x   /  AST  40  /  ALT  37  /  AlkPhos  92        Urinalysis Basic - ( 2021 14:32 )    Color: Yellow / Appearance: Clear / S.015 / pH: x  Gluc: x / Ketone: Negative  / Bili: Negative / Urobili: Negative   Blood: x / Protein: 30 mg/dL / Nitrite: Negative   Leuk Esterase: Negative / RBC: 5-10 /HPF / WBC 0-2 /HPF   Sq Epi: x / Non Sq Epi: Few /HPF / Bacteria: Few /HPF      MICROBIOLOGY: (if applicable)    RADIOLOGY & ADDITIONAL STUDIES:  EKG:   CXR:< from: Xray Chest 1 View-PORTABLE IMMEDIATE (Xray Chest 1 View-PORTABLE IMMEDIATE .) (21 @ 14:50) >    EXAM:  XR CHEST PORTABLE IMMED 1V                            PROCEDURE DATE:  2021          INTERPRETATION:  CLINICAL STATEMENT: Chest Pain.    TECHNIQUE: AP view of the chest.    COMPARISON: 2021    FINDINGS/  IMPRESSION:  New bilateral airspace opacities lung bases. No pleural effusion    Heart size cannot be accurately assessed in this projection.              AMARJIT ROWAN MD; Attending Radiologist  This document has been electronically signed. 2021  2:59PM    < end of copied text >    ECHO:    IMPRESSION: 76y Male PAST MEDICAL & SURGICAL HISTORY:  HTN (hypertension)    DM (diabetes mellitus)    No significant past surgical history     Impression: This is a 76 Yr old man  presented with Hiccups associated with cough , fever, chills x 1 Day due to Acute hypoxic respiratory failure secondary to Multifocal  Pneumonia. from covid-19 infection   Positive Covid 19 PCR on 21. Had an episode of increase work of breathing requiring Non rebreather Mask and it improved and now on Oxygen supplementation NC.    Suggestion:   O2 saturation 93% . Continue Oxygen supplementation  to maintain sat >90%  Day  /10 Dexamethasone Daily.   s/p remdesivir   Continue Singulair 10 mg Daily.   Continue PRN Albuterol 2 puffs Q 6 hours.  Monitor biomarkers TIW  DVT/ GI prophylactic.   Airborne and contact precautions.

## 2021-04-22 NOTE — DIETITIAN INITIAL EVALUATION ADULT. - PERTINENT LABORATORY DATA
04-22 Na144 mmol/L Glu 168 mg/dL<H> K+ 3.7 mmol/L Cr  1.47 mg/dL<H> BUN 37 mg/dL<H>   04-19 Phos 3.0 mg/dL   04-22 Alb 2.4 g/dL<L>       04-17 Chol 96 mg/dL LDL --    HDL 42 mg/dL Trig 77 mg/dL  04-17-21 @ 10:52 HgbA1C 13.8 [4.0 - 5.6]  04-17-21 @ 00:01 HgbA1C 13.4 [4.0 - 5.6]

## 2021-04-22 NOTE — PROGRESS NOTE ADULT - ASSESSMENT
_________________________________________________________________________________________  ========>>  M E D I C A L   A T T E N D I N G    F O L L O W  U P  N O T E  <<=========  -----------------------------------------------------------------------------------------------------    - Patient seen and examined by me earlier today.   - Patient today doing worse with increased O2 requirements >> placed back on nasal canula but desaturates easily per RN   - pt with occasional cough, appetite / PO intake is fair >> pt encouraged to increase as able     ==================>> REVIEW OF SYSTEM <<=================    GEN: no fever, no chills, no pain  RESP: no SOB, occasional cough, no sputum  CVS: no chest pain  GI: no abdominal pain, no nausea  : no dysuria, no frequency  Neuro: no headache, no dizziness  Derm : no itching, no rash    ==================>> PHYSICAL EXAM <<=================    GEN: A&O X 2-3 , NAD , comfortable, pleasant, calm , cachectic   HEENT: NCAT, PERRL, MMM, hearing intact  Neck: supple , no JVD appreciated  CVS: S1S2 , regular , No M/R/G appreciated  PULM: CTA B/L,  no W/R/R appreciated  ABD.: soft. non tender, non distended,  bowel sounds present  Extrem: intact pulses , no edema   PSYCH : normal mood,  not anxious                                ( Note written / Date of service 04-22-21 )    ==================>> MEDICATIONS <<====================    ascorbic acid 500 milliGRAM(s) Oral daily  atorvastatin 40 milliGRAM(s) Oral at bedtime  cholecalciferol 400 Unit(s) Oral daily  colchicine 0.6 milliGRAM(s) Oral daily  dexAMETHasone  Injectable 6 milliGRAM(s) IV Push daily  dextrose 40% Gel 15 Gram(s) Oral once  dextrose 5%. 1000 milliLiter(s) IV Continuous <Continuous>  dextrose 5%. 1000 milliLiter(s) IV Continuous <Continuous>  dextrose 50% Injectable 25 Gram(s) IV Push once  dextrose 50% Injectable 12.5 Gram(s) IV Push once  dextrose 50% Injectable 25 Gram(s) IV Push once  enoxaparin Injectable 40 milliGRAM(s) SubCutaneous daily  famotidine    Tablet 20 milliGRAM(s) Oral two times a day  glucagon  Injectable 1 milliGRAM(s) IntraMuscular once  insulin glargine Injectable (LANTUS) 24 Unit(s) SubCutaneous at bedtime  insulin lispro (ADMELOG) corrective regimen sliding scale   SubCutaneous three times a day before meals  insulin lispro Injectable (ADMELOG) 4 Unit(s) SubCutaneous three times a day before meals  losartan 50 milliGRAM(s) Oral daily  montelukast 10 milliGRAM(s) Oral daily  sodium chloride 0.9%. 1000 milliLiter(s) IV Continuous <Continuous>  zinc sulfate 220 milliGRAM(s) Oral daily    MEDICATIONS  (PRN):  ALBUTerol    90 MICROgram(s) HFA Inhaler 2 Puff(s) Inhalation every 6 hours PRN Bronchospasm    ___________  Active diet:  Diet, Regular:   Consistent Carbohydrate Evening Snacks  DASH/TLC Sodium & Cholesterol Restricted  1200mL Fluid Restriction ([id number])  Lacto Veg (Accepts Milk & Milk Products)  ___________________    ==================>> VITAL SIGNS <<==================    Vital Signs Last 24 HrsT(C): 36.3 (04-22-21 @ 13:58)  T(F): 97.4 (04-22-21 @ 13:58), Max: 97.6 (04-21-21 @ 21:22)  HR: 96 (04-22-21 @ 13:58) (96 - 102)  BP: 119/77 (04-22-21 @ 13:58)  RR: 18 (04-22-21 @ 13:58) (18 - 18)  SpO2: 93% (04-22-21 @ 13:58) (92% - 97%)      POCT Blood Glucose.: 107 mg/dL (22 Apr 2021 16:54)  POCT Blood Glucose.: 128 mg/dL (22 Apr 2021 11:21)  POCT Blood Glucose.: 155 mg/dL (22 Apr 2021 08:03)  POCT Blood Glucose.: 138 mg/dL (21 Apr 2021 21:06)     ==================>> LAB AND IMAGING <<==================                        15.2   12.81 )-----------( 399      ( 22 Apr 2021 05:34 )             44.8        04-22    144  |  112<H>  |  37<H>  ----------------------------<  168<H>  3.7   |  21<L>  |  1.47<H>    Ca    8.7      22 Apr 2021 05:34    TPro  7.5  /  Alb  2.4<L>  /  TBili  0.7  /  DBili  x   /  AST  40  /  ALT  37  /  AlkPhos  92  04-22    WBC count:   12.81 <<== ,  13.06 <<== ,  14.73 <<== ,  16.63 <<== ,  16.74 <<==   Hemoglobin:   15.2 <<==,  15.1 <<==,  14.8 <<==,  15.3 <<==,  15.7 <<==  platelets:  399 <==, 434 <==, 408 <==, 401 <==, 388 <==, 250 <==    Creatinine:  1.47  <<==, 1.15  <<==, 0.84  <<==, 1.01  <<==, 1.12  <<==, 1.13  <<==  Sodium:   144  <==, 141  <==, 138  <==, 136  <==, 133  <==, 130  <==, 127  <==       AST:          40 <== , 51 <== , 43 <== , 38 <== , 47 <==      ALT:        37  <== , 38  <== , 31  <== , 29  <== , 32  <==      AP:        92  <=, 81  <=, 86  <=, 70  <=, 69  <=     Bili:        0.7  <=, 0.6  <=, 0.7  <=, 0.6  <=, 0.7  <=    ^^^ Inflammatory markers :  ^^^  C R P :          48 (04-22-21)  <<--, 39 (04-21-21)  <<--, 53 (04-20-21)  <<--, 184 (04-17-21)  <<--  P C T :         0.16 (04-17-21) <<--    Ferritin :         842 (04-21-21) <<--, 998 (04-17-21) <<--, 944 (04-16-21) <<--    D-Dimer :          324 (04-21-21) <<--, 263 (04-19-21) <<--, 262 (04-16-21) <<--    _______________________  C U L T U R E S :    Culture - Urine (collected 16 Apr 2021 22:00)  Source: .Urine Clean Catch (Midstream)  Final Report (18 Apr 2021 12:46):    >100,000 CFU/ml Aerococcus species    "Aerococcus spp. are predictably susceptible to penicillin,    ampicillin, tetracycline, and vancomycin.  All isolates are    resistant to sulfonamides"    <10,000 CFU/ml of other organisms    SARS-CoV-2: Detected (04-16-21 @ 15:10)  ___________________________________________________________________________________  ===============>>  A S S E S S M E N T   A N D   P L A N <<===============  ------------------------------------------------------------------------------------------    · Assessment	  76 M with PMH of HTN and DM presented to ED with complaint of hiccups.  Found to be COVID positive on admission. admitted to medicine for acute hypoxemic respiratory failure due to bilateral pneumonia in setting of COVID 19. Started on Remdesivir, dexamethasone and supplemental oxygen. Pt was also found to have Hyponatremia likely due to hypovolemia, Followed by Nephro, started on IVF with improvement.  Pt was also found to have uncontrolled type 2 diabetes worsening with steroid use. Endo following  as well      Problem/Plan - 1:  ·  Problem: Acute respiratory failure with hypoxia.  Plan: -bilateral pneumonia in setting of COVID 19  - finish Remdesivir / Dexamethasone per protocol  ( likely cause of leukocytosis and hyperglycemia)   -cont  with oxygen and titrated down as tolerated   -pulm following   -continuous pulse ox monitoring  -cont prophylactic Lovenox   -cont supportive care   -daily labs CBC/CMP/CRP...   - Continue Current medications otherwise and monitor.  supportive care  -nutrition / PTOOB  - gentle IVH ordered given INOCENCIA     Problem/Plan - 2:  ·  Problem: Insulin dependent type 2 diabetes mellitus, uncontrolled, in setting of infection and steroids home   -endo consulted and appreciated recom and mgmt   -diabetic diet   -a1c 13.8  - Continue Current medications per endo otherwise and monitor FS    Problem/Plan - 3:  ·  Problem: HTN (hypertension).    -controlled, cont home dose of Losartan   -mon BP.     Problem/Plan - 4:  ·  Problem: Hyponatremia.  Plan: -likely due to hypovolemia, better   -Nephro following.   - IVH for INOCENCIA      ( off colchicine)     Avoid nephrotoxic medications.     Problem/Plan - 5:  ·  Problem: UTI   post Rocephin X 3 days     -GI/DVT Prophylaxis per protocol.   OOB to chair encouraged  PO intake and hydration encouraged     discussed with pt, RN, NP  ___________________________  H. MARILEE Gayle.  (covering today)   Pager: [phone]       _________________________________________________________________________________________  ========>>  M E D I C A L   A T T E N D I N G    F O L L O W  U P  N O T E  <<=========  -----------------------------------------------------------------------------------------------------    - Patient seen and examined by me earlier today.   - Patient today doing worse with increased O2 requirements >> placed back on nasal canula but desaturates easily per RN   - pt with occasional cough, appetite / PO intake is fair >> pt encouraged to increase as able     ==================>> REVIEW OF SYSTEM <<=================    limited ROS as pt less interactive   GEN: no fever, no pain  RESP: no SOB  CVS: no chest pain  GI: no abdominal pain  : no dysuria  Neuro: no headache    ==================>> PHYSICAL EXAM <<=================    GEN: A&O X 2 , NAD , comfortable, pleasant, calm , cachectic , more disoriented today   HEENT: NCAT, PERRL, MMM, hearing intact  Neck: supple , no JVD appreciated  CVS: S1S2 , regular , No M/R/G appreciated  PULM: CTA B/L,  no W/R/R appreciated  ABD.: soft. non tender, non distended,  bowel sounds present  Extrem: intact pulses , no edema   PSYCH : normal mood,  not anxious                                ( Note written / Date of service 04-22-21 )    ==================>> MEDICATIONS <<====================    ascorbic acid 500 milliGRAM(s) Oral daily  atorvastatin 40 milliGRAM(s) Oral at bedtime  cholecalciferol 400 Unit(s) Oral daily  colchicine 0.6 milliGRAM(s) Oral daily  dexAMETHasone  Injectable 6 milliGRAM(s) IV Push daily  dextrose 40% Gel 15 Gram(s) Oral once  dextrose 5%. 1000 milliLiter(s) IV Continuous <Continuous>  dextrose 5%. 1000 milliLiter(s) IV Continuous <Continuous>  dextrose 50% Injectable 25 Gram(s) IV Push once  dextrose 50% Injectable 12.5 Gram(s) IV Push once  dextrose 50% Injectable 25 Gram(s) IV Push once  enoxaparin Injectable 40 milliGRAM(s) SubCutaneous daily  famotidine    Tablet 20 milliGRAM(s) Oral two times a day  glucagon  Injectable 1 milliGRAM(s) IntraMuscular once  insulin glargine Injectable (LANTUS) 24 Unit(s) SubCutaneous at bedtime  insulin lispro (ADMELOG) corrective regimen sliding scale   SubCutaneous three times a day before meals  insulin lispro Injectable (ADMELOG) 4 Unit(s) SubCutaneous three times a day before meals  losartan 50 milliGRAM(s) Oral daily  montelukast 10 milliGRAM(s) Oral daily  sodium chloride 0.9%. 1000 milliLiter(s) IV Continuous <Continuous>  zinc sulfate 220 milliGRAM(s) Oral daily    MEDICATIONS  (PRN):  ALBUTerol    90 MICROgram(s) HFA Inhaler 2 Puff(s) Inhalation every 6 hours PRN Bronchospasm    ___________  Active diet:  Diet, Regular:   Consistent Carbohydrate Evening Snacks  DASH/TLC Sodium & Cholesterol Restricted  1200mL Fluid Restriction ([id number])  Lacto Veg (Accepts Milk & Milk Products)  ___________________    ==================>> VITAL SIGNS <<==================    Vital Signs Last 24 HrsT(C): 36.3 (04-22-21 @ 13:58)  T(F): 97.4 (04-22-21 @ 13:58), Max: 97.6 (04-21-21 @ 21:22)  HR: 96 (04-22-21 @ 13:58) (96 - 102)  BP: 119/77 (04-22-21 @ 13:58)  RR: 18 (04-22-21 @ 13:58) (18 - 18)  SpO2: 93% (04-22-21 @ 13:58) (92% - 97%)      POCT Blood Glucose.: 107 mg/dL (22 Apr 2021 16:54)  POCT Blood Glucose.: 128 mg/dL (22 Apr 2021 11:21)  POCT Blood Glucose.: 155 mg/dL (22 Apr 2021 08:03)  POCT Blood Glucose.: 138 mg/dL (21 Apr 2021 21:06)     ==================>> LAB AND IMAGING <<==================                        15.2   12.81 )-----------( 399      ( 22 Apr 2021 05:34 )             44.8        04-22    144  |  112<H>  |  37<H>  ----------------------------<  168<H>  3.7   |  21<L>  |  1.47<H>    Ca    8.7      22 Apr 2021 05:34    TPro  7.5  /  Alb  2.4<L>  /  TBili  0.7  /  DBili  x   /  AST  40  /  ALT  37  /  AlkPhos  92  04-22    WBC count:   12.81 <<== ,  13.06 <<== ,  14.73 <<== ,  16.63 <<== ,  16.74 <<==   Hemoglobin:   15.2 <<==,  15.1 <<==,  14.8 <<==,  15.3 <<==,  15.7 <<==  platelets:  399 <==, 434 <==, 408 <==, 401 <==, 388 <==, 250 <==    Creatinine:  1.47  <<==, 1.15  <<==, 0.84  <<==, 1.01  <<==, 1.12  <<==, 1.13  <<==  Sodium:   144  <==, 141  <==, 138  <==, 136  <==, 133  <==, 130  <==, 127  <==       AST:          40 <== , 51 <== , 43 <== , 38 <== , 47 <==      ALT:        37  <== , 38  <== , 31  <== , 29  <== , 32  <==      AP:        92  <=, 81  <=, 86  <=, 70  <=, 69  <=     Bili:        0.7  <=, 0.6  <=, 0.7  <=, 0.6  <=, 0.7  <=    ^^^ Inflammatory markers :  ^^^  C R P :          48 (04-22-21)  <<--, 39 (04-21-21)  <<--, 53 (04-20-21)  <<--, 184 (04-17-21)  <<--  P C T :         0.16 (04-17-21) <<--    Ferritin :         842 (04-21-21) <<--, 998 (04-17-21) <<--, 944 (04-16-21) <<--    D-Dimer :          324 (04-21-21) <<--, 263 (04-19-21) <<--, 262 (04-16-21) <<--    _______________________  C U L T U R E S :    Culture - Urine (collected 16 Apr 2021 22:00)  Source: .Urine Clean Catch (Midstream)  Final Report (18 Apr 2021 12:46):    >100,000 CFU/ml Aerococcus species    "Aerococcus spp. are predictably susceptible to penicillin,    ampicillin, tetracycline, and vancomycin.  All isolates are    resistant to sulfonamides"    <10,000 CFU/ml of other organisms    SARS-CoV-2: Detected (04-16-21 @ 15:10)  ___________________________________________________________________________________  ===============>>  A S S E S S M E N T   A N D   P L A N <<===============  ------------------------------------------------------------------------------------------    · Assessment	  76 M with PMH of HTN and DM presented to ED with complaint of hiccups.  Found to be COVID positive on admission. admitted to medicine for acute hypoxemic respiratory failure due to bilateral pneumonia in setting of COVID 19. Started on Remdesivir, dexamethasone and supplemental oxygen. Pt was also found to have Hyponatremia likely due to hypovolemia, Followed by Nephro, started on IVF with improvement.  Pt was also found to have uncontrolled type 2 diabetes worsening with steroid use. Endo following  as well      Problem/Plan - 1:  ·  Problem: Acute respiratory failure with hypoxia.  Plan: -bilateral pneumonia in setting of COVID 19  - finish Remdesivir / Dexamethasone per protocol  ( likely cause of leukocytosis and hyperglycemia)   -cont  with oxygen and titrated down as tolerated   -pulm following   -continuous pulse ox monitoring  -cont prophylactic Lovenox   -cont supportive care   -daily labs CBC/CMP/CRP...   - Continue Current medications otherwise and monitor.  supportive care  -nutrition / PTOOB  - gentle IVH ordered given INOCENCIA     Problem/Plan - 2:  ·  Problem: Insulin dependent type 2 diabetes mellitus, uncontrolled, in setting of infection and steroids home   -endo consulted and appreciated recom and mgmt   -diabetic diet   -a1c 13.8  - Continue Current medications per endo otherwise and monitor FS    Problem/Plan - 3:  ·  Problem: HTN (hypertension).    -controlled, cont home dose of Losartan   -mon BP.     Problem/Plan - 4:  ·  Problem: Hyponatremia.  Plan: -likely due to hypovolemia, better   -Nephro following.   - IVH for INOCENCIA      ( off colchicine)     Avoid nephrotoxic medications.     Problem/Plan - 5:  ·  Problem: UTI   post Rocephin X 3 days     -GI/DVT Prophylaxis per protocol.   OOB to chair encouraged  PO intake and hydration encouraged     discussed with pt, RN, NP  ___________________________  H. JUVE Gayle  (covering today)   Pager: [phone]

## 2021-04-23 NOTE — PROGRESS NOTE ADULT - ASSESSMENT
INOCENCIA:  etiology? possibly sec to Covid  Urine lytes suggestive of pre-renal - however did not improve with iVF yesterday  Trial of gentle hydration again today  UA with blood, check renal sonogram   check CBC with diff as pt on abx to r/o AIN  HOLD losartan  (last dose on 4/23)  - Monitor BMP.    Hyponatremia/Hypernatremia  -sec to free water loss  -Monitor serum Na  -Avoid overcorrection >8mEq in 24 hrs       INOCENCIA:  etiology? possibly sec to Covid  Urine lytes suggestive of pre-renal - however did not improve with iVF yesterday  Trial of gentle hydration again today  UA with blood, check renal sonogram   check CBC with diff as pt on abx to r/o AIN  - Monitor BMP.      CKD 2/3:  Has risk (Diabetes) and minimal proteinuria.  - Monitor BMP.    Metabolic Acidosis:  Mixed AG and NAG. He may have some Tubulo-interstitial Disease  - Monitor for now.  -start PO NaHCO3    COVID   MOnitor temp/ O2  COntinue current care    Hypokalemia  repelted by team   monitor serum K

## 2021-04-23 NOTE — PROGRESS NOTE ADULT - SUBJECTIVE AND OBJECTIVE BOX
[facility]   MD BART FLANNERY MD RUORU WONG, PA    TEL:  OFFICE: [phone]  DR AYERS CELL: [phone]  JOSÉ MIGUEL ABDI CELL: [phone]  DR. AGUIRRE CELL: [phone]  DR. MCCLAIN CELL: [phone]    FROM 5 PM - 7 AM PLEASE CALL ANSWERING SERVICE: [phone]    RENAL FOLLOW UP NOTE--Date of Service 04-23-21 @ 09:58  --------------------------------------------------------------------------------  HPI:      Pt covid 19+  PAST HISTORY  --------------------------------------------------------------------------------  No significant changes to PMH, PSH, FHx, SHx, unless otherwise noted    ALLERGIES & MEDICATIONS  --------------------------------------------------------------------------------  Allergies    No Known Allergies    Intolerances      Standing Inpatient Medications  ascorbic acid 500 milliGRAM(s) Oral daily  atorvastatin 40 milliGRAM(s) Oral at bedtime  cholecalciferol 400 Unit(s) Oral daily  dexAMETHasone  Injectable 6 milliGRAM(s) IV Push daily  dextrose 40% Gel 15 Gram(s) Oral once  dextrose 5%. 1000 milliLiter(s) IV Continuous <Continuous>  dextrose 5%. 1000 milliLiter(s) IV Continuous <Continuous>  dextrose 50% Injectable 25 Gram(s) IV Push once  dextrose 50% Injectable 12.5 Gram(s) IV Push once  dextrose 50% Injectable 25 Gram(s) IV Push once  enoxaparin Injectable 40 milliGRAM(s) SubCutaneous daily  famotidine    Tablet 20 milliGRAM(s) Oral two times a day  glucagon  Injectable 1 milliGRAM(s) IntraMuscular once  insulin glargine Injectable (LANTUS) 24 Unit(s) SubCutaneous at bedtime  insulin lispro (ADMELOG) corrective regimen sliding scale   SubCutaneous three times a day before meals  insulin lispro Injectable (ADMELOG) 4 Unit(s) SubCutaneous three times a day before meals  losartan 50 milliGRAM(s) Oral daily  montelukast 10 milliGRAM(s) Oral daily  potassium chloride    Tablet ER 40 milliEquivalent(s) Oral once  sodium chloride 0.9%. 1000 milliLiter(s) IV Continuous <Continuous>  zinc sulfate 220 milliGRAM(s) Oral daily    PRN Inpatient Medications  ALBUTerol    90 MICROgram(s) HFA Inhaler 2 Puff(s) Inhalation every 6 hours PRN      REVIEW OF SYSTEMS  --------------------------------------------------------------------------------  General: no fever  MSK: no edema     VITALS/PHYSICAL EXAM  --------------------------------------------------------------------------------  T(C): 36.3 (04-23-21 @ 05:21), Max: 36.3 (04-22-21 @ 13:58)  HR: 95 (04-23-21 @ 05:21) (93 - 96)  BP: 128/92 (04-23-21 @ 05:21) (119/77 - 154/87)  RR: 18 (04-23-21 @ 06:54) (18 - 18)  SpO2: 95% (04-23-21 @ 06:54) (91% - 95%)  Wt(kg): --        04-22-21 @ 07:01  -  04-23-21 @ 07:00  --------------------------------------------------------  IN: 0 mL / OUT: 400 mL / NET: -400 mL        LABS/STUDIES  --------------------------------------------------------------------------------              14.8   13.69 >-----------<  385      [04-23-21 @ 08:42]              44.2     146  |  116  |  44  ----------------------------<  84      [04-23-21 @ 08:42]  3.4   |  19  |  2.26        Ca     8.4     [04-23-21 @ 08:42]      Mg     2.3     [04-23-21 @ 08:42]      Phos  3.2     [04-23-21 @ 08:42]    TPro  7.5  /  Alb  2.4  /  TBili  0.7  /  DBili  x   /  AST  40  /  ALT  37  /  AlkPhos  92  [04-22-21 @ 05:34]              [04-23-21 @ 08:42]    Creatinine Trend:  SCr 2.26 [04-23 @ 08:42]  SCr 1.47 [04-22 @ 05:34]  SCr 1.15 [04-21 @ 06:39]  SCr 0.84 [04-20 @ 07:44]  SCr 1.01 [04-19 @ 05:56]    Urinalysis - [04-22-21 @ 14:32]      Color Yellow / Appearance Clear / SG 1.015 / pH 6.5      Gluc 1000 / Ketone Negative  / Bili Negative / Urobili Negative       Blood Small / Protein 30 / Leuk Est Negative / Nitrite Negative      RBC 5-10 / WBC 0-2 / Hyaline  / Gran  / Sq Epi  / Non Sq Epi Few / Bacteria Few    Urine Creatinine 51      [04-22-21 @ 14:32]  Urine Sodium 32      [04-22-21 @ 14:32]  Urine Osmolality 466      [04-16-21 @ 23:15]    Ferritin 842      [04-21-21 @ 09:31]  TSH 0.96      [04-17-21 @ 06:26]  Lipid: chol 96, TG 77, HDL 42, LDL --      [04-17-21 @ 06:26]

## 2021-04-23 NOTE — PROGRESS NOTE ADULT - SUBJECTIVE AND OBJECTIVE BOX
Interval Events:  pt in nad    Allergies    No Known Allergies    Intolerances      Endocrine/Metabolic Medications:  atorvastatin 40 milliGRAM(s) Oral at bedtime  dexAMETHasone  Injectable 6 milliGRAM(s) IV Push daily  dextrose 50% Injectable 25 Gram(s) IV Push once  insulin glargine Injectable (LANTUS) 24 Unit(s) SubCutaneous at bedtime  insulin lispro (ADMELOG) corrective regimen sliding scale   SubCutaneous three times a day before meals  insulin lispro Injectable (ADMELOG) 4 Unit(s) SubCutaneous three times a day before meals      Vital Signs Last 24 Hrs  T(C): 36.3 (23 Apr 2021 12:13), Max: 36.3 (22 Apr 2021 13:58)  T(F): 97.3 (23 Apr 2021 12:13), Max: 97.4 (22 Apr 2021 13:58)  HR: 87 (23 Apr 2021 12:13) (87 - 96)  BP: 171/93 (23 Apr 2021 12:13) (119/77 - 171/93)  BP(mean): --  RR: 17 (23 Apr 2021 12:13) (17 - 18)  SpO2: 94% (23 Apr 2021 12:13) (91% - 95%)      PHYSICAL EXAM  All physical exam findings normal, except those marked:  General:	Alert, active, cooperative, NAD, well hydrated  .		[] Abnormal:  Neck		Normal: supple, no cervical adenopathy, no palpable thyroid  .		[] Abnormal:  Cardiovascular	Normal: regular rate, normal S1, S2, no murmurs  .		[] Abnormal:  Respiratory	Normal: no chest wall deformity, normal respiratory pattern, CTA B/L  .		[] Abnormal:  Abdominal	Normal: soft, ND, NT, bowel sounds present, no masses, no organomegaly  .		[] Abnormal:  		Normal normal genitalia, testes descended, circumcised/uncircumcised  .		Alejandro stage:			Breast alejandro:  .		Menstrual history:  .		[] Abnormal:  Extremities	Normal: FROM x4  .		[] Abnormal:  Skin		Normal: intact and not indurated, no rash, no acanthosis nigricans  .		[] Abnormal:  Neurologic	Normal: grossly intact  .		[] Abnormal:    LABS                        14.8   13.69 )-----------( 385      ( 23 Apr 2021 08:42 )             44.2                               146    |  116    |  44                  Calcium: 8.4   / iCa: x      (04-23 @ 08:42)    ----------------------------<  84        Magnesium: 2.3                              3.4     |  19     |  2.26             Phosphorous: 3.2        CAPILLARY BLOOD GLUCOSE      POCT Blood Glucose.: 147 mg/dL (23 Apr 2021 11:26)  POCT Blood Glucose.: 81 mg/dL (23 Apr 2021 07:50)  POCT Blood Glucose.: 145 mg/dL (22 Apr 2021 20:59)  POCT Blood Glucose.: 107 mg/dL (22 Apr 2021 16:54)        Assesment/plan    76 M with PMH of HTN and DM presents to ED with complaint of hiccups since this morning. Patient reports hiccups are associated with cough, fever and chills x 1 day. Found to have covid and uncont dm. Pt does not remember his home meds ? insulin tx as out pt and metformin.   HBA1C 13.8         Problem/Recommendation - 1:  Problem: Uncontrolled diabetes mellitus.   Recommendation: hyperglycemic- better controlled  on ? insulin tx as out pt and metformin  continue lantus to 20 units   decreased admelog 4 ac tid- hold if poor po intake  moderate correction doses  fsg ac and hs.     Problem/Recommendation - 2:  ·  Problem: Acute respiratory failure with hypoxia.- on NC.    Recommendation: cont tx as per prim team  Remdisivir and decadron.

## 2021-04-23 NOTE — CONSULT NOTE ADULT - SUBJECTIVE AND OBJECTIVE BOX
Patient is a 76y old  Male who presents with a chief complaint of AHRF 2/2 COVID-19 (2021 18:48)      HPI:  76 M from home with PMH of HTN and DM initially presented to ED  complaint of hiccups on 2021, associated with cough, fever,chills and myalgias. Tmax at home was 100.3F.He denies having had the COVID-19 vaccination or having had COVID-19 in the past. Per daughter patient has had poor appetite and weakness  She also reports noticing that he becomes short of breath on minimal exertion. Patient was at PMD office last week and was advised to go to ED for hyperglycemia at that time, but refused. Pt was admitted to [facility] in January for DKA.   In ED, /76, , saO2 89% on RA, improved to 95% on 5L NC  (2021 16:51)    Interval history : Pt was admitted to Medicine floor for COVid 19 infection requiring supplemental O2 with NC 6L . Pt completed Remdesivir course and is on D8 of Decadron. As per NP , Pt desaturated yesterday so was placed on NRB for brief period ,then switched back to nasal cannula 6L .Today pt desaturated again so was placed back on 10 L NRB which was increased to 15L after pt finished eating breakfast . Blood sugars were initially high now well controlled on basal-bolus regimen. Hyponatremia on admission resolved with gentle hydration now pt is becoming hypernatremic. Cr has worsen since last 2-3 days with metabolic acidosis and pt is started on HCO3 drip today by nephro. Pt currently states he feels ok , denies any CP, nausea, vomiting ,diarrhea , constipation or any urinary complaints       Allergies    No Known Allergies    Intolerances        MEDICATIONS  (STANDING):  ascorbic acid 500 milliGRAM(s) Oral daily  atorvastatin 40 milliGRAM(s) Oral at bedtime  cholecalciferol 400 Unit(s) Oral daily  dexAMETHasone  Injectable 6 milliGRAM(s) IV Push daily  dextrose 40% Gel 15 Gram(s) Oral once  dextrose 5%. 1000 milliLiter(s) (50 mL/Hr) IV Continuous <Continuous>  dextrose 5%. 1000 milliLiter(s) (100 mL/Hr) IV Continuous <Continuous>  dextrose 50% Injectable 25 Gram(s) IV Push once  dextrose 50% Injectable 12.5 Gram(s) IV Push once  dextrose 50% Injectable 25 Gram(s) IV Push once  enoxaparin Injectable 40 milliGRAM(s) SubCutaneous daily  famotidine    Tablet 20 milliGRAM(s) Oral two times a day  glucagon  Injectable 1 milliGRAM(s) IntraMuscular once  insulin glargine Injectable (LANTUS) 24 Unit(s) SubCutaneous at bedtime  insulin lispro (ADMELOG) corrective regimen sliding scale   SubCutaneous three times a day before meals  insulin lispro Injectable (ADMELOG) 4 Unit(s) SubCutaneous three times a day before meals  montelukast 10 milliGRAM(s) Oral daily  potassium chloride    Tablet ER 40 milliEquivalent(s) Oral once  sodium bicarbonate  Infusion 0.048 mEq/kG/Hr (50 mL/Hr) IV Continuous <Continuous>  zinc sulfate 220 milliGRAM(s) Oral daily    MEDICATIONS  (PRN):  ALBUTerol    90 MICROgram(s) HFA Inhaler 2 Puff(s) Inhalation every 6 hours PRN Bronchospasm      Daily     Daily     Drug Dosing Weight  Height (cm): 172.7 (2021 13:26)  Weight (kg): 78.9 (2021 13:26)  BMI (kg/m2): 26.5 (2021 13:26)  BSA (m2): 1.93 (2021 13:26)    PAST MEDICAL & SURGICAL HISTORY:  HTN (hypertension)    DM (diabetes mellitus)    No significant past surgical history        FAMILY HISTORY:  No pertinent family history in first degree relatives        SOCIAL HISTORY: Denies Smoking, alcohol or drug use       REVIEW OF SYSTEMS:    CONSTITUTIONAL: No fever,  ENMT:   No sinus or throat pain  RESPIRATORY: SOB+ , cough + , no wheezing   CARDIOVASCULAR: No chest pain, palpitations, dizziness, or leg swelling  GASTROINTESTINAL: No abdominal or epigastric pain. No nausea, vomiting, or hematemesis; No diarrhea or constipation. No melena or hematochezia.  GENITOURINARY: No dysuria, frequency, hematuria, or incontinence  NEUROLOGICAL: No headaches,   SKIN: No itching, burning, rashes, or lesions   ENDOCRINE: No heat or cold intolerance; No hair loss  MUSCULOSKELETAL: No joint pain or swelling; No muscle, back, or extremity pain          ICU Vital Signs Last 24 Hrs   T(C): 36.3 (2021 05:21), Max: 36.3 (2021 13:58)  T(F): 97.3 (2021 05:21), Max: 97.4 (2021 13:58)  HR: 95 (2021 05:21) (93 - 96)  BP: 128/92 (2021 05:21) (119/77 - 154/87)  BP(mean): --  ABP: --  ABP(mean): --  RR: 18 (2021 06:54) (18 - 18)  SpO2: 95% (2021 06:54) (91% - 95%)          I&O's Detail    2021 07:01  -  2021 07:00  --------------------------------------------------------  IN:  Total IN: 0 mL    OUT:    Voided (mL): 400 mL  Total OUT: 400 mL    Total NET: -400 mL          PHYSICAL EXAM:    GENERAL: WD elderly M in mod resp distress   HEAD:  Atraumatic, Normocephalic  EYES: EOMI, PERRLA, conjunctiva and sclera clear  NECK: Supple, No JVD, Normal thyroid  NERVOUS SYSTEM:  Alert & Awake   CHEST/LUNG: b/l crackles .No  rhonchi, wheezing, or rubs  HEART: Regular rate and rhythm; No murmurs, rubs, or gallops  ABDOMEN: Soft, Nontender, Nondistended; Bowel sounds present  EXTREMITIES:  2+ Peripheral Pulses, No clubbing, cyanosis, or edema  LYMPH: No lymphadenopathy noted  SKIN: No rashes or lesions    LABS:  CBC Full  -  ( 2021 08:42 )  WBC Count : 13.69 K/uL  RBC Count : 5.23 M/uL  Hemoglobin : 14.8 g/dL  Hematocrit : 44.2 %  Platelet Count - Automated : 385 K/uL  Mean Cell Volume : 84.5 fl  Mean Cell Hemoglobin : 28.3 pg  Mean Cell Hemoglobin Concentration : 33.5 gm/dL  Auto Neutrophil # : x  Auto Lymphocyte # : x  Auto Monocyte # : x  Auto Eosinophil # : x  Auto Basophil # : x  Auto Neutrophil % : x  Auto Lymphocyte % : x  Auto Monocyte % : x  Auto Eosinophil % : x  Auto Basophil % : x    04-23    146<H>  |  116<H>  |  44<H>  ----------------------------<  84  3.4<L>   |  19<L>  |  2.26<H>    Ca    8.4      2021 08:42  Phos  3.2       Mg     2.3         TPro  7.5  /  Alb  2.4<L>  /  TBili  0.7  /  DBili  x   /  AST  40  /  ALT  37  /  AlkPhos  92      CAPILLARY BLOOD GLUCOSE      POCT Blood Glucose.: 81 mg/dL (2021 07:50)      Urinalysis Basic - ( 2021 14:32 )    Color: Yellow / Appearance: Clear / S.015 / pH: x  Gluc: x / Ketone: Negative  / Bili: Negative / Urobili: Negative   Blood: x / Protein: 30 mg/dL / Nitrite: Negative   Leuk Esterase: Negative / RBC: 5-10 /HPF / WBC 0-2 /HPF   Sq Epi: x / Non Sq Epi: Few /HPF / Bacteria: Few /HPF

## 2021-04-23 NOTE — CHART NOTE - NSCHARTNOTEFT_GEN_A_CORE
EVENT:  Pt requiring increased oxygen.  SPO2 90-91% on 15L NRB      HPI:  76 M with PMH of HTN and DM presents to ED with complaint of hiccups since this morning. Patient reports hiccups are associated with cough, fever and chills x 1 day. He also endorses myalgias. Tmax at home was 100.3F.He denies having had the COVID-19 vaccination or having had COVID-19 in the past. Per daughter patient has had poor appetite and weakness x1 week. She also reports noticing that he becomes short of breath on minimal exertion. Patient was at PMD office last week and was advised to go to ED for hyperglycemia at that time, but refused. Pt was admitted to [facility] in January for DKA. Pt denies any chest pain, palpitations, rinary symptoms or any other acute complaints     In ED, /76, , saO2 89% on RA, improved to 95% on 5L NC  (16 Apr 2021 16:51)        OBJECTIVE:  Vital Signs Last 24 Hrs  T(C): 36.3 (23 Apr 2021 05:21), Max: 36.3 (22 Apr 2021 13:58)  T(F): 97.3 (23 Apr 2021 05:21), Max: 97.4 (22 Apr 2021 13:58)  HR: 95 (23 Apr 2021 05:21) (93 - 96)  BP: 128/92 (23 Apr 2021 05:21) (119/77 - 154/87)  BP(mean): --  RR: 18 (23 Apr 2021 06:54) (18 - 18)  SpO2: 95% (23 Apr 2021 06:54) (91% - 95%)    LABS:                        14.8   13.69 )-----------( 385      ( 23 Apr 2021 08:42 )             44.2     04-23    146<H>  |  116<H>  |  44<H>  ----------------------------<  84  3.4<L>   |  19<L>  |  2.26<H>    Ca    8.4      23 Apr 2021 08:42  Phos  3.2     04-23  Mg     2.3     04-23    TPro  7.5  /  Alb  2.4<L>  /  TBili  0.7  /  DBili  x   /  AST  40  /  ALT  37  /  AlkPhos  92  04-22      ASSESSMENT:  Pt resting comfortably, NAD, but requires increased oxygen.      PLAN:   ICU consulted, will transfer pt for Hi-flow    FOLLOW UP/RESULTS:  CXR, D-Dimer

## 2021-04-23 NOTE — CONSULT NOTE ADULT - ASSESSMENT
76 M from home with PMH of HTN and DM initially presented to ED  complaint of hiccups on 4/16/2021, associated with cough, fever,chills and myalgias.  Pt was admitted to Medicine floor for COVid 19 infection requiring supplemental O2 with NC 6L . Pt completed Remdesivir course and is on D8 of Decadron. As per NP , Pt desaturated yesterday so was placed on NRB for brief period ,then switched back to nasal cannula 6L .Today pt desaturated again so was placed back on 10 L NRB which was increased to 15L after pt finished eating breakfast . Blood sugars were initially high now well controlled on basal-bolus regimen. Hyponatremia on admission resolved with gentle hydration now pt is becoming hypernatremic. Cr has worsen since last 2-3 days with metabolic acidosis and pt is started on HCO3 drip today by nephro. Pt currently states he feels ok , denies any CP, nausea, vomiting ,diarrhea , constipation or any urinary complaints     ICU consulted for Worsening Hypoxia .     Pt will be transferred to ICU for Acute Hypoxic Resp failure 2/2 COVID-19 requiring HFNC         Assessment:  1. Acute Hypoxic Respiratory Failure secondary to COVID19 infection    2. INOCENCIA   3. Mixed AG +  NAGMA   4. Uncontrolled DM-2   5. Hypernatremia   6. HTN     Plan:    =====================[CNS ]==============================  # No issues:   - Mental status at baseline      =====================[CVS ]===============================  # HTN :   - Pt was on losartan 50mg , last dose 4/23   - will hold Losartan due INOCENCIA   - BP acceptable off medications for now     =====================[RESP ]==============================  # Acute Hypoxic Resp Failure 2/2 COVID-19 infection :   - Hypoxia worsening , now switched from 6l NC to 15 NRB   - c/w Supplemental o2 with HFNC   - Low threshold for intubation , needs GOC discussion with family regarding intubation   - completed Remdesivir   - C/w Decadron 6mg daily D8/10   - will repeat CXR   - will recheck markers( D-dimers, CRP, Ferritin , PCT) and ABG   - c/w Lovenox 40 mg daily for VTE ppx ( monitor Renal Fx and adjust dosing)   - c/w Vit c , Zn , Vit D supplementation     =====================[ GI ]================================  # No issues :    - c/w Soft diabetic diet     ====================[ RENAL ]=============================   # INOCENCIA :   - D/d : likely 2/2 COVID vs pre -renal( less likely given no response to IVF)   - will stop NS , c/w Bicarb drip as per nephro   - Losartan stopped today ( last dose 4/23)   - monitor urine output   - Monitor and supplement electrolytes   - will check CBC with diff to r/o AIN as per nephro   - Nephro Dr Brown following     # Mixed AG + NAGMA :   - HCO3 19 , could be due to hyperchloremic MA due to NS ? vs worsening INOCENCIA   - started on Bicarb drip as per nephro   - repeat BMP     # Hypernatremia :   - Initially pt was hyponateremic , resolved s/p IVF   - Na 146 , will stop NS for now   - c/w 0.45 NS through Bicarb drip   - Monitor BMP     =====================[ ID ]================================  # COVID -19 :  - plan as above     ===================[ HEME/ONC ]===========================  # No issues :  - Hb and Plt stable   - monitor cbc daily     =====================[ ENDO ]=============================  # Uncontrolled DM-2   - a1c 13.6   - BS controlled on current regimen   - c/w lantus and admelog   - Endo Dr Hall  following   - monitor BS     ==================[ SKIN/ CATHETERS ]======================  # no skin breaks     ==================[ PROPHYLAXIS ]==========================   # Dvt : Lovenox   # Gi :pepcid     ====================[ DISPO ]==============================   - transfer to  ICU     ===================[ PROGNOSIS ]===========================  - Guarded

## 2021-04-24 NOTE — PROGRESS NOTE ADULT - ASSESSMENT
76 M from home with PMH of HTN and DM initially presented to ED  complaint of hiccups on 4/16/2021, associated with cough, fever,chills and myalgias.  Pt was admitted to Medicine floor for COVid 19 infection requiring supplemental O2 with NC 6L then NRB and still desaturating.     Pt admitted to ICU for Acute Hypoxic Resp failure 2/2 COVID-19 requiring HFNC       Assessment:  1. Acute Hypoxic Respiratory Failure secondary to COVID19 infection    2. INOCENCIA   3. Mixed AG +  NAGMA   4. Uncontrolled DM-2   5. Hypernatremia   6. HTN     Plan:    =====================[CNS ]==============================  # No issues:   - Mental status at baseline      =====================[CVS ]===============================  # HTN :   - Pt was on losartan 50mg , last dose 4/23   - will hold Losartan due INOCENCIA   - BP acceptable off medications for now     =====================[RESP ]==============================  # Acute Hypoxic Resp Failure 2/2 COVID-19 infection :   - Hypoxia worsening , now switched from 6l NC to 15 NRB   - c/w Supplemental o2 with HFNC   - Low threshold for intubation , needs GOC discussion with family regarding intubation   - completed Remdesivir   - C/w Decadron 6mg daily D8/10   - will repeat CXR   - will recheck markers( D-dimers, CRP, Ferritin , PCT) and ABG   - c/w Lovenox 40 mg daily for VTE ppx ( monitor Renal Fx and adjust dosing)   - c/w Vit c , Zn , Vit D supplementation     =====================[ GI ]================================  # No issues :    - c/w Soft diabetic diet     ====================[ RENAL ]=============================   # INOCENCIA :   - D/d : likely 2/2 COVID vs pre -renal( less likely given no response to IVF)   - will stop NS , c/w Bicarb drip as per nephro   - Losartan stopped today ( last dose 4/23)   - monitor urine output   - Monitor and supplement electrolytes   - will check CBC with diff to r/o AIN as per nephro   - Nephro Dr Brown following     # Mixed AG + NAGMA :   - HCO3 19 , could be due to hyperchloremic MA due to NS ? vs worsening INOCENCIA   - started on Bicarb drip as per nephro   - repeat BMP     # Hypernatremia :   - Initially pt was hyponateremic , resolved s/p IVF   - Na 146 , will stop NS for now   - c/w 0.45 NS through Bicarb drip   - Monitor BMP     =====================[ ID ]================================  # COVID -19 :  - plan as above     ===================[ HEME/ONC ]===========================  # No issues :  - Hb and Plt stable   - monitor cbc daily     =====================[ ENDO ]=============================  # Uncontrolled DM-2   - a1c 13.6   - BS controlled on current regimen   - c/w lantus and admelog   - Endo Dr Hall  following   - monitor BS     ==================[ SKIN/ CATHETERS ]======================  # no skin breaks     ==================[ PROPHYLAXIS ]==========================   # Dvt : Lovenox   # Gi :pepcid     ====================[ DISPO ]==============================   - transfer to  ICU     ===================[ PROGNOSIS ]===========================  - Guarded      76 M from home with PMH of HTN and DM initially presented to ED  complaint of hiccups on 4/16/2021, associated with cough, fever,chills and myalgias. Pt was admitted to Medicine floor for COVid 19 infection requiring supplemental O2 with NC 6L then NRB and still desaturating.  ICU was consulted for acute hypoxic respiratory failure 2/2 COVID-19 now requiring HFNC 50/100, saturation at 91%. He is s/p bicarb gtt for metabolic acidosis, on ABG bicarb this am 20; will follow repeat BMP at 1400 today.       Assessment:  1. Acute Hypoxic Respiratory Failure secondary to COVID19 infection    2. INOECNCIA   3. Mixed AG +  NAGMA   4. Uncontrolled DM-2   5. Hypernatremia   6. HTN     Plan:    =====================[CNS ]==============================  # No issues:   - Mental status at baseline      =====================[CVS ]===============================  # HTN :   - Pt was on losartan 50mg , last dose 4/23   - will hold Losartan due INOCENCIA; now improving Cr 1.93   - BP acceptable off medications for now     =====================[RESP ]==============================  # Acute Hypoxic Resp Failure 2/2 COVID-19 infection :   - Hypoxia worsening, now on HFNC 50/100 wean off as tolerated    - Low threshold for intubation , needs GOC discussion with family regarding intubation   - completed Remdesivir   - C/w Decadron 6mg daily D9/10   - will repeat CXR   - will recheck markers( D-dimers, CRP, Ferritin , PCT) and ABG   - c/w Lovenox 40 mg daily for VTE ppx ( monitor Renal Fx and adjust dosing)   - c/w Vit c , Zn , Vit D supplementation     =====================[ GI ]================================  # No issues :    - c/w Soft diabetic diet     ====================[ RENAL ]=============================   # INOCENCIA :   - D/d : likely 2/2 COVID vs pre -renal( less likely given no response to IVF)   - will stop NS , c/w Bicarb drip as per nephro   - Losartan stopped today ( last dose 4/23)   - monitor urine output   - Monitor and supplement electrolytes   - will check CBC with diff to r/o AIN as per nephro   - Nephro Dr Brown following     # Mixed AG + NAGMA :   - HCO3 19 , could be due to hyperchloremic MA due to NS ? vs worsening INOCENCIA   - started on Bicarb drip as per nephro   - repeat BMP     # Hypernatremia :   - Initially pt was hyponateremic , resolved s/p IVF   - Na 146 , will stop NS for now   - c/w 0.45 NS through Bicarb drip   - Monitor BMP     =====================[ ID ]================================  # COVID -19 :  - plan as above     ===================[ HEME/ONC ]===========================  # No issues :  - Hb and Plt stable   - monitor cbc daily     =====================[ ENDO ]=============================  # Uncontrolled DM-2   - a1c 13.6   - BS controlled on current regimen   - c/w lantus and admelog   - Endo Dr Hall  following   - monitor BS     ==================[ SKIN/ CATHETERS ]======================  # no skin breaks     ==================[ PROPHYLAXIS ]==========================   # Dvt : Lovenox   # Gi :pepcid     ====================[ DISPO ]==============================   - transfer to  ICU     ===================[ PROGNOSIS ]===========================  - Guarded      76 M from home with PMH of HTN and DM initially presented to ED  complaint of hiccups on 4/16/2021, associated with cough, fever,chills and myalgias. Pt was admitted to Medicine floor for COVid 19 infection requiring supplemental O2 with NC 6L then NRB and still desaturating.  ICU was consulted for acute hypoxic respiratory failure 2/2 COVID-19 now requiring HFNC 50/100, saturation at 91%. He is s/p bicarb gtt for metabolic acidosis, on ABG bicarb this am 20; will follow repeat BMP at 1400 today.       Assessment:  1. Acute Hypoxic Respiratory Failure secondary to COVID19 infection    2. INOCENCIA   3. Mixed AG +  NAGMA   4. Uncontrolled DM-2   5. Hypernatremia   6. HTN     Plan:    =====================[CNS ]==============================  # No issues:   - Mental status at baseline      =====================[CVS ]===============================  # HTN :   - Pt was on losartan 50mg , last dose 4/23   - will hold Losartan due INOCENCIA; now improving Cr 1.93   - BP acceptable off medications for now     =====================[RESP ]==============================  # Acute Hypoxic Resp Failure 2/2 COVID-19 infection :   - Hypoxia worsening, now on HFNC 50/100 wean off as tolerated    - Low threshold for intubation , needs GOC discussion with family regarding intubation   - completed Remdesivir   - C/w Decadron 6mg daily D9/10   - CXR (4/23) Bilateral perihilar and bibasilar  multifocal and diffuse ill-defined airspace opacities  - f/u CXR in am   - markers(RP, Ferritin , PCT) improving ; DDimer 324 --> 523 on Lovenox 40 mg daily for VTE ppx  - monitor Renal Fx and adjust Lovenox dosing as needed   - c/w Vit c , Zn , Vit D supplementation     =====================[ GI ]================================  # No issues :    - c/w Soft diabetic diet     ====================[ RENAL ]=============================   # INOCENCIA :   - likely 2/2 COVID vs pre -renal( less likely given no response to IVF)   - will stop NS , c/w Bicarb drip as per nephro   - Losartan stopped today ( last dose 4/23)   - monitor urine output, monitor I&Os closely   - Monitor and supplement electrolytes   - will check CBC with diff to r/o AIN as per nephro   - Nephro Dr Brown following     # Mixed AG + NAGMA :   - HCO3 19 , could be due to hyperchloremic MA due to NS ? vs worsening INOCENCIA   - started on sodium bicarb 650 mg TID per nephrology   - repeat BMP this afternoon     # Hypernatremia :   - Initially pt was hyponatremic , resolved s/p IVF   - Na 146, will stop NS for now   - Monitor BMP     =====================[ ID ]================================  # COVID -19 :  - plan as above     ===================[ HEME/ONC ]===========================  # No issues :  - Hb and Plt stable   - monitor cbc daily     =====================[ ENDO ]=============================  # Uncontrolled DM-2   - a1c 13.6   - BS controlled on current regimen   - c/w lantus and admelog   - Endo Dr Hall  following   - monitor BS     ==================[ SKIN/ CATHETERS ]======================  # no skin breaks   ==================[ PROPHYLAXIS ]==========================   # Dvt : Lovenox   # Gi :pepcid     ====================[ DISPO ]==============================   - patient remains acute, will continue monitoring in ICU     ===================[ PROGNOSIS ]===========================  - Guarded      76 M from home with PMH of HTN and DM initially presented to ED  complaint of hiccups on 4/16/2021, associated with cough, fever,chills and myalgias. Pt was admitted to Medicine floor for COVid 19 infection requiring supplemental O2 with NC 6L then NRB and still desaturating.  ICU was consulted for acute hypoxic respiratory failure 2/2 COVID-19 now requiring HFNC 50/100, saturation at 91%. He is s/p bicarb gtt for metabolic acidosis, on ABG bicarb this am 20; will follow repeat BMP at 1400 today.       Assessment:  1. Acute Hypoxic Respiratory Failure secondary to COVID19 infection    2. INOCENCIA   3. Mixed AG +  NAGMA   4. Uncontrolled DM-2   5. Hypernatremia   6. HTN     Plan:    =====================[CNS ]==============================  # No issues:   - Mental status at baseline      =====================[CVS ]===============================  # HTN :   - Pt was on losartan 50mg , last dose 4/23   - will hold Losartan due INOCENCIA; now improving Cr 1.93   - BP acceptable off medications for now     =====================[RESP ]==============================  # Acute Hypoxic Resp Failure 2/2 COVID-19 infection :   - Hypoxia worsening, now on HFNC 50/100 wean off as tolerated    - Low threshold for intubation , needs GOC discussion with family regarding intubation   - completed Remdesivir   - C/w Decadron 6mg daily D9/10   - CXR (4/23) Bilateral perihilar and bibasilar  multifocal and diffuse ill-defined airspace opacities  - f/u CXR in am   - markers(RP, Ferritin , PCT) improving ; DDimer 324 --> 523 on Lovenox 40 mg daily for VTE ppx  - monitor Renal Fx and adjust Lovenox dosing as needed   - c/w Vit c , Zn , Vit D supplementation     =====================[ GI ]================================  # No issues :    - c/w Soft diabetic diet     ====================[ RENAL ]=============================   # INOCENCIA :   - likely 2/2 COVID vs pre -renal( less likely given no response to IVF)   - will stop NS , c/w Bicarb drip as per nephro   - Losartan stopped today ( last dose 4/23)   - monitor urine output, monitor I&Os closely   - Monitor and supplement electrolytes   - Renal US ordered (pending)   - will check CBC with diff to r/o AIN as per nephro   - Nephro Dr Brown following     # Mixed AG + NAGMA :   - HCO3 19 , could be due to hyperchloremic MA due to NS ? vs worsening INOCENCIA   - started on sodium bicarb 650 mg TID per nephrology   - repeat BMP this afternoon     # Hypernatremia :   - Initially pt was hyponatremic , resolved s/p IVF   - Na 146, will stop NS for now   - Monitor BMP     =====================[ ID ]================================  # COVID -19 :  - plan as above     ===================[ HEME/ONC ]===========================  # No issues :  - Hb and Plt stable   - monitor cbc daily     =====================[ ENDO ]=============================  # Uncontrolled DM-2   - a1c 13.6   - BS controlled on current regimen   - c/w lantus and admelog   - Endo Dr Hall  following   - monitor BS     ==================[ SKIN/ CATHETERS ]======================  # no skin breaks   ==================[ PROPHYLAXIS ]==========================   # Dvt : Lovenox   # Gi :pepcid     ====================[ DISPO ]==============================   - patient remains acute, will continue monitoring in ICU     ===================[ PROGNOSIS ]===========================  - Guarded

## 2021-04-24 NOTE — CHART NOTE - NSCHARTNOTEFT_GEN_A_CORE
Patients' family updated on status. All questions answered. I spoke to patient's granddaughter Owen Becker .

## 2021-04-24 NOTE — PROGRESS NOTE ADULT - SUBJECTIVE AND OBJECTIVE BOX
INTERVAL HPI/OVERNIGHT EVENTS: patient saturating > 90 on high flow 40/80    PRESSORS: [ ] YES [x ] NO      Antimicrobial:    Cardiovascular:    Pulmonary:  ALBUTerol    90 MICROgram(s) HFA Inhaler 2 Puff(s) Inhalation every 6 hours PRN  montelukast 10 milliGRAM(s) Oral daily    Hematalogic:  enoxaparin Injectable 40 milliGRAM(s) SubCutaneous daily    Other:  ascorbic acid 500 milliGRAM(s) Oral daily  atorvastatin 40 milliGRAM(s) Oral at bedtime  chlorhexidine 2% Cloths 1 Application(s) Topical <User Schedule>  cholecalciferol 400 Unit(s) Oral daily  dexAMETHasone  Injectable 6 milliGRAM(s) IV Push daily  dextrose 50% Injectable 25 Gram(s) IV Push once  famotidine    Tablet 20 milliGRAM(s) Oral two times a day  insulin glargine Injectable (LANTUS) 12 Unit(s) SubCutaneous at bedtime  insulin lispro (ADMELOG) corrective regimen sliding scale   SubCutaneous three times a day before meals  insulin lispro Injectable (ADMELOG) 4 Unit(s) SubCutaneous three times a day before meals  sodium bicarbonate  Infusion 0.048 mEq/kG/Hr IV Continuous <Continuous>  zinc sulfate 220 milliGRAM(s) Oral daily    ALBUTerol    90 MICROgram(s) HFA Inhaler 2 Puff(s) Inhalation every 6 hours PRN  ascorbic acid 500 milliGRAM(s) Oral daily  atorvastatin 40 milliGRAM(s) Oral at bedtime  chlorhexidine 2% Cloths 1 Application(s) Topical <User Schedule>  cholecalciferol 400 Unit(s) Oral daily  dexAMETHasone  Injectable 6 milliGRAM(s) IV Push daily  dextrose 50% Injectable 25 Gram(s) IV Push once  enoxaparin Injectable 40 milliGRAM(s) SubCutaneous daily  famotidine    Tablet 20 milliGRAM(s) Oral two times a day  insulin glargine Injectable (LANTUS) 12 Unit(s) SubCutaneous at bedtime  insulin lispro (ADMELOG) corrective regimen sliding scale   SubCutaneous three times a day before meals  insulin lispro Injectable (ADMELOG) 4 Unit(s) SubCutaneous three times a day before meals  montelukast 10 milliGRAM(s) Oral daily  sodium bicarbonate  Infusion 0.048 mEq/kG/Hr IV Continuous <Continuous>  zinc sulfate 220 milliGRAM(s) Oral daily    Drug Dosing Weight  Height (cm): 172.7 (2021 13:26)  Weight (kg): 78.9 (2021 13:26)  BMI (kg/m2): 26.5 (2021 13:26)  BSA (m2): 1.93 (2021 13:26)    CENTRAL LINE: [ ] YES [ ] NO  LOCATION:         GUPTA: [ ] YES [ ] NO          A-LINE:  [ ] YES [ ] NO  LOCATION:             ICU Vital Signs Last 24 Hrs  T(C): 36.7 (2021 00:00), Max: 36.9 (2021 20:00)  T(F): 98 (2021 00:00), Max: 98.4 (2021 20:00)  HR: 95 (2021 00:00) (87 - 102)  BP: 150/96 (2021 00:00) (76/61 - 171/93)  BP(mean): 113 (2021 00:00) (67 - 113)  ABP: --  ABP(mean): --  RR: 15 (2021 00:00) (15 - 23)  SpO2: 92% (2021 00:00) (91% - 99%)      ABG - ( 2021 14:22 )  pH, Arterial: 7.50  pH, Blood: x     /  pCO2: 23    /  pO2: 126   / HCO3: 18    / Base Excess: -3.4  /  SaO2: 99                    04-22 @ 07:01  -  04-23 @ 07:00  --------------------------------------------------------  IN: 0 mL / OUT: 400 mL / NET: -400 mL              PHYSICAL EXAM:    GENERAL: NAD  EYES: EOMI, PERRLA  NECK: Supple, No JVD; Normal thyroid; Trachea midline: No LAD   NERVOUS SYSTEM:  Alert & Oriented X3,  Motor Strength 5/5 B/L upper and lower extremities; DTRs 2+ intact and symmetric  CHEST/LUNG: No rales, rhonchi, wheezing, breath sounds present bilaterally  HEART: Regular rate and rhythm; No murmurs, no gallops  ABDOMEN: Soft, Nontender, Nondistended; Bowel sounds present, no pain or masses on palpation  : voiding well, Gupta in place  EXTREMITIES:  2+ Peripheral Pulses, No clubbing, cyanosis, or edema  SKIN: warm, intact, no lesions         LABS:  CBC Full  -  ( 2021 15:05 )  WBC Count : 10.74 K/uL  RBC Count : 4.99 M/uL  Hemoglobin : 14.2 g/dL  Hematocrit : 42.0 %  Platelet Count - Automated : 390 K/uL  Mean Cell Volume : 84.2 fl  Mean Cell Hemoglobin : 28.5 pg  Mean Cell Hemoglobin Concentration : 33.8 gm/dL  Auto Neutrophil # : 9.27 K/uL  Auto Lymphocyte # : 0.67 K/uL  Auto Monocyte # : 0.68 K/uL  Auto Eosinophil # : 0.00 K/uL  Auto Basophil # : 0.02 K/uL  Auto Neutrophil % : 86.4 %  Auto Lymphocyte % : 6.2 %  Auto Monocyte % : 6.3 %  Auto Eosinophil % : 0.0 %  Auto Basophil % : 0.2 %        142  |  115<H>  |  46<H>  ----------------------------<  174<H>  4.3   |  17<L>  |  2.32<H>    Ca    8.2<L>      2021 15:05  Phos  3.8       Mg     2.2         TPro  6.8  /  Alb  2.2<L>  /  TBili  0.6  /  DBili  x   /  AST  63<H>  /  ALT  33  /  AlkPhos  95  -23    PT/INR - ( 2021 15:05 )   PT: 15.1 sec;   INR: 1.28 ratio           Urinalysis Basic - ( 2021 14:32 )    Color: Yellow / Appearance: Clear / S.015 / pH: x  Gluc: x / Ketone: Negative  / Bili: Negative / Urobili: Negative   Blood: x / Protein: 30 mg/dL / Nitrite: Negative   Leuk Esterase: Negative / RBC: 5-10 /HPF / WBC 0-2 /HPF   Sq Epi: x / Non Sq Epi: Few /HPF / Bacteria: Few /HPF          RADIOLOGY & ADDITIONAL STUDIES REVIEWED:  ***    [ ]GOALS OF CARE DISCUSSION WITH PATIENT/FAMILY/PROXY:    CRITICAL CARE TIME SPENT: 35 minutes   INTERVAL HPI/OVERNIGHT EVENTS: patient saturating > 90 on high flow 40/80    PRESSORS: [ ] YES [x ] NO      Antimicrobial:    Cardiovascular:    Pulmonary:  ALBUTerol    90 MICROgram(s) HFA Inhaler 2 Puff(s) Inhalation every 6 hours PRN  montelukast 10 milliGRAM(s) Oral daily    Hematalogic:  enoxaparin Injectable 40 milliGRAM(s) SubCutaneous daily    Other:  ascorbic acid 500 milliGRAM(s) Oral daily  atorvastatin 40 milliGRAM(s) Oral at bedtime  chlorhexidine 2% Cloths 1 Application(s) Topical <User Schedule>  cholecalciferol 400 Unit(s) Oral daily  dexAMETHasone  Injectable 6 milliGRAM(s) IV Push daily  dextrose 50% Injectable 25 Gram(s) IV Push once  famotidine    Tablet 20 milliGRAM(s) Oral two times a day  insulin glargine Injectable (LANTUS) 12 Unit(s) SubCutaneous at bedtime  insulin lispro (ADMELOG) corrective regimen sliding scale   SubCutaneous three times a day before meals  insulin lispro Injectable (ADMELOG) 4 Unit(s) SubCutaneous three times a day before meals  sodium bicarbonate  Infusion 0.048 mEq/kG/Hr IV Continuous <Continuous>  zinc sulfate 220 milliGRAM(s) Oral daily    ALBUTerol    90 MICROgram(s) HFA Inhaler 2 Puff(s) Inhalation every 6 hours PRN  ascorbic acid 500 milliGRAM(s) Oral daily  atorvastatin 40 milliGRAM(s) Oral at bedtime  chlorhexidine 2% Cloths 1 Application(s) Topical <User Schedule>  cholecalciferol 400 Unit(s) Oral daily  dexAMETHasone  Injectable 6 milliGRAM(s) IV Push daily  dextrose 50% Injectable 25 Gram(s) IV Push once  enoxaparin Injectable 40 milliGRAM(s) SubCutaneous daily  famotidine    Tablet 20 milliGRAM(s) Oral two times a day  insulin glargine Injectable (LANTUS) 12 Unit(s) SubCutaneous at bedtime  insulin lispro (ADMELOG) corrective regimen sliding scale   SubCutaneous three times a day before meals  insulin lispro Injectable (ADMELOG) 4 Unit(s) SubCutaneous three times a day before meals  montelukast 10 milliGRAM(s) Oral daily  sodium bicarbonate  Infusion 0.048 mEq/kG/Hr IV Continuous <Continuous>  zinc sulfate 220 milliGRAM(s) Oral daily    Drug Dosing Weight  Height (cm): 172.7 (2021 13:26)  Weight (kg): 78.9 (2021 13:26)  BMI (kg/m2): 26.5 (2021 13:26)  BSA (m2): 1.93 (2021 13:26)    CENTRAL LINE: [ ] YES [ ] NO  LOCATION:         GUPTA: [ ] YES [ ] NO          A-LINE:  [ ] YES [ ] NO  LOCATION:             ICU Vital Signs Last 24 Hrs  T(C): 36.7 (2021 00:00), Max: 36.9 (2021 20:00)  T(F): 98 (2021 00:00), Max: 98.4 (2021 20:00)  HR: 95 (2021 00:00) (87 - 102)  BP: 150/96 (2021 00:00) (76/61 - 171/93)  BP(mean): 113 (2021 00:00) (67 - 113)  ABP: --  ABP(mean): --  RR: 15 (2021 00:00) (15 - 23)  SpO2: 92% (2021 00:00) (91% - 99%)      ABG - ( 2021 14:22 )  pH, Arterial: 7.50  pH, Blood: x     /  pCO2: 23    /  pO2: 126   / HCO3: 18    / Base Excess: -3.4  /  SaO2: 99                    04-22 @ 07:01  -  04-23 @ 07:00  --------------------------------------------------------  IN: 0 mL / OUT: 400 mL / NET: -400 mL              PHYSICAL EXAM:    GENERAL: NAD, on high flow oxygen sat >90%  EYES: EOMI, PERRLA  NECK: Supple, No JVD; Normal thyroid; Trachea midline: No LAD   NERVOUS SYSTEM:  Alert & Oriented X3,  Motor Strength 5/5 B/L upper and lower extremities; DTRs 2+ intact and symmetric  CHEST/LUNG: No rales, rhonchi, wheezing, breath sounds present bilaterally  HEART: Regular rate and rhythm; No murmurs, no gallops  ABDOMEN: Soft, Nontender, Nondistended; Bowel sounds present, no pain or masses on palpation  : voiding well  EXTREMITIES:  2+ Peripheral Pulses, No clubbing, cyanosis, or edema  SKIN: warm, intact, no lesions         LABS:  CBC Full  -  ( 2021 15:05 )  WBC Count : 10.74 K/uL  RBC Count : 4.99 M/uL  Hemoglobin : 14.2 g/dL  Hematocrit : 42.0 %  Platelet Count - Automated : 390 K/uL  Mean Cell Volume : 84.2 fl  Mean Cell Hemoglobin : 28.5 pg  Mean Cell Hemoglobin Concentration : 33.8 gm/dL  Auto Neutrophil # : 9.27 K/uL  Auto Lymphocyte # : 0.67 K/uL  Auto Monocyte # : 0.68 K/uL  Auto Eosinophil # : 0.00 K/uL  Auto Basophil # : 0.02 K/uL  Auto Neutrophil % : 86.4 %  Auto Lymphocyte % : 6.2 %  Auto Monocyte % : 6.3 %  Auto Eosinophil % : 0.0 %  Auto Basophil % : 0.2 %        142  |  115<H>  |  46<H>  ----------------------------<  174<H>  4.3   |  17<L>  |  2.32<H>    Ca    8.2<L>      2021 15:05  Phos  3.8     04-  Mg     2.2     -    TPro  6.8  /  Alb  2.2<L>  /  TBili  0.6  /  DBili  x   /  AST  63<H>  /  ALT  33  /  AlkPhos  95  04-23    PT/INR - ( 2021 15:05 )   PT: 15.1 sec;   INR: 1.28 ratio           Urinalysis Basic - ( 2021 14:32 )    Color: Yellow / Appearance: Clear / S.015 / pH: x  Gluc: x / Ketone: Negative  / Bili: Negative / Urobili: Negative   Blood: x / Protein: 30 mg/dL / Nitrite: Negative   Leuk Esterase: Negative / RBC: 5-10 /HPF / WBC 0-2 /HPF   Sq Epi: x / Non Sq Epi: Few /HPF / Bacteria: Few /HPF          RADIOLOGY & ADDITIONAL STUDIES REVIEWED:  ***    [ ]GOALS OF CARE DISCUSSION WITH PATIENT/FAMILY/PROXY:    CRITICAL CARE TIME SPENT: 35 minutes   INTERVAL HPI/OVERNIGHT EVENTS: patient saturating > 90 on high flow 40/100    PRESSORS: [ ] YES [x ] NO    Antimicrobial:  No     Cardiovascular:  Hemodynamically Stable     Pulmonary:  ALBUTerol    90 MICROgram(s) HFA Inhaler 2 Puff(s) Inhalation every 6 hours PRN  montelukast 10 milliGRAM(s) Oral daily  Hematalogic:  enoxaparin Injectable 40 milliGRAM(s) SubCutaneous daily    Other:  ascorbic acid 500 milliGRAM(s) Oral daily  atorvastatin 40 milliGRAM(s) Oral at bedtime  chlorhexidine 2% Cloths 1 Application(s) Topical <User Schedule>  cholecalciferol 400 Unit(s) Oral daily  dexAMETHasone  Injectable 6 milliGRAM(s) IV Push daily  dextrose 50% Injectable 25 Gram(s) IV Push once  famotidine    Tablet 20 milliGRAM(s) Oral two times a day  insulin glargine Injectable (LANTUS) 12 Unit(s) SubCutaneous at bedtime  insulin lispro (ADMELOG) corrective regimen sliding scale   SubCutaneous three times a day before meals  insulin lispro Injectable (ADMELOG) 4 Unit(s) SubCutaneous three times a day before meals  sodium bicarbonate  Infusion 0.048 mEq/kG/Hr IV Continuous <Continuous>  zinc sulfate 220 milliGRAM(s) Oral daily    ALBUTerol    90 MICROgram(s) HFA Inhaler 2 Puff(s) Inhalation every 6 hours PRN  ascorbic acid 500 milliGRAM(s) Oral daily  atorvastatin 40 milliGRAM(s) Oral at bedtime  chlorhexidine 2% Cloths 1 Application(s) Topical <User Schedule>  cholecalciferol 400 Unit(s) Oral daily  dexAMETHasone  Injectable 6 milliGRAM(s) IV Push daily  dextrose 50% Injectable 25 Gram(s) IV Push once  enoxaparin Injectable 40 milliGRAM(s) SubCutaneous daily  famotidine    Tablet 20 milliGRAM(s) Oral two times a day  insulin glargine Injectable (LANTUS) 12 Unit(s) SubCutaneous at bedtime  insulin lispro (ADMELOG) corrective regimen sliding scale   SubCutaneous three times a day before meals  insulin lispro Injectable (ADMELOG) 4 Unit(s) SubCutaneous three times a day before meals  montelukast 10 milliGRAM(s) Oral daily  sodium bicarbonate  Infusion 0.048 mEq/kG/Hr IV Continuous <Continuous>  zinc sulfate 220 milliGRAM(s) Oral daily    Drug Dosing Weight  Height (cm): 172.7 (2021 13:26)  Weight (kg): 78.9 (2021 13:26)  BMI (kg/m2): 26.5 (2021 13:26)  BSA (m2): 1.93 (2021 13:26)    CENTRAL LINE: [ ] YES [ ] NO  LOCATION:     GUPTA: [ ] YES [ ] NO        A-LINE:  [ ] YES [ ] NO  LOCATION:             ICU Vital Signs Last 24 Hrs  T(C): 36.7 (2021 00:00), Max: 36.9 (2021 20:00)  T(F): 98 (2021 00:00), Max: 98.4 (2021 20:00)  HR: 95 (2021 00:00) (87 - 102)  BP: 150/96 (2021 00:00) (76/61 - 171/93)  BP(mean): 113 (2021 00:00) (67 - 113)  ABP: --  ABP(mean): --  RR: 15 (2021 00:00) (15 - 23)  SpO2: 92% (2021 00:00) (91% - 99%)      ABG - ( 2021 14:22 )  pH, Arterial: 7.50  pH, Blood: x     /  pCO2: 23    /  pO2: 126   / HCO3: 18    / Base Excess: -3.4  /  SaO2: 99                    04-22 @ 07:01  -  -23 @ 07:00  --------------------------------------------------------  IN: 0 mL / OUT: 400 mL / NET: -400 mL              PHYSICAL EXAM:    GENERAL: NAD, on high flow oxygen sat >90%  EYES: EOMI, PERRLA  NECK: Supple, No JVD; Normal thyroid; Trachea midline: No LAD   NERVOUS SYSTEM:  Alert & Oriented X3,  Motor Strength 5/5 B/L upper and lower extremities; DTRs 2+ intact and symmetric  CHEST/LUNG: No rales, rhonchi, wheezing, breath sounds present bilaterally  HEART: Regular rate and rhythm; No murmurs, no gallops  ABDOMEN: Soft, Nontender, Nondistended; Bowel sounds present, no pain or masses on palpation  : voiding well  EXTREMITIES:  2+ Peripheral Pulses, No clubbing, cyanosis, or edema  SKIN: warm, intact, no lesions         LABS:  CBC Full  -  ( 2021 15:05 )  WBC Count : 10.74 K/uL  RBC Count : 4.99 M/uL  Hemoglobin : 14.2 g/dL  Hematocrit : 42.0 %  Platelet Count - Automated : 390 K/uL  Mean Cell Volume : 84.2 fl  Mean Cell Hemoglobin : 28.5 pg  Mean Cell Hemoglobin Concentration : 33.8 gm/dL  Auto Neutrophil # : 9.27 K/uL  Auto Lymphocyte # : 0.67 K/uL  Auto Monocyte # : 0.68 K/uL  Auto Eosinophil # : 0.00 K/uL  Auto Basophil # : 0.02 K/uL  Auto Neutrophil % : 86.4 %  Auto Lymphocyte % : 6.2 %  Auto Monocyte % : 6.3 %  Auto Eosinophil % : 0.0 %  Auto Basophil % : 0.2 %        142  |  115<H>  |  46<H>  ----------------------------<  174<H>  4.3   |  17<L>  |  2.32<H>    Ca    8.2<L>      2021 15:05  Phos  3.8       Mg     2.2     -    TPro  6.8  /  Alb  2.2<L>  /  TBili  0.6  /  DBili  x   /  AST  63<H>  /  ALT  33  /  AlkPhos  95  04-23    PT/INR - ( 2021 15:05 )   PT: 15.1 sec;   INR: 1.28 ratio           Urinalysis Basic - ( 2021 14:32 )    Color: Yellow / Appearance: Clear / S.015 / pH: x  Gluc: x / Ketone: Negative  / Bili: Negative / Urobili: Negative   Blood: x / Protein: 30 mg/dL / Nitrite: Negative   Leuk Esterase: Negative / RBC: 5-10 /HPF / WBC 0-2 /HPF   Sq Epi: x / Non Sq Epi: Few /HPF / Bacteria: Few /HPF          RADIOLOGY & ADDITIONAL STUDIES REVIEWED:  ***    [ ]GOALS OF CARE DISCUSSION WITH PATIENT/FAMILY/PROXY:    CRITICAL CARE TIME SPENT: 35 minutes   INTERVAL HPI/OVERNIGHT EVENTS: patient saturating > 90 on high flow 40/100    PRESSORS: [ ] YES [x ] NO    Antimicrobial:  No     Cardiovascular:  Hemodynamically Stable     Pulmonary:  ALBUTerol    90 MICROgram(s) HFA Inhaler 2 Puff(s) Inhalation every 6 hours PRN  montelukast 10 milliGRAM(s) Oral daily  Hematalogic:  enoxaparin Injectable 40 milliGRAM(s) SubCutaneous daily    Other:  ascorbic acid 500 milliGRAM(s) Oral daily  atorvastatin 40 milliGRAM(s) Oral at bedtime  chlorhexidine 2% Cloths 1 Application(s) Topical <User Schedule>  cholecalciferol 400 Unit(s) Oral daily  dexAMETHasone  Injectable 6 milliGRAM(s) IV Push daily  dextrose 50% Injectable 25 Gram(s) IV Push once  famotidine    Tablet 20 milliGRAM(s) Oral two times a day  insulin glargine Injectable (LANTUS) 12 Unit(s) SubCutaneous at bedtime  insulin lispro (ADMELOG) corrective regimen sliding scale   SubCutaneous three times a day before meals  insulin lispro Injectable (ADMELOG) 4 Unit(s) SubCutaneous three times a day before meals  sodium bicarbonate  Infusion 0.048 mEq/kG/Hr IV Continuous <Continuous>  zinc sulfate 220 milliGRAM(s) Oral daily    ALBUTerol    90 MICROgram(s) HFA Inhaler 2 Puff(s) Inhalation every 6 hours PRN  ascorbic acid 500 milliGRAM(s) Oral daily  atorvastatin 40 milliGRAM(s) Oral at bedtime  chlorhexidine 2% Cloths 1 Application(s) Topical <User Schedule>  cholecalciferol 400 Unit(s) Oral daily  dexAMETHasone  Injectable 6 milliGRAM(s) IV Push daily  dextrose 50% Injectable 25 Gram(s) IV Push once  enoxaparin Injectable 40 milliGRAM(s) SubCutaneous daily  famotidine    Tablet 20 milliGRAM(s) Oral two times a day  insulin glargine Injectable (LANTUS) 12 Unit(s) SubCutaneous at bedtime  insulin lispro (ADMELOG) corrective regimen sliding scale   SubCutaneous three times a day before meals  insulin lispro Injectable (ADMELOG) 4 Unit(s) SubCutaneous three times a day before meals  montelukast 10 milliGRAM(s) Oral daily  sodium bicarbonate  Infusion 0.048 mEq/kG/Hr IV Continuous <Continuous>  zinc sulfate 220 milliGRAM(s) Oral daily    Drug Dosing Weight  Height (cm): 172.7 (2021 13:26)  Weight (kg): 78.9 (2021 13:26)  BMI (kg/m2): 26.5 (2021 13:26)  BSA (m2): 1.93 (2021 13:26)    CENTRAL LINE: [ ] YES [X ] NO  LOCATION:     A-LINE:  [ ] YES [X ] NO  LOCATION:       GUPTA: [ ] YES [X ] NO          ICU Vital Signs Last 24 Hrs  T(C): 36.7 (2021 00:00), Max: 36.9 (2021 20:00)  T(F): 98 (2021 00:00), Max: 98.4 (2021 20:00)  HR: 95 (2021 00:00) (87 - 102)  BP: 150/96 (2021 00:00) (76/61 - 171/93)  BP(mean): 113 (2021 00:00) (67 - 113)  RR: 15 (2021 00:00) (15 - 23)  SpO2: 92% (2021 00:00) (91% - 99%)      ABG - ( 2021 14:22 )  pH, Arterial: 7.50  pH, Blood: x     /  pCO2: 23    /  pO2: 126   / HCO3: 18    / Base Excess: -3.4  /  SaO2: 99        04-22 @ 07:01  -  04-23 @ 07:00  --------------------------------------------------------  IN: 0 mL / OUT: 400 mL / NET: -400 mL      PHYSICAL EXAM:    GENERAL: NAD, on high flow oxygen sat >90%  EYES: EOMI, PERRLA  NECK: Supple, No JVD; Normal thyroid; Trachea midline: No LAD   NERVOUS SYSTEM:  Alert & Oriented X3,  Motor Strength 5/5 B/L upper and lower extremities; DTRs 2+ intact and symmetric  CHEST/LUNG: No rales, rhonchi, wheezing, breath sounds present bilaterally  HEART: Regular rate and rhythm; No murmurs, no gallops  ABDOMEN: Soft, Nontender, Nondistended; Bowel sounds present, no pain or masses on palpation  : voiding well  EXTREMITIES:  2+ Peripheral Pulses, No clubbing, cyanosis, or edema  SKIN: warm, intact, no lesions       LABS:  CBC Full  -  ( 2021 15:05 )  WBC Count : 10.74 K/uL  RBC Count : 4.99 M/uL  Hemoglobin : 14.2 g/dL  Hematocrit : 42.0 %  Platelet Count - Automated : 390 K/uL  Mean Cell Volume : 84.2 fl  Mean Cell Hemoglobin : 28.5 pg  Mean Cell Hemoglobin Concentration : 33.8 gm/dL  Auto Neutrophil # : 9.27 K/uL  Auto Lymphocyte # : 0.67 K/uL  Auto Monocyte # : 0.68 K/uL  Auto Eosinophil # : 0.00 K/uL  Auto Basophil # : 0.02 K/uL  Auto Neutrophil % : 86.4 %  Auto Lymphocyte % : 6.2 %  Auto Monocyte % : 6.3 %  Auto Eosinophil % : 0.0 %  Auto Basophil % : 0.2 %        142  |  115<H>  |  46<H>  ----------------------------<  174<H>  4.3   |  17<L>  |  2.32<H>    Ca    8.2<L>      2021 15:05  Phos  3.8       Mg     2.2         TPro  6.8  /  Alb  2.2<L>  /  TBili  0.6  /  DBili  x   /  AST  63<H>  /  ALT  33  /  AlkPhos  95  -23    PT/INR - ( 2021 15:05 )   PT: 15.1 sec;   INR: 1.28 ratio           Urinalysis Basic - ( 2021 14:32 )    Color: Yellow / Appearance: Clear / S.015 / pH: x  Gluc: x / Ketone: Negative  / Bili: Negative / Urobili: Negative   Blood: x / Protein: 30 mg/dL / Nitrite: Negative   Leuk Esterase: Negative / RBC: 5-10 /HPF / WBC 0-2 /HPF   Sq Epi: x / Non Sq Epi: Few /HPF / Bacteria: Few /HPF          RADIOLOGY & ADDITIONAL STUDIES REVIEWED:  ***    [ ]GOALS OF CARE DISCUSSION WITH PATIENT/FAMILY/PROXY:    CRITICAL CARE TIME SPENT: 35 minutes

## 2021-04-24 NOTE — PROGRESS NOTE ADULT - SUBJECTIVE AND OBJECTIVE BOX
Dr. Moss  Office [phone]  Cell [phone]  Cathy MCDOWELL  Cell [phone]    RENAL PROGRESS NOTE: DATE OF SERVICE 04-24-21 @ 13:57    Patient is a 76y old  Male who presents with a chief complaint of Covid 19 (23 Apr 2021 11:28)      Patient seen and examined at bedside. using high flow O2    VITALS:  T(F): 96.8 (04-24-21 @ 09:00), Max: 98.5 (04-24-21 @ 06:00)  HR: 105 (04-24-21 @ 12:13)  BP: 145/91 (04-24-21 @ 12:00)  RR: 18 (04-24-21 @ 12:13)  SpO2: 91% (04-24-21 @ 12:13)  Wt(kg): --    04-23 @ 07:01  -  04-24 @ 07:00  --------------------------------------------------------  IN: 50 mL / OUT: 0 mL / NET: 50 mL    04-24 @ 07:01  -  04-24 @ 13:57  --------------------------------------------------------  IN: 320 mL / OUT: 200 mL / NET: 120 mL          Hospital Medications:   MEDICATIONS  (STANDING):  ascorbic acid 500 milliGRAM(s) Oral daily  atorvastatin 40 milliGRAM(s) Oral at bedtime  chlorhexidine 2% Cloths 1 Application(s) Topical <User Schedule>  cholecalciferol 400 Unit(s) Oral daily  dexAMETHasone  Injectable 6 milliGRAM(s) IV Push daily  dextrose 50% Injectable 25 Gram(s) IV Push once  enoxaparin Injectable 40 milliGRAM(s) SubCutaneous daily  famotidine    Tablet 20 milliGRAM(s) Oral two times a day  insulin glargine Injectable (LANTUS) 12 Unit(s) SubCutaneous at bedtime  insulin lispro (ADMELOG) corrective regimen sliding scale   SubCutaneous three times a day before meals  insulin lispro Injectable (ADMELOG) 4 Unit(s) SubCutaneous three times a day before meals  montelukast 10 milliGRAM(s) Oral daily  sodium bicarbonate 650 milliGRAM(s) Oral three times a day  zinc sulfate 220 milliGRAM(s) Oral daily      LABS:  04-24    146<H>  |  116<H>  |  42<H>  ----------------------------<  86  3.8   |  19<L>  |  1.93<H>    Ca    8.9      24 Apr 2021 05:49  Phos  3.1     04-24  Mg     2.4     04-24    TPro  6.9  /  Alb  2.2<L>  /  TBili  0.7  /  DBili      /  AST  36  /  ALT  33  /  AlkPhos  98  04-24    Creatinine Trend: 1.93 <--, 2.32 <--, 2.26 <--, 1.47 <--, 1.15 <--, 0.84 <--, 1.01 <--, 1.12 <--    Albumin, Serum: 2.2 g/dL (04-24 @ 05:49)  Phosphorus Level, Serum: 3.1 mg/dL (04-24 @ 05:49)  Ferritin, Serum: 663 ng/mL (04-24 @ 01:11)  Albumin, Serum: 2.2 g/dL (04-23 @ 15:05)  Phosphorus Level, Serum: 3.8 mg/dL (04-23 @ 15:05)  Ferritin, Serum: 701 ng/mL (04-23 @ 14:56)                              15.1   15.38 )-----------( 374      ( 24 Apr 2021 05:49 )             44.5     Urine Studies:  Urinalysis - [04-22-21 @ 14:32]      Color Yellow / Appearance Clear / SG 1.015 / pH 6.5      Gluc 1000 / Ketone Negative  / Bili Negative / Urobili Negative       Blood Small / Protein 30 / Leuk Est Negative / Nitrite Negative      RBC 5-10 / WBC 0-2 / Hyaline  / Gran  / Sq Epi  / Non Sq Epi Few / Bacteria Few    Urine Creatinine 51      [04-22-21 @ 14:32]  Urine Sodium 32      [04-22-21 @ 14:32]    Ferritin 663      [04-24-21 @ 01:11]  TSH 0.96      [04-17-21 @ 06:26]  Lipid: chol 96, TG 77, HDL 42, LDL --      [04-17-21 @ 06:26]        RADIOLOGY & ADDITIONAL STUDIES:

## 2021-04-24 NOTE — PROGRESS NOTE ADULT - ASSESSMENT
INOCENCIA:  etiology? possibly sec to Covid  Urine lytes suggestive of pre-renal    Has some improvement after IVF  UA with blood, check renal sonogram   HOLD losartan  (last dose on 4/23)  - Monitor BMP.    Hyponatremia/Hypernatremia  -sec to free water loss  -Use 1/2NS 50c/hr  -Monitor serum Na  -Avoid overcorrection >8mEq in 24 hrs       CKD 2/3:  Has risk (Diabetes) and minimal proteinuria.  - Monitor BMP.    Metabolic Acidosis:  Mixed AG and NAG. He may have some Tubulo-interstitial Disease  - Monitor for now.  -Continue PO NaHCO3    COVID   MOnitor temp/ O2  COntinue current care    Hypokalemia  repelted by team   monitor serum K

## 2021-04-25 NOTE — PROGRESS NOTE ADULT - SUBJECTIVE AND OBJECTIVE BOX
INTERVAL HPI/OVERNIGHT EVENTS: Patient agitated overnight. Given Haldol 2 > 5 then 10 mg and re-oriented. Low threshold for intubation. On HFNC 55L 100%    PRESSORS: [ ] YES [ x] NO    Antimicrobial:    Cardiovascular:    Pulmonary:  ALBUTerol    90 MICROgram(s) HFA Inhaler 2 Puff(s) Inhalation every 6 hours PRN  montelukast 10 milliGRAM(s) Oral daily    Hematologic:  enoxaparin Injectable 40 milliGRAM(s) SubCutaneous daily    Other:  ascorbic acid 500 milliGRAM(s) Oral daily  atorvastatin 40 milliGRAM(s) Oral at bedtime  chlorhexidine 2% Cloths 1 Application(s) Topical <User Schedule>  cholecalciferol 400 Unit(s) Oral daily  dexAMETHasone  Injectable 6 milliGRAM(s) IV Push daily  dextrose 50% Injectable 25 Gram(s) IV Push once  famotidine    Tablet 20 milliGRAM(s) Oral two times a day  insulin glargine Injectable (LANTUS) 12 Unit(s) SubCutaneous at bedtime  insulin lispro (ADMELOG) corrective regimen sliding scale   SubCutaneous three times a day before meals  insulin lispro Injectable (ADMELOG) 4 Unit(s) SubCutaneous three times a day before meals  sodium bicarbonate 650 milliGRAM(s) Oral three times a day  zinc sulfate 220 milliGRAM(s) Oral daily    ALBUTerol    90 MICROgram(s) HFA Inhaler 2 Puff(s) Inhalation every 6 hours PRN  ascorbic acid 500 milliGRAM(s) Oral daily  atorvastatin 40 milliGRAM(s) Oral at bedtime  chlorhexidine 2% Cloths 1 Application(s) Topical <User Schedule>  cholecalciferol 400 Unit(s) Oral daily  dexAMETHasone  Injectable 6 milliGRAM(s) IV Push daily  dextrose 50% Injectable 25 Gram(s) IV Push once  enoxaparin Injectable 40 milliGRAM(s) SubCutaneous daily  famotidine    Tablet 20 milliGRAM(s) Oral two times a day  insulin glargine Injectable (LANTUS) 12 Unit(s) SubCutaneous at bedtime  insulin lispro (ADMELOG) corrective regimen sliding scale   SubCutaneous three times a day before meals  insulin lispro Injectable (ADMELOG) 4 Unit(s) SubCutaneous three times a day before meals  montelukast 10 milliGRAM(s) Oral daily  sodium bicarbonate 650 milliGRAM(s) Oral three times a day  zinc sulfate 220 milliGRAM(s) Oral daily    Drug Dosing Weight  Height (cm): 172.7 (16 Apr 2021 13:26)  Weight (kg): 78.9 (16 Apr 2021 13:26)  BMI (kg/m2): 26.5 (16 Apr 2021 13:26)  BSA (m2): 1.93 (16 Apr 2021 13:26)    CENTRAL LINE: [ ] YES [X ] NO  LOCATION:     A-LINE:  [ ] YES [X ] NO  LOCATION:     GUPTA: [ ] YES [X ] NO        PMH -reviewed admission note, no change since admission      ABG - ( 24 Apr 2021 04:52 )  pH, Arterial: 7.51  pH, Blood: x     /  pCO2: 25    /  pO2: 63    / HCO3: 20    / Base Excess: -1.5  /  SaO2: 93        04-23 @ 07:01  -  04-24 @ 07:00  --------------------------------------------------------  IN: 50 mL / OUT: 0 mL / NET: 50 mL      PHYSICAL EXAM:  GENERAL: NAD, on high flow oxygen sat >90%  EYES: EOMI, PERRLA  NECK: Supple, No JVD; Normal thyroid; Trachea midline: No LAD   NERVOUS SYSTEM:  Alert & Oriented X3,  Motor Strength 5/5 B/L upper and lower extremities; DTRs 2+ intact and symmetric  CHEST/LUNG: No rales, rhonchi, wheezing, breath sounds present bilaterally  HEART: Regular rate and rhythm; No murmurs, no gallops  ABDOMEN: Soft, Nontender, Nondistended; Bowel sounds present, no pain or masses on palpation  : voiding well  EXTREMITIES:  2+ Peripheral Pulses, No clubbing, cyanosis, or edema  SKIN: warm, intact, no lesions       LABS:  CBC Full  -  ( 24 Apr 2021 05:49 )  WBC Count : 15.38 K/uL  RBC Count : 5.29 M/uL  Hemoglobin : 15.1 g/dL  Hematocrit : 44.5 %  Platelet Count - Automated : 374 K/uL  Mean Cell Volume : 84.1 fl  Mean Cell Hemoglobin : 28.5 pg  Mean Cell Hemoglobin Concentration : 33.9 gm/dL  Auto Neutrophil # : 13.10 K/uL  Auto Lymphocyte # : 1.02 K/uL  Auto Monocyte # : 0.97 K/uL  Auto Eosinophil # : 0.08 K/uL  Auto Basophil # : 0.02 K/uL  Auto Neutrophil % : 85.3 %  Auto Lymphocyte % : 6.6 %  Auto Monocyte % : 6.3 %  Auto Eosinophil % : 0.5 %  Auto Basophil % : 0.1 %    04-24    141  |  114<H>  |  44<H>  ----------------------------<  141<H>  4.4   |  19<L>  |  1.93<H>    Ca    8.6      24 Apr 2021 15:32  Phos  3.1     04-24  Mg     2.4     04-24    TPro  6.9  /  Alb  2.2<L>  /  TBili  0.7  /  DBili  x   /  AST  36  /  ALT  33  /  AlkPhos  98  04-24    PT/INR - ( 23 Apr 2021 15:05 )   PT: 15.1 sec;   INR: 1.28 ratio      RADIOLOGY & ADDITIONAL STUDIES REVIEWED:     < from: Xray Chest 1 View- PORTABLE-Urgent (Xray Chest 1 View- PORTABLE-Urgent .) (04.23.21 @ 12:59) >    EXAM:  XR CHEST PORTABLE URGENT 1V                            PROCEDURE DATE:  04/23/2021          INTERPRETATION:  Portable chest radiograph    CLINICAL INFORMATION:  Covid 19.    TECHNIQUE:  Portable  AP view of the chest was obtained.    COMPARISON: 1/9/2021 chest available for review.    FINDINGS:    The lungs show bilateral perihilar and basilar  multifocal and diffuse ill-defined airspace opacities. Upper lobes clear. No pneumothorax.    The heart and mediastinum are within normal limits.    Visualized osseous structures are intact.      IMPRESSION:   Bilateral perihilar and bibasilar  multifocal and diffuse ill-defined airspace opacities.      CHESTER WOMACK MD; Attending Radiologist  This document has been electronically signed. Apr 23 2021  6:43PM    < end of copied text >    [ ]GOALS OF CARE DISCUSSION WITH PATIENT/FAMILY/PROXY:    CRITICAL CARE TIME SPENT: 35 minutes INTERVAL HPI/OVERNIGHT EVENTS: Patient agitated overnight. Given Haldol 2 > 5 then 10 mg and re-oriented. Low threshold for intubation. On HFNC 55L 100%  Patient resting in bed this AM, sleeping. Will monitor resp status, updated pt's grand-daughter regarding clinical status.     PRESSORS: [ ] YES [ x] NO    Antimicrobial:    Cardiovascular:    Pulmonary:  ALBUTerol    90 MICROgram(s) HFA Inhaler 2 Puff(s) Inhalation every 6 hours PRN  montelukast 10 milliGRAM(s) Oral daily    Hematologic:  enoxaparin Injectable 40 milliGRAM(s) SubCutaneous daily    Other:  ascorbic acid 500 milliGRAM(s) Oral daily  atorvastatin 40 milliGRAM(s) Oral at bedtime  chlorhexidine 2% Cloths 1 Application(s) Topical <User Schedule>  cholecalciferol 400 Unit(s) Oral daily  dexAMETHasone  Injectable 6 milliGRAM(s) IV Push daily  dextrose 50% Injectable 25 Gram(s) IV Push once  famotidine    Tablet 20 milliGRAM(s) Oral two times a day  insulin glargine Injectable (LANTUS) 12 Unit(s) SubCutaneous at bedtime  insulin lispro (ADMELOG) corrective regimen sliding scale   SubCutaneous three times a day before meals  insulin lispro Injectable (ADMELOG) 4 Unit(s) SubCutaneous three times a day before meals  sodium bicarbonate 650 milliGRAM(s) Oral three times a day  zinc sulfate 220 milliGRAM(s) Oral daily    ALBUTerol    90 MICROgram(s) HFA Inhaler 2 Puff(s) Inhalation every 6 hours PRN  ascorbic acid 500 milliGRAM(s) Oral daily  atorvastatin 40 milliGRAM(s) Oral at bedtime  chlorhexidine 2% Cloths 1 Application(s) Topical <User Schedule>  cholecalciferol 400 Unit(s) Oral daily  dexAMETHasone  Injectable 6 milliGRAM(s) IV Push daily  dextrose 50% Injectable 25 Gram(s) IV Push once  enoxaparin Injectable 40 milliGRAM(s) SubCutaneous daily  famotidine    Tablet 20 milliGRAM(s) Oral two times a day  insulin glargine Injectable (LANTUS) 12 Unit(s) SubCutaneous at bedtime  insulin lispro (ADMELOG) corrective regimen sliding scale   SubCutaneous three times a day before meals  insulin lispro Injectable (ADMELOG) 4 Unit(s) SubCutaneous three times a day before meals  montelukast 10 milliGRAM(s) Oral daily  sodium bicarbonate 650 milliGRAM(s) Oral three times a day  zinc sulfate 220 milliGRAM(s) Oral daily    Drug Dosing Weight  Height (cm): 172.7 (16 Apr 2021 13:26)  Weight (kg): 78.9 (16 Apr 2021 13:26)  BMI (kg/m2): 26.5 (16 Apr 2021 13:26)  BSA (m2): 1.93 (16 Apr 2021 13:26)    CENTRAL LINE: [ ] YES [X ] NO  LOCATION:     A-LINE:  [ ] YES [X ] NO  LOCATION:     GUPTA: [ ] YES [X ] NO        PMH -reviewed admission note, no change since admission      ABG - ( 24 Apr 2021 04:52 )  pH, Arterial: 7.51  pH, Blood: x     /  pCO2: 25    /  pO2: 63    / HCO3: 20    / Base Excess: -1.5  /  SaO2: 93        04-23 @ 07:01  -  04-24 @ 07:00  --------------------------------------------------------  IN: 50 mL / OUT: 0 mL / NET: 50 mL      PHYSICAL EXAM:  GENERAL: NAD, on high flow oxygen sat >90%  EYES: EOMI, PERRLA  NECK: Supple, No JVD; Normal thyroid; Trachea midline: No LAD   NERVOUS SYSTEM:  Alert & Oriented X3,  Motor Strength 5/5 B/L upper and lower extremities; DTRs 2+ intact and symmetric  CHEST/LUNG: No rales, rhonchi, wheezing, breath sounds present bilaterally  HEART: Regular rate and rhythm; No murmurs, no gallops  ABDOMEN: Soft, Nontender, Nondistended; Bowel sounds present, no pain or masses on palpation  : voiding well  EXTREMITIES:  2+ Peripheral Pulses, No clubbing, cyanosis, or edema  SKIN: warm, intact, no lesions       LABS:  CBC Full  -  ( 24 Apr 2021 05:49 )  WBC Count : 15.38 K/uL  RBC Count : 5.29 M/uL  Hemoglobin : 15.1 g/dL  Hematocrit : 44.5 %  Platelet Count - Automated : 374 K/uL  Mean Cell Volume : 84.1 fl  Mean Cell Hemoglobin : 28.5 pg  Mean Cell Hemoglobin Concentration : 33.9 gm/dL  Auto Neutrophil # : 13.10 K/uL  Auto Lymphocyte # : 1.02 K/uL  Auto Monocyte # : 0.97 K/uL  Auto Eosinophil # : 0.08 K/uL  Auto Basophil # : 0.02 K/uL  Auto Neutrophil % : 85.3 %  Auto Lymphocyte % : 6.6 %  Auto Monocyte % : 6.3 %  Auto Eosinophil % : 0.5 %  Auto Basophil % : 0.1 %    04-24    141  |  114<H>  |  44<H>  ----------------------------<  141<H>  4.4   |  19<L>  |  1.93<H>    Ca    8.6      24 Apr 2021 15:32  Phos  3.1     04-24  Mg     2.4     04-24    TPro  6.9  /  Alb  2.2<L>  /  TBili  0.7  /  DBili  x   /  AST  36  /  ALT  33  /  AlkPhos  98  04-24    PT/INR - ( 23 Apr 2021 15:05 )   PT: 15.1 sec;   INR: 1.28 ratio      RADIOLOGY & ADDITIONAL STUDIES REVIEWED:     < from: Xray Chest 1 View- PORTABLE-Urgent (Xray Chest 1 View- PORTABLE-Urgent .) (04.23.21 @ 12:59) >    EXAM:  XR CHEST PORTABLE URGENT 1V                            PROCEDURE DATE:  04/23/2021          INTERPRETATION:  Portable chest radiograph    CLINICAL INFORMATION:  Covid 19.    TECHNIQUE:  Portable  AP view of the chest was obtained.    COMPARISON: 1/9/2021 chest available for review.    FINDINGS:    The lungs show bilateral perihilar and basilar  multifocal and diffuse ill-defined airspace opacities. Upper lobes clear. No pneumothorax.    The heart and mediastinum are within normal limits.    Visualized osseous structures are intact.      IMPRESSION:   Bilateral perihilar and bibasilar  multifocal and diffuse ill-defined airspace opacities.      CHESTER WOMACK MD; Attending Radiologist  This document has been electronically signed. Apr 23 2021  6:43PM    < end of copied text >    [ ]GOALS OF CARE DISCUSSION WITH PATIENT/FAMILY/PROXY:    CRITICAL CARE TIME SPENT: 35 minutes

## 2021-04-25 NOTE — PROGRESS NOTE ADULT - SUBJECTIVE AND OBJECTIVE BOX
Dr. Moss  Office [phone]  Cell [phone]  Cathy MCDOWELL  Cell [phone]    RENAL PROGRESS NOTE: DATE OF SERVICE 04-25-21 @ 17:36    Patient is a 76y old  Male who presents with a chief complaint of Covid 19 (23 Apr 2021 11:28)      Patient seen and examined at bedside. a bit agitated    VITALS:  T(F): 97.5 (04-25-21 @ 12:00), Max: 98.9 (04-25-21 @ 00:00)  HR: 103 (04-25-21 @ 16:15)  BP: 141/91 (04-25-21 @ 12:00)  RR: 27 (04-25-21 @ 16:37)  SpO2: 94% (04-25-21 @ 16:37)  Wt(kg): --    04-24 @ 07:01  -  04-25 @ 07:00  --------------------------------------------------------  IN: 600 mL / OUT: 200 mL / NET: 400 mL          Hospital Medications:   MEDICATIONS  (STANDING):  ascorbic acid 500 milliGRAM(s) Oral daily  atorvastatin 40 milliGRAM(s) Oral at bedtime  chlorhexidine 2% Cloths 1 Application(s) Topical <User Schedule>  cholecalciferol 400 Unit(s) Oral daily  dexAMETHasone  Injectable 6 milliGRAM(s) IV Push daily  dextrose 50% Injectable 25 Gram(s) IV Push once  famotidine    Tablet 20 milliGRAM(s) Oral two times a day  heparin  Infusion.  Unit(s)/Hr (14 mL/Hr) IV Continuous <Continuous>  insulin glargine Injectable (LANTUS) 12 Unit(s) SubCutaneous at bedtime  insulin lispro (ADMELOG) corrective regimen sliding scale   SubCutaneous three times a day before meals  insulin lispro Injectable (ADMELOG) 4 Unit(s) SubCutaneous three times a day before meals  montelukast 10 milliGRAM(s) Oral daily  sodium bicarbonate 650 milliGRAM(s) Oral three times a day  zinc sulfate 220 milliGRAM(s) Oral daily      LABS:  04-25    145  |  113<H>  |  45<H>  ----------------------------<  119<H>  3.7   |  21<L>  |  2.00<H>    Ca    8.5      25 Apr 2021 06:00  Phos  3.0     04-25  Mg     2.1     04-25    TPro  6.8  /  Alb  2.0<L>  /  TBili  0.9  /  DBili      /  AST  35  /  ALT  29  /  AlkPhos  98  04-25    Creatinine Trend: 2.00 <--, 1.93 <--, 1.93 <--, 2.32 <--, 2.26 <--, 1.47 <--, 1.15 <--, 0.84 <--, 1.01 <--    Ferritin, Serum: 973 ng/mL (04-25 @ 15:26)  Albumin, Serum: 2.0 g/dL (04-25 @ 06:00)  Phosphorus Level, Serum: 3.0 mg/dL (04-25 @ 06:00)                              14.1   14.76 )-----------( 323      ( 25 Apr 2021 06:00 )             42.6     Urine Studies:  Urinalysis - [04-22-21 @ 14:32]      Color Yellow / Appearance Clear / SG 1.015 / pH 6.5      Gluc 1000 / Ketone Negative  / Bili Negative / Urobili Negative       Blood Small / Protein 30 / Leuk Est Negative / Nitrite Negative      RBC 5-10 / WBC 0-2 / Hyaline  / Gran  / Sq Epi  / Non Sq Epi Few / Bacteria Few    Urine Creatinine 51      [04-22-21 @ 14:32]  Urine Sodium 32      [04-22-21 @ 14:32]    Ferritin 973      [04-25-21 @ 15:26]  TSH 0.96      [04-17-21 @ 06:26]  Lipid: chol 96, TG 77, HDL 42, LDL --      [04-17-21 @ 06:26]        RADIOLOGY & ADDITIONAL STUDIES:

## 2021-04-25 NOTE — CHART NOTE - NSCHARTNOTEFT_GEN_A_CORE
I called pt's grand-daughter Owen Becker  and updated her regarding patient's clinical status. All questions answered.

## 2021-04-25 NOTE — PROGRESS NOTE ADULT - ASSESSMENT
76 M from home with PMH of HTN and DM initially presented to ED  complaint of hiccups on 4/16/2021, associated with cough, fever,chills and myalgias. Pt was admitted to Medicine floor for COVid 19 infection requiring supplemental O2 with NC 6L then NRB and still desaturating.  ICU was consulted for acute hypoxic respiratory failure 2/2 COVID-19 now requiring HFNC 50/100, saturation at 91%. He is s/p bicarb gtt for metabolic acidosis, on ABG bicarb this am 20; will follow repeat BMP at 1400 today.       Assessment:  1. Acute Hypoxic Respiratory Failure secondary to COVID19 infection    2. INOCENCIA   3. Mixed AG +  NAGMA   4. Uncontrolled DM-2   5. Hypernatremia   6. HTN     Plan:    =====================[CNS ]==============================  # No issues:   - Mental status at baseline      =====================[CVS ]===============================  # HTN :   - Pt was on losartan 50mg , last dose 4/23   - will hold Losartan due INOCENCIA; now improving Cr 1.93   - BP acceptable off medications for now     =====================[RESP ]==============================  # Acute Hypoxic Resp Failure 2/2 COVID-19 infection :   - Hypoxia worsening, now on HFNC 50/100 wean off as tolerated    - Low threshold for intubation , needs GOC discussion with family regarding intubation   - completed Remdesivir   - C/w Decadron 6mg daily D9/10   - CXR (4/23) Bilateral perihilar and bibasilar  multifocal and diffuse ill-defined airspace opacities  - f/u CXR in am   - markers(RP, Ferritin , PCT) improving ; DDimer 324 --> 523 on Lovenox 40 mg daily for VTE ppx  - monitor Renal Fx and adjust Lovenox dosing as needed   - c/w Vit c , Zn , Vit D supplementation     =====================[ GI ]================================  # No issues :    - c/w Soft diabetic diet     ====================[ RENAL ]=============================   # INOCENCIA :   - likely 2/2 COVID vs pre -renal( less likely given no response to IVF)   - will stop NS , c/w Bicarb drip as per nephro   - Losartan stopped today ( last dose 4/23)   - monitor urine output, monitor I&Os closely   - Monitor and supplement electrolytes   - Renal US ordered (pending)   - will check CBC with diff to r/o AIN as per nephro   - Nephro Dr Brown following     # Mixed AG + NAGMA :   - HCO3 19 , could be due to hyperchloremic MA due to NS ? vs worsening INOCENCIA   - started on sodium bicarb 650 mg TID per nephrology   - repeat BMP this afternoon     # Hypernatremia :   - Initially pt was hyponatremic , resolved s/p IVF   - Na 146, will stop NS for now   - Monitor BMP     =====================[ ID ]================================  # COVID -19 :  - plan as above     ===================[ HEME/ONC ]===========================  # No issues :  - Hb and Plt stable   - monitor cbc daily     =====================[ ENDO ]=============================  # Uncontrolled DM-2   - a1c 13.6   - BS controlled on current regimen   - c/w lantus and admelog   - Endo Dr Hall  following   - monitor BS     ==================[ SKIN/ CATHETERS ]======================  # no skin breaks   ==================[ PROPHYLAXIS ]==========================   # Dvt : Lovenox   # Gi :pepcid     ====================[ DISPO ]==============================   - patient remains acute, will continue monitoring in ICU     ===================[ PROGNOSIS ]===========================  - Guarded      76 M from home with PMH of HTN and DM initially presented to ED  complaint of hiccups on 4/16/2021, associated with cough, fever,chills and myalgias. Pt was admitted to Medicine floor for COVid 19 infection requiring supplemental O2 with NC 6L then NRB and still desaturating.  ICU was consulted for acute hypoxic respiratory failure 2/2 COVID-19 now requiring HFNC 50/100, saturation at 91%. He is s/p bicarb gtt for metabolic acidosis, c/w PO TID bicarb as per Nephro.      Assessment:  1. Acute Hypoxic Respiratory Failure secondary to COVID19 infection    2. INOCENCIA   3. Mixed AG +  NAGMA   4. Uncontrolled DM-2   5. Hypernatremia   6. HTN     Plan:    =====================[CNS ]==============================  # No issues:   - Mental status at baseline      =====================[CVS ]===============================  # HTN :   - Pt was on losartan 50mg , last dose 4/23   - will hold Losartan due INOCENCIA; Cr 1.93> 2 this Am  - BP acceptable off medications for now     =====================[RESP ]==============================  # Acute Hypoxic Resp Failure 2/2 COVID-19 infection :   - Hypoxia worsening, now on HFNC 60/100 wean off as tolerated    - Low threshold for intubation, pt's grand-daughter Owen SMYTH, agrees for intubation if the need arises  - completed Remdesivir   - C/w Decadron 6mg daily D9/10   - CXR (4/23) Bilateral perihilar and bibasilar  multifocal and diffuse ill-defined airspace opacities  - f/u CXR in AM  - markers (Ferritin uptrending 663-> 973, DDimer 324 --> 523-->4893 started on Heparin Drip  - c/w Vit c, Zn , Vit D supplementation     =====================[ GI ]================================  # No issues :    - c/w Soft diabetic diet     ====================[ RENAL ]=============================   # INOCENCIA :   - likely 2/2 COVID vs pre -renal( less likely given no response to IVF)   - will stop NS , c/w Bicarb drip, now transitioned to PO bicarb as per nephro   - Losartan stopped ( last dose 4/23)   - monitor urine output, monitor I&Os closely   - Monitor and supplement electrolytes   - Renal US ordered (pending)   - will check CBC with diff to r/o AIN as per nephro   - Nephro Dr Brown following     # Mixed AG + NAGMA :   - HCO3 19 , could be due to hyperchloremic MA due to NS ? vs worsening INOCENCIA   - started on sodium bicarb 650 mg TID per nephrology     # Hypernatremia :   - Initially pt was hyponatremic , resolved s/p IVF   - Na 145 now wnl  - Monitor BMP     =====================[ ID ]================================  # COVID -19 :  - plan as above     ===================[ HEME/ONC ]===========================  # No issues :  - Hb and Plt stable   - monitor cbc daily     =====================[ ENDO ]=============================  # Uncontrolled DM-2   - a1c 13.6   - BS controlled on current regimen   - c/w lantus and admelog   - Endo Dr Hall  following   - monitor BS     ==================[ SKIN/ CATHETERS ]======================  # no skin breaks   ==================[ PROPHYLAXIS ]==========================   # Dvt : Lovenox   # Gi :pepcid     ====================[ DISPO ]==============================   - patient remains acute, will continue monitoring in ICU     ===================[ PROGNOSIS ]===========================  - Guarded

## 2021-04-25 NOTE — PROGRESS NOTE ADULT - ASSESSMENT
INOCENCIA:  etiology? possibly sec to Covid  Urine lytes suggestive of pre-renal    Has some improvement after IVF  Renal function is fluctuating  UA with blood, check renal sonogram   HOLD losartan  (last dose on 4/23)  - Monitor BMP.    Hyponatremia/Hypernatremia  -sec to free water loss  -Use 1/2NS 50c/hr  -Monitor serum Na  -Avoid overcorrection >8mEq in 24 hrs       CKD 2/3:  Has risk (Diabetes) and minimal proteinuria.  - Monitor BMP.    Metabolic Acidosis:  Mixed AG and NAG. He may have some Tubulo-interstitial Disease  - Monitor for now.  -Continue PO NaHCO3    COVID   MOnitor temp/ O2  COntinue current care    Hypokalemia  repelted by team   monitor serum K

## 2021-04-26 NOTE — PROGRESS NOTE ADULT - SUBJECTIVE AND OBJECTIVE BOX
Time of Visit:  Patient seen and examined.     MEDICATIONS  (STANDING):  ascorbic acid 500 milliGRAM(s) Oral daily  atorvastatin 40 milliGRAM(s) Oral at bedtime  chlorhexidine 2% Cloths 1 Application(s) Topical <User Schedule>  cholecalciferol 400 Unit(s) Oral daily  dextrose 50% Injectable 25 Gram(s) IV Push once  famotidine    Tablet 20 milliGRAM(s) Oral two times a day  heparin  Infusion.  Unit(s)/Hr (14 mL/Hr) IV Continuous <Continuous>  insulin glargine Injectable (LANTUS) 12 Unit(s) SubCutaneous at bedtime  insulin lispro (ADMELOG) corrective regimen sliding scale   SubCutaneous three times a day before meals  insulin lispro Injectable (ADMELOG) 4 Unit(s) SubCutaneous three times a day before meals  montelukast 10 milliGRAM(s) Oral daily  polyethylene glycol 3350 17 Gram(s) Oral daily  QUEtiapine 25 milliGRAM(s) Oral at bedtime  senna 2 Tablet(s) Oral at bedtime  sodium bicarbonate 650 milliGRAM(s) Oral three times a day  zinc sulfate 220 milliGRAM(s) Oral daily      MEDICATIONS  (PRN):  ALBUTerol    90 MICROgram(s) HFA Inhaler 2 Puff(s) Inhalation every 6 hours PRN Bronchospasm       Medications up to date at time of exam.    ROS; No fever, chills, cough, congestion.   PHYSICAL EXAMINATION:  Vital Signs Last 24 Hrs  T(C): 36.8 (26 Apr 2021 07:00), Max: 36.9 (26 Apr 2021 00:00)  T(F): 98.2 (26 Apr 2021 07:00), Max: 98.5 (26 Apr 2021 00:00)  HR: 80 (26 Apr 2021 11:00) (59 - 122)  BP: 140/81 (26 Apr 2021 11:00) (92/70 - 151/92)  BP(mean): 100 (26 Apr 2021 11:00) (70 - 111)  RR: 22 (26 Apr 2021 11:00) (15 - 30)  SpO2: 96% (26 Apr 2021 11:00) (88% - 97%)   (if applicable)    General : Alert and oriented. Poor historian. No acute distress.     HEENT: Head is normocephalic and atraumatic. No nasal tenderness. Extraocular muscles are intact. Mucous membranes are moist.     NECK: Supple, no palpable adenopathy.    LUNGS: Fair air entrance. Clear to auscultation bilaterally with no wheezing, rales, or rhonchi. No use of accessory muscle.     HEART: S1 S2 Regular rate and no click/ rub.     ABDOMEN: Soft, nontender, and nondistended. Active bowel sounds.     EXTREMITIES: Without any cyanosis, clubbing, rash, lesions or edema.    NEUROLOGIC: Awake, alert, oriented.     SKIN: Warm and moist. Non diaphoretic.     LABS:                        14.3   14.80 )-----------( 183      ( 26 Apr 2021 03:57 )             43.2     04-26    141  |  111<H>  |  42<H>  ----------------------------<  165<H>  3.7   |  21<L>  |  1.61<H>    Ca    8.4      26 Apr 2021 03:57  Phos  4.5     04-26  Mg     2.0     04-26    TPro  6.9  /  Alb  2.1<L>  /  TBili  0.9  /  DBili  x   /  AST  33  /  ALT  29  /  AlkPhos  100  04-26    PT/INR - ( 25 Apr 2021 12:45 )   PT: 14.8 sec;   INR: 1.26 ratio         PTT - ( 26 Apr 2021 03:57 )  PTT:>200.0 sec    ABG - ( 26 Apr 2021 03:38 )  pH, Arterial: 7.48  pH, Blood: x     /  pCO2: 29    /  pO2: 76    / HCO3: 22    / Base Excess: -0.8  /  SaO2: 96      D-Dimer Assay, Quantitative: 4893 ng/mL DDU (04-25-21 @ 12:45)  MICROBIOLOGY: (if applicable)    RADIOLOGY & ADDITIONAL STUDIES:  EKG:   CXR:  ECHO:    IMPRESSION: 76y Male PAST MEDICAL & SURGICAL HISTORY:  HTN (hypertension)    DM (diabetes mellitus)    No significant past surgical history    Impression: This is a 76 Yr old man  presented with Hiccups associated with cough , fever, chills x 1 Day due to Acute hypoxic respiratory failure secondary to Multifocal  Pneumonia. from covid-19 infection   Positive Covid 19 PCR on 04-16-21. Had an episode of increase work of breathing requiring Non rebreather Mask and was on Oxygen supplementation NC and still dessaturating and now on High Flow .    Suggestion:   O2 saturation 96% on High Flow O2. Continue High Flow Oxygen supplementation  60 L.   s/p Dexamethasone Daily x 10 Days .   s/p remdesivir   Continue Singulair 10 mg Daily.   Continue PRN Albuterol 2 puffs Q 6 hours.  Monitor biomarkers TIW  DVT/ GI prophylactic.   Airborne and contact precautions.       Time of Visit:  Patient seen and examined.     MEDICATIONS  (STANDING):  ascorbic acid 500 milliGRAM(s) Oral daily  atorvastatin 40 milliGRAM(s) Oral at bedtime  chlorhexidine 2% Cloths 1 Application(s) Topical <User Schedule>  cholecalciferol 400 Unit(s) Oral daily  dextrose 50% Injectable 25 Gram(s) IV Push once  famotidine    Tablet 20 milliGRAM(s) Oral two times a day  heparin  Infusion.  Unit(s)/Hr (14 mL/Hr) IV Continuous <Continuous>  insulin glargine Injectable (LANTUS) 12 Unit(s) SubCutaneous at bedtime  insulin lispro (ADMELOG) corrective regimen sliding scale   SubCutaneous three times a day before meals  insulin lispro Injectable (ADMELOG) 4 Unit(s) SubCutaneous three times a day before meals  montelukast 10 milliGRAM(s) Oral daily  polyethylene glycol 3350 17 Gram(s) Oral daily  QUEtiapine 25 milliGRAM(s) Oral at bedtime  senna 2 Tablet(s) Oral at bedtime  sodium bicarbonate 650 milliGRAM(s) Oral three times a day  zinc sulfate 220 milliGRAM(s) Oral daily      MEDICATIONS  (PRN):  ALBUTerol    90 MICROgram(s) HFA Inhaler 2 Puff(s) Inhalation every 6 hours PRN Bronchospasm       Medications up to date at time of exam.    ROS; No fever, chills, cough, congestion.   PHYSICAL EXAMINATION:  Vital Signs Last 24 Hrs  T(C): 36.8 (26 Apr 2021 07:00), Max: 36.9 (26 Apr 2021 00:00)  T(F): 98.2 (26 Apr 2021 07:00), Max: 98.5 (26 Apr 2021 00:00)  HR: 80 (26 Apr 2021 11:00) (59 - 122)  BP: 140/81 (26 Apr 2021 11:00) (92/70 - 151/92)  BP(mean): 100 (26 Apr 2021 11:00) (70 - 111)  RR: 22 (26 Apr 2021 11:00) (15 - 30)  SpO2: 96% (26 Apr 2021 11:00) (88% - 97%)   (if applicable)    General : Alert and oriented. Poor historian. No acute distress.     HEENT: Head is normocephalic and atraumatic. No nasal tenderness. Extraocular muscles are intact. Mucous membranes are moist.     NECK: Supple, no palpable adenopathy.    LUNGS: Fair air entrance. Clear to auscultation bilaterally with no wheezing, rales, or rhonchi. No use of accessory muscle.     HEART: S1 S2 Regular rate and no click/ rub.     ABDOMEN: Soft, nontender, and nondistended. Active bowel sounds.     EXTREMITIES: Without any cyanosis, clubbing, rash, lesions or edema.    NEUROLOGIC: Awake, alert, oriented.     SKIN: Warm and moist. Non diaphoretic.     LABS:                        14.3   14.80 )-----------( 183      ( 26 Apr 2021 03:57 )             43.2     04-26    141  |  111<H>  |  42<H>  ----------------------------<  165<H>  3.7   |  21<L>  |  1.61<H>    Ca    8.4      26 Apr 2021 03:57  Phos  4.5     04-26  Mg     2.0     04-26    TPro  6.9  /  Alb  2.1<L>  /  TBili  0.9  /  DBili  x   /  AST  33  /  ALT  29  /  AlkPhos  100  04-26    PT/INR - ( 25 Apr 2021 12:45 )   PT: 14.8 sec;   INR: 1.26 ratio         PTT - ( 26 Apr 2021 03:57 )  PTT:>200.0 sec    ABG - ( 26 Apr 2021 03:38 )  pH, Arterial: 7.48  pH, Blood: x     /  pCO2: 29    /  pO2: 76    / HCO3: 22    / Base Excess: -0.8  /  SaO2: 96      D-Dimer Assay, Quantitative: 4893 ng/mL DDU (04-25-21 @ 12:45)  MICROBIOLOGY: (if applicable)    RADIOLOGY & ADDITIONAL STUDIES:  EKG:   CXR:  ECHO:    IMPRESSION: 76y Male PAST MEDICAL & SURGICAL HISTORY:  HTN (hypertension)    DM (diabetes mellitus)    No significant past surgical history    Impression: This is a 76 Yr old man  presented with Hiccups associated with cough , fever, chills x 1 Day due to Acute hypoxic respiratory failure secondary to Multifocal  Pneumonia. from covid-19 infection   Positive Covid 19 PCR on 04-16-21. Had an episode of increase work of breathing requiring Non rebreather Mask and was on Oxygen supplementation NC and still dessaturating and now on High Flow .    Suggestion:   O2 saturation 96% on High Flow O2. Continue High Flow Oxygen supplementation  60 L.   s/p Dexamethasone Daily x 10 Days .   s/p remdesivir   Continue Singulair 10 mg Daily.   Continue PRN Albuterol 2 puffs Q 6 hours.  Monitor biomarkers TIW  DVT/ GI prophylactic.   Airborne and contact precautions.        d/c icu team  Agree with above assessment and plan as transcribed.

## 2021-04-26 NOTE — PROGRESS NOTE ADULT - SUBJECTIVE AND OBJECTIVE BOX
Interval Events:  pt in nad  on high flow     Allergies    No Known Allergies    Intolerances      Endocrine/Metabolic Medications:  atorvastatin 40 milliGRAM(s) Oral at bedtime  dextrose 50% Injectable 25 Gram(s) IV Push once  insulin glargine Injectable (LANTUS) 12 Unit(s) SubCutaneous at bedtime  insulin lispro (ADMELOG) corrective regimen sliding scale   SubCutaneous three times a day before meals  insulin lispro Injectable (ADMELOG) 4 Unit(s) SubCutaneous three times a day before meals      Vital Signs Last 24 Hrs  T(C): 36.8 (26 Apr 2021 07:00), Max: 36.9 (26 Apr 2021 00:00)  T(F): 98.2 (26 Apr 2021 07:00), Max: 98.5 (26 Apr 2021 00:00)  HR: 82 (26 Apr 2021 10:00) (59 - 122)  BP: 120/86 (26 Apr 2021 10:00) (92/70 - 151/92)  BP(mean): 97 (26 Apr 2021 10:00) (70 - 111)  RR: 24 (26 Apr 2021 10:00) (15 - 30)  SpO2: 95% (26 Apr 2021 10:00) (88% - 97%)      PHYSICAL EXAM  All physical exam findings normal, except those marked:  General:	Alert, active, cooperative, NAD, well hydrated  .		[] Abnormal:  Neck		Normal: supple, no cervical adenopathy, no palpable thyroid  .		[] Abnormal:  Cardiovascular	Normal: regular rate, normal S1, S2, no murmurs  .		[] Abnormal:  Respiratory	Normal: no chest wall deformity, normal respiratory pattern, CTA B/L  .		[] Abnormal:  Abdominal	Normal: soft, ND, NT, bowel sounds present, no masses, no organomegaly  .		[] Abnormal:  		Normal normal genitalia, testes descended, circumcised/uncircumcised  .		Alejandro stage:			Breast alejandro:  .		Menstrual history:  .		[] Abnormal:  Extremities	Normal: FROM x4  .		[] Abnormal:  Skin		Normal: intact and not indurated, no rash, no acanthosis nigricans  .		[] Abnormal:  Neurologic	Normal: grossly intact  .		[] Abnormal:    LABS                        14.3   14.80 )-----------( 183      ( 26 Apr 2021 03:57 )             43.2                               141    |  111    |  42                  Calcium: 8.4   / iCa: x      (04-26 @ 03:57)    ----------------------------<  165       Magnesium: 2.0                              3.7     |  21     |  1.61             Phosphorous: 4.5      TPro  6.9    /  Alb  2.1    /  TBili  0.9    /  DBili  x      /  AST  33     /  ALT  29     /  AlkPhos  100    26 Apr 2021 03:57    CAPILLARY BLOOD GLUCOSE      POCT Blood Glucose.: 177 mg/dL (26 Apr 2021 10:22)  POCT Blood Glucose.: 173 mg/dL (26 Apr 2021 05:49)  POCT Blood Glucose.: 129 mg/dL (25 Apr 2021 21:31)  POCT Blood Glucose.: 266 mg/dL (25 Apr 2021 17:57)  POCT Blood Glucose.: 164 mg/dL (25 Apr 2021 12:26)        Assesment/plan    76 M with PMH of HTN and DM presents to ED with complaint of hiccups since this morning. Patient reports hiccups are associated with cough, fever and chills x 1 day. Found to have covid and uncont dm. Pt does not remember his home meds ? insulin tx as out pt and metformin.   HBA1C 13.8         Problem/Recommendation - 1:  Problem: Uncontrolled diabetes mellitus.   Recommendation: hyperglycemic- better controlled  on ? insulin tx as out pt and metformin  chnage lantus to 16 units - last dose of decadron this am   decreased admelog 4 ac tid- hold if poor po intake  moderate correction doses  fsg ac and hs.     Problem/Recommendation - 2:  ·  Problem: Acute respiratory failure with hypoxia.- on NC.    Recommendation: cont tx as per prim team  Remdisivir and decadron.      Interval Events:  pt in nad  on high flow     Allergies    No Known Allergies    Intolerances      Endocrine/Metabolic Medications:  atorvastatin 40 milliGRAM(s) Oral at bedtime  dextrose 50% Injectable 25 Gram(s) IV Push once  insulin glargine Injectable (LANTUS) 12 Unit(s) SubCutaneous at bedtime  insulin lispro (ADMELOG) corrective regimen sliding scale   SubCutaneous three times a day before meals  insulin lispro Injectable (ADMELOG) 4 Unit(s) SubCutaneous three times a day before meals      Vital Signs Last 24 Hrs  T(C): 36.8 (26 Apr 2021 07:00), Max: 36.9 (26 Apr 2021 00:00)  T(F): 98.2 (26 Apr 2021 07:00), Max: 98.5 (26 Apr 2021 00:00)  HR: 82 (26 Apr 2021 10:00) (59 - 122)  BP: 120/86 (26 Apr 2021 10:00) (92/70 - 151/92)  BP(mean): 97 (26 Apr 2021 10:00) (70 - 111)  RR: 24 (26 Apr 2021 10:00) (15 - 30)  SpO2: 95% (26 Apr 2021 10:00) (88% - 97%)      PHYSICAL EXAM  All physical exam findings normal, except those marked:  General:	Alert, active, cooperative, NAD, well hydrated  .		[] Abnormal:  Neck		Normal: supple, no cervical adenopathy, no palpable thyroid  .		[] Abnormal:  Cardiovascular	Normal: regular rate, normal S1, S2, no murmurs  .		[] Abnormal:  Respiratory	Normal: no chest wall deformity, normal respiratory pattern, CTA B/L  .		[] Abnormal:  Abdominal	Normal: soft, ND, NT, bowel sounds present, no masses, no organomegaly  .		[] Abnormal:  		Normal normal genitalia, testes descended, circumcised/uncircumcised  .		Alejandro stage:			Breast alejandro:  .		Menstrual history:  .		[] Abnormal:  Extremities	Normal: FROM x4  .		[] Abnormal:  Skin		Normal: intact and not indurated, no rash, no acanthosis nigricans  .		[] Abnormal:  Neurologic	Normal: grossly intact  .		[] Abnormal:    LABS                        14.3   14.80 )-----------( 183      ( 26 Apr 2021 03:57 )             43.2                               141    |  111    |  42                  Calcium: 8.4   / iCa: x      (04-26 @ 03:57)    ----------------------------<  165       Magnesium: 2.0                              3.7     |  21     |  1.61             Phosphorous: 4.5      TPro  6.9    /  Alb  2.1    /  TBili  0.9    /  DBili  x      /  AST  33     /  ALT  29     /  AlkPhos  100    26 Apr 2021 03:57    CAPILLARY BLOOD GLUCOSE      POCT Blood Glucose.: 177 mg/dL (26 Apr 2021 10:22)  POCT Blood Glucose.: 173 mg/dL (26 Apr 2021 05:49)  POCT Blood Glucose.: 129 mg/dL (25 Apr 2021 21:31)  POCT Blood Glucose.: 266 mg/dL (25 Apr 2021 17:57)  POCT Blood Glucose.: 164 mg/dL (25 Apr 2021 12:26)        Assesment/plan    76 M with PMH of HTN and DM presents to ED with complaint of hiccups since this morning. Patient reports hiccups are associated with cough, fever and chills x 1 day. Found to have covid and uncont dm. Pt does not remember his home meds ? insulin tx as out pt and metformin.   HBA1C 13.8         Problem/Recommendation - 1:  Problem: Uncontrolled diabetes mellitus.   Recommendation: hyperglycemic- better controlled  on ? insulin tx as out pt and metformin  chnage lantus to 10 units - last dose of decadron this am   decreased admelog 4 ac tid- hold if poor po intake  moderate correction doses  fsg ac and hs.     Problem/Recommendation - 2:  ·  Problem: Acute respiratory failure with hypoxia.- on NC.    Recommendation: cont tx as per prim team  Remdisivir and decadron.

## 2021-04-26 NOTE — CHART NOTE - NSCHARTNOTEFT_GEN_A_CORE
Assessment:   Patient is a 76y old  Male who presents with a chief complaint of Acute HYpoxic Respiratory Failure secondary to Covid Infection. (2021 11:16). Pt transferred to ICU/ suge . Covid/ Isolation. Noted to be on HFNC. RN reports he fed pt today (50% intake).      Factors impacting intake: [ ] none [ ] nausea  [ ] vomiting [ ] diarrhea [ ] constipation  [ ]chewing problems [ ] swallowing issues  [ ] other:     Diet Prescription: Diet, Regular:   Consistent Carbohydrate {Evening Snacks}  DASH/TLC {Sodium & Cholesterol Restricted}  Lacto Veg (Accepts Milk & Milk Products) (21 @ 12:57)        Daily     Daily Weight in k.8 (2021 07:00)    Pertinent Medications: MEDICATIONS  (STANDING):  ascorbic acid 500 milliGRAM(s) Oral daily  atorvastatin 40 milliGRAM(s) Oral at bedtime  chlorhexidine 2% Cloths 1 Application(s) Topical <User Schedule>  cholecalciferol 400 Unit(s) Oral daily  dextrose 50% Injectable 25 Gram(s) IV Push once  famotidine    Tablet 20 milliGRAM(s) Oral two times a day  heparin  Infusion.  Unit(s)/Hr (14 mL/Hr) IV Continuous <Continuous>  insulin glargine Injectable (LANTUS) 12 Unit(s) SubCutaneous at bedtime  insulin lispro (ADMELOG) corrective regimen sliding scale   SubCutaneous three times a day before meals  insulin lispro Injectable (ADMELOG) 4 Unit(s) SubCutaneous three times a day before meals  montelukast 10 milliGRAM(s) Oral daily  polyethylene glycol 3350 17 Gram(s) Oral daily  QUEtiapine 25 milliGRAM(s) Oral at bedtime  senna 2 Tablet(s) Oral at bedtime  sodium bicarbonate 650 milliGRAM(s) Oral three times a day  zinc sulfate 220 milliGRAM(s) Oral daily    MEDICATIONS  (PRN):  ALBUTerol    90 MICROgram(s) HFA Inhaler 2 Puff(s) Inhalation every 6 hours PRN Bronchospasm    Pertinent Labs:  Na141 mmol/L Glu 165 mg/dL<H> K+ 3.7 mmol/L Cr  1.61 mg/dL<H> BUN 42 mg/dL<H>  Phos 4.5 mg/dL  Alb 2.1 g/dL<L> 04-17 Chol 96 mg/dL LDL --    HDL 42 mg/dL Trig 77 mg/dL     CAPILLARY BLOOD GLUCOSE      POCT Blood Glucose.: 270 mg/dL (2021 15:35)  POCT Blood Glucose.: 177 mg/dL (2021 10:22)  POCT Blood Glucose.: 173 mg/dL (2021 05:49)  POCT Blood Glucose.: 129 mg/dL (2021 21:31)  POCT Blood Glucose.: 266 mg/dL (2021 17:57)          Previous Nutrition Diagnosis:   [ ] Altered GI function  [x ]Inadequate Oral Intake [ ] Swallowing Difficulty   [ ] Altered nutrition related labs [ ] Increased Nutrient Needs [ ] Overweight/Obesity   [ ] Unintended Weight Loss [ ] Food & Nutrition Related Knowledge Deficit [ ] Malnutrition   [ ] Other:     Nutrition Diagnosis is [x ] ongoing  [ ] resolved [ ] not applicable     New Nutrition Diagnosis: [ ] not applicable       Interventions:   Recommend  [ ] Change Diet To:  [x ] Nutrition Supplement: Add Glucerna shakes BID  [ ] Nutrition Support  [x ] Other: MD to monitor. RD available.     Monitoring and Evaluation:   [x ] PO intake [ x ] Tolerance to diet prescription [ x ] weights [ x ] labs[ x ] follow up per protocol  [ ] other:

## 2021-04-26 NOTE — PROGRESS NOTE ADULT - SUBJECTIVE AND OBJECTIVE BOX
INTERVAL HPI/OVERNIGHT EVENTS: ***    PRESSORS: [ ] YES [ ] NO  WHICH:    ANTIBIOTICS:                  DATE STARTED:  ANTIBIOTICS:                  DATE STARTED:  ANTIBIOTICS:                  DATE STARTED:    Antimicrobial:    Cardiovascular:    Pulmonary:  ALBUTerol    90 MICROgram(s) HFA Inhaler 2 Puff(s) Inhalation every 6 hours PRN  montelukast 10 milliGRAM(s) Oral daily    Hematalogic:  heparin  Infusion.  Unit(s)/Hr IV Continuous <Continuous>    Other:  ascorbic acid 500 milliGRAM(s) Oral daily  atorvastatin 40 milliGRAM(s) Oral at bedtime  chlorhexidine 2% Cloths 1 Application(s) Topical <User Schedule>  cholecalciferol 400 Unit(s) Oral daily  dexMEDEtomidine Infusion 0.6 MICROgram(s)/kG/Hr IV Continuous <Continuous>  dextrose 50% Injectable 25 Gram(s) IV Push once  famotidine    Tablet 20 milliGRAM(s) Oral two times a day  insulin glargine Injectable (LANTUS) 12 Unit(s) SubCutaneous at bedtime  insulin lispro (ADMELOG) corrective regimen sliding scale   SubCutaneous three times a day before meals  insulin lispro Injectable (ADMELOG) 4 Unit(s) SubCutaneous three times a day before meals  polyethylene glycol 3350 17 Gram(s) Oral daily  senna 2 Tablet(s) Oral at bedtime  sodium bicarbonate 650 milliGRAM(s) Oral three times a day  zinc sulfate 220 milliGRAM(s) Oral daily    ALBUTerol    90 MICROgram(s) HFA Inhaler 2 Puff(s) Inhalation every 6 hours PRN  ascorbic acid 500 milliGRAM(s) Oral daily  atorvastatin 40 milliGRAM(s) Oral at bedtime  chlorhexidine 2% Cloths 1 Application(s) Topical <User Schedule>  cholecalciferol 400 Unit(s) Oral daily  dexMEDEtomidine Infusion 0.6 MICROgram(s)/kG/Hr IV Continuous <Continuous>  dextrose 50% Injectable 25 Gram(s) IV Push once  famotidine    Tablet 20 milliGRAM(s) Oral two times a day  heparin  Infusion.  Unit(s)/Hr IV Continuous <Continuous>  insulin glargine Injectable (LANTUS) 12 Unit(s) SubCutaneous at bedtime  insulin lispro (ADMELOG) corrective regimen sliding scale   SubCutaneous three times a day before meals  insulin lispro Injectable (ADMELOG) 4 Unit(s) SubCutaneous three times a day before meals  montelukast 10 milliGRAM(s) Oral daily  polyethylene glycol 3350 17 Gram(s) Oral daily  senna 2 Tablet(s) Oral at bedtime  sodium bicarbonate 650 milliGRAM(s) Oral three times a day  zinc sulfate 220 milliGRAM(s) Oral daily    Drug Dosing Weight  Height (cm): 172.7 (16 Apr 2021 13:26)  Weight (kg): 78.9 (16 Apr 2021 13:26)  BMI (kg/m2): 26.5 (16 Apr 2021 13:26)  BSA (m2): 1.93 (16 Apr 2021 13:26)    CENTRAL LINE: [ ] YES [ ] NO  LOCATION:         GUPTA: [ ] YES [ ] NO          A-LINE:  [ ] YES [ ] NO  LOCATION:             ICU Vital Signs Last 24 Hrs  T(C): 36.8 (26 Apr 2021 06:00), Max: 36.9 (26 Apr 2021 00:00)  T(F): 98.2 (26 Apr 2021 06:00), Max: 98.5 (26 Apr 2021 00:00)  HR: 59 (26 Apr 2021 06:00) (59 - 122)  BP: 133/84 (26 Apr 2021 06:00) (92/70 - 151/92)  BP(mean): 98 (26 Apr 2021 06:00) (70 - 111)  ABP: --  ABP(mean): --  RR: 19 (26 Apr 2021 06:00) (15 - 30)  SpO2: 97% (26 Apr 2021 06:00) (88% - 99%)      ABG - ( 26 Apr 2021 03:38 )  pH, Arterial: 7.48  pH, Blood: x     /  pCO2: 29    /  pO2: 76    / HCO3: 22    / Base Excess: -0.8  /  SaO2: 96                    04-25 @ 07:01  -  04-26 @ 07:00  --------------------------------------------------------  IN: 846 mL / OUT: 500 mL / NET: 346 mL              PHYSICAL EXAM:    GENERAL: NAD  EYES: EOMI, PERRLA  NECK: Supple, No JVD; Normal thyroid; Trachea midline: No LAD   NERVOUS SYSTEM:  Alert & Oriented X3,  Motor Strength 5/5 B/L upper and lower extremities; DTRs 2+ intact and symmetric  CHEST/LUNG: No rales, rhonchi, wheezing, breath sounds present bilaterally  HEART: Regular rate and rhythm; No murmurs, no gallops  ABDOMEN: Soft, Nontender, Nondistended; Bowel sounds present, no pain or masses on palpation  : voiding well, Gupta in place  EXTREMITIES:  2+ Peripheral Pulses, No clubbing, cyanosis, or edema  SKIN: warm, intact, no lesions         LABS:  CBC Full  -  ( 26 Apr 2021 03:57 )  WBC Count : 14.80 K/uL  RBC Count : 5.01 M/uL  Hemoglobin : 14.3 g/dL  Hematocrit : 43.2 %  Platelet Count - Automated : 183 K/uL  Mean Cell Volume : 86.2 fl  Mean Cell Hemoglobin : 28.5 pg  Mean Cell Hemoglobin Concentration : 33.1 gm/dL  Auto Neutrophil # : 13.20 K/uL  Auto Lymphocyte # : 0.90 K/uL  Auto Monocyte # : 0.43 K/uL  Auto Eosinophil # : 0.07 K/uL  Auto Basophil # : 0.03 K/uL  Auto Neutrophil % : 89.2 %  Auto Lymphocyte % : 6.1 %  Auto Monocyte % : 2.9 %  Auto Eosinophil % : 0.5 %  Auto Basophil % : 0.2 %    04-26    141  |  111<H>  |  42<H>  ----------------------------<  165<H>  3.7   |  21<L>  |  1.61<H>    Ca    8.4      26 Apr 2021 03:57  Phos  4.5     04-26  Mg     2.0     04-26    TPro  6.9  /  Alb  2.1<L>  /  TBili  0.9  /  DBili  x   /  AST  33  /  ALT  29  /  AlkPhos  100  04-26    PT/INR - ( 25 Apr 2021 12:45 )   PT: 14.8 sec;   INR: 1.26 ratio         PTT - ( 26 Apr 2021 03:57 )  PTT:>200.0 sec        RADIOLOGY & ADDITIONAL STUDIES REVIEWED:  ***    [ ]GOALS OF CARE DISCUSSION WITH PATIENT/FAMILY/PROXY:    CRITICAL CARE TIME SPENT: 35 minutes   INTERVAL HPI/OVERNIGHT EVENTS: Patient had no agitation events overnight, on low dose Precedex drip, continued on heparin drip for elevated D dimer, today last dose of Decadron.    PRESSORS: [ ] YES [ x] NO    Antimicrobial:    Cardiovascular:    Pulmonary:  ALBUTerol    90 MICROgram(s) HFA Inhaler 2 Puff(s) Inhalation every 6 hours PRN  montelukast 10 milliGRAM(s) Oral daily    Hematalogic:  heparin  Infusion.  Unit(s)/Hr IV Continuous <Continuous>    Other:  ascorbic acid 500 milliGRAM(s) Oral daily  atorvastatin 40 milliGRAM(s) Oral at bedtime  chlorhexidine 2% Cloths 1 Application(s) Topical <User Schedule>  cholecalciferol 400 Unit(s) Oral daily  dexMEDEtomidine Infusion 0.6 MICROgram(s)/kG/Hr IV Continuous <Continuous>  dextrose 50% Injectable 25 Gram(s) IV Push once  famotidine    Tablet 20 milliGRAM(s) Oral two times a day  insulin glargine Injectable (LANTUS) 12 Unit(s) SubCutaneous at bedtime  insulin lispro (ADMELOG) corrective regimen sliding scale   SubCutaneous three times a day before meals  insulin lispro Injectable (ADMELOG) 4 Unit(s) SubCutaneous three times a day before meals  polyethylene glycol 3350 17 Gram(s) Oral daily  senna 2 Tablet(s) Oral at bedtime  sodium bicarbonate 650 milliGRAM(s) Oral three times a day  zinc sulfate 220 milliGRAM(s) Oral daily    ALBUTerol    90 MICROgram(s) HFA Inhaler 2 Puff(s) Inhalation every 6 hours PRN  ascorbic acid 500 milliGRAM(s) Oral daily  atorvastatin 40 milliGRAM(s) Oral at bedtime  chlorhexidine 2% Cloths 1 Application(s) Topical <User Schedule>  cholecalciferol 400 Unit(s) Oral daily  dexMEDEtomidine Infusion 0.6 MICROgram(s)/kG/Hr IV Continuous <Continuous>  dextrose 50% Injectable 25 Gram(s) IV Push once  famotidine    Tablet 20 milliGRAM(s) Oral two times a day  heparin  Infusion.  Unit(s)/Hr IV Continuous <Continuous>  insulin glargine Injectable (LANTUS) 12 Unit(s) SubCutaneous at bedtime  insulin lispro (ADMELOG) corrective regimen sliding scale   SubCutaneous three times a day before meals  insulin lispro Injectable (ADMELOG) 4 Unit(s) SubCutaneous three times a day before meals  montelukast 10 milliGRAM(s) Oral daily  polyethylene glycol 3350 17 Gram(s) Oral daily  senna 2 Tablet(s) Oral at bedtime  sodium bicarbonate 650 milliGRAM(s) Oral three times a day  zinc sulfate 220 milliGRAM(s) Oral daily    Drug Dosing Weight  Height (cm): 172.7 (16 Apr 2021 13:26)  Weight (kg): 78.9 (16 Apr 2021 13:26)  BMI (kg/m2): 26.5 (16 Apr 2021 13:26)  BSA (m2): 1.93 (16 Apr 2021 13:26)    CENTRAL LINE: [ ] YES [ ] NO  LOCATION:         GUPTA: [ ] YES [ ] NO          A-LINE:  [ ] YES [ ] NO  LOCATION:             ICU Vital Signs Last 24 Hrs  T(C): 36.8 (26 Apr 2021 06:00), Max: 36.9 (26 Apr 2021 00:00)  T(F): 98.2 (26 Apr 2021 06:00), Max: 98.5 (26 Apr 2021 00:00)  HR: 59 (26 Apr 2021 06:00) (59 - 122)  BP: 133/84 (26 Apr 2021 06:00) (92/70 - 151/92)  BP(mean): 98 (26 Apr 2021 06:00) (70 - 111)  ABP: --  ABP(mean): --  RR: 19 (26 Apr 2021 06:00) (15 - 30)  SpO2: 97% (26 Apr 2021 06:00) (88% - 99%)      ABG - ( 26 Apr 2021 03:38 )  pH, Arterial: 7.48  pH, Blood: x     /  pCO2: 29    /  pO2: 76    / HCO3: 22    / Base Excess: -0.8  /  SaO2: 96                    04-25 @ 07:01  -  04-26 @ 07:00  --------------------------------------------------------  IN: 846 mL / OUT: 500 mL / NET: 346 mL              PHYSICAL EXAM:    GENERAL: NAD, saturating 90% on high flow 60/100, calmed, off precedex  EYES: EOMI, PERRLA  NECK: Supple, No JVD; Normal thyroid; Trachea midline: No LAD   NERVOUS SYSTEM:  Alert & Oriented X3,  Motor Strength 5/5 B/L upper and lower extremities; DTRs 2+ intact and symmetric  CHEST/LUNG: No rales, rhonchi, wheezing, breath sounds present bilaterally  HEART: Regular rate and rhythm; No murmurs, no gallops  ABDOMEN: Soft, Nontender, Nondistended; Bowel sounds present, no pain or masses on palpation  : Condom cath   EXTREMITIES:  2+ Peripheral Pulses, No clubbing, cyanosis, or edema  SKIN: warm, intact, no lesions         LABS:  CBC Full  -  ( 26 Apr 2021 03:57 )  WBC Count : 14.80 K/uL  RBC Count : 5.01 M/uL  Hemoglobin : 14.3 g/dL  Hematocrit : 43.2 %  Platelet Count - Automated : 183 K/uL  Mean Cell Volume : 86.2 fl  Mean Cell Hemoglobin : 28.5 pg  Mean Cell Hemoglobin Concentration : 33.1 gm/dL  Auto Neutrophil # : 13.20 K/uL  Auto Lymphocyte # : 0.90 K/uL  Auto Monocyte # : 0.43 K/uL  Auto Eosinophil # : 0.07 K/uL  Auto Basophil # : 0.03 K/uL  Auto Neutrophil % : 89.2 %  Auto Lymphocyte % : 6.1 %  Auto Monocyte % : 2.9 %  Auto Eosinophil % : 0.5 %  Auto Basophil % : 0.2 %    04-26    141  |  111<H>  |  42<H>  ----------------------------<  165<H>  3.7   |  21<L>  |  1.61<H>    Ca    8.4      26 Apr 2021 03:57  Phos  4.5     04-26  Mg     2.0     04-26    TPro  6.9  /  Alb  2.1<L>  /  TBili  0.9  /  DBili  x   /  AST  33  /  ALT  29  /  AlkPhos  100  04-26    PT/INR - ( 25 Apr 2021 12:45 )   PT: 14.8 sec;   INR: 1.26 ratio         PTT - ( 26 Apr 2021 03:57 )  PTT:>200.0 sec        RADIOLOGY & ADDITIONAL STUDIES REVIEWED:  ***    [ ]GOALS OF CARE DISCUSSION WITH PATIENT/FAMILY/PROXY:    CRITICAL CARE TIME SPENT: 35 minutes

## 2021-04-26 NOTE — PROGRESS NOTE ADULT - ASSESSMENT
_________________________________________________________________________________________  ========>>  M E D I C A L   A T T E N D I N G    F O L L O W  U P  N O T E  <<=========  -----------------------------------------------------------------------------------------------------    - Patient seen and examined by me earlier today.  (covering Dr WATSON today)   - In summary,  MEHRDAD SUTHERLAND is a 76y year old man admitted with COVID  - Patient has been in the ICU , on high flow, overall otherwise doing ok, comfortable, eating fairly     ==================>> REVIEW OF SYSTEM <<=================    limited ROS as pt not very engaging   GEN: no fever,  no pain  RESP: no SOB, no sig cough  CVS: no chest pain  GI: no abdominal pain, no nausea  : no dysuria  Neuro: no headache, no dizziness  Derm : no itching    ==================>> PHYSICAL EXAM <<=================    GEN: A&O X 2 , NAD , comfortable, pleasant, calm . pm high flow , cachectic   HEENT: NCAT, PERRL, MMM, hearing intact  Neck: supple , no JVD appreciated  CVS: S1S2 , regular , No M/R/G appreciated  PULM: CTA B/L,  limited exam as not taking deep breaths   ABD.: soft. non tender, non distended  Extrem: intact pulses , no edema   PSYCH : normal mood,  not anxious                            ( Note Written / Date of service :  04-26-21 )    ==================>> MEDICATIONS <<====================    MEDICATIONS  (STANDING):  ascorbic acid 500 milliGRAM(s) Oral daily  atorvastatin 40 milliGRAM(s) Oral at bedtime  chlorhexidine 2% Cloths 1 Application(s) Topical <User Schedule>  cholecalciferol 400 Unit(s) Oral daily  dextrose 50% Injectable 25 Gram(s) IV Push once  famotidine    Tablet 20 milliGRAM(s) Oral two times a day  heparin  Infusion.  Unit(s)/Hr (14 mL/Hr) IV Continuous <Continuous>  insulin glargine Injectable (LANTUS) 12 Unit(s) SubCutaneous at bedtime  insulin lispro (ADMELOG) corrective regimen sliding scale   SubCutaneous three times a day before meals  insulin lispro Injectable (ADMELOG) 4 Unit(s) SubCutaneous three times a day before meals  montelukast 10 milliGRAM(s) Oral daily  polyethylene glycol 3350 17 Gram(s) Oral daily  QUEtiapine 25 milliGRAM(s) Oral at bedtime  senna 2 Tablet(s) Oral at bedtime  sodium bicarbonate 650 milliGRAM(s) Oral three times a day  zinc sulfate 220 milliGRAM(s) Oral daily    MEDICATIONS  (PRN):  ALBUTerol    90 MICROgram(s) HFA Inhaler 2 Puff(s) Inhalation every 6 hours PRN Bronchospasm    ___________  Active diet:  Diet, Regular:   Consistent Carbohydrate Evening Snacks  DASH/TLC Sodium & Cholesterol Restricted  Lacto Veg (Accepts Milk & Milk Products)  ___________________    ==================>> VITAL SIGNS <<==================    T(C): 36.8 (04-26-21 @ 19:00), Max: 36.9 (04-26-21 @ 00:00)  HR: 106 (04-26-21 @ 20:23) (59 - 106)  BP: 128/79 (04-26-21 @ 20:00) (91/67 - 154/98)  BP(mean): 93 (04-26-21 @ 20:00)  RR: 18 (04-26-21 @ 20:00) (15 - 26)  SpO2: 94% (04-26-21 @ 20:23) (88% - 97%)     POCT Blood Glucose.: 155 mg/dL (26 Apr 2021 22:48)  POCT Blood Glucose.: 270 mg/dL (26 Apr 2021 15:35)  POCT Blood Glucose.: 177 mg/dL (26 Apr 2021 10:22)  POCT Blood Glucose.: 173 mg/dL (26 Apr 2021 05:49)    I&O's Summary    25 Apr 2021 07:01  -  26 Apr 2021 07:00  --------------------------------------------------------  IN: 857 mL / OUT: 500 mL / NET: 357 mL    26 Apr 2021 07:01  -  26 Apr 2021 23:14  --------------------------------------------------------  IN: 438 mL / OUT: 360 mL / NET: 78 mL     ==================>> LAB AND IMAGING <<==================                        14.3   14.80 )-----------( 183      ( 26 Apr 2021 03:57 )             43.2        04-26    141  |  111<H>  |  42<H>  ----------------------------<  165<H>  3.7   |  21<L>  |  1.61<H>    Creatinine:  1.61  <<==, 2.00  <<==, 1.93  <<==, 1.93  <<==, 2.32  <<==, 2.26  <<==    Ca    8.4      26 Apr 2021 03:57  Phos  4.5     04-26  Mg     2.0     04-26    TPro  6.9  /  Alb  2.1<L>  /  TBili  0.9  /  DBili  x   /  AST  33  /  ALT  29  /  AlkPhos  100  04-26    PT/INR - ( 26 Apr 2021 15:52 )   PT: 16.0 sec;   INR: 1.36 ratio         PTT - ( 26 Apr 2021 15:52 )  PTT:171.4 sec          ABG - ( 26 Apr 2021 03:38 )  pH, Arterial: 7.48  pH, Blood: x     /  pCO2: 29    /  pO2: 76    / HCO3: 22    / Base Excess: -0.8  /  SaO2: 96           TSH:      0.96   (04-17-21)           Lipid profile:  (04-17-21)     Total: 96     LDL  : (p)     HDL  :42     TG   :77     HgA1C:   (04-17-21)          (04-17-21)      13.8,   (04-17-21)          (04-17-21)      13.4,   (01-10-21)          (01-10-21)      14.0    ___________________________________________________________________________________  ===============>>  A S S E S S M E N T   A N D   P L A N <<===============  ------------------------------------------------------------------------------------------    · Assessment	  76 M with PMH of HTN and DM presented to ED with complaint of hiccups.  Found to be COVID positive on admission. admitted to medicine for acute hypoxemic respiratory failure due to bilateral pneumonia in setting of COVID 19. Started on Remdesivir, dexamethasone and supplemental oxygen. Pt was also found to have Hyponatremia likely due to hypovolemia, Followed by Nephro, started on IVF with improvement.  Pt was also found to have uncontrolled type 2 diabetes worsening with steroid use. Endo following  as well      Problem/Plan - 1:  ·  Problem: Acute respiratory failure with hypoxia.  Plan: -bilateral pneumonia in setting of COVID 19  - post Remdesivir / Dexamethasone per protocol    -cont  with oxygen and titrated down as tolerated   -pulm following   - ICU care appreciated   -continuous pulse ox monitoring  -cont prophylactic Lovenox   -cont supportive care   -daily labs CBC/CMP/CRP...   - Continue Current medications otherwise and monitor.  supportive care  -nutrition / PTOOB    Problem/Plan - 2:  ·  Problem: Insulin dependent type 2 diabetes mellitus, uncontrolled, in setting of infection and steroids home   -endo consulted and appreciated recom and mgmt   -diabetic diet   -a1c 13.8  - Continue Current medications per endo otherwise and monitor FS    Problem/Plan - 3:  ·  Problem: HTN (hypertension).    -controlled, cont home dose of Losartan   -mon BP.     Problem/Plan - 4:  ·  Problem: Hyponatremia.  Plan: -likely due to hypovolemia, better   -Nephro following.   - IVH as needed     ( off colchicine)     Avoid nephrotoxic medications.     Problem/Plan - 5:  ·  Problem: UTI   post Rocephin X 3 days     -GI/DVT Prophylaxis per protocol.   OOB to chair encouraged  PO intake and hydration encouraged     ___________________________  H. JUVE Gayle  (covering today)   Pager: [phone]

## 2021-04-26 NOTE — CHART NOTE - NSCHARTNOTEFT_GEN_A_CORE
Spoke to grand daughter Rosita to give update on patient's clinical status and management. She was informed about episodes of agitation. Questions answered.

## 2021-04-26 NOTE — PROGRESS NOTE ADULT - ASSESSMENT
77 y/o M from home with PMH of HTN and DM initially presented to ED  complaint of hiccups on 4/16/2021, associated with cough, fever,chills and myalgias. Pt was admitted to Medicine floor for COVID 19 infection requiring supplemental O2 with NC 6L then NRB and still desaturating.  ICU was consulted for acute hypoxic respiratory failure 2/2 COVID-19 now requiring HFNC 50/100, saturation at 91%. He is s/p bicarb gtt for metabolic acidosis, c/w PO TID bicarb as per Nephro.      Assessment:  1. Acute Hypoxic Respiratory Failure secondary to COVID19 infection    2. INOCENCIA   3. Mixed AG +  NAGMA   4. Uncontrolled DM-2   5. Hyponatremia/Hypernatremia   6. HTN     Plan:    =====================[CNS ]==============================  # No issues:   - Mental status at baseline    - Can give Seroquel at bedtime if need it for agitation   - Off Precedex drip     =====================[CVS ]===============================  # HTN :   - Pt was on losartan 50mg at home, last dose 4/23   - Holding Losartan due INOCENCIA; Cr 1.93>2.0>1.6  - Monitor BP and adjust meds as needed      =====================[RESP ]==============================  # Acute Hypoxic Resp Failure 2/2 COVID-19 infection :   - Hypoxia worsening, now on HFNC 60/100 wean off as tolerated    - Low threshold for intubation, pt's grand-daughter Owen SMYTH, agrees for intubation if the need arises  - Completed Remdesivir 4/20  - Completed Decadron 4/26  - CXR (4/23) Bilateral perihilar and bibasilar  multifocal and diffuse ill-defined airspace opacities  - Patient on heparin drip for elevated D-dimer   - C/w Vit c, Zn , Vit D supplementation     =====================[ GI ]================================  # No issues :    - C/w Soft diabetic diet     ====================[ RENAL ]=============================   # INOCENCIA :   - likely 2/2 COVID19 vs pre -renal (less likely given no response to IVF)   - S/p bicarb drip  - C/w PO bicarb as per nephro   - Losartan on hold (last dose 4/23)   - Monitor urine output, monitor I&Os closely   - Monitor and supplement electrolytes as needed   - Renal US ordered (pending) **  - Nephro Dr Brown following     # Mixed AG + NAGMA :   - HCO3 19, could be due to hyperchloremic MA due to NS vs worsening INOCENCIA   - C/w sodium bicarb 650 mg TID as per nephrology     # Hyponatremia/Hypernatremia :   - Initially pt was hyponatremic, resolved s/p IVF   - Na 141  - Monitor BMP     =====================[ ID ]================================  # COVID -19 :  - plan as above     ===================[ HEME/ONC ]===========================  # No issues :  - Hb and Plt stable   - Monitor cbc daily     =====================[ ENDO ]=============================  # Uncontrolled DM-2   - A1c 13.6   - BS controlled on current regimen   - C/w Lantus 12 units at bedtime and Admelog 4 units before meals   - Endo Dr Hall  following   - monitor BS     ==================[ SKIN/ CATHETERS ]======================  # no skin breaks   ==================[ PROPHYLAXIS ]==========================   # DVT: Lovenox   # GI: pepcid     ====================[ DISPO ]==============================   - patient remains acute, will continue monitoring in ICU     ===================[ PROGNOSIS ]===========================  - Guarded      75 y/o M from home with PMH of HTN and DM initially presented to ED  complaint of hiccups on 4/16/2021, associated with cough, fever,chills and myalgias. Pt was admitted to Medicine floor for COVID 19 infection requiring supplemental O2 with NC 6L then NRB and still desaturating.  ICU was consulted for acute hypoxic respiratory failure 2/2 COVID-19 now requiring HFNC 50/100, saturation at 91%. He is s/p bicarb gtt for metabolic acidosis, c/w PO TID bicarb as per Nephro.      Assessment:  1. Acute Hypoxic Respiratory Failure secondary to COVID19 infection    2. INOCENCIA   3. Mixed AG +  NAGMA   4. Uncontrolled DM-2   5. Hyponatremia/Hypernatremia   6. HTN     Plan:    =====================[CNS ]==============================  # No issues:   - Mental status at baseline    - Can give Seroquel at bedtime if need it for agitation   - Off Precedex drip     =====================[CVS ]===============================  # HTN :   - Pt was on losartan 50mg at home, last dose 4/23   - Holding Losartan due INOCENCIA; Cr 1.93>2.0>1.6  - Monitor BP and adjust meds as needed      =====================[RESP ]==============================  # Acute Hypoxic Resp Failure 2/2 COVID-19 infection :   - Hypoxia worsening, now on HFNC 60/100 wean off as tolerated    - Low threshold for intubation, pt's grand-daughter Owen SMYTH, agrees for intubation if the need arises  - Completed Remdesivir 4/20  - Completed Decadron 4/26  - CXR (4/23) Bilateral perihilar and bibasilar  multifocal and diffuse ill-defined airspace opacities  - Patient on heparin drip for elevated D-dimer   - C/w Vit c, Zn , Vit D supplementation   - US doppler of LE no DVT    =====================[ GI ]================================  # No issues :    - C/w Soft diabetic diet     ====================[ RENAL ]=============================   # INOCENCIA :   - likely 2/2 COVID19 vs pre -renal (less likely given no response to IVF)   - S/p bicarb drip  - C/w PO bicarb as per nephro   - Losartan on hold (last dose 4/23)   - Monitor urine output, monitor I&Os closely   - Monitor and supplement electrolytes as needed   - Renal US ordered (pending) **  - Nephro Dr Brown following     # Mixed AG + NAGMA :   - HCO3 19, could be due to hyperchloremic MA due to NS vs worsening INOCENCIA   - C/w sodium bicarb 650 mg TID as per nephrology     # Hyponatremia/Hypernatremia :   - Initially pt was hyponatremic, resolved s/p IVF   - Na 141  - Monitor BMP     =====================[ ID ]================================  # COVID -19 :  - Plan as above     ===================[ HEME/ONC ]===========================  # No issues :  - Hb and Plt stable   - Monitor cbc daily     =====================[ ENDO ]=============================  # Uncontrolled DM-2   - A1c 13.6   - BS controlled on current regimen   - C/w Lantus 12 units at bedtime and Admelog 4 units before meals   - Endo Dr Hall  following   - Monitor BS     ==================[ SKIN/ CATHETERS ]======================  # no skin breaks   ==================[ PROPHYLAXIS ]==========================   # DVT: Lovenox   # GI: Pepcid     ====================[ DISPO ]==============================   - patient remains acute, will continue monitoring in ICU     ===================[ PROGNOSIS ]===========================  - Guarded

## 2021-04-26 NOTE — PROGRESS NOTE ADULT - SUBJECTIVE AND OBJECTIVE BOX
[facility] [place] [facility]   MD BART FLANNERY MD RUORU WONG, PA    TEL:  OFFICE: [phone]  DR AYERS CELL: [phone]  JOSÉ MIGUEL ABDI CELL: [phone]  DR. AGUIRRE CELL: [phone]  DR. MCCLAIN CELL: [phone]    FROM 5 PM - 7 AM PLEASE CALL ANSWERING SERVICE: [phone]    RENAL FOLLOW UP NOTE--Date of Service 04-26-21 @ 11:13  --------------------------------------------------------------------------------  HPI:      Pt covid 19+    PAST HISTORY  --------------------------------------------------------------------------------  No significant changes to PMH, PSH, FHx, SHx, unless otherwise noted    ALLERGIES & MEDICATIONS  --------------------------------------------------------------------------------  Allergies    No Known Allergies    Intolerances      Standing Inpatient Medications  ascorbic acid 500 milliGRAM(s) Oral daily  atorvastatin 40 milliGRAM(s) Oral at bedtime  chlorhexidine 2% Cloths 1 Application(s) Topical <User Schedule>  cholecalciferol 400 Unit(s) Oral daily  dextrose 50% Injectable 25 Gram(s) IV Push once  famotidine    Tablet 20 milliGRAM(s) Oral two times a day  heparin  Infusion.  Unit(s)/Hr IV Continuous <Continuous>  insulin glargine Injectable (LANTUS) 12 Unit(s) SubCutaneous at bedtime  insulin lispro (ADMELOG) corrective regimen sliding scale   SubCutaneous three times a day before meals  insulin lispro Injectable (ADMELOG) 4 Unit(s) SubCutaneous three times a day before meals  montelukast 10 milliGRAM(s) Oral daily  polyethylene glycol 3350 17 Gram(s) Oral daily  QUEtiapine 25 milliGRAM(s) Oral at bedtime  senna 2 Tablet(s) Oral at bedtime  sodium bicarbonate 650 milliGRAM(s) Oral three times a day  zinc sulfate 220 milliGRAM(s) Oral daily    PRN Inpatient Medications  ALBUTerol    90 MICROgram(s) HFA Inhaler 2 Puff(s) Inhalation every 6 hours PRN      REVIEW OF SYSTEMS  --------------------------------------------------------------------------------  General: no fever    MSK: no edema     VITALS/PHYSICAL EXAM  --------------------------------------------------------------------------------  T(C): 36.8 (04-26-21 @ 07:00), Max: 36.9 (04-26-21 @ 00:00)  HR: 80 (04-26-21 @ 11:00) (59 - 122)  BP: 140/81 (04-26-21 @ 11:00) (92/70 - 151/92)  RR: 22 (04-26-21 @ 11:00) (15 - 30)  SpO2: 96% (04-26-21 @ 11:00) (88% - 97%)  Wt(kg): --        04-25-21 @ 07:01  -  04-26-21 @ 07:00  --------------------------------------------------------  IN: 857 mL / OUT: 500 mL / NET: 357 mL    04-26-21 @ 07:01  -  04-26-21 @ 11:13  --------------------------------------------------------  IN: 144 mL / OUT: 40 mL / NET: 104 mL        LABS/STUDIES  --------------------------------------------------------------------------------              14.3   14.80 >-----------<  183      [04-26-21 @ 03:57]              43.2     141  |  111  |  42  ----------------------------<  165      [04-26-21 @ 03:57]  3.7   |  21  |  1.61        Ca     8.4     [04-26-21 @ 03:57]      Mg     2.0     [04-26-21 @ 03:57]      Phos  4.5     [04-26-21 @ 03:57]    TPro  6.9  /  Alb  2.1  /  TBili  0.9  /  DBili  x   /  AST  33  /  ALT  29  /  AlkPhos  100  [04-26-21 @ 03:57]    PT/INR: PT 14.8 , INR 1.26       [04-25-21 @ 12:45]  PTT: >200.0      [04-26-21 @ 03:57]      Creatinine Trend:  SCr 1.61 [04-26 @ 03:57]  SCr 2.00 [04-25 @ 06:00]  SCr 1.93 [04-24 @ 15:32]  SCr 1.93 [04-24 @ 05:49]  SCr 2.32 [04-23 @ 15:05]    Urinalysis - [04-22-21 @ 14:32]      Color Yellow / Appearance Clear / SG 1.015 / pH 6.5      Gluc 1000 / Ketone Negative  / Bili Negative / Urobili Negative       Blood Small / Protein 30 / Leuk Est Negative / Nitrite Negative      RBC 5-10 / WBC 0-2 / Hyaline  / Gran  / Sq Epi  / Non Sq Epi Few / Bacteria Few    Urine Creatinine 51      [04-22-21 @ 14:32]  Urine Sodium 32      [04-22-21 @ 14:32]    Ferritin 973      [04-25-21 @ 15:26]  TSH 0.96      [04-17-21 @ 06:26]  Lipid: chol 96, TG 77, HDL 42, LDL --      [04-17-21 @ 06:26]

## 2021-04-26 NOTE — PROGRESS NOTE ADULT - ASSESSMENT
INOCENCIA:  etiology? possibly sec to Covid  Urine lytes suggestive of pre-renal    Has some improvement after IVF  Renal function is fluctuating  UA with blood, check renal sonogram   HOLD losartan  (last dose on 4/23)  - Monitor BMP.    Hyponatremia/Hypernatremia  -sec to free water loss  -improved  -Monitor serum Na  -Avoid overcorrection >8mEq in 24 hrs       CKD 2/3:  Has risk (Diabetes) and minimal proteinuria.  - Monitor BMP.    Metabolic Acidosis:  Mixed AG and NAG. He may have some Tubulo-interstitial Disease  - Monitor for now.  -Continue PO NaHCO3    COVID   MOnitor temp/ O2  COntinue current care    Hypokalemia  repelted by team   monitor serum K

## 2021-04-27 NOTE — PROGRESS NOTE ADULT - SUBJECTIVE AND OBJECTIVE BOX
[facility] – [place] [facility]   MD BART FLANNERY MD RUORU WONG, PA    TEL:  OFFICE: [phone]  DR AYERS CELL: [phone]  JOSÉ MIGUEL ABDI CELL: [phone]  DR. AGUIRRE CELL: [phone]  DR. MCCLAIN CELL: [phone]    FROM 5 PM - 7 AM PLEASE CALL ANSWERING SERVICE: [phone]    RENAL FOLLOW UP NOTE--Date of Service 04-27-21 @ 09:14  --------------------------------------------------------------------------------  HPI:      Pt covid 19+    PAST HISTORY  --------------------------------------------------------------------------------  No significant changes to PMH, PSH, FHx, SHx, unless otherwise noted    ALLERGIES & MEDICATIONS  --------------------------------------------------------------------------------  Allergies    No Known Allergies    Intolerances      Standing Inpatient Medications  ascorbic acid 500 milliGRAM(s) Oral daily  atorvastatin 40 milliGRAM(s) Oral at bedtime  chlorhexidine 2% Cloths 1 Application(s) Topical <User Schedule>  cholecalciferol 400 Unit(s) Oral daily  dextrose 50% Injectable 25 Gram(s) IV Push once  famotidine    Tablet 20 milliGRAM(s) Oral two times a day  heparin  Infusion.  Unit(s)/Hr IV Continuous <Continuous>  insulin glargine Injectable (LANTUS) 12 Unit(s) SubCutaneous at bedtime  insulin lispro (ADMELOG) corrective regimen sliding scale   SubCutaneous three times a day before meals  insulin lispro Injectable (ADMELOG) 4 Unit(s) SubCutaneous three times a day before meals  montelukast 10 milliGRAM(s) Oral daily  polyethylene glycol 3350 17 Gram(s) Oral daily  QUEtiapine 25 milliGRAM(s) Oral at bedtime  senna 2 Tablet(s) Oral at bedtime  sodium bicarbonate 650 milliGRAM(s) Oral three times a day  zinc sulfate 220 milliGRAM(s) Oral daily    PRN Inpatient Medications  ALBUTerol    90 MICROgram(s) HFA Inhaler 2 Puff(s) Inhalation every 6 hours PRN      REVIEW OF SYSTEMS  --------------------------------------------------------------------------------  General: no fever    MSK: no edema     VITALS/PHYSICAL EXAM  --------------------------------------------------------------------------------  T(C): 36.8 (04-27-21 @ 05:00), Max: 36.9 (04-27-21 @ 01:00)  HR: 111 (04-27-21 @ 08:41) (80 - 111)  BP: 123/76 (04-27-21 @ 08:00) (91/67 - 154/98)  RR: 26 (04-27-21 @ 08:41) (16 - 28)  SpO2: 90% (04-27-21 @ 08:41) (89% - 97%)  Wt(kg): --        04-26-21 @ 07:01  -  04-27-21 @ 07:00  --------------------------------------------------------  IN: 618 mL / OUT: 560 mL / NET: 58 mL        LABS/STUDIES  --------------------------------------------------------------------------------              15.1   16.71 >-----------<  176      [04-27-21 @ 05:27]              45.8     144  |  110  |  38  ----------------------------<  148      [04-27-21 @ 05:27]  3.5   |  23  |  1.38        Ca     8.6     [04-27-21 @ 05:27]      Mg     2.0     [04-27-21 @ 05:27]      Phos  3.5     [04-27-21 @ 05:27]    TPro  6.9  /  Alb  2.1  /  TBili  0.9  /  DBili  x   /  AST  33  /  ALT  29  /  AlkPhos  100  [04-26-21 @ 03:57]    PT/INR: PT 16.0 , INR 1.36       [04-26-21 @ 15:52]  PTT: 90.8       [04-27-21 @ 05:28]          [04-27-21 @ 05:27]    Creatinine Trend:  SCr 1.38 [04-27 @ 05:27]  SCr 1.61 [04-26 @ 03:57]  SCr 2.00 [04-25 @ 06:00]  SCr 1.93 [04-24 @ 15:32]  SCr 1.93 [04-24 @ 05:49]    Urinalysis - [04-22-21 @ 14:32]      Color Yellow / Appearance Clear / SG 1.015 / pH 6.5      Gluc 1000 / Ketone Negative  / Bili Negative / Urobili Negative       Blood Small / Protein 30 / Leuk Est Negative / Nitrite Negative      RBC 5-10 / WBC 0-2 / Hyaline  / Gran  / Sq Epi  / Non Sq Epi Few / Bacteria Few    Urine Creatinine 51      [04-22-21 @ 14:32]  Urine Sodium 32      [04-22-21 @ 14:32]    Ferritin 973      [04-25-21 @ 15:26]  TSH 0.96      [04-17-21 @ 06:26]  Lipid: chol 96, TG 77, HDL 42, LDL --      [04-17-21 @ 06:26]

## 2021-04-27 NOTE — PROGRESS NOTE ADULT - ASSESSMENT
75 y/o M from home with PMH of HTN and DM initially presented to ED  complaint of hiccups on 4/16/2021, associated with cough, fever,chills and myalgias. Pt was admitted to Medicine floor for COVID 19 infection requiring supplemental O2 with NC 6L then NRB and still desaturating.  ICU was consulted for acute hypoxic respiratory failure 2/2 COVID-19 now requiring HFNC 50/100, saturation at 91%. He is s/p bicarb gtt for metabolic acidosis, c/w PO TID bicarb as per Nephro.      Assessment:  1. Acute Hypoxic Respiratory Failure secondary to COVID19 infection    2. INOCENCIA   3. Mixed AG +  NAGMA   4. Uncontrolled DM-2   5. Hyponatremia/Hypernatremia   6. HTN     Plan:    =====================[CNS ]==============================  # No issues:   - Mental status at baseline    - Can give Seroquel at bedtime if need it for agitation   - Off Precedex drip     =====================[CVS ]===============================  # HTN :   - Pt was on losartan 50mg at home, last dose 4/23   - Holding Losartan due INOCENCIA; Cr 1.93>2.0>1.6  - Monitor BP and adjust meds as needed      =====================[RESP ]==============================  # Acute Hypoxic Resp Failure 2/2 COVID-19 infection :   - Hypoxia worsening, now on HFNC 60/100 wean off as tolerated    - Low threshold for intubation, pt's grand-daughter Owen SMYTH, agrees for intubation if the need arises  - Completed Remdesivir 4/20  - Completed Decadron 4/26  - CXR (4/23) Bilateral perihilar and bibasilar  multifocal and diffuse ill-defined airspace opacities  - Patient on heparin drip for elevated D-dimer   - C/w Vit c, Zn , Vit D supplementation   - US doppler of LE no DVT    =====================[ GI ]================================  # No issues :    - C/w Soft diabetic diet     ====================[ RENAL ]=============================   # INOCENCIA :   - likely 2/2 COVID19 vs pre -renal (less likely given no response to IVF)   - S/p bicarb drip  - C/w PO bicarb as per nephro   - Losartan on hold (last dose 4/23)   - Monitor urine output, monitor I&Os closely   - Monitor and supplement electrolytes as needed   - Renal US ordered (pending) **  - Nephro Dr Brown following     # Mixed AG + NAGMA :   - HCO3 19, could be due to hyperchloremic MA due to NS vs worsening INOCENCIA   - C/w sodium bicarb 650 mg TID as per nephrology     # Hyponatremia/Hypernatremia :   - Initially pt was hyponatremic, resolved s/p IVF   - Na 141  - Monitor BMP     =====================[ ID ]================================  # COVID -19 :  - Plan as above     ===================[ HEME/ONC ]===========================  # No issues :  - Hb and Plt stable   - Monitor cbc daily     =====================[ ENDO ]=============================  # Uncontrolled DM-2   - A1c 13.6   - BS controlled on current regimen   - C/w Lantus 12 units at bedtime and Admelog 4 units before meals   - Endo Dr Hall  following   - Monitor BS     ==================[ SKIN/ CATHETERS ]======================  # no skin breaks   ==================[ PROPHYLAXIS ]==========================   # DVT: Lovenox   # GI: Pepcid     ====================[ DISPO ]==============================   - patient remains acute, will continue monitoring in ICU     ===================[ PROGNOSIS ]===========================  - Guarded      77 y/o M from home with PMH of HTN and DM initially presented to ED complaint of hiccups on 4/16/2021, associated with cough, fever,chills and myalgias. Pt was admitted to Medicine floor for COVID 19 infection requiring supplemental O2 with NC 6L then NRB and still desaturating.  ICU was consulted for acute hypoxic respiratory failure 2/2 COVID-19 now requiring HFNC 50/100, saturation at 91%. He is s/p bicarb gtt for metabolic acidosis, c/w PO TID bicarb as per Nephro.      Assessment:  1. Acute Hypoxic Respiratory Failure secondary to COVID19 infection    2. INOCENCIA   3. Mixed AG +  NAGMA   4. Uncontrolled DM-2   5. Hyponatremia/Hypernatremia   6. HTN     Plan:    =====================[CNS ]==============================  # No issues:   - Mental status at baseline    - Continue w/ Seroquel at bedtime for agitation   - Off Precedex drip     =====================[CVS ]===============================  # HTN :   - Pt was on losartan 50mg at home, last dose 4/23   - Holding Losartan due INOCENCIA; Cr 1.93>2.0>1.6  - Monitor BP and adjust meds as needed      =====================[RESP ]==============================  # Acute Hypoxic Resp Failure 2/2 COVID-19 infection :   - Hypoxia worsening, now on HFNC 60/100 wean off as tolerated    - Low threshold for intubation, pt's grand-daughter Owen SMYTH, agrees for intubation if the need arises  - Completed Remdesivir 4/20  - Completed Decadron 4/26  - CXR (4/23) Bilateral perihilar and bibasilar  multifocal and diffuse ill-defined airspace opacities  - Patient on heparin drip for elevated D-dimer transitioned to Eliquis 2.5 mg BID    - C/w Vit c, Zn , Vit D supplementation   - US doppler of LE no DVT    =====================[ GI ]================================  # No issues :    - C/w Soft diabetic diet     ====================[ RENAL ]=============================   # INOCENCIA :   - likely 2/2 COVID19 vs pre -renal (less likely given no response to IVF)   - S/p bicarb drip  - C/w PO bicarb as per nephro   - Losartan on hold (last dose 4/23)   - Monitor urine output, monitor I &O s closely   - Monitor and supplement electrolytes as needed   - Renal US ordered (pending) **  - Nephro Dr Brown following     # Mixed AG + NAGMA :   - HCO3 19, could be due to hyperchloremic MA due to NS vs worsening INOCENCIA   - C/w sodium bicarb 650 mg TID as per nephrology     # Hyponatremia/Hypernatremia :   - Initially pt was hyponatremic, resolved s/p IVF   - Na 144  - Monitor BMP     =====================[ ID ]================================  # COVID -19 :  - Plan as above     ===================[ HEME/ONC ]===========================  # No issues :  - Hb and Plt stable   - Monitor cbc daily     =====================[ ENDO ]=============================  # Uncontrolled DM-2   - A1c 13.6   - BS controlled on current regimen   - C/w Lantus 12 units at bedtime and Admelog 4 units before meals   - Endo Dr Rafael  following   - Monitor BS     ==================[ SKIN/ CATHETERS ]======================  # no skin breaks   ==================[ PROPHYLAXIS ]==========================   # DVT: Lovenox   # GI: Pepcid     ====================[ DISPO ]==============================   - Patient remains acute, will continue monitoring in ICU     ===================[ PROGNOSIS ]===========================  - Guarded

## 2021-04-27 NOTE — PROGRESS NOTE ADULT - ASSESSMENT
INOCENCIA:  etiology? possibly sec to Covid  Urine lytes suggestive of pre-renal  s/p IVF  Renal function is fluctuating  UA with blood, check renal sonogram   HOLD losartan  (last dose on 4/23)  - Monitor BMP.    Hyponatremia/Hypernatremia  -sec to free water loss  -improved  -Monitor serum Na  -Avoid overcorrection >8mEq in 24 hrs       CKD 2/3:  Has risk (Diabetes) and minimal proteinuria.  - Monitor BMP.    Metabolic Acidosis:  Mixed AG and NAG. He may have some Tubulo-interstitial Disease  - Monitor for now.  -Continue PO NaHCO3    COVID   MOnitor temp/ O2  COntinue current care    Hypokalemia  repelted by team   monitor serum K

## 2021-04-27 NOTE — PROGRESS NOTE ADULT - SUBJECTIVE AND OBJECTIVE BOX
INTERVAL HPI/OVERNIGHT EVENTS: ***    PRESSORS: [ ] YES [ ] NO  WHICH:    ANTIBIOTICS:                  DATE STARTED:  ANTIBIOTICS:                  DATE STARTED:  ANTIBIOTICS:                  DATE STARTED:    Antimicrobial:    Cardiovascular:    Pulmonary:  ALBUTerol    90 MICROgram(s) HFA Inhaler 2 Puff(s) Inhalation every 6 hours PRN  montelukast 10 milliGRAM(s) Oral daily    Hematalogic:  heparin  Infusion.  Unit(s)/Hr IV Continuous <Continuous>    Other:  ascorbic acid 500 milliGRAM(s) Oral daily  atorvastatin 40 milliGRAM(s) Oral at bedtime  chlorhexidine 2% Cloths 1 Application(s) Topical <User Schedule>  cholecalciferol 400 Unit(s) Oral daily  dextrose 50% Injectable 25 Gram(s) IV Push once  famotidine    Tablet 20 milliGRAM(s) Oral two times a day  insulin glargine Injectable (LANTUS) 12 Unit(s) SubCutaneous at bedtime  insulin lispro (ADMELOG) corrective regimen sliding scale   SubCutaneous three times a day before meals  insulin lispro Injectable (ADMELOG) 4 Unit(s) SubCutaneous three times a day before meals  polyethylene glycol 3350 17 Gram(s) Oral daily  QUEtiapine 25 milliGRAM(s) Oral at bedtime  senna 2 Tablet(s) Oral at bedtime  sodium bicarbonate 650 milliGRAM(s) Oral three times a day  zinc sulfate 220 milliGRAM(s) Oral daily    ALBUTerol    90 MICROgram(s) HFA Inhaler 2 Puff(s) Inhalation every 6 hours PRN  ascorbic acid 500 milliGRAM(s) Oral daily  atorvastatin 40 milliGRAM(s) Oral at bedtime  chlorhexidine 2% Cloths 1 Application(s) Topical <User Schedule>  cholecalciferol 400 Unit(s) Oral daily  dextrose 50% Injectable 25 Gram(s) IV Push once  famotidine    Tablet 20 milliGRAM(s) Oral two times a day  heparin  Infusion.  Unit(s)/Hr IV Continuous <Continuous>  insulin glargine Injectable (LANTUS) 12 Unit(s) SubCutaneous at bedtime  insulin lispro (ADMELOG) corrective regimen sliding scale   SubCutaneous three times a day before meals  insulin lispro Injectable (ADMELOG) 4 Unit(s) SubCutaneous three times a day before meals  montelukast 10 milliGRAM(s) Oral daily  polyethylene glycol 3350 17 Gram(s) Oral daily  QUEtiapine 25 milliGRAM(s) Oral at bedtime  senna 2 Tablet(s) Oral at bedtime  sodium bicarbonate 650 milliGRAM(s) Oral three times a day  zinc sulfate 220 milliGRAM(s) Oral daily    Drug Dosing Weight  Height (cm): 172.7 (16 Apr 2021 13:26)  Weight (kg): 78.9 (16 Apr 2021 13:26)  BMI (kg/m2): 26.5 (16 Apr 2021 13:26)  BSA (m2): 1.93 (16 Apr 2021 13:26)    CENTRAL LINE: [ ] YES [ ] NO  LOCATION:         GUPTA: [ ] YES [ ] NO          A-LINE:  [ ] YES [ ] NO  LOCATION:             ICU Vital Signs Last 24 Hrs  T(C): 36.8 (27 Apr 2021 05:00), Max: 36.9 (27 Apr 2021 01:00)  T(F): 98.2 (27 Apr 2021 05:00), Max: 98.4 (27 Apr 2021 01:00)  HR: 105 (27 Apr 2021 08:00) (80 - 111)  BP: 123/76 (27 Apr 2021 08:00) (91/67 - 154/98)  BP(mean): 86 (27 Apr 2021 08:00) (65 - 110)  ABP: --  ABP(mean): --  RR: 21 (27 Apr 2021 08:00) (16 - 28)  SpO2: 91% (27 Apr 2021 08:00) (89% - 97%)      ABG - ( 26 Apr 2021 03:38 )  pH, Arterial: 7.48  pH, Blood: x     /  pCO2: 29    /  pO2: 76    / HCO3: 22    / Base Excess: -0.8  /  SaO2: 96                    04-26 @ 07:01  -  04-27 @ 07:00  --------------------------------------------------------  IN: 618 mL / OUT: 560 mL / NET: 58 mL              PHYSICAL EXAM:    GENERAL: NAD  EYES: EOMI, PERRLA  NECK: Supple, No JVD; Normal thyroid; Trachea midline: No LAD   NERVOUS SYSTEM:  Alert & Oriented X3,  Motor Strength 5/5 B/L upper and lower extremities; DTRs 2+ intact and symmetric  CHEST/LUNG: No rales, rhonchi, wheezing, breath sounds present bilaterally  HEART: Regular rate and rhythm; No murmurs, no gallops  ABDOMEN: Soft, Nontender, Nondistended; Bowel sounds present, no pain or masses on palpation  : voiding well, Gupta in place  EXTREMITIES:  2+ Peripheral Pulses, No clubbing, cyanosis, or edema  SKIN: warm, intact, no lesions         LABS:  CBC Full  -  ( 27 Apr 2021 05:27 )  WBC Count : 16.71 K/uL  RBC Count : 5.35 M/uL  Hemoglobin : 15.1 g/dL  Hematocrit : 45.8 %  Platelet Count - Automated : 176 K/uL  Mean Cell Volume : 85.6 fl  Mean Cell Hemoglobin : 28.2 pg  Mean Cell Hemoglobin Concentration : 33.0 gm/dL  Auto Neutrophil # : x  Auto Lymphocyte # : x  Auto Monocyte # : x  Auto Eosinophil # : x  Auto Basophil # : x  Auto Neutrophil % : x  Auto Lymphocyte % : x  Auto Monocyte % : x  Auto Eosinophil % : x  Auto Basophil % : x    04-27    144  |  110<H>  |  38<H>  ----------------------------<  148<H>  3.5   |  23  |  1.38<H>    Ca    8.6      27 Apr 2021 05:27  Phos  3.5     04-27  Mg     2.0     04-27    TPro  6.9  /  Alb  2.1<L>  /  TBili  0.9  /  DBili  x   /  AST  33  /  ALT  29  /  AlkPhos  100  04-26    PT/INR - ( 26 Apr 2021 15:52 )   PT: 16.0 sec;   INR: 1.36 ratio         PTT - ( 27 Apr 2021 05:28 )  PTT:90.8 sec        RADIOLOGY & ADDITIONAL STUDIES REVIEWED:  ***    [ ]GOALS OF CARE DISCUSSION WITH PATIENT/FAMILY/PROXY:    CRITICAL CARE TIME SPENT: 35 minutes   INTERVAL HPI/OVERNIGHT EVENTS: Patient remained on high flow overnight, saturating around 93%, received Seroquel last night, no more episodes of anxiety    PRESSORS: [ ] YES [x ] NO    Antimicrobial:    Cardiovascular:    Pulmonary:  ALBUTerol    90 MICROgram(s) HFA Inhaler 2 Puff(s) Inhalation every 6 hours PRN  montelukast 10 milliGRAM(s) Oral daily    Hematalogic:  heparin  Infusion.  Unit(s)/Hr IV Continuous <Continuous>    Other:  ascorbic acid 500 milliGRAM(s) Oral daily  atorvastatin 40 milliGRAM(s) Oral at bedtime  chlorhexidine 2% Cloths 1 Application(s) Topical <User Schedule>  cholecalciferol 400 Unit(s) Oral daily  dextrose 50% Injectable 25 Gram(s) IV Push once  famotidine    Tablet 20 milliGRAM(s) Oral two times a day  insulin glargine Injectable (LANTUS) 12 Unit(s) SubCutaneous at bedtime  insulin lispro (ADMELOG) corrective regimen sliding scale   SubCutaneous three times a day before meals  insulin lispro Injectable (ADMELOG) 4 Unit(s) SubCutaneous three times a day before meals  polyethylene glycol 3350 17 Gram(s) Oral daily  QUEtiapine 25 milliGRAM(s) Oral at bedtime  senna 2 Tablet(s) Oral at bedtime  sodium bicarbonate 650 milliGRAM(s) Oral three times a day  zinc sulfate 220 milliGRAM(s) Oral daily    ALBUTerol    90 MICROgram(s) HFA Inhaler 2 Puff(s) Inhalation every 6 hours PRN  ascorbic acid 500 milliGRAM(s) Oral daily  atorvastatin 40 milliGRAM(s) Oral at bedtime  chlorhexidine 2% Cloths 1 Application(s) Topical <User Schedule>  cholecalciferol 400 Unit(s) Oral daily  dextrose 50% Injectable 25 Gram(s) IV Push once  famotidine    Tablet 20 milliGRAM(s) Oral two times a day  heparin  Infusion.  Unit(s)/Hr IV Continuous <Continuous>  insulin glargine Injectable (LANTUS) 12 Unit(s) SubCutaneous at bedtime  insulin lispro (ADMELOG) corrective regimen sliding scale   SubCutaneous three times a day before meals  insulin lispro Injectable (ADMELOG) 4 Unit(s) SubCutaneous three times a day before meals  montelukast 10 milliGRAM(s) Oral daily  polyethylene glycol 3350 17 Gram(s) Oral daily  QUEtiapine 25 milliGRAM(s) Oral at bedtime  senna 2 Tablet(s) Oral at bedtime  sodium bicarbonate 650 milliGRAM(s) Oral three times a day  zinc sulfate 220 milliGRAM(s) Oral daily    Drug Dosing Weight  Height (cm): 172.7 (16 Apr 2021 13:26)  Weight (kg): 78.9 (16 Apr 2021 13:26)  BMI (kg/m2): 26.5 (16 Apr 2021 13:26)  BSA (m2): 1.93 (16 Apr 2021 13:26)    CENTRAL LINE: [ ] YES [ ] NO  LOCATION:         GUPTA: [ ] YES [ ] NO          A-LINE:  [ ] YES [ ] NO  LOCATION:             ICU Vital Signs Last 24 Hrs  T(C): 36.8 (27 Apr 2021 05:00), Max: 36.9 (27 Apr 2021 01:00)  T(F): 98.2 (27 Apr 2021 05:00), Max: 98.4 (27 Apr 2021 01:00)  HR: 105 (27 Apr 2021 08:00) (80 - 111)  BP: 123/76 (27 Apr 2021 08:00) (91/67 - 154/98)  BP(mean): 86 (27 Apr 2021 08:00) (65 - 110)  ABP: --  ABP(mean): --  RR: 21 (27 Apr 2021 08:00) (16 - 28)  SpO2: 91% (27 Apr 2021 08:00) (89% - 97%)      ABG - ( 26 Apr 2021 03:38 )  pH, Arterial: 7.48  pH, Blood: x     /  pCO2: 29    /  pO2: 76    / HCO3: 22    / Base Excess: -0.8  /  SaO2: 96                    04-26 @ 07:01  -  04-27 @ 07:00  --------------------------------------------------------  IN: 618 mL / OUT: 560 mL / NET: 58 mL              PHYSICAL EXAM:    GENERAL: NAD, on high flow sat 93%  EYES: EOMI, PERRLA  NECK: Supple, No JVD; Normal thyroid; Trachea midline: No LAD   NERVOUS SYSTEM:  Alert & Oriented X3,  Motor Strength 5/5 B/L upper and lower extremities; DTRs 2+ intact and symmetric  CHEST/LUNG: bilateral fine crackles at bases   HEART: Regular rate and rhythm; No murmurs, no gallops  ABDOMEN: Soft, Nontender, Nondistended; Bowel sounds present, no pain or masses on palpation  : voiding well   EXTREMITIES:  2+ Peripheral Pulses, No clubbing, cyanosis, or edema  SKIN: warm, intact, no lesions         LABS:  CBC Full  -  ( 27 Apr 2021 05:27 )  WBC Count : 16.71 K/uL  RBC Count : 5.35 M/uL  Hemoglobin : 15.1 g/dL  Hematocrit : 45.8 %  Platelet Count - Automated : 176 K/uL  Mean Cell Volume : 85.6 fl  Mean Cell Hemoglobin : 28.2 pg  Mean Cell Hemoglobin Concentration : 33.0 gm/dL  Auto Neutrophil # : x  Auto Lymphocyte # : x  Auto Monocyte # : x  Auto Eosinophil # : x  Auto Basophil # : x  Auto Neutrophil % : x  Auto Lymphocyte % : x  Auto Monocyte % : x  Auto Eosinophil % : x  Auto Basophil % : x    04-27    144  |  110<H>  |  38<H>  ----------------------------<  148<H>  3.5   |  23  |  1.38<H>    Ca    8.6      27 Apr 2021 05:27  Phos  3.5     04-27  Mg     2.0     04-27    TPro  6.9  /  Alb  2.1<L>  /  TBili  0.9  /  DBili  x   /  AST  33  /  ALT  29  /  AlkPhos  100  04-26    PT/INR - ( 26 Apr 2021 15:52 )   PT: 16.0 sec;   INR: 1.36 ratio         PTT - ( 27 Apr 2021 05:28 )  PTT:90.8 sec        RADIOLOGY & ADDITIONAL STUDIES REVIEWED:  ***    [ ]GOALS OF CARE DISCUSSION WITH PATIENT/FAMILY/PROXY:    CRITICAL CARE TIME SPENT: 35 minutes

## 2021-04-27 NOTE — CHART NOTE - NSCHARTNOTEFT_GEN_A_CORE
Owen, patient's Granddaughter updated on condition. All questions answered. Will facilitate facetime later today.

## 2021-04-28 NOTE — PHYSICAL THERAPY INITIAL EVALUATION ADULT - ACTIVE RANGE OF MOTION EXAMINATION, REHAB EVAL
except b/l hips 3-/5/bilateral upper extremity Active ROM was WFL (within functional limits)/bilateral  lower extremity Active ROM was WFL (within functional limits)

## 2021-04-28 NOTE — PROGRESS NOTE ADULT - SUBJECTIVE AND OBJECTIVE BOX
Time of Visit:  Patient seen and examined.     MEDICATIONS  (STANDING):  apixaban 2.5 milliGRAM(s) Oral every 12 hours  ascorbic acid 500 milliGRAM(s) Oral daily  atorvastatin 40 milliGRAM(s) Oral at bedtime  chlorhexidine 2% Cloths 1 Application(s) Topical <User Schedule>  cholecalciferol 400 Unit(s) Oral daily  dextrose 50% Injectable 25 Gram(s) IV Push once  famotidine    Tablet 20 milliGRAM(s) Oral two times a day  insulin glargine Injectable (LANTUS) 12 Unit(s) SubCutaneous at bedtime  insulin lispro (ADMELOG) corrective regimen sliding scale   SubCutaneous three times a day before meals  insulin lispro Injectable (ADMELOG) 4 Unit(s) SubCutaneous three times a day before meals  montelukast 10 milliGRAM(s) Oral daily  polyethylene glycol 3350 17 Gram(s) Oral daily  QUEtiapine 25 milliGRAM(s) Oral at bedtime  senna 2 Tablet(s) Oral at bedtime  sodium bicarbonate 650 milliGRAM(s) Oral three times a day  zinc sulfate 220 milliGRAM(s) Oral daily      MEDICATIONS  (PRN):  ALBUTerol    90 MICROgram(s) HFA Inhaler 2 Puff(s) Inhalation every 6 hours PRN Bronchospasm       Medications up to date at time of exam.      PHYSICAL EXAMINATION:  Patient has no new complaints.  GENERAL: The patient is a well-developed, well-nourished, in no apparent distress.     Vital Signs Last 24 Hrs  T(C): 36.8 (28 Apr 2021 15:16), Max: 36.8 (28 Apr 2021 15:16)  T(F): 98.2 (28 Apr 2021 15:16), Max: 98.2 (28 Apr 2021 15:16)  HR: 108 (28 Apr 2021 16:47) (86 - 118)  BP: 116/73 (28 Apr 2021 16:00) (83/55 - 148/80)  BP(mean): 84 (28 Apr 2021 16:00) (62 - 106)  RR: 25 (28 Apr 2021 16:47) (12 - 31)  SpO2: 97% (28 Apr 2021 16:47) (88% - 99%)   (if applicable)    Chest Tube (if applicable)    HEENT: Head is normocephalic and atraumatic. Extraocular muscles are intact. Mucous membranes are moist.     NECK: Supple, no palpable adenopathy.    LUNGS: Clear to auscultation, no wheezing, rales, or rhonchi.    HEART: Regular rate and rhythm without murmur.    ABDOMEN: Soft, nontender, and nondistended.  No hepatosplenomegaly is noted.    : No painful voiding, no pelvic pain    EXTREMITIES: Without any cyanosis, clubbing, rash, lesions or edema.    NEUROLOGIC: Awake, alert, oriented, grossly intact    SKIN: Warm, dry, good turgor.      LABS:                        14.5   14.56 )-----------( 115      ( 28 Apr 2021 06:37 )             44.6     04-28    145  |  111<H>  |  32<H>  ----------------------------<  116<H>  3.3<L>   |  22  |  1.21    Ca    8.5      28 Apr 2021 06:37  Phos  4.3     04-28  Mg     2.0     04-28      PTT - ( 27 Apr 2021 05:28 )  PTT:90.8 sec          D-Dimer Assay, Quantitative: 7390 ng/mL DDU (04-28-21 @ 06:37)            MICROBIOLOGY: (if applicable)    RADIOLOGY & ADDITIONAL STUDIES:  EKG:   CXR:< from: Xray Chest 1 View-PORTABLE IMMEDIATE (Xray Chest 1 View-PORTABLE IMMEDIATE .) (04.28.21 @ 09:54) >    EXAM:  XR CHEST PORTABLE IMMED 1V                            PROCEDURE DATE:  04/28/2021          INTERPRETATION:  CLINICAL INDICATION: 76 years  Male with covid.    COMPARISON: 4/26/2021    AP view of the chest demonstrates persistent bilateral lower lung interstitial and airspace infiltrates.. There is no pleural effusion. There is no pneumothorax.    The heart is normal in size. There is no mediastinal or hilar mass.    The pulmonary vasculature is normal.    Mild thoracic degenerative changes are present.    IMPRESSION:    Bilateral infiltrates unchanged.            LA NEAL MD; Attending Radiologist  This document has been electronically signed. Apr 28 2021 11:20AM    < end of copied text >    ECHO:    IMPRESSION: 76y Male PAST MEDICAL & SURGICAL HISTORY:  HTN (hypertension)    DM (diabetes mellitus)    No significant past surgical history     p/w     IMP: This is  76 yr old man  from home with PMH of HTN and DM initially presented to ED  complaint of hiccups on 4/16/2021, associated with cough, fever,chills and myalgias. Pt was admitted to Medicine floor for COVid 19 infection requiring supplemental O2 with NC 6L then NRB and still desaturating.  ICU was consulted for acute hypoxic respiratory failure 2/2 COVID-19 now requiring HFNC 50/100, saturation at 91%. He is s/p bicarb gtt for metabolic acidosis, c/w PO TID bicarb as per Nephro.          Assessment:  1. Acute Hypoxic Respiratory Failure   2. COVID19 infection    3. INOCENCIA   4. Mixed AG +  NAGMA   5. Uncontrolled DM-2   6. Hypernatremia   7. HTN     Plan  - CXR stable   - Continue HFNC support  - IV fluid resuscitation   - Completed remdesivir  - Cont. Dexamethasone   - Renal fx improving   - Re-adjust eliquis dose in setting of elevated D Dimer   - Close glucose monitoring   - Monitor fingerstick glucose   - DVT and stress ulcer prophylaxis  - Prognosis is guarded .        d/c with icu team and attend     time spent 37 min

## 2021-04-28 NOTE — PROGRESS NOTE ADULT - ASSESSMENT
77 y/o M from home with PMH of HTN and DM initially presented to ED complaint of hiccups on 4/16/2021, associated with cough, fever,chills and myalgias. Pt was admitted to Medicine floor for COVID 19 infection requiring supplemental O2 with NC 6L then NRB and still desaturating.  ICU was consulted for acute hypoxic respiratory failure 2/2 COVID-19 now requiring HFNC 50/100, saturation at 91%. He is s/p bicarb gtt for metabolic acidosis, c/w PO TID bicarb as per Nephro.      Assessment:  1. Acute Hypoxic Respiratory Failure secondary to COVID19 infection    2. INOCENCIA   3. Mixed AG +  NAGMA   4. Uncontrolled DM-2   5. Hyponatremia/Hypernatremia   6. HTN     Plan:    =====================[CNS ]==============================  # No issues:   - Mental status at baseline    - Continue w/ Seroquel at bedtime for agitation   - Off Precedex drip     =====================[CVS ]===============================  # HTN :   - Pt was on losartan 50mg at home, last dose 4/23   - Holding Losartan due INOCENCIA; Cr 1.93>2.0>1.6  - Monitor BP and adjust meds as needed      =====================[RESP ]==============================  # Acute Hypoxic Resp Failure 2/2 COVID-19 infection :   - Hypoxia worsening, now on HFNC 60/100 wean off as tolerated    - Low threshold for intubation, pt's grand-daughter Owen SMYTH, agrees for intubation if the need arises  - Completed Remdesivir 4/20  - Completed Decadron 4/26  - CXR (4/23) Bilateral perihilar and bibasilar  multifocal and diffuse ill-defined airspace opacities  - Patient on heparin drip for elevated D-dimer transitioned to Eliquis 2.5 mg BID    - C/w Vit c, Zn , Vit D supplementation   - US doppler of LE no DVT    =====================[ GI ]================================  # No issues :    - C/w Soft diabetic diet     ====================[ RENAL ]=============================   # INOCENCIA :   - likely 2/2 COVID19 vs pre -renal (less likely given no response to IVF)   - S/p bicarb drip  - C/w PO bicarb as per nephro   - Losartan on hold (last dose 4/23)   - Monitor urine output, monitor I &O s closely   - Monitor and supplement electrolytes as needed   - Renal US ordered (pending) **  - Nephro Dr Brown following     # Mixed AG + NAGMA :   - HCO3 19, could be due to hyperchloremic MA due to NS vs worsening INOCENCIA   - C/w sodium bicarb 650 mg TID as per nephrology     # Hyponatremia/Hypernatremia :   - Initially pt was hyponatremic, resolved s/p IVF   - Na 144  - Monitor BMP     =====================[ ID ]================================  # COVID -19 :  - Plan as above     ===================[ HEME/ONC ]===========================  # No issues :  - Hb and Plt stable   - Monitor cbc daily     =====================[ ENDO ]=============================  # Uncontrolled DM-2   - A1c 13.6   - BS controlled on current regimen   - C/w Lantus 12 units at bedtime and Admelog 4 units before meals   - Endo Dr Rafael  following   - Monitor BS     ==================[ SKIN/ CATHETERS ]======================  # no skin breaks   ==================[ PROPHYLAXIS ]==========================   # DVT: Lovenox   # GI: Pepcid     ====================[ DISPO ]==============================   - Patient remains acute, will continue monitoring in ICU     ===================[ PROGNOSIS ]===========================  - Guarded      75 y/o M from home with PMH of HTN and DM initially presented to ED complaint of hiccups on 4/16/2021, associated with cough, fever,chills and myalgias. Pt was admitted to Medicine floor for COVID 19 infection requiring supplemental O2 with NC 6L then NRB and still desaturating.  ICU was consulted for acute hypoxic respiratory failure 2/2 COVID-19 now requiring HFNC 50/100, saturation at 91%. He is s/p bicarb gtt for metabolic acidosis, c/w PO TID bicarb as per Nephro.      Assessment:  1. Acute Hypoxic Respiratory Failure secondary to COVID19 infection    2. INOCENCIA   3. Mixed AG +  NAGMA   4. Uncontrolled DM-2   5. Hyponatremia/Hypernatremia   6. HTN     Plan:    =====================[CNS ]==============================  # No issues:   - Mental status at baseline    - Continue w/ Seroquel at bedtime for agitation   - Off Precedex drip     =====================[CVS ]===============================  # HTN :   - Pt was on losartan 50mg at home, last dose 4/23   - Holding Losartan due INOCENCIA; Cr 1.93>2.0>1.6  - Monitor BP and adjust meds as needed      =====================[RESP ]==============================  # Acute Hypoxic Resp Failure 2/2 COVID-19 infection :   - Hypoxia worsening, now on HFNC 60/100 wean off as tolerated    - Low threshold for intubation, pt's grand-daughter Owen SMYTH, agrees for intubation if the need arises  - Completed Remdesivir 4/20  - Completed Decadron 4/26  - CXR (4/23) Bilateral perihilar and bibasilar multifocal and diffuse ill-defined airspace opacities  - S/p heparin drip for elevated D-dimer   - C/w Eliquis 2.5 mg BID    - C/w Vit c, Zn , Vit D supplementation   - US doppler of LE no DVT    =====================[ GI ]================================  # No issues :   - C/w Soft diabetic diet + ensure shakes   - Will give One dose modafinil      ====================[ RENAL ]=============================   # INOCENCIA :   - likely 2/2 COVID19 vs pre -renal (less likely given no response to IVF)   - S/p bicarb drip  - C/w PO bicarb TID as per nephro   - Losartan on hold (last dose 4/23)   - Monitor urine output, monitor I &O s closely   - Monitor and supplement electrolytes as needed   - Renal US ordered (pending) **  - Nephro Dr Brown following     # Mixed AG + NAGMA :   - HCO3 19, could be due to hyperchloremic MA due to NS vs worsening INOCENCIA   - C/w sodium bicarb 650 mg TID as per nephrology     # Hyponatremia/Hypernatremia :   - Initially pt was hyponatremic, resolved s/p IVF   - Na 145  - Monitor BMP     =====================[ ID ]================================  # COVID -19 :  - Plan as above     ===================[ HEME/ONC ]===========================  # No issues :  - Hb and Plt stable   - Monitor cbc daily     =====================[ ENDO ]=============================  # Uncontrolled DM-2   - A1c 13.6   - BS controlled on current regimen   - C/w Lantus 12 units at bedtime and Admelog 4 units before meals   - Endo Dr Hall  following   - Monitor BS     ==================[ SKIN/ CATHETERS ]======================  # no skin breaks   ==================[ PROPHYLAXIS ]==========================   # DVT: Lovenox   # GI: Pepcid     ====================[ DISPO ]==============================   - Patient remains acute, will continue monitoring in ICU     ===================[ PROGNOSIS ]===========================  - Guarded      77 y/o M from home with PMH of HTN and DM initially presented to ED complaint of hiccups on 4/16/2021, associated with cough, fever,chills and myalgias. Pt was admitted to Medicine floor for COVID 19 infection requiring supplemental O2 with NC 6L then NRB and still desaturating.  ICU was consulted for acute hypoxic respiratory failure 2/2 COVID-19 now requiring HFNC 50/100, saturation at 91%. He is s/p bicarb gtt for metabolic acidosis, c/w PO TID bicarb as per Nephro.      Assessment:  1. Acute Hypoxic Respiratory Failure secondary to COVID19 infection    2. INOCENCIA   3. Mixed AG +  NAGMA   4. Uncontrolled DM-2   5. Hyponatremia/Hypernatremia   6. HTN     Plan:    =====================[CNS ]==============================  # No issues:   - Mental status at baseline    - Continue w/ Seroquel at bedtime for agitation   - Off Precedex drip     =====================[CVS ]===============================  # HTN :   - Pt was on losartan 50mg at home, last dose 4/23   - Holding Losartan due INOCENCIA; Cr 1.93>2.0>1.6  - Monitor BP and adjust meds as needed      =====================[RESP ]==============================  # Acute Hypoxic Resp Failure 2/2 COVID-19 infection :   - Hypoxia worsening, now on HFNC 60/100 wean off as tolerated    - Low threshold for intubation, pt's grand-daughter Owen SMYTH, agrees for intubation if the need arises  - Completed Remdesivir 4/20  - Completed Decadron 4/26  - CXR (4/23) Bilateral perihilar and bibasilar multifocal and diffuse ill-defined airspace opacities  - S/p heparin drip for elevated D-dimer   - C/w Eliquis 2.5 mg BID    - C/w Vit c, Zn , Vit D supplementation   - US doppler of LE no DVT    =====================[ GI ]================================  # No issues :   - C/w Soft diabetic diet + ensure shakes   - Will give One dose modafinil      ====================[ RENAL ]=============================   # INOCENCIA :   - likely 2/2 COVID19 vs pre -renal (less likely given no response to IVF)   - S/p bicarb drip  - C/w PO bicarb TID as per nephro   - Losartan on hold (last dose 4/23)   - Monitor urine output, monitor I &O s closely   - Monitor and supplement electrolytes as needed   - Renal US ordered (pending) **  - Nephro Dr Brown following     # Mixed AG + NAGMA :   - HCO3 19, could be due to hyperchloremic MA due to NS vs worsening INOCENCIA   - C/w sodium bicarb 650 mg TID as per nephrology     # Hyponatremia/Hypernatremia :   - Initially pt was hyponatremic, resolved s/p IVF   - Na 145  - Monitor BMP     =====================[ ID ]================================  # COVID -19 :  - Plan as above     ===================[ HEME/ONC ]===========================  # Thrombocytopenia :  - Platelets 176 > 115 today   - No signs of bleeding   - Monitor for now   - CBC daily     =====================[ ENDO ]=============================  # Uncontrolled DM-2   - A1c 13.6   - BS controlled on current regimen   - C/w Lantus 12 units at bedtime and Admelog 4 units before meals   - Endo Dr Hall  following   - Monitor BS     ==================[ SKIN/ CATHETERS ]======================  # no skin breaks   ==================[ PROPHYLAXIS ]==========================   # DVT: Lovenox   # GI: Pepcid     ====================[ DISPO ]==============================   - Patient remains acute, will continue monitoring in ICU     ===================[ PROGNOSIS ]===========================  - Guarded

## 2021-04-28 NOTE — PHYSICAL THERAPY INITIAL EVALUATION ADULT - PLANNED THERAPY INTERVENTIONS, PT EVAL
aerobic capacity/endurance training/balance training/bed mobility training/gait training/neuromuscular re-education/postural re-education/ROM/strengthening/transfer training

## 2021-04-28 NOTE — PHYSICAL THERAPY INITIAL EVALUATION ADULT - LEVEL OF INDEPENDENCE: SUPINE/SIT, REHAB EVAL
Pt reported increasing dizziness in sitting, SPo2 decreased to ~83% on HFNC+NRB, RR 39, [id number]. pt. reports "mild" exertion with activity. Pt. was returned to supine in bed. 5+ min guided breathing and relaxation to improve to baseline./minimum assist (75% patients effort)

## 2021-04-28 NOTE — PROGRESS NOTE ADULT - SUBJECTIVE AND OBJECTIVE BOX
[facility]   MD BART FLANNERY MD RUORU WONG, PA    TEL:  OFFICE: [phone]  DR AYERS CELL: [phone]  JOSÉ MIGUEL ABDI CELL: [phone]  DR. AGUIRRE CELL: [phone]  DR. MCCLAIN CELL: [phone]    FROM 5 PM - 7 AM PLEASE CALL ANSWERING SERVICE: [phone]    RENAL FOLLOW UP NOTE--Date of Service 04-28-21 @ 08:58  --------------------------------------------------------------------------------  HPI:      Pt covid 19+    PAST HISTORY  --------------------------------------------------------------------------------  No significant changes to PMH, PSH, FHx, SHx, unless otherwise noted    ALLERGIES & MEDICATIONS  --------------------------------------------------------------------------------  Allergies    No Known Allergies    Intolerances      Standing Inpatient Medications  apixaban 2.5 milliGRAM(s) Oral every 12 hours  ascorbic acid 500 milliGRAM(s) Oral daily  atorvastatin 40 milliGRAM(s) Oral at bedtime  chlorhexidine 2% Cloths 1 Application(s) Topical <User Schedule>  cholecalciferol 400 Unit(s) Oral daily  dextrose 50% Injectable 25 Gram(s) IV Push once  famotidine    Tablet 20 milliGRAM(s) Oral two times a day  insulin glargine Injectable (LANTUS) 12 Unit(s) SubCutaneous at bedtime  insulin lispro (ADMELOG) corrective regimen sliding scale   SubCutaneous three times a day before meals  insulin lispro Injectable (ADMELOG) 4 Unit(s) SubCutaneous three times a day before meals  montelukast 10 milliGRAM(s) Oral daily  polyethylene glycol 3350 17 Gram(s) Oral daily  potassium chloride   Powder 40 milliEquivalent(s) Oral every 4 hours  QUEtiapine 25 milliGRAM(s) Oral at bedtime  senna 2 Tablet(s) Oral at bedtime  sodium bicarbonate 650 milliGRAM(s) Oral three times a day  zinc sulfate 220 milliGRAM(s) Oral daily    PRN Inpatient Medications  ALBUTerol    90 MICROgram(s) HFA Inhaler 2 Puff(s) Inhalation every 6 hours PRN      REVIEW OF SYSTEMS  --------------------------------------------------------------------------------  General: no fever  MSK: no edema     VITALS/PHYSICAL EXAM  --------------------------------------------------------------------------------  T(C): 36.7 (04-28-21 @ 04:00), Max: 37 (04-27-21 @ 15:00)  HR: 115 (04-28-21 @ 08:00) (86 - 118)  BP: 100/58 (04-28-21 @ 08:00) (83/55 - 148/80)  RR: 22 (04-28-21 @ 08:00) (12 - 36)  SpO2: 95% (04-28-21 @ 08:00) (73% - 98%)  Wt(kg): --        04-27-21 @ 07:01  -  04-28-21 @ 07:00  --------------------------------------------------------  IN: 706 mL / OUT: 500 mL / NET: 206 mL        LABS/STUDIES  --------------------------------------------------------------------------------              14.5   14.56 >-----------<  115      [04-28-21 @ 06:37]              44.6     145  |  111  |  32  ----------------------------<  116      [04-28-21 @ 06:37]  3.3   |  22  |  1.21        Ca     8.5     [04-28-21 @ 06:37]      Mg     2.0     [04-28-21 @ 06:37]      Phos  4.3     [04-28-21 @ 06:37]      PT/INR: PT 16.0 , INR 1.36       [04-26-21 @ 15:52]  PTT: 90.8       [04-27-21 @ 05:28]          [04-28-21 @ 06:37]    Creatinine Trend:  SCr 1.21 [04-28 @ 06:37]  SCr 1.38 [04-27 @ 05:27]  SCr 1.61 [04-26 @ 03:57]  SCr 2.00 [04-25 @ 06:00]  SCr 1.93 [04-24 @ 15:32]    Urinalysis - [04-22-21 @ 14:32]      Color Yellow / Appearance Clear / SG 1.015 / pH 6.5      Gluc 1000 / Ketone Negative  / Bili Negative / Urobili Negative       Blood Small / Protein 30 / Leuk Est Negative / Nitrite Negative      RBC 5-10 / WBC 0-2 / Hyaline  / Gran  / Sq Epi  / Non Sq Epi Few / Bacteria Few    Urine Creatinine 51      [04-22-21 @ 14:32]  Urine Sodium 32      [04-22-21 @ 14:32]    Ferritin 973      [04-25-21 @ 15:26]  TSH 0.96      [04-17-21 @ 06:26]  Lipid: chol 96, TG 77, HDL 42, LDL --      [04-17-21 @ 06:26]

## 2021-04-28 NOTE — PROGRESS NOTE ADULT - ASSESSMENT
_________________________________________________________________________________________  ========>>  M E D I C A L   A T T E N D I N G    F O L L O W  U P  N O T E  <<=========  -----------------------------------------------------------------------------------------------------    - Patient seen and examined by me earlier today.  (covering Dr WATSON today)   - In summary,  MEHRDAD SUTHERLAND is a 76y year old man admitted with COVID  - Patient has been in the ICU , on high flow AND nonrebreather O2 ! otherwise doing fairly, comfortable, eating poorly     ==================>> REVIEW OF SYSTEM <<=================    limited ROS as pt not very engaging   GEN: no fever,  no pain  RESP: no SOB, no sig cough  CVS: no chest pain  GI: no abdominal pain, no nausea  : no dysuria  Neuro: no headache, no dizziness  Derm : no itching    ==================>> PHYSICAL EXAM <<=================    GEN: A&O X 2 , NAD , comfortable, pleasant, calm, cachectic   HEENT: NCAT, PERRL, MMM, hearing intact  Neck: supple , no JVD appreciated  CVS: S1S2 , regular , No M/R/G appreciated  PULM: CTA B/L,  limited exam as not taking deep breaths   ABD.: soft. non tender, non distended  Extrem: intact pulses , no edema   PSYCH : normal mood,  not anxious                             ( Note written / Date of service 04-28-21 )    ==================>> MEDICATIONS <<====================    apixaban 2.5 milliGRAM(s) Oral every 12 hours  ascorbic acid 500 milliGRAM(s) Oral daily  atorvastatin 40 milliGRAM(s) Oral at bedtime  chlorhexidine 2% Cloths 1 Application(s) Topical <User Schedule>  cholecalciferol 400 Unit(s) Oral daily  dextrose 50% Injectable 25 Gram(s) IV Push once  famotidine    Tablet 20 milliGRAM(s) Oral two times a day  insulin glargine Injectable (LANTUS) 12 Unit(s) SubCutaneous at bedtime  insulin lispro (ADMELOG) corrective regimen sliding scale   SubCutaneous three times a day before meals  insulin lispro Injectable (ADMELOG) 4 Unit(s) SubCutaneous three times a day before meals  montelukast 10 milliGRAM(s) Oral daily  polyethylene glycol 3350 17 Gram(s) Oral daily  QUEtiapine 25 milliGRAM(s) Oral at bedtime  senna 2 Tablet(s) Oral at bedtime  sodium bicarbonate 650 milliGRAM(s) Oral three times a day  zinc sulfate 220 milliGRAM(s) Oral daily    MEDICATIONS  (PRN):  ALBUTerol    90 MICROgram(s) HFA Inhaler 2 Puff(s) Inhalation every 6 hours PRN Bronchospasm    ___________  Active diet:  Diet, Soft:   DASH/TLC Sodium & Cholesterol Restricted  Supplement Feeding Modality:  Oral  Ensure Enlive Cans or Servings Per Day:  1       Frequency:  Two Times a day  ___________________    ==================>> VITAL SIGNS <<==================    Vital Signs Last 24 HrsT(C): 36.2 (04-28-21 @ 19:16)  T(F): 97.2 (04-28-21 @ 19:16), Max: 98.2 (04-28-21 @ 15:16)  HR: 105 (04-28-21 @ 19:00) (86 - 115)  BP: 122/83 (04-28-21 @ 18:00)  RR: 22 (04-28-21 @ 19:00) (12 - 31)  SpO2: 86% (04-28-21 @ 19:00) (86% - 99%)        POCT Blood Glucose.: 144 mg/dL (28 Apr 2021 16:35)  POCT Blood Glucose.: 266 mg/dL (28 Apr 2021 10:01)     ==================>> LAB AND IMAGING <<==================                        14.5   14.56 )-----------( 115      ( 28 Apr 2021 06:37 )             44.6        04-28    145  |  111<H>  |  32<H>  ----------------------------<  116<H>  3.3<L>   |  22  |  1.21    Ca    8.5      28 Apr 2021 06:37  Phos  4.3     04-28  Mg     2.0     04-28    WBC count:   14.56 <<== ,  16.71 <<== ,  14.80 <<== ,  14.76 <<== ,  15.38 <<==   Hemoglobin:   14.5 <<==,  15.1 <<==,  14.3 <<==,  14.1 <<==,  15.1 <<==  platelets:  115 <==, 176 <==, 183 <==, 323 <==, 374 <==, 390 <==, 385 <==    Creatinine:  1.21  <<==, 1.38  <<==, 1.61  <<==, 2.00  <<==, 1.93  <<==, 1.93  <<==  Sodium:   145  <==, 144  <==, 141  <==, 145  <==, 141  <==, 146  <==, 142  <==       AST:          33 <== , 35 <== , 36 <==      ALT:        29  <== , 29  <== , 33  <==      AP:        100  <=, 98  <=, 98  <=     Bili:        0.9  <=, 0.9  <=, 0.7  <=    ___________________________________________________________________________________  ===============>>  A S S E S S M E N T   A N D   P L A N <<===============  ------------------------------------------------------------------------------------------    · Assessment	  76 M with PMH of HTN and DM presented to ED with complaint of hiccups.  Found to be COVID positive on admission. admitted to medicine for acute hypoxemic respiratory failure due to bilateral pneumonia in setting of COVID 19. Started on Remdesivir, dexamethasone and supplemental oxygen. Pt was also found to have Hyponatremia likely due to hypovolemia, Followed by Nephro, started on IVF with improvement.  Pt was also found to have uncontrolled type 2 diabetes worsening with steroid use. Endo following  as well    Problem/Plan - 1:  ·  Problem: Acute respiratory failure with hypoxia.  Plan: -bilateral pneumonia in setting of COVID 19  - post Remdesivir / Dexamethasone per protocol    -cont  with oxygen and titrated down as tolerated   -pulm following   - ICU care appreciated   -continuous pulse ox monitoring  -cont prophylactic Lovenox   -cont supportive care   -daily labs CBC/CMP/CRP...   - Continue Current medications otherwise and monitor.  supportive care  - encourage PO intake   -nutrition / PTOOB    Problem/Plan - 2:  ·  Problem: Insulin dependent type 2 diabetes mellitus, uncontrolled, in setting of infection and steroids home   -endo consulted and appreciated recom and mgmt   -diabetic diet   -a1c 13.8  - Continue Current medications per endo otherwise and monitor FS    Problem/Plan - 3:  ·  Problem: HTN (hypertension).    -controlled, cont home dose of Losartan   -mon BP.     Problem/Plan - 4:  ·  Problem: Hyponatremia.    -likely due to hypovolemia, better   -Nephro following.   - IVH as needed     Avoid nephrotoxic medications.     GI/DVT Prophylaxis per protocol.   OOB to chair encouraged  PO intake and hydration encouraged     ___________________________  H. MARILEE Gayle.  (covering today)   Pager: [phone]

## 2021-04-28 NOTE — PHYSICAL THERAPY INITIAL EVALUATION ADULT - IMPAIRMENTS FOUND, PT EVAL
aerobic capacity/endurance/gait, locomotion, and balance/muscle strength/posture/ROM/ventilation and respiration/gas exchange

## 2021-04-28 NOTE — CHART NOTE - NSCHARTNOTEFT_GEN_A_CORE
Called grand daughter at [phone] to give daily update but went straight to . I left  to call back later.

## 2021-04-28 NOTE — PROGRESS NOTE ADULT - SUBJECTIVE AND OBJECTIVE BOX
INTERVAL HPI/OVERNIGHT EVENTS: ***    PRESSORS: [ ] YES [ ] NO  WHICH:    ANTIBIOTICS:                  DATE STARTED:  ANTIBIOTICS:                  DATE STARTED:  ANTIBIOTICS:                  DATE STARTED:    Antimicrobial:    Cardiovascular:    Pulmonary:  ALBUTerol    90 MICROgram(s) HFA Inhaler 2 Puff(s) Inhalation every 6 hours PRN  montelukast 10 milliGRAM(s) Oral daily    Hematalogic:  apixaban 2.5 milliGRAM(s) Oral every 12 hours    Other:  ascorbic acid 500 milliGRAM(s) Oral daily  atorvastatin 40 milliGRAM(s) Oral at bedtime  chlorhexidine 2% Cloths 1 Application(s) Topical <User Schedule>  cholecalciferol 400 Unit(s) Oral daily  dextrose 50% Injectable 25 Gram(s) IV Push once  famotidine    Tablet 20 milliGRAM(s) Oral two times a day  insulin glargine Injectable (LANTUS) 12 Unit(s) SubCutaneous at bedtime  insulin lispro (ADMELOG) corrective regimen sliding scale   SubCutaneous three times a day before meals  insulin lispro Injectable (ADMELOG) 4 Unit(s) SubCutaneous three times a day before meals  polyethylene glycol 3350 17 Gram(s) Oral daily  QUEtiapine 25 milliGRAM(s) Oral at bedtime  senna 2 Tablet(s) Oral at bedtime  sodium bicarbonate 650 milliGRAM(s) Oral three times a day  zinc sulfate 220 milliGRAM(s) Oral daily    ALBUTerol    90 MICROgram(s) HFA Inhaler 2 Puff(s) Inhalation every 6 hours PRN  apixaban 2.5 milliGRAM(s) Oral every 12 hours  ascorbic acid 500 milliGRAM(s) Oral daily  atorvastatin 40 milliGRAM(s) Oral at bedtime  chlorhexidine 2% Cloths 1 Application(s) Topical <User Schedule>  cholecalciferol 400 Unit(s) Oral daily  dextrose 50% Injectable 25 Gram(s) IV Push once  famotidine    Tablet 20 milliGRAM(s) Oral two times a day  insulin glargine Injectable (LANTUS) 12 Unit(s) SubCutaneous at bedtime  insulin lispro (ADMELOG) corrective regimen sliding scale   SubCutaneous three times a day before meals  insulin lispro Injectable (ADMELOG) 4 Unit(s) SubCutaneous three times a day before meals  montelukast 10 milliGRAM(s) Oral daily  polyethylene glycol 3350 17 Gram(s) Oral daily  QUEtiapine 25 milliGRAM(s) Oral at bedtime  senna 2 Tablet(s) Oral at bedtime  sodium bicarbonate 650 milliGRAM(s) Oral three times a day  zinc sulfate 220 milliGRAM(s) Oral daily    Drug Dosing Weight  Height (cm): 172.7 (16 Apr 2021 13:26)  Weight (kg): 78.9 (16 Apr 2021 13:26)  BMI (kg/m2): 26.5 (16 Apr 2021 13:26)  BSA (m2): 1.93 (16 Apr 2021 13:26)    CENTRAL LINE: [ ] YES [ ] NO  LOCATION:         GUPTA: [ ] YES [ ] NO          A-LINE:  [ ] YES [ ] NO  LOCATION:             ICU Vital Signs Last 24 Hrs  T(C): 36.7 (28 Apr 2021 04:00), Max: 37 (27 Apr 2021 15:00)  T(F): 98 (28 Apr 2021 04:00), Max: 98.6 (27 Apr 2021 15:00)  HR: 113 (28 Apr 2021 06:00) (86 - 118)  BP: 114/84 (28 Apr 2021 06:00) (88/61 - 148/80)  BP(mean): 90 (28 Apr 2021 06:00) (64 - 111)  ABP: --  ABP(mean): --  RR: 31 (28 Apr 2021 06:00) (12 - 36)  SpO2: 97% (28 Apr 2021 05:00) (73% - 98%)            04-27 @ 07:01  -  04-28 @ 07:00  --------------------------------------------------------  IN: 706 mL / OUT: 500 mL / NET: 206 mL              PHYSICAL EXAM:    GENERAL: NAD  EYES: EOMI, PERRLA  NECK: Supple, No JVD; Normal thyroid; Trachea midline: No LAD   NERVOUS SYSTEM:  Alert & Oriented X3,  Motor Strength 5/5 B/L upper and lower extremities; DTRs 2+ intact and symmetric  CHEST/LUNG: No rales, rhonchi, wheezing, breath sounds present bilaterally  HEART: Regular rate and rhythm; No murmurs, no gallops  ABDOMEN: Soft, Nontender, Nondistended; Bowel sounds present, no pain or masses on palpation  : voiding well, Gupta in place  EXTREMITIES:  2+ Peripheral Pulses, No clubbing, cyanosis, or edema  SKIN: warm, intact, no lesions         LABS:  CBC Full  -  ( 28 Apr 2021 06:37 )  WBC Count : 14.56 K/uL  RBC Count : 5.08 M/uL  Hemoglobin : 14.5 g/dL  Hematocrit : 44.6 %  Platelet Count - Automated : 115 K/uL  Mean Cell Volume : 87.8 fl  Mean Cell Hemoglobin : 28.5 pg  Mean Cell Hemoglobin Concentration : 32.5 gm/dL  Auto Neutrophil # : x  Auto Lymphocyte # : x  Auto Monocyte # : x  Auto Eosinophil # : x  Auto Basophil # : x  Auto Neutrophil % : x  Auto Lymphocyte % : x  Auto Monocyte % : x  Auto Eosinophil % : x  Auto Basophil % : x    04-28    145  |  111<H>  |  32<H>  ----------------------------<  116<H>  3.3<L>   |  22  |  1.21    Ca    8.5      28 Apr 2021 06:37  Phos  4.3     04-28  Mg     2.0     04-28      PT/INR - ( 26 Apr 2021 15:52 )   PT: 16.0 sec;   INR: 1.36 ratio         PTT - ( 27 Apr 2021 05:28 )  PTT:90.8 sec        RADIOLOGY & ADDITIONAL STUDIES REVIEWED:  ***    [ ]GOALS OF CARE DISCUSSION WITH PATIENT/FAMILY/PROXY:    CRITICAL CARE TIME SPENT: 35 minutes   INTERVAL HPI/OVERNIGHT EVENTS: patient remains on high flow 60/100, not tolerated 40/90. Patient desaturates easily to 80's, on Seroquel at bedtime, tolerating well Eliquis. Patient with poor oral intake and low energy, started on Modafinil and changed diet to soft w/ ensure shakes.     PRESSORS: [ ] YES [ x] NO    Antimicrobial:    Cardiovascular:    Pulmonary:  ALBUTerol    90 MICROgram(s) HFA Inhaler 2 Puff(s) Inhalation every 6 hours PRN  montelukast 10 milliGRAM(s) Oral daily    Hematalogic:  apixaban 2.5 milliGRAM(s) Oral every 12 hours    Other:  ascorbic acid 500 milliGRAM(s) Oral daily  atorvastatin 40 milliGRAM(s) Oral at bedtime  chlorhexidine 2% Cloths 1 Application(s) Topical <User Schedule>  cholecalciferol 400 Unit(s) Oral daily  dextrose 50% Injectable 25 Gram(s) IV Push once  famotidine    Tablet 20 milliGRAM(s) Oral two times a day  insulin glargine Injectable (LANTUS) 12 Unit(s) SubCutaneous at bedtime  insulin lispro (ADMELOG) corrective regimen sliding scale   SubCutaneous three times a day before meals  insulin lispro Injectable (ADMELOG) 4 Unit(s) SubCutaneous three times a day before meals  polyethylene glycol 3350 17 Gram(s) Oral daily  QUEtiapine 25 milliGRAM(s) Oral at bedtime  senna 2 Tablet(s) Oral at bedtime  sodium bicarbonate 650 milliGRAM(s) Oral three times a day  zinc sulfate 220 milliGRAM(s) Oral daily    ALBUTerol    90 MICROgram(s) HFA Inhaler 2 Puff(s) Inhalation every 6 hours PRN  apixaban 2.5 milliGRAM(s) Oral every 12 hours  ascorbic acid 500 milliGRAM(s) Oral daily  atorvastatin 40 milliGRAM(s) Oral at bedtime  chlorhexidine 2% Cloths 1 Application(s) Topical <User Schedule>  cholecalciferol 400 Unit(s) Oral daily  dextrose 50% Injectable 25 Gram(s) IV Push once  famotidine    Tablet 20 milliGRAM(s) Oral two times a day  insulin glargine Injectable (LANTUS) 12 Unit(s) SubCutaneous at bedtime  insulin lispro (ADMELOG) corrective regimen sliding scale   SubCutaneous three times a day before meals  insulin lispro Injectable (ADMELOG) 4 Unit(s) SubCutaneous three times a day before meals  montelukast 10 milliGRAM(s) Oral daily  polyethylene glycol 3350 17 Gram(s) Oral daily  QUEtiapine 25 milliGRAM(s) Oral at bedtime  senna 2 Tablet(s) Oral at bedtime  sodium bicarbonate 650 milliGRAM(s) Oral three times a day  zinc sulfate 220 milliGRAM(s) Oral daily    Drug Dosing Weight  Height (cm): 172.7 (16 Apr 2021 13:26)  Weight (kg): 78.9 (16 Apr 2021 13:26)  BMI (kg/m2): 26.5 (16 Apr 2021 13:26)  BSA (m2): 1.93 (16 Apr 2021 13:26)    CENTRAL LINE: [ ] YES [ ] NO  LOCATION:         GUPTA: [ ] YES [ ] NO          A-LINE:  [ ] YES [ ] NO  LOCATION:             ICU Vital Signs Last 24 Hrs  T(C): 36.7 (28 Apr 2021 04:00), Max: 37 (27 Apr 2021 15:00)  T(F): 98 (28 Apr 2021 04:00), Max: 98.6 (27 Apr 2021 15:00)  HR: 113 (28 Apr 2021 06:00) (86 - 118)  BP: 114/84 (28 Apr 2021 06:00) (88/61 - 148/80)  BP(mean): 90 (28 Apr 2021 06:00) (64 - 111)  ABP: --  ABP(mean): --  RR: 31 (28 Apr 2021 06:00) (12 - 36)  SpO2: 97% (28 Apr 2021 05:00) (73% - 98%)            04-27 @ 07:01  -  04-28 @ 07:00  --------------------------------------------------------  IN: 706 mL / OUT: 500 mL / NET: 206 mL              PHYSICAL EXAM:    GENERAL: NAD, on high flow 60/100 sat 90%, desaturates with movement  EYES: EOMI, PERRLA  NECK: Supple, No JVD; Normal thyroid; Trachea midline: No LAD   NERVOUS SYSTEM:  Alert & Oriented X3,  Motor Strength 5/5 B/L upper and lower extremities; DTRs 2+ intact and symmetric  CHEST/LUNG: No rales, rhonchi, wheezing, breath sounds present bilaterally  HEART: Regular rate and rhythm; No murmurs, no gallops  ABDOMEN: Soft, Nontender, Nondistended; Bowel sounds present, no pain or masses on palpation  : voiding well  EXTREMITIES:  2+ Peripheral Pulses, No clubbing, cyanosis, or edema  SKIN: warm, intact, no lesions         LABS:  CBC Full  -  ( 28 Apr 2021 06:37 )  WBC Count : 14.56 K/uL  RBC Count : 5.08 M/uL  Hemoglobin : 14.5 g/dL  Hematocrit : 44.6 %  Platelet Count - Automated : 115 K/uL  Mean Cell Volume : 87.8 fl  Mean Cell Hemoglobin : 28.5 pg  Mean Cell Hemoglobin Concentration : 32.5 gm/dL  Auto Neutrophil # : x  Auto Lymphocyte # : x  Auto Monocyte # : x  Auto Eosinophil # : x  Auto Basophil # : x  Auto Neutrophil % : x  Auto Lymphocyte % : x  Auto Monocyte % : x  Auto Eosinophil % : x  Auto Basophil % : x    04-28    145  |  111<H>  |  32<H>  ----------------------------<  116<H>  3.3<L>   |  22  |  1.21    Ca    8.5      28 Apr 2021 06:37  Phos  4.3     04-28  Mg     2.0     04-28      PT/INR - ( 26 Apr 2021 15:52 )   PT: 16.0 sec;   INR: 1.36 ratio         PTT - ( 27 Apr 2021 05:28 )  PTT:90.8 sec        RADIOLOGY & ADDITIONAL STUDIES REVIEWED:  ***    [ ]GOALS OF CARE DISCUSSION WITH PATIENT/FAMILY/PROXY:    CRITICAL CARE TIME SPENT: 35 minutes   INTERVAL HPI/OVERNIGHT EVENTS: patient remains on high flow 60/100, not tolerated 40/90. Patient desaturates easily to 80's, on Seroquel at bedtime, tolerating Eliquis. Patient with poor oral intake and low energy, started on Modafinil and changed diet to soft w/ ensure shakes.     PRESSORS: [ ] YES [ x] NO    Antimicrobial:    Cardiovascular:    Pulmonary:  ALBUTerol    90 MICROgram(s) HFA Inhaler 2 Puff(s) Inhalation every 6 hours PRN  montelukast 10 milliGRAM(s) Oral daily    Hematalogic:  apixaban 2.5 milliGRAM(s) Oral every 12 hours    Other:  ascorbic acid 500 milliGRAM(s) Oral daily  atorvastatin 40 milliGRAM(s) Oral at bedtime  chlorhexidine 2% Cloths 1 Application(s) Topical <User Schedule>  cholecalciferol 400 Unit(s) Oral daily  dextrose 50% Injectable 25 Gram(s) IV Push once  famotidine    Tablet 20 milliGRAM(s) Oral two times a day  insulin glargine Injectable (LANTUS) 12 Unit(s) SubCutaneous at bedtime  insulin lispro (ADMELOG) corrective regimen sliding scale   SubCutaneous three times a day before meals  insulin lispro Injectable (ADMELOG) 4 Unit(s) SubCutaneous three times a day before meals  polyethylene glycol 3350 17 Gram(s) Oral daily  QUEtiapine 25 milliGRAM(s) Oral at bedtime  senna 2 Tablet(s) Oral at bedtime  sodium bicarbonate 650 milliGRAM(s) Oral three times a day  zinc sulfate 220 milliGRAM(s) Oral daily    ALBUTerol    90 MICROgram(s) HFA Inhaler 2 Puff(s) Inhalation every 6 hours PRN  apixaban 2.5 milliGRAM(s) Oral every 12 hours  ascorbic acid 500 milliGRAM(s) Oral daily  atorvastatin 40 milliGRAM(s) Oral at bedtime  chlorhexidine 2% Cloths 1 Application(s) Topical <User Schedule>  cholecalciferol 400 Unit(s) Oral daily  dextrose 50% Injectable 25 Gram(s) IV Push once  famotidine    Tablet 20 milliGRAM(s) Oral two times a day  insulin glargine Injectable (LANTUS) 12 Unit(s) SubCutaneous at bedtime  insulin lispro (ADMELOG) corrective regimen sliding scale   SubCutaneous three times a day before meals  insulin lispro Injectable (ADMELOG) 4 Unit(s) SubCutaneous three times a day before meals  montelukast 10 milliGRAM(s) Oral daily  polyethylene glycol 3350 17 Gram(s) Oral daily  QUEtiapine 25 milliGRAM(s) Oral at bedtime  senna 2 Tablet(s) Oral at bedtime  sodium bicarbonate 650 milliGRAM(s) Oral three times a day  zinc sulfate 220 milliGRAM(s) Oral daily    Drug Dosing Weight  Height (cm): 172.7 (16 Apr 2021 13:26)  Weight (kg): 78.9 (16 Apr 2021 13:26)  BMI (kg/m2): 26.5 (16 Apr 2021 13:26)  BSA (m2): 1.93 (16 Apr 2021 13:26)    CENTRAL LINE: [ ] YES [ ] NO  LOCATION:         GUPTA: [ ] YES [ ] NO          A-LINE:  [ ] YES [ ] NO  LOCATION:             ICU Vital Signs Last 24 Hrs  T(C): 36.7 (28 Apr 2021 04:00), Max: 37 (27 Apr 2021 15:00)  T(F): 98 (28 Apr 2021 04:00), Max: 98.6 (27 Apr 2021 15:00)  HR: 113 (28 Apr 2021 06:00) (86 - 118)  BP: 114/84 (28 Apr 2021 06:00) (88/61 - 148/80)  BP(mean): 90 (28 Apr 2021 06:00) (64 - 111)  ABP: --  ABP(mean): --  RR: 31 (28 Apr 2021 06:00) (12 - 36)  SpO2: 97% (28 Apr 2021 05:00) (73% - 98%)            04-27 @ 07:01  -  04-28 @ 07:00  --------------------------------------------------------  IN: 706 mL / OUT: 500 mL / NET: 206 mL              PHYSICAL EXAM:    GENERAL: NAD, on high flow 60/100 sat 90%, desaturates with movement  EYES: EOMI, PERRLA  NECK: Supple, No JVD; Normal thyroid; Trachea midline: No LAD   NERVOUS SYSTEM:  Alert & Oriented X3,  Motor Strength 5/5 B/L upper and lower extremities; DTRs 2+ intact and symmetric  CHEST/LUNG: No rales, rhonchi, wheezing, breath sounds present bilaterally  HEART: Regular rate and rhythm; No murmurs, no gallops  ABDOMEN: Soft, Nontender, Nondistended; Bowel sounds present, no pain or masses on palpation  : voiding well  EXTREMITIES:  2+ Peripheral Pulses, No clubbing, cyanosis, or edema  SKIN: warm, intact, no lesions         LABS:  CBC Full  -  ( 28 Apr 2021 06:37 )  WBC Count : 14.56 K/uL  RBC Count : 5.08 M/uL  Hemoglobin : 14.5 g/dL  Hematocrit : 44.6 %  Platelet Count - Automated : 115 K/uL  Mean Cell Volume : 87.8 fl  Mean Cell Hemoglobin : 28.5 pg  Mean Cell Hemoglobin Concentration : 32.5 gm/dL  Auto Neutrophil # : x  Auto Lymphocyte # : x  Auto Monocyte # : x  Auto Eosinophil # : x  Auto Basophil # : x  Auto Neutrophil % : x  Auto Lymphocyte % : x  Auto Monocyte % : x  Auto Eosinophil % : x  Auto Basophil % : x    04-28    145  |  111<H>  |  32<H>  ----------------------------<  116<H>  3.3<L>   |  22  |  1.21    Ca    8.5      28 Apr 2021 06:37  Phos  4.3     04-28  Mg     2.0     04-28      PT/INR - ( 26 Apr 2021 15:52 )   PT: 16.0 sec;   INR: 1.36 ratio         PTT - ( 27 Apr 2021 05:28 )  PTT:90.8 sec        RADIOLOGY & ADDITIONAL STUDIES REVIEWED:  ***    [ ]GOALS OF CARE DISCUSSION WITH PATIENT/FAMILY/PROXY:    CRITICAL CARE TIME SPENT: 35 minutes

## 2021-04-28 NOTE — PHYSICAL THERAPY INITIAL EVALUATION ADULT - GENERAL OBSERVATIONS, REHAB EVAL
Consult received, chart reviewed. Patient received supine in bed, NAD, +HFNC, NRB, + Cardiac monitoring, RASS 0; CAM ICU (-) Patient agreed to EVALUATION from Physical Therapist.

## 2021-04-29 NOTE — PROGRESS NOTE ADULT - ASSESSMENT
INOCENCIA:  likely pre-renal improved with ivf initialy  REnal function worsening today - sec to hypotension  optimize BP  UA with blood, check renal sonogram   HOLD losartan  (last dose on 4/23)  - Monitor BMP.    Hyponatremia/Hypernatremia  -sec to free water loss  -improved  -Monitor serum Na  -Avoid overcorrection >8mEq in 24 hrs       CKD 2/3:  Has risk (Diabetes) and minimal proteinuria.  - Monitor BMP.    Metabolic Acidosis:  Mixed AG and NAG. He may have some Tubulo-interstitial Disease  - Monitor for now.  -Continue PO NaHCO3    COVID   MOnitor temp/ O2  COntinue current care    Hypokalemia  repelted by team   monitor serum K

## 2021-04-29 NOTE — PROGRESS NOTE ADULT - SUBJECTIVE AND OBJECTIVE BOX
Time of Visit:  Patient seen and examined.     MEDICATIONS  (STANDING):  apixaban 2.5 milliGRAM(s) Oral every 12 hours  ascorbic acid 500 milliGRAM(s) Oral daily  atorvastatin 40 milliGRAM(s) Oral at bedtime  chlorhexidine 2% Cloths 1 Application(s) Topical <User Schedule>  cholecalciferol 400 Unit(s) Oral daily  famotidine    Tablet 20 milliGRAM(s) Oral two times a day  insulin glargine Injectable (LANTUS) 12 Unit(s) SubCutaneous at bedtime  insulin lispro (ADMELOG) corrective regimen sliding scale   SubCutaneous three times a day before meals  insulin lispro Injectable (ADMELOG) 4 Unit(s) SubCutaneous three times a day before meals  montelukast 10 milliGRAM(s) Oral daily  polyethylene glycol 3350 17 Gram(s) Oral daily  senna 2 Tablet(s) Oral at bedtime  sodium bicarbonate 650 milliGRAM(s) Oral three times a day  zinc sulfate 220 milliGRAM(s) Oral daily      MEDICATIONS  (PRN):  ALBUTerol    90 MICROgram(s) HFA Inhaler 2 Puff(s) Inhalation every 6 hours PRN Bronchospasm  OLANZapine 2.5 milliGRAM(s) Oral two times a day PRN agitation       Medications up to date at time of exam.      PHYSICAL EXAMINATION:  Patient has no new complaints.  GENERAL: The patient is a well-developed, well-nourished, in no apparent distress.     Vital Signs Last 24 Hrs  T(C): 36.4 (29 Apr 2021 15:45), Max: 36.6 (29 Apr 2021 04:00)  T(F): 97.6 (29 Apr 2021 15:45), Max: 97.9 (29 Apr 2021 12:00)  HR: 97 (29 Apr 2021 20:31) (93 - 120)  BP: 124/76 (29 Apr 2021 19:00) (78/56 - 140/90)  BP(mean): 89 (29 Apr 2021 19:00) (60 - 101)  RR: 21 (29 Apr 2021 20:31) (16 - 30)  SpO2: 96% (29 Apr 2021 20:31) (84% - 100%)   (if applicable)    Chest Tube (if applicable)    HEENT: Head is normocephalic and atraumatic. Extraocular muscles are intact. Mucous membranes are moist.  + HFNC    NECK: Supple, no palpable adenopathy.    LUNGS: Clear to auscultation, no wheezing, rales, or rhonchi.    HEART: Regular rate and rhythm without murmur.    ABDOMEN: Soft, nontender, and nondistended.  No hepatosplenomegaly is noted.    : No painful voiding, no pelvic pain    EXTREMITIES: Without any cyanosis, clubbing, rash, lesions or edema.    NEUROLOGIC: Awake, alert, oriented, grossly intact    SKIN: Warm, dry, good turgor.      LABS:                        14.5   13.72 )-----------( 95       ( 29 Apr 2021 04:42 )             44.8     04-29    146<H>  |  114<H>  |  34<H>  ----------------------------<  156<H>  4.0   |  26  |  1.33<H>    Ca    8.4      29 Apr 2021 04:42  Phos  3.6     04-29  Mg     2.0     04-29                D-Dimer Assay, Quantitative: 10690 ng/mL DDU (04-29-21 @ 04:42)            MICROBIOLOGY: (if applicable)    RADIOLOGY & ADDITIONAL STUDIES:  EKG:   CXR:  < from: Xray Chest 1 View-PORTABLE IMMEDIATE (Xray Chest 1 View-PORTABLE IMMEDIATE .) (04.28.21 @ 09:54) >    EXAM:  XR CHEST PORTABLE IMMED 1V                            PROCEDURE DATE:  04/28/2021          INTERPRETATION:  CLINICAL INDICATION: 76 years  Male with covid.    COMPARISON: 4/26/2021    AP view of the chest demonstrates persistent bilateral lower lung interstitial and airspace infiltrates.. There is no pleural effusion. There is no pneumothorax.    The heart is normal in size. There is no mediastinal or hilar mass.    The pulmonary vasculature is normal.    Mild thoracic degenerative changes are present.    IMPRESSION:    Bilateral infiltrates unchanged.            LA NEAL MD; Attending Radiologist  This document has been electronically signed. Apr 28 2021 11:20AM    < end of copied text >  ECHO:    IMPRESSION: 76y Male PAST MEDICAL & SURGICAL HISTORY:  HTN (hypertension)    DM (diabetes mellitus)    No significant past surgical history     p/w           IMP: This is  76 yr old man  from home with PMH of HTN and DM initially presented to ED  complaint of hiccups on 4/16/2021, associated with cough, fever,chills and myalgias. Pt was admitted to Medicine floor for COVid 19 infection requiring supplemental O2 with NC 6L then NRB and still desaturating.  ICU was consulted for acute hypoxic respiratory failure 2/2 COVID-19 now requiring HFNC 50/100, saturation at 91%. He is s/p bicarb gtt for metabolic acidosis, c/w PO TID bicarb as per Nephro.          Assessment:  1. Acute Hypoxic Respiratory Failure   2. COVID19 infection    3. INOCENCIA   4. Mixed AG +  NAGMA   5. Uncontrolled DM-2   6. Hypernatremia   7. HTN     Plan  - CXR stable   - Continue HFNC support  - IV fluid resuscitation   - Completed remdesivir  - Cont. Dexamethasone   - Renal fx improving   - Re-adjust eliquis dose in setting of elevated D Dimer   - Close glucose monitoring   - Monitor fingerstick glucose   - DVT and stress ulcer prophylaxis  - Prognosis is guarded .      discussed with ICU team

## 2021-04-29 NOTE — PROGRESS NOTE ADULT - SUBJECTIVE AND OBJECTIVE BOX
[facility]   MD BART FLANNERY MD RUORU WONG, PA    TEL:  OFFICE: [phone]  DR AYERS CELL: [phone]  JOSÉ MIGUEL ABDI CELL: [phone]  DR. AGUIRRE CELL: [phone]  DR. MCCLAIN CELL: [phone]    FROM 5 PM - 7 AM PLEASE CALL ANSWERING SERVICE: [phone]    RENAL FOLLOW UP NOTE--Date of Service 04-29-21 @ 12:36  --------------------------------------------------------------------------------  HPI:      Pt covid 19+    PAST HISTORY  --------------------------------------------------------------------------------  No significant changes to PMH, PSH, FHx, SHx, unless otherwise noted    ALLERGIES & MEDICATIONS  --------------------------------------------------------------------------------  Allergies    No Known Allergies    Intolerances      Standing Inpatient Medications  apixaban 2.5 milliGRAM(s) Oral every 12 hours  ascorbic acid 500 milliGRAM(s) Oral daily  atorvastatin 40 milliGRAM(s) Oral at bedtime  chlorhexidine 2% Cloths 1 Application(s) Topical <User Schedule>  cholecalciferol 400 Unit(s) Oral daily  famotidine    Tablet 20 milliGRAM(s) Oral two times a day  insulin glargine Injectable (LANTUS) 12 Unit(s) SubCutaneous at bedtime  insulin lispro (ADMELOG) corrective regimen sliding scale   SubCutaneous three times a day before meals  insulin lispro Injectable (ADMELOG) 4 Unit(s) SubCutaneous three times a day before meals  montelukast 10 milliGRAM(s) Oral daily  polyethylene glycol 3350 17 Gram(s) Oral daily  senna 2 Tablet(s) Oral at bedtime  sodium bicarbonate 650 milliGRAM(s) Oral three times a day  zinc sulfate 220 milliGRAM(s) Oral daily    PRN Inpatient Medications  ALBUTerol    90 MICROgram(s) HFA Inhaler 2 Puff(s) Inhalation every 6 hours PRN  OLANZapine 2.5 milliGRAM(s) Oral two times a day PRN      REVIEW OF SYSTEMS  --------------------------------------------------------------------------------  General: no fever  MSK: no edema     VITALS/PHYSICAL EXAM  --------------------------------------------------------------------------------  T(C): 36.4 (04-29-21 @ 06:00), Max: 36.8 (04-28-21 @ 15:16)  HR: 114 (04-29-21 @ 12:04) (93 - 120)  BP: 104/74 (04-29-21 @ 09:00) (90/68 - 140/90)  RR: 27 (04-29-21 @ 09:00) (16 - 27)  SpO2: 96% (04-29-21 @ 12:04) (84% - 99%)  Wt(kg): --        04-28-21 @ 07:01  -  04-29-21 @ 07:00  --------------------------------------------------------  IN: 560 mL / OUT: 0 mL / NET: 560 mL        LABS/STUDIES  --------------------------------------------------------------------------------              14.5   13.72 >-----------<  95       [04-29-21 @ 04:42]              44.8     146  |  114  |  34  ----------------------------<  156      [04-29-21 @ 04:42]  4.0   |  26  |  1.33        Ca     8.4     [04-29-21 @ 04:42]      Mg     2.0     [04-29-21 @ 04:42]      Phos  3.6     [04-29-21 @ 04:42]                [04-29-21 @ 04:42]    Creatinine Trend:  SCr 1.33 [04-29 @ 04:42]  SCr 1.49 [04-28 @ 20:31]  SCr 1.21 [04-28 @ 06:37]  SCr 1.38 [04-27 @ 05:27]  SCr 1.61 [04-26 @ 03:57]    Urinalysis - [04-22-21 @ 14:32]      Color Yellow / Appearance Clear / SG 1.015 / pH 6.5      Gluc 1000 / Ketone Negative  / Bili Negative / Urobili Negative       Blood Small / Protein 30 / Leuk Est Negative / Nitrite Negative      RBC 5-10 / WBC 0-2 / Hyaline  / Gran  / Sq Epi  / Non Sq Epi Few / Bacteria Few    Urine Creatinine 51      [04-22-21 @ 14:32]  Urine Sodium 32      [04-22-21 @ 14:32]    Ferritin 1878      [04-29-21 @ 10:24]  TSH 0.96      [04-17-21 @ 06:26]  Lipid: chol 96, TG 77, HDL 42, LDL --      [04-17-21 @ 06:26]

## 2021-04-29 NOTE — PROGRESS NOTE ADULT - ASSESSMENT
77 y/o M from home with PMH of HTN and DM initially presented to ED complaint of hiccups on 4/16/2021, associated with cough, fever,chills and myalgias. Pt was admitted to Medicine floor for COVID 19 infection requiring supplemental O2 with NC 6L then NRB and still desaturating.  ICU was consulted for acute hypoxic respiratory failure 2/2 COVID-19 now requiring HFNC 50/100, saturation at 91%. He is s/p bicarb gtt for metabolic acidosis, c/w PO TID bicarb as per Nephro.      Assessment:  1. Acute Hypoxic Respiratory Failure secondary to COVID19 infection    2. INOCENCIA   3. Mixed AG +  NAGMA   4. Uncontrolled DM-2   5. Hyponatremia/Hypernatremia   6. HTN     Plan:    =====================[CNS ]==============================  # No issues:   - Mental status at baseline    - Continue w/ Seroquel at bedtime for agitation   - Off Precedex drip     =====================[CVS ]===============================  # HTN :   - Pt was on losartan 50mg at home, last dose 4/23   - Holding Losartan due INOCENCIA; Cr 1.93>2.0>1.6  - Monitor BP and adjust meds as needed      =====================[RESP ]==============================  # Acute Hypoxic Resp Failure 2/2 COVID-19 infection :   - Hypoxia worsening, now on HFNC 60/100 wean off as tolerated    - Low threshold for intubation, pt's grand-daughter Owen SMYTH, agrees for intubation if the need arises  - Completed Remdesivir 4/20  - Completed Decadron 4/26  - CXR (4/23) Bilateral perihilar and bibasilar multifocal and diffuse ill-defined airspace opacities  - S/p heparin drip for elevated D-dimer   - C/w Eliquis 2.5 mg BID    - C/w Vit c, Zn , Vit D supplementation   - US doppler of LE no DVT    =====================[ GI ]================================  # No issues :   - C/w Soft diabetic diet + ensure shakes   - Will give One dose modafinil      ====================[ RENAL ]=============================   # INOCENCIA :   - likely 2/2 COVID19 vs pre -renal (less likely given no response to IVF)   - S/p bicarb drip  - C/w PO bicarb TID as per nephro   - Losartan on hold (last dose 4/23)   - Monitor urine output, monitor I &O s closely   - Monitor and supplement electrolytes as needed   - Renal US ordered (pending) **  - Nephro Dr Brown following     # Mixed AG + NAGMA :   - HCO3 19, could be due to hyperchloremic MA due to NS vs worsening INOCENCIA   - C/w sodium bicarb 650 mg TID as per nephrology     # Hyponatremia/Hypernatremia :   - Initially pt was hyponatremic, resolved s/p IVF   - Na 145  - Monitor BMP     =====================[ ID ]================================  # COVID -19 :  - Plan as above     ===================[ HEME/ONC ]===========================  # No issues :  - Hb and Plt stable   - Monitor cbc daily     =====================[ ENDO ]=============================  # Uncontrolled DM-2   - A1c 13.6   - BS controlled on current regimen   - C/w Lantus 12 units at bedtime and Admelog 4 units before meals   - Endo Dr Hall  following   - Monitor BS     ==================[ SKIN/ CATHETERS ]======================  # no skin breaks   ==================[ PROPHYLAXIS ]==========================   # DVT: Lovenox   # GI: Pepcid     ====================[ DISPO ]==============================   - Patient remains acute, will continue monitoring in ICU     ===================[ PROGNOSIS ]===========================  - Guarded      77 y/o M from home with PMH of HTN and DM initially presented to ED complaint of hiccups on 4/16/2021, associated with cough, fever,chills and myalgias. Pt was admitted to Medicine floor for COVID 19 infection requiring supplemental O2 with NC 6L then NRB and still desaturating.  ICU was consulted for acute hypoxic respiratory failure 2/2 COVID-19 now requiring HFNC 50/100, saturation at 91%. He is s/p bicarb gtt for metabolic acidosis, c/w PO TID bicarb as per Nephro.    Assessment:  1. Acute Hypoxic Respiratory Failure secondary to COVID19 infection    2. INOCENCIA   3. Mixed AG +  NAGMA   4. Uncontrolled DM-2   5. Hyponatremia/Hypernatremia   6. HTN     Plan:    =====================[CNS ]==============================  # No issues:   - Mental status at baseline    - Discontinued Seroquel  - Started on Zyprexa PRN for agitation   - Off Precedex drip     =====================[CVS ]===============================  # HTN :   - Pt was on losartan 50mg at home, last dose 4/23   - Holding Losartan due INOCENCIA; Cr 1.93>2.0>1.6  - Monitor BP and adjust meds as needed      =====================[RESP ]==============================  # Acute Hypoxic Resp Failure 2/2 COVID-19 infection :   - Hypoxia worsening, now on HFNC 60/100 wean off as tolerated    - Low threshold for intubation, pt's grand-daughter Owen SMYTH, agrees for intubation if the need arises  - Completed Remdesivir 4/20  - Completed Decadron 4/26  - CXR (4/23) Bilateral perihilar and bibasilar multifocal and diffuse ill-defined airspace opacities  - S/p heparin drip for elevated D-dimer, it keeps rising, now 11k   - C/w Eliquis 2.5 mg BID    - C/w Vit c, Zn , Vit D supplementation   - US doppler of LE no DVT    =====================[ GI ]================================  # No issues :   - C/w Soft diabetic diet + ensure shakes   - S/p one dose modafinil  on 4/28    ====================[ RENAL ]=============================   # INOCENCIA :   - likely 2/2 COVID19 vs pre -renal (less likely given no response to IVF)   - S/p bicarb drip  - C/w PO bicarb TID as per nephro   - Losartan on hold (last dose 4/23)   - Monitor urine output, monitor I &O s closely   - Monitor and supplement electrolytes as needed   - Renal US ordered (pending) **  - Nephro Dr Kevin following     # Mixed AG + NAGMA :   - HCO3 19, could be due to hyperchloremic MA due to NS vs worsening INOCENCIA   - C/w sodium bicarb 650 mg TID as per nephrology     # Hyponatremia/Hypernatremia :   - Initially pt was hyponatremic, resolved s/p IVF   - Na 146  - Monitor BMP     =====================[ ID ]================================  # COVID-19 :  - Plan as above     ===================[ HEME/ONC ]===========================  # Thrombocytopenia :  - Platelets 115 > 95  today   - F/u HIT panel   - No signs of bleeding   - Monitor cbc daily     =====================[ ENDO ]=============================  # Uncontrolled DM-2   - A1c 13.6   - BS controlled on current regimen   - C/w Lantus 12 units at bedtime and Admelog 4 units before meals   - Endo Dr Hall  following   - Monitor BS     ==================[ SKIN/ CATHETERS ]======================  # no skin breaks   ==================[ PROPHYLAXIS ]==========================   # DVT: Lovenox   # GI: Pepcid     ====================[ DISPO ]==============================   - Patient remains acute, will continue monitoring in ICU     ===================[ PROGNOSIS ]===========================  - Guarded      75 y/o M from home with PMH of HTN and DM initially presented to ED complaint of hiccups on 4/16/2021, associated with cough, fever,chills and myalgias. Pt was admitted to Medicine floor for COVID 19 infection requiring supplemental O2 with NC 6L then NRB and still desaturating.  ICU was consulted for acute hypoxic respiratory failure 2/2 COVID-19 now requiring HFNC 50/100, saturation at 91%. He is s/p bicarb gtt for metabolic acidosis, c/w PO TID bicarb as per Nephro.    Assessment:  1. Acute Hypoxic Respiratory Failure secondary to COVID19 infection    2. INOCENCIA   3. Mixed AG +  NAGMA   4. Uncontrolled DM-2   5. Hyponatremia/Hypernatremia   6. HTN     Plan:    =====================[CNS ]==============================  # No issues:   - Mental status at baseline    - Off Seroquel  - C/w Zyprexa PRN for agitation   - Off Precedex drip     =====================[CVS ]===============================  # HTN :   - Pt was on losartan 50mg at home, last dose 4/23   - Holding Losartan due INOCENCIA; Cr 1.93>2.0>1.6  - Monitor BP and adjust meds as needed      =====================[RESP ]==============================  # Acute Hypoxic Resp Failure 2/2 COVID-19 infection :   - Hypoxia worsening, now on HFNC 60/100 wean off as tolerated    - Low threshold for intubation, pt's grand-daughter Owen SMYTH, agrees for intubation if the need arises  - Completed Remdesivir 4/20  - Completed Decadron 4/26  - CXR (4/23) Bilateral perihilar and bibasilar multifocal and diffuse ill-defined airspace opacities  - S/p heparin drip for elevated D-dimer, it keeps rising, now 11k   - C/w Eliquis 2.5 mg BID    - C/w Vit c, Zn , Vit D supplementation   - US doppler of LE no DVT    =====================[ GI ]================================  # No issues :   - C/w Soft diabetic diet + ensure shakes   - S/p one dose modafinil  on 4/28    ====================[ RENAL ]=============================   # INOCENCIA :   - likely 2/2 COVID19 vs pre -renal (less likely given no response to IVF)   - S/p bicarb drip  - C/w PO bicarb TID as per nephro   - Losartan on hold (last dose 4/23)   - Monitor urine output, monitor I &O s closely   - Monitor and supplement electrolytes as needed   - Renal US ordered (pending) **  - Nephro Dr Kevin following     # Mixed AG + NAGMA :   - HCO3 19, could be due to hyperchloremic MA due to NS vs worsening INOCENCIA   - C/w sodium bicarb 650 mg TID as per nephrology     # Hyponatremia/Hypernatremia :   - Initially pt was hyponatremic, resolved s/p IVF   - Na 146  - Monitor BMP     =====================[ ID ]================================  # COVID-19 :  - Plan as above     ===================[ HEME/ONC ]===========================  # Thrombocytopenia :  - Platelets 115 > 95  today   - F/u HIT panel   - No signs of bleeding   - Monitor cbc daily     =====================[ ENDO ]=============================  # Uncontrolled DM-2   - A1c 13.6   - BS controlled on current regimen   - C/w Lantus 12 units at bedtime and Admelog 4 units before meals   - Endo Dr Hall  following   - Monitor BS   - Encouraged po intake and home meals     ==================[ SKIN/ CATHETERS ]======================  # no skin breaks  OOB to chair    ==================[ PROPHYLAXIS ]==========================   # DVT: Lovenox   # GI: Pepcid     ====================[ DISPO ]==============================   - Patient remains acute, will continue monitoring in ICU     ===================[ PROGNOSIS ]===========================  - Guarded      77 y/o M from home with PMH of HTN and DM initially presented to ED complaint of hiccups on 4/16/2021, associated with cough, fever,chills and myalgias. Pt was admitted to Medicine floor for COVID 19 infection requiring supplemental O2 with NC 6L then NRB and still desaturating.  ICU was consulted for acute hypoxic respiratory failure 2/2 COVID-19 now requiring HFNC 50/100, saturation at 91%. He is s/p bicarb gtt for metabolic acidosis, c/w PO TID bicarb as per Nephro.    Assessment:  1. Acute Hypoxic Respiratory Failure secondary to COVID19 infection    2. INOCENCIA   3. Mixed AG +  NAGMA   4. Uncontrolled DM-2   5. Hyponatremia/Hypernatremia   6. HTN     Plan:    =====================[CNS ]==============================  # No issues:   - Mental status at baseline    - Off Seroquel  - C/w Zyprexa PRN for agitation   - Off Precedex drip     =====================[CVS ]===============================  # HTN :   - Pt was on losartan 50mg at home, last dose 4/23   - Holding Losartan due INCOENCIA; Cr 1.93>2.0>1.6  - Monitor BP and adjust meds as needed      =====================[RESP ]==============================  # Acute Hypoxic Resp Failure 2/2 COVID-19 infection :   - Hypoxia worsening, now on HFNC 60/100 wean off as tolerated    - Low threshold for intubation, pt's grand-daughter Owen SMYTH, agrees for intubation if the need arises  - Completed Remdesivir 4/20  - Completed Decadron 4/26  - CXR (4/23) Bilateral perihilar and bibasilar multifocal and diffuse ill-defined airspace opacities  - S/p heparin drip for elevated D-dimer, it keeps rising, now 11k   - C/w Eliquis 2.5 mg BID    - C/w Vit c, Zn , Vit D supplementation   - US doppler of LE no DVT    =====================[ GI ]================================  # No issues :   - C/w Soft diabetic diet + Glucerna shakes   - S/p one dose modafinil  on 4/28    ====================[ RENAL ]=============================   # INOCENCIA :   - likely 2/2 COVID19 vs pre -renal (less likely given no response to IVF)   - S/p bicarb drip  - C/w PO bicarb TID as per nephro   - Losartan on hold (last dose 4/23)   - Monitor urine output, monitor I &O s closely   - Monitor and supplement electrolytes as needed   - Renal US ordered (pending) **  - Nephro Dr Brown following     # Mixed AG + NAGMA :   - HCO3 19, could be due to hyperchloremic MA due to NS vs worsening INOCENCIA   - C/w sodium bicarb 650 mg TID as per nephrology     # Hyponatremia/Hypernatremia :   - Initially pt was hyponatremic, resolved s/p IVF   - Na 146  - Monitor BMP     =====================[ ID ]================================  # COVID-19 :  - Plan as above     ===================[ HEME/ONC ]===========================  # Thrombocytopenia :  - Platelets 115 > 95  today   - F/u HIT panel   - No signs of bleeding   - Monitor cbc daily     =====================[ ENDO ]=============================  # Uncontrolled DM-2   - A1c 13.6   - BS controlled on current regimen   - C/w Lantus 12 units at bedtime and Admelog 4 units before meals   - Endo Dr Hall  following   - Monitor BS   - Encouraged po intake and home meals     ==================[ SKIN/ CATHETERS ]======================  # no skin breaks  OOB to chair    ==================[ PROPHYLAXIS ]==========================   # DVT: Lovenox   # GI: Pepcid     ====================[ DISPO ]==============================   - Patient remains acute, will continue monitoring in ICU     ===================[ PROGNOSIS ]===========================  - Guarded

## 2021-04-30 NOTE — PROGRESS NOTE ADULT - ASSESSMENT
INOCENCIA:  likely pre-renal improved with ivf initialy  REnal function improving  optimize BP  UA with blood, check renal sonogram   HOLD losartan  (last dose on 4/23)  - Monitor BMP.    Hyponatremia/Hypernatremia  -sec to free water loss  -improved  -Monitor serum Na  -Avoid overcorrection >8mEq in 24 hrs       CKD 2/3:  Has risk (Diabetes) and minimal proteinuria.  - Monitor BMP.    Metabolic Acidosis:  Mixed AG and NAG. He may have some Tubulo-interstitial Disease  - Monitor for now.  -Hold PO NaHCO3    COVID   MOnitor temp/ O2  COntinue current care    Hypokalemia  repelted by team   monitor serum K

## 2021-04-30 NOTE — PROGRESS NOTE ADULT - SUBJECTIVE AND OBJECTIVE BOX
Time of Visit:  Patient seen and examined.     MEDICATIONS  (STANDING):  apixaban 2.5 milliGRAM(s) Oral every 12 hours  ascorbic acid 500 milliGRAM(s) Oral daily  atorvastatin 40 milliGRAM(s) Oral at bedtime  chlorhexidine 2% Cloths 1 Application(s) Topical <User Schedule>  cholecalciferol 400 Unit(s) Oral daily  famotidine    Tablet 20 milliGRAM(s) Oral two times a day  insulin glargine Injectable (LANTUS) 12 Unit(s) SubCutaneous at bedtime  insulin lispro (ADMELOG) corrective regimen sliding scale   SubCutaneous three times a day before meals  insulin lispro Injectable (ADMELOG) 4 Unit(s) SubCutaneous three times a day before meals  montelukast 10 milliGRAM(s) Oral daily  polyethylene glycol 3350 17 Gram(s) Oral daily  senna 2 Tablet(s) Oral at bedtime  zinc sulfate 220 milliGRAM(s) Oral daily      MEDICATIONS  (PRN):  ALBUTerol    90 MICROgram(s) HFA Inhaler 2 Puff(s) Inhalation every 6 hours PRN Bronchospasm  OLANZapine 2.5 milliGRAM(s) Oral two times a day PRN agitation       Medications up to date at time of exam.      PHYSICAL EXAMINATION:  Patient has no new complaints.  GENERAL: The patient is a well-developed, well-nourished, in no apparent distress.     Vital Signs Last 24 Hrs  T(C): 36.9 (30 Apr 2021 12:00), Max: 36.9 (30 Apr 2021 12:00)  T(F): 98.5 (30 Apr 2021 12:00), Max: 98.5 (30 Apr 2021 12:00)  HR: 100 (30 Apr 2021 17:00) (93 - 116)  BP: 108/74 (30 Apr 2021 17:00) (90/64 - 147/83)  BP(mean): 82 (30 Apr 2021 17:00) (70 - 106)  RR: 26 (30 Apr 2021 17:00) (16 - 31)  SpO2: 93% (30 Apr 2021 17:00) (76% - 100%)   (if applicable)    Chest Tube (if applicable)    HEENT: Head is normocephalic and atraumatic. Extraocular muscles are intact. Mucous membranes are moist.  + HFNC    NECK: Supple, no palpable adenopathy.    LUNGS: Clear to auscultation, no wheezing, rales, or rhonchi.    HEART: Regular rate and rhythm without murmur.    ABDOMEN: Soft, nontender, and nondistended.  No hepatosplenomegaly is noted.    : No painful voiding, no pelvic pain    EXTREMITIES: Without any cyanosis, clubbing, rash, lesions or edema.    NEUROLOGIC: Awake, alert, oriented, grossly intact    SKIN: Warm, dry, good turgor.      LABS:                        14.8   16.56 )-----------( 103      ( 30 Apr 2021 05:17 )             46.2     04-30    144  |  112<H>  |  33<H>  ----------------------------<  148<H>  4.7   |  26  |  1.23    Ca    8.5      30 Apr 2021 05:17  Phos  3.5     04-30  Mg     2.0     04-30                D-Dimer Assay, Quantitative: 3442 ng/mL DDU (04-30-21 @ 05:17)            MICROBIOLOGY: (if applicable)    RADIOLOGY & ADDITIONAL STUDIES:  EKG:   CXR:  ECHO:    IMPRESSION: 76y Male PAST MEDICAL & SURGICAL HISTORY:  HTN (hypertension)    DM (diabetes mellitus)    No significant past surgical history     p/w           IMP: This is  76 yr old man  from home with PMH of HTN and DM initially presented to ED  complaint of hiccups on 4/16/2021, associated with cough, fever,chills and myalgias. Pt was admitted to Medicine floor for COVid 19 infection requiring supplemental O2 with NC 6L then NRB and still desaturating.  ICU was consulted for acute hypoxic respiratory failure 2/2 COVID-19 now requiring HFNC 50/100, saturation at 91%. He is s/p bicarb gtt for metabolic acidosis, c/w PO TID bicarb as per Nephro.          Assessment:  1. Acute Hypoxic Respiratory Failure   2. COVID19 infection    3. INOCENCIA   4. Mixed AG +  NAGMA   5. Uncontrolled DM-2   6. Hypernatremia   7. HTN     Plan  - CXR stable   - Continue HFNC support  - Completed remdesivir  - Cont. Dexamethasone   - Renal fx improving   - Re-adjust eliquis dose in setting of elevated D Dimer   - Close glucose monitoring   - Monitor fingerstick glucose   - DVT and stress ulcer prophylaxis  - Prognosis is guarded .      discussed with ICU team

## 2021-04-30 NOTE — PROGRESS NOTE ADULT - ASSESSMENT
_________________________________________________________________________________________  ========>>  M E D I C A L   A T T E N D I N G    F O L L O W  U P  N O T E  <<=========  -----------------------------------------------------------------------------------------------------    - Patient seen and examined by me earlier today.  (covering Dr WATSON today)   - In summary,  MEHRDAD SUTHERLAND is a 76y year old man admitted with COVID  - Patient has been in the ICU , on high flow O2 ; otherwise doing fairly, comfortable, eating poorly     ==================>> REVIEW OF SYSTEM <<=================    limited ROS as pt not very engaging   GEN: no fever,  no pain  RESP: no SOB, no sig cough  CVS: no chest pain  GI: no abdominal pain, no nausea  : no dysuria  Neuro: no headache, no dizziness  Derm : no itching    ==================>> PHYSICAL EXAM <<=================    GEN: A&O X 2 , NAD , comfortable, pleasant, calm, cachectic   HEENT: NCAT, PERRL, MMM, hearing intact  Neck: supple , no JVD appreciated  CVS: S1S2 , regular , No M/R/G appreciated  PULM: CTA B/L,  limited exam as not taking deep breaths   ABD.: soft. non tender, non distended  Extrem: intact pulses , no edema   PSYCH : normal mood,  not anxious                                ( Note written / Date of service 04-30-21 )    ==================>> MEDICATIONS <<====================    apixaban 2.5 milliGRAM(s) Oral every 12 hours  ascorbic acid 500 milliGRAM(s) Oral daily  atorvastatin 40 milliGRAM(s) Oral at bedtime  chlorhexidine 2% Cloths 1 Application(s) Topical <User Schedule>  cholecalciferol 400 Unit(s) Oral daily  famotidine    Tablet 20 milliGRAM(s) Oral two times a day  insulin glargine Injectable (LANTUS) 12 Unit(s) SubCutaneous at bedtime  insulin lispro (ADMELOG) corrective regimen sliding scale   SubCutaneous three times a day before meals  insulin lispro Injectable (ADMELOG) 4 Unit(s) SubCutaneous three times a day before meals  montelukast 10 milliGRAM(s) Oral daily  polyethylene glycol 3350 17 Gram(s) Oral daily  senna 2 Tablet(s) Oral at bedtime  zinc sulfate 220 milliGRAM(s) Oral daily    MEDICATIONS  (PRN):  ALBUTerol    90 MICROgram(s) HFA Inhaler 2 Puff(s) Inhalation every 6 hours PRN Bronchospasm  OLANZapine 2.5 milliGRAM(s) Oral two times a day PRN agitation    ___________  Active diet:  Diet, Soft:   Consistent Carbohydrate No Snacks  DASH/TLC Sodium & Cholesterol Restricted  Lacto Veg (Accepts Milk & Milk Products)  Supplement Feeding Modality:  Oral  Glucerna Shake Cans or Servings Per Day:  1       Frequency:  Three Times a day  ___________________    ==================>> VITAL SIGNS <<==================    Vital Signs Last 24 HrsT(C): 36.8 (04-30-21 @ 04:00)  T(F): 98.2 (04-30-21 @ 04:00), Max: 98.2 (04-30-21 @ 04:00)  HR: 104 (04-30-21 @ 13:00) (93 - 116)  BP: 94/63 (04-30-21 @ 13:00)  RR: 19 (04-30-21 @ 13:00) (16 - 31)  SpO2: 98% (04-30-21 @ 13:00) (76% - 100%)        POCT Blood Glucose.: 218 mg/dL (30 Apr 2021 12:00)  POCT Blood Glucose.: 161 mg/dL (30 Apr 2021 05:12)  POCT Blood Glucose.: 164 mg/dL (29 Apr 2021 21:29)  POCT Blood Glucose.: 243 mg/dL (29 Apr 2021 15:55)     ==================>> LAB AND IMAGING <<==================                        14.8   16.56 )-----------( 103      ( 30 Apr 2021 05:17 )             46.2        04-30    144  |  112<H>  |  33<H>  ----------------------------<  148<H>  4.7   |  26  |  1.23    Ca    8.5      30 Apr 2021 05:17  Phos  3.5     04-30  Mg     2.0     04-30      WBC count:   16.56 <<== ,  13.72 <<== ,  14.56 <<== ,  16.71 <<== ,  14.80 <<==   Hemoglobin:   14.8 <<==,  14.5 <<==,  14.5 <<==,  15.1 <<==,  14.3 <<==  platelets:  103 <==, 95 <==, 115 <==, 176 <==, 183 <==, 323 <==    Creatinine:  1.23  <<==, 1.33  <<==, 1.49  <<==, 1.21  <<==, 1.38  <<==, 1.61  <<==  Sodium:   144  <==, 146  <==, 144  <==, 145  <==, 144  <==, 141  <==, 145  <==      ___________________________________________________________________________________  ===============>>  A S S E S S M E N T   A N D   P L A N <<===============  ------------------------------------------------------------------------------------------    · Assessment	  76 M with PMH of HTN and DM presented to ED with complaint of hiccups.  Found to be COVID positive on admission. admitted to medicine for acute hypoxemic respiratory failure due to bilateral pneumonia in setting of COVID 19. Started on Remdesivir, dexamethasone and supplemental oxygen. Pt was also found to have Hyponatremia likely due to hypovolemia, Followed by Nephro, started on IVF with improvement.  Pt was also found to have uncontrolled type 2 diabetes worsening with steroid use. Endo following  as well    Problem/Plan - 1:  ·  Problem: Acute respiratory failure with hypoxia.  Plan: -bilateral pneumonia in setting of COVID 19  - post Remdesivir / Dexamethasone per protocol    -cont  with oxygen and titrated down as tolerated   -pulm following   - ICU care appreciated   -continuous pulse ox monitoring  -cont prophylactic Lovenox   -cont supportive care   -daily labs CBC/CMP/CRP...   - Continue Current medications otherwise and monitor.  supportive care  - encourage PO intake   -nutrition / PT / OOB    Problem/Plan - 2:  ·  Problem: Insulin dependent type 2 diabetes mellitus, uncontrolled, in setting of infection and steroids home   -endo consulted and appreciated recom and mgmt   -diabetic diet   -a1c 13.8  - Continue Current medications per endo otherwise and monitor FS    Problem/Plan - 3:  ·  Problem: HTN (hypertension).    -controlled, cont home dose of Losartan   -mon BP.     Problem/Plan - 4:  ·  Problem: Hyponatremia.    -likely due to hypovolemia, better   -Nephro following.   - IVH as needed     Avoid nephrotoxic medications.     GI/DVT Prophylaxis per protocol.   OOB to chair encouraged  PO intake and hydration encouraged     ___________________________  H. MARILEE Gayle.  (covering today)   Pager: [phone]

## 2021-04-30 NOTE — PROGRESS NOTE ADULT - SUBJECTIVE AND OBJECTIVE BOX
[facility] [place] [facility]   MD BART FLANNERY MD RUORU WONG, PA    TEL:  OFFICE: [phone]  DR AYERS CELL: [phone]  JOSÉ MIGUEL ABDI CELL: [phone]  DR. AGUIRRE CELL: [phone]  DR. MCCLAIN CELL: [phone]    FROM 5 PM - 7 AM PLEASE CALL ANSWERING SERVICE: [phone]    RENAL FOLLOW UP NOTE--Date of Service 04-30-21 @ 11:00  --------------------------------------------------------------------------------  HPI:      Pt covid 19+    PAST HISTORY  --------------------------------------------------------------------------------  No significant changes to PMH, PSH, FHx, SHx, unless otherwise noted    ALLERGIES & MEDICATIONS  --------------------------------------------------------------------------------  Allergies    No Known Allergies    Intolerances      Standing Inpatient Medications  apixaban 2.5 milliGRAM(s) Oral every 12 hours  ascorbic acid 500 milliGRAM(s) Oral daily  atorvastatin 40 milliGRAM(s) Oral at bedtime  chlorhexidine 2% Cloths 1 Application(s) Topical <User Schedule>  cholecalciferol 400 Unit(s) Oral daily  famotidine    Tablet 20 milliGRAM(s) Oral two times a day  insulin glargine Injectable (LANTUS) 12 Unit(s) SubCutaneous at bedtime  insulin lispro (ADMELOG) corrective regimen sliding scale   SubCutaneous three times a day before meals  insulin lispro Injectable (ADMELOG) 4 Unit(s) SubCutaneous three times a day before meals  montelukast 10 milliGRAM(s) Oral daily  polyethylene glycol 3350 17 Gram(s) Oral daily  senna 2 Tablet(s) Oral at bedtime  sodium bicarbonate 650 milliGRAM(s) Oral three times a day  zinc sulfate 220 milliGRAM(s) Oral daily    PRN Inpatient Medications  ALBUTerol    90 MICROgram(s) HFA Inhaler 2 Puff(s) Inhalation every 6 hours PRN  OLANZapine 2.5 milliGRAM(s) Oral two times a day PRN      REVIEW OF SYSTEMS  --------------------------------------------------------------------------------  General: no fever  MSK: no edema     VITALS/PHYSICAL EXAM  --------------------------------------------------------------------------------  T(C): 36.8 (04-30-21 @ 04:00), Max: 36.8 (04-30-21 @ 04:00)  HR: 110 (04-30-21 @ 10:00) (93 - 116)  BP: 146/92 (04-30-21 @ 10:00) (78/56 - 147/83)  RR: 16 (04-30-21 @ 10:00) (16 - 31)  SpO2: 100% (04-30-21 @ 10:00) (76% - 100%)  Wt(kg): --        04-29-21 @ 07:01  -  04-30-21 @ 07:00  --------------------------------------------------------  IN: 520 mL / OUT: 300 mL / NET: 220 mL    04-30-21 @ 07:01  -  04-30-21 @ 11:00  --------------------------------------------------------  IN: 160 mL / OUT: 0 mL / NET: 160 mL      LABS/STUDIES  --------------------------------------------------------------------------------              14.8   16.56 >-----------<  103      [04-30-21 @ 05:17]              46.2     144  |  112  |  33  ----------------------------<  148      [04-30-21 @ 05:17]  4.7   |  26  |  1.23        Ca     8.5     [04-30-21 @ 05:17]      Mg     2.0     [04-30-21 @ 05:17]      Phos  3.5     [04-30-21 @ 05:17]                [04-30-21 @ 05:17]    Creatinine Trend:  SCr 1.23 [04-30 @ 05:17]  SCr 1.33 [04-29 @ 04:42]  SCr 1.49 [04-28 @ 20:31]  SCr 1.21 [04-28 @ 06:37]  SCr 1.38 [04-27 @ 05:27]    Urinalysis - [04-22-21 @ 14:32]      Color Yellow / Appearance Clear / SG 1.015 / pH 6.5      Gluc 1000 / Ketone Negative  / Bili Negative / Urobili Negative       Blood Small / Protein 30 / Leuk Est Negative / Nitrite Negative      RBC 5-10 / WBC 0-2 / Hyaline  / Gran  / Sq Epi  / Non Sq Epi Few / Bacteria Few      Ferritin 1878      [04-29-21 @ 10:24]  TSH 0.96      [04-17-21 @ 06:26]  Lipid: chol 96, TG 77, HDL 42, LDL --      [04-17-21 @ 06:26]

## 2021-04-30 NOTE — PROGRESS NOTE ADULT - SUBJECTIVE AND OBJECTIVE BOX
INTERVAL HPI/OVERNIGHT EVENTS: ***    PRESSORS: [ ] YES [ ] NO  WHICH:    ANTIBIOTICS:                  DATE STARTED:  ANTIBIOTICS:                  DATE STARTED:  ANTIBIOTICS:                  DATE STARTED:    Antimicrobial:    Cardiovascular:    Pulmonary:  ALBUTerol    90 MICROgram(s) HFA Inhaler 2 Puff(s) Inhalation every 6 hours PRN  montelukast 10 milliGRAM(s) Oral daily    Hematalogic:  apixaban 2.5 milliGRAM(s) Oral every 12 hours    Other:  ascorbic acid 500 milliGRAM(s) Oral daily  atorvastatin 40 milliGRAM(s) Oral at bedtime  chlorhexidine 2% Cloths 1 Application(s) Topical <User Schedule>  cholecalciferol 400 Unit(s) Oral daily  famotidine    Tablet 20 milliGRAM(s) Oral two times a day  insulin glargine Injectable (LANTUS) 12 Unit(s) SubCutaneous at bedtime  insulin lispro (ADMELOG) corrective regimen sliding scale   SubCutaneous three times a day before meals  insulin lispro Injectable (ADMELOG) 4 Unit(s) SubCutaneous three times a day before meals  OLANZapine 2.5 milliGRAM(s) Oral two times a day PRN  polyethylene glycol 3350 17 Gram(s) Oral daily  senna 2 Tablet(s) Oral at bedtime  sodium bicarbonate 650 milliGRAM(s) Oral three times a day  zinc sulfate 220 milliGRAM(s) Oral daily    ALBUTerol    90 MICROgram(s) HFA Inhaler 2 Puff(s) Inhalation every 6 hours PRN  apixaban 2.5 milliGRAM(s) Oral every 12 hours  ascorbic acid 500 milliGRAM(s) Oral daily  atorvastatin 40 milliGRAM(s) Oral at bedtime  chlorhexidine 2% Cloths 1 Application(s) Topical <User Schedule>  cholecalciferol 400 Unit(s) Oral daily  famotidine    Tablet 20 milliGRAM(s) Oral two times a day  insulin glargine Injectable (LANTUS) 12 Unit(s) SubCutaneous at bedtime  insulin lispro (ADMELOG) corrective regimen sliding scale   SubCutaneous three times a day before meals  insulin lispro Injectable (ADMELOG) 4 Unit(s) SubCutaneous three times a day before meals  montelukast 10 milliGRAM(s) Oral daily  OLANZapine 2.5 milliGRAM(s) Oral two times a day PRN  polyethylene glycol 3350 17 Gram(s) Oral daily  senna 2 Tablet(s) Oral at bedtime  sodium bicarbonate 650 milliGRAM(s) Oral three times a day  zinc sulfate 220 milliGRAM(s) Oral daily    Drug Dosing Weight  Height (cm): 172.7 (16 Apr 2021 13:26)  Weight (kg): 78.9 (16 Apr 2021 13:26)  BMI (kg/m2): 26.5 (16 Apr 2021 13:26)  BSA (m2): 1.93 (16 Apr 2021 13:26)    CENTRAL LINE: [ ] YES [ ] NO  LOCATION:         GUPTA: [ ] YES [ ] NO          A-LINE:  [ ] YES [ ] NO  LOCATION:             ICU Vital Signs Last 24 Hrs  T(C): 36.8 (30 Apr 2021 04:00), Max: 36.8 (30 Apr 2021 04:00)  T(F): 98.2 (30 Apr 2021 04:00), Max: 98.2 (30 Apr 2021 04:00)  HR: 99 (30 Apr 2021 07:00) (93 - 120)  BP: 116/77 (30 Apr 2021 07:00) (78/56 - 147/83)  BP(mean): 87 (30 Apr 2021 07:00) (60 - 105)  ABP: --  ABP(mean): --  RR: 17 (30 Apr 2021 07:00) (16 - 30)  SpO2: 91% (30 Apr 2021 07:00) (84% - 100%)            04-29 @ 07:01  -  04-30 @ 07:00  --------------------------------------------------------  IN: 520 mL / OUT: 300 mL / NET: 220 mL              PHYSICAL EXAM:    GENERAL: NAD  EYES: EOMI, PERRLA  NECK: Supple, No JVD; Normal thyroid; Trachea midline: No LAD   NERVOUS SYSTEM:  Alert & Oriented X3,  Motor Strength 5/5 B/L upper and lower extremities; DTRs 2+ intact and symmetric  CHEST/LUNG: No rales, rhonchi, wheezing, breath sounds present bilaterally  HEART: Regular rate and rhythm; No murmurs, no gallops  ABDOMEN: Soft, Nontender, Nondistended; Bowel sounds present, no pain or masses on palpation  : voiding well, Gupta in place  EXTREMITIES:  2+ Peripheral Pulses, No clubbing, cyanosis, or edema  SKIN: warm, intact, no lesions         LABS:  CBC Full  -  ( 30 Apr 2021 05:17 )  WBC Count : 16.56 K/uL  RBC Count : 5.23 M/uL  Hemoglobin : 14.8 g/dL  Hematocrit : 46.2 %  Platelet Count - Automated : 103 K/uL  Mean Cell Volume : 88.3 fl  Mean Cell Hemoglobin : 28.3 pg  Mean Cell Hemoglobin Concentration : 32.0 gm/dL  Auto Neutrophil # : x  Auto Lymphocyte # : x  Auto Monocyte # : x  Auto Eosinophil # : x  Auto Basophil # : x  Auto Neutrophil % : x  Auto Lymphocyte % : x  Auto Monocyte % : x  Auto Eosinophil % : x  Auto Basophil % : x    04-30    144  |  112<H>  |  33<H>  ----------------------------<  148<H>  4.7   |  26  |  1.23    Ca    8.5      30 Apr 2021 05:17  Phos  3.5     04-30  Mg     2.0     04-30              RADIOLOGY & ADDITIONAL STUDIES REVIEWED:  ***    [ ]GOALS OF CARE DISCUSSION WITH PATIENT/FAMILY/PROXY:    CRITICAL CARE TIME SPENT: 35 minutes   INTERVAL HPI/OVERNIGHT EVENTS: Patient was on 6L NC throughout the night sat >90% however this am he was desaturating to <88% so was placed back to high flow 60/100    PRESSORS: [ ] YES [ ] NO    Antimicrobial:    Cardiovascular:    Pulmonary:  ALBUTerol    90 MICROgram(s) HFA Inhaler 2 Puff(s) Inhalation every 6 hours PRN  montelukast 10 milliGRAM(s) Oral daily    Hematalogic:  apixaban 2.5 milliGRAM(s) Oral every 12 hours    Other:  ascorbic acid 500 milliGRAM(s) Oral daily  atorvastatin 40 milliGRAM(s) Oral at bedtime  chlorhexidine 2% Cloths 1 Application(s) Topical <User Schedule>  cholecalciferol 400 Unit(s) Oral daily  famotidine    Tablet 20 milliGRAM(s) Oral two times a day  insulin glargine Injectable (LANTUS) 12 Unit(s) SubCutaneous at bedtime  insulin lispro (ADMELOG) corrective regimen sliding scale   SubCutaneous three times a day before meals  insulin lispro Injectable (ADMELOG) 4 Unit(s) SubCutaneous three times a day before meals  OLANZapine 2.5 milliGRAM(s) Oral two times a day PRN  polyethylene glycol 3350 17 Gram(s) Oral daily  senna 2 Tablet(s) Oral at bedtime  sodium bicarbonate 650 milliGRAM(s) Oral three times a day  zinc sulfate 220 milliGRAM(s) Oral daily    ALBUTerol    90 MICROgram(s) HFA Inhaler 2 Puff(s) Inhalation every 6 hours PRN  apixaban 2.5 milliGRAM(s) Oral every 12 hours  ascorbic acid 500 milliGRAM(s) Oral daily  atorvastatin 40 milliGRAM(s) Oral at bedtime  chlorhexidine 2% Cloths 1 Application(s) Topical <User Schedule>  cholecalciferol 400 Unit(s) Oral daily  famotidine    Tablet 20 milliGRAM(s) Oral two times a day  insulin glargine Injectable (LANTUS) 12 Unit(s) SubCutaneous at bedtime  insulin lispro (ADMELOG) corrective regimen sliding scale   SubCutaneous three times a day before meals  insulin lispro Injectable (ADMELOG) 4 Unit(s) SubCutaneous three times a day before meals  montelukast 10 milliGRAM(s) Oral daily  OLANZapine 2.5 milliGRAM(s) Oral two times a day PRN  polyethylene glycol 3350 17 Gram(s) Oral daily  senna 2 Tablet(s) Oral at bedtime  sodium bicarbonate 650 milliGRAM(s) Oral three times a day  zinc sulfate 220 milliGRAM(s) Oral daily    Drug Dosing Weight  Height (cm): 172.7 (16 Apr 2021 13:26)  Weight (kg): 78.9 (16 Apr 2021 13:26)  BMI (kg/m2): 26.5 (16 Apr 2021 13:26)  BSA (m2): 1.93 (16 Apr 2021 13:26)    CENTRAL LINE: [ ] YES [ ] NO  LOCATION:         GUPTA: [ ] YES [ ] NO          A-LINE:  [ ] YES [ ] NO  LOCATION:             ICU Vital Signs Last 24 Hrs  T(C): 36.8 (30 Apr 2021 04:00), Max: 36.8 (30 Apr 2021 04:00)  T(F): 98.2 (30 Apr 2021 04:00), Max: 98.2 (30 Apr 2021 04:00)  HR: 99 (30 Apr 2021 07:00) (93 - 120)  BP: 116/77 (30 Apr 2021 07:00) (78/56 - 147/83)  BP(mean): 87 (30 Apr 2021 07:00) (60 - 105)  ABP: --  ABP(mean): --  RR: 17 (30 Apr 2021 07:00) (16 - 30)  SpO2: 91% (30 Apr 2021 07:00) (84% - 100%)            04-29 @ 07:01  -  04-30 @ 07:00  --------------------------------------------------------  IN: 520 mL / OUT: 300 mL / NET: 220 mL              PHYSICAL EXAM:    GENERAL: NAD, on high flow 60/100, desaturates with movement  EYES: EOMI, PERRLA  NECK: Supple, No JVD; Normal thyroid; Trachea midline: No LAD   NERVOUS SYSTEM:  Alert & Oriented X3,  Motor Strength 5/5 B/L upper and lower extremities; DTRs 2+ intact and symmetric  CHEST/LUNG: No rales, rhonchi, wheezing, breath sounds present bilaterally  HEART: Regular rate and rhythm; No murmurs, no gallops  ABDOMEN: Soft, Nontender, Nondistended; Bowel sounds present, no pain or masses on palpation  : voiding well  EXTREMITIES:  2+ Peripheral Pulses, No clubbing, cyanosis, or edema  SKIN: warm, intact, no lesions          LABS:  CBC Full  -  ( 30 Apr 2021 05:17 )  WBC Count : 16.56 K/uL  RBC Count : 5.23 M/uL  Hemoglobin : 14.8 g/dL  Hematocrit : 46.2 %  Platelet Count - Automated : 103 K/uL  Mean Cell Volume : 88.3 fl  Mean Cell Hemoglobin : 28.3 pg  Mean Cell Hemoglobin Concentration : 32.0 gm/dL  Auto Neutrophil # : x  Auto Lymphocyte # : x  Auto Monocyte # : x  Auto Eosinophil # : x  Auto Basophil # : x  Auto Neutrophil % : x  Auto Lymphocyte % : x  Auto Monocyte % : x  Auto Eosinophil % : x  Auto Basophil % : x    04-30    144  |  112<H>  |  33<H>  ----------------------------<  148<H>  4.7   |  26  |  1.23    Ca    8.5      30 Apr 2021 05:17  Phos  3.5     04-30  Mg     2.0     04-30              RADIOLOGY & ADDITIONAL STUDIES REVIEWED:  ***    [ ]GOALS OF CARE DISCUSSION WITH PATIENT/FAMILY/PROXY:    CRITICAL CARE TIME SPENT: 35 minutes   INTERVAL HPI/OVERNIGHT EVENTS: Patient was on 6L NC throughout the night sat >90% however this am he was desaturating to <88% so was placed back to high flow 50/80    PRESSORS: [ ] YES [ ] NO    Antimicrobial:    Cardiovascular:    Pulmonary:  ALBUTerol    90 MICROgram(s) HFA Inhaler 2 Puff(s) Inhalation every 6 hours PRN  montelukast 10 milliGRAM(s) Oral daily    Hematalogic:  apixaban 2.5 milliGRAM(s) Oral every 12 hours    Other:  ascorbic acid 500 milliGRAM(s) Oral daily  atorvastatin 40 milliGRAM(s) Oral at bedtime  chlorhexidine 2% Cloths 1 Application(s) Topical <User Schedule>  cholecalciferol 400 Unit(s) Oral daily  famotidine    Tablet 20 milliGRAM(s) Oral two times a day  insulin glargine Injectable (LANTUS) 12 Unit(s) SubCutaneous at bedtime  insulin lispro (ADMELOG) corrective regimen sliding scale   SubCutaneous three times a day before meals  insulin lispro Injectable (ADMELOG) 4 Unit(s) SubCutaneous three times a day before meals  OLANZapine 2.5 milliGRAM(s) Oral two times a day PRN  polyethylene glycol 3350 17 Gram(s) Oral daily  senna 2 Tablet(s) Oral at bedtime  sodium bicarbonate 650 milliGRAM(s) Oral three times a day  zinc sulfate 220 milliGRAM(s) Oral daily    ALBUTerol    90 MICROgram(s) HFA Inhaler 2 Puff(s) Inhalation every 6 hours PRN  apixaban 2.5 milliGRAM(s) Oral every 12 hours  ascorbic acid 500 milliGRAM(s) Oral daily  atorvastatin 40 milliGRAM(s) Oral at bedtime  chlorhexidine 2% Cloths 1 Application(s) Topical <User Schedule>  cholecalciferol 400 Unit(s) Oral daily  famotidine    Tablet 20 milliGRAM(s) Oral two times a day  insulin glargine Injectable (LANTUS) 12 Unit(s) SubCutaneous at bedtime  insulin lispro (ADMELOG) corrective regimen sliding scale   SubCutaneous three times a day before meals  insulin lispro Injectable (ADMELOG) 4 Unit(s) SubCutaneous three times a day before meals  montelukast 10 milliGRAM(s) Oral daily  OLANZapine 2.5 milliGRAM(s) Oral two times a day PRN  polyethylene glycol 3350 17 Gram(s) Oral daily  senna 2 Tablet(s) Oral at bedtime  sodium bicarbonate 650 milliGRAM(s) Oral three times a day  zinc sulfate 220 milliGRAM(s) Oral daily    Drug Dosing Weight  Height (cm): 172.7 (16 Apr 2021 13:26)  Weight (kg): 78.9 (16 Apr 2021 13:26)  BMI (kg/m2): 26.5 (16 Apr 2021 13:26)  BSA (m2): 1.93 (16 Apr 2021 13:26)    CENTRAL LINE: [ ] YES [ ] NO  LOCATION:         GUPTA: [ ] YES [ ] NO          A-LINE:  [ ] YES [ ] NO  LOCATION:             ICU Vital Signs Last 24 Hrs  T(C): 36.8 (30 Apr 2021 04:00), Max: 36.8 (30 Apr 2021 04:00)  T(F): 98.2 (30 Apr 2021 04:00), Max: 98.2 (30 Apr 2021 04:00)  HR: 99 (30 Apr 2021 07:00) (93 - 120)  BP: 116/77 (30 Apr 2021 07:00) (78/56 - 147/83)  BP(mean): 87 (30 Apr 2021 07:00) (60 - 105)  ABP: --  ABP(mean): --  RR: 17 (30 Apr 2021 07:00) (16 - 30)  SpO2: 91% (30 Apr 2021 07:00) (84% - 100%)            04-29 @ 07:01  -  04-30 @ 07:00  --------------------------------------------------------  IN: 520 mL / OUT: 300 mL / NET: 220 mL              PHYSICAL EXAM:    GENERAL: NAD, on high flow 50/80, desaturates with movement  EYES: EOMI, PERRLA  NECK: Supple, No JVD; Normal thyroid; Trachea midline: No LAD   NERVOUS SYSTEM:  Alert & Oriented X3,  Motor Strength 5/5 B/L upper and lower extremities; DTRs 2+ intact and symmetric  CHEST/LUNG: No rales, rhonchi, wheezing, breath sounds present bilaterally  HEART: Regular rate and rhythm; No murmurs, no gallops  ABDOMEN: Soft, Nontender, Nondistended; Bowel sounds present, no pain or masses on palpation  : voiding well  EXTREMITIES:  2+ Peripheral Pulses, No clubbing, cyanosis, or edema  SKIN: warm, intact, no lesions          LABS:  CBC Full  -  ( 30 Apr 2021 05:17 )  WBC Count : 16.56 K/uL  RBC Count : 5.23 M/uL  Hemoglobin : 14.8 g/dL  Hematocrit : 46.2 %  Platelet Count - Automated : 103 K/uL  Mean Cell Volume : 88.3 fl  Mean Cell Hemoglobin : 28.3 pg  Mean Cell Hemoglobin Concentration : 32.0 gm/dL  Auto Neutrophil # : x  Auto Lymphocyte # : x  Auto Monocyte # : x  Auto Eosinophil # : x  Auto Basophil # : x  Auto Neutrophil % : x  Auto Lymphocyte % : x  Auto Monocyte % : x  Auto Eosinophil % : x  Auto Basophil % : x    04-30    144  |  112<H>  |  33<H>  ----------------------------<  148<H>  4.7   |  26  |  1.23    Ca    8.5      30 Apr 2021 05:17  Phos  3.5     04-30  Mg     2.0     04-30              RADIOLOGY & ADDITIONAL STUDIES REVIEWED:  ***    [ ]GOALS OF CARE DISCUSSION WITH PATIENT/FAMILY/PROXY:    CRITICAL CARE TIME SPENT: 35 minutes

## 2021-05-01 NOTE — PROGRESS NOTE ADULT - ASSESSMENT
INOCENCIA:  Likely pre-renal improved with IVF  REnal function improving  optimize BP  UA with blood, check renal sonogram   HOLD losartan  (last dose on 4/23)  - Monitor BMP.    Hyponatremia/Hypernatremia  -d to free water loss  -improved  -Monitor serum Na  -Avoid overcorrection >8mEq in 24 hrs       CKD 2/3:  Has risk (Diabetes) and minimal proteinuria.  - Monitor BMP.    Metabolic Acidosis:  Mixed AG and NAG. He may have some Tubulo-interstitial Disease  - Monitor for now.  -Hold PO NaHCO3    COVID   MOnitor temp/ O2  COntinue current care    Hypokalemia  repelted by team   monitor serum K

## 2021-05-01 NOTE — PROGRESS NOTE ADULT - SUBJECTIVE AND OBJECTIVE BOX
INTERVAL HPI/OVERNIGHT EVENTS: No acute events overnight, patient remains on HF     PRESSORS: [ ] YES [ x] NO  WHICH:    ANTIBIOTICS:                  DATE STARTED:  ANTIBIOTICS:                  DATE STARTED:  ANTIBIOTICS:                  DATE STARTED:    Antimicrobial:    Cardiovascular:    Pulmonary:  ALBUTerol    90 MICROgram(s) HFA Inhaler 2 Puff(s) Inhalation every 6 hours PRN  montelukast 10 milliGRAM(s) Oral daily    Hematalogic:  apixaban 2.5 milliGRAM(s) Oral every 12 hours    Other:  ascorbic acid 500 milliGRAM(s) Oral daily  atorvastatin 40 milliGRAM(s) Oral at bedtime  chlorhexidine 2% Cloths 1 Application(s) Topical <User Schedule>  cholecalciferol 400 Unit(s) Oral daily  famotidine    Tablet 20 milliGRAM(s) Oral two times a day  insulin glargine Injectable (LANTUS) 12 Unit(s) SubCutaneous at bedtime  insulin lispro (ADMELOG) corrective regimen sliding scale   SubCutaneous three times a day before meals  insulin lispro Injectable (ADMELOG) 4 Unit(s) SubCutaneous three times a day before meals  OLANZapine 2.5 milliGRAM(s) Oral two times a day PRN  polyethylene glycol 3350 17 Gram(s) Oral daily  senna 2 Tablet(s) Oral at bedtime  zinc sulfate 220 milliGRAM(s) Oral daily    ALBUTerol    90 MICROgram(s) HFA Inhaler 2 Puff(s) Inhalation every 6 hours PRN  apixaban 2.5 milliGRAM(s) Oral every 12 hours  ascorbic acid 500 milliGRAM(s) Oral daily  atorvastatin 40 milliGRAM(s) Oral at bedtime  chlorhexidine 2% Cloths 1 Application(s) Topical <User Schedule>  cholecalciferol 400 Unit(s) Oral daily  famotidine    Tablet 20 milliGRAM(s) Oral two times a day  insulin glargine Injectable (LANTUS) 12 Unit(s) SubCutaneous at bedtime  insulin lispro (ADMELOG) corrective regimen sliding scale   SubCutaneous three times a day before meals  insulin lispro Injectable (ADMELOG) 4 Unit(s) SubCutaneous three times a day before meals  montelukast 10 milliGRAM(s) Oral daily  OLANZapine 2.5 milliGRAM(s) Oral two times a day PRN  polyethylene glycol 3350 17 Gram(s) Oral daily  senna 2 Tablet(s) Oral at bedtime  zinc sulfate 220 milliGRAM(s) Oral daily    Drug Dosing Weight  Height (cm): 172.7 (16 Apr 2021 13:26)  Weight (kg): 78.9 (16 Apr 2021 13:26)  BMI (kg/m2): 26.5 (16 Apr 2021 13:26)  BSA (m2): 1.93 (16 Apr 2021 13:26)    CENTRAL LINE: [ ] YES [x ] NO  LOCATION:   DATE INSERTED:  REMOVE: [ ] YES [ ] NO  EXPLAIN:    GUPTA: [ ] YES [x ] NO    DATE INSERTED:  REMOVE:  [ ] YES [ ] NO  EXPLAIN:    A-LINE:  [ ] YES [x ] NO  LOCATION:   DATE INSERTED:  REMOVE:  [ ] YES [ ] NO  EXPLAIN:    PMH -reviewed admission note, no change since admission  PAST MEDICAL & SURGICAL HISTORY:  HTN (hypertension)    DM (diabetes mellitus)    No significant past surgical history        ICU Vital Signs Last 24 Hrs  T(C): 36.9 (30 Apr 2021 23:00), Max: 36.9 (30 Apr 2021 12:00)  T(F): 98.4 (30 Apr 2021 23:00), Max: 98.5 (30 Apr 2021 12:00)  HR: 95 (01 May 2021 02:00) (93 - 116)  BP: 145/87 (01 May 2021 02:00) (90/64 - 159/84)  BP(mean): 103 (01 May 2021 02:00) (70 - 110)  RR: 23 (01 May 2021 02:00) (13 - 31)  SpO2: 98% (01 May 2021 02:00) (76% - 100%)            04-29 @ 07:01  -  04-30 @ 07:00  --------------------------------------------------------  IN: 520 mL / OUT: 300 mL / NET: 220 mL            PHYSICAL EXAM:    GENERAL: NAD, on high flow 50/80, desaturates with movement  EYES: EOMI, PERRLA  NECK: Supple, No JVD; Normal thyroid; Trachea midline: No LAD   NERVOUS SYSTEM:  Alert & Oriented X3,  Motor Strength 5/5 B/L upper and lower extremities; DTRs 2+ intact and symmetric  CHEST/LUNG: No rales, rhonchi, wheezing, breath sounds present bilaterally  HEART: Regular rate and rhythm; No murmurs, no gallops  ABDOMEN: Soft, Nontender, Nondistended; Bowel sounds present, no pain or masses on palpation  : voiding well  EXTREMITIES:  2+ Peripheral Pulses, No clubbing, cyanosis, or edema  SKIN: warm, intact, no lesions          LABS:  CBC Full  -  ( 30 Apr 2021 05:17 )  WBC Count : 16.56 K/uL  RBC Count : 5.23 M/uL  Hemoglobin : 14.8 g/dL  Hematocrit : 46.2 %  Platelet Count - Automated : 103 K/uL  Mean Cell Volume : 88.3 fl  Mean Cell Hemoglobin : 28.3 pg  Mean Cell Hemoglobin Concentration : 32.0 gm/dL  Auto Neutrophil # : x  Auto Lymphocyte # : x  Auto Monocyte # : x  Auto Eosinophil # : x  Auto Basophil # : x  Auto Neutrophil % : x  Auto Lymphocyte % : x  Auto Monocyte % : x  Auto Eosinophil % : x  Auto Basophil % : x    04-30    144  |  112<H>  |  33<H>  ----------------------------<  148<H>  4.7   |  26  |  1.23    Ca    8.5      30 Apr 2021 05:17  Phos  3.5     04-30  Mg     2.0     04-30              RADIOLOGY & ADDITIONAL STUDIES REVIEWED:  yes    [ ]GOALS OF CARE DISCUSSION WITH PATIENT/FAMILY/PROXY:    CRITICAL CARE TIME SPENT: 35 minutes INTERVAL HPI/OVERNIGHT EVENTS: No acute events overnight, patient remains on HF     PRESSORS: [ ] YES [ x] NO    Antimicrobial:    Cardiovascular:    Pulmonary:  ALBUTerol    90 MICROgram(s) HFA Inhaler 2 Puff(s) Inhalation every 6 hours PRN  montelukast 10 milliGRAM(s) Oral daily    Hematalogic:  apixaban 2.5 milliGRAM(s) Oral every 12 hours    Other:  ascorbic acid 500 milliGRAM(s) Oral daily  atorvastatin 40 milliGRAM(s) Oral at bedtime  chlorhexidine 2% Cloths 1 Application(s) Topical <User Schedule>  cholecalciferol 400 Unit(s) Oral daily  famotidine    Tablet 20 milliGRAM(s) Oral two times a day  insulin glargine Injectable (LANTUS) 12 Unit(s) SubCutaneous at bedtime  insulin lispro (ADMELOG) corrective regimen sliding scale   SubCutaneous three times a day before meals  insulin lispro Injectable (ADMELOG) 4 Unit(s) SubCutaneous three times a day before meals  OLANZapine 2.5 milliGRAM(s) Oral two times a day PRN  polyethylene glycol 3350 17 Gram(s) Oral daily  senna 2 Tablet(s) Oral at bedtime  zinc sulfate 220 milliGRAM(s) Oral daily    ALBUTerol    90 MICROgram(s) HFA Inhaler 2 Puff(s) Inhalation every 6 hours PRN  apixaban 2.5 milliGRAM(s) Oral every 12 hours  ascorbic acid 500 milliGRAM(s) Oral daily  atorvastatin 40 milliGRAM(s) Oral at bedtime  chlorhexidine 2% Cloths 1 Application(s) Topical <User Schedule>  cholecalciferol 400 Unit(s) Oral daily  famotidine    Tablet 20 milliGRAM(s) Oral two times a day  insulin glargine Injectable (LANTUS) 12 Unit(s) SubCutaneous at bedtime  insulin lispro (ADMELOG) corrective regimen sliding scale   SubCutaneous three times a day before meals  insulin lispro Injectable (ADMELOG) 4 Unit(s) SubCutaneous three times a day before meals  montelukast 10 milliGRAM(s) Oral daily  OLANZapine 2.5 milliGRAM(s) Oral two times a day PRN  polyethylene glycol 3350 17 Gram(s) Oral daily  senna 2 Tablet(s) Oral at bedtime  zinc sulfate 220 milliGRAM(s) Oral daily    Drug Dosing Weight  Height (cm): 172.7 (16 Apr 2021 13:26)  Weight (kg): 78.9 (16 Apr 2021 13:26)  BMI (kg/m2): 26.5 (16 Apr 2021 13:26)  BSA (m2): 1.93 (16 Apr 2021 13:26)    CENTRAL LINE: [ ] YES [x ] NO  LOCATION:   DATE INSERTED:  REMOVE: [ ] YES [ ] NO  EXPLAIN:    GUPTA: [ ] YES [x ] NO    DATE INSERTED:  REMOVE:  [ ] YES [ ] NO  EXPLAIN:    A-LINE:  [ ] YES [x ] NO  LOCATION:   DATE INSERTED:  REMOVE:  [ ] YES [ ] NO  EXPLAIN:    PMH -reviewed admission note, no change since admission  PAST MEDICAL & SURGICAL HISTORY:  HTN (hypertension)    DM (diabetes mellitus)    No significant past surgical history        ICU Vital Signs Last 24 Hrs  T(C): 36.9 (30 Apr 2021 23:00), Max: 36.9 (30 Apr 2021 12:00)  T(F): 98.4 (30 Apr 2021 23:00), Max: 98.5 (30 Apr 2021 12:00)  HR: 95 (01 May 2021 02:00) (93 - 116)  BP: 145/87 (01 May 2021 02:00) (90/64 - 159/84)  BP(mean): 103 (01 May 2021 02:00) (70 - 110)  RR: 23 (01 May 2021 02:00) (13 - 31)  SpO2: 98% (01 May 2021 02:00) (76% - 100%)            04-29 @ 07:01  -  04-30 @ 07:00  --------------------------------------------------------  IN: 520 mL / OUT: 300 mL / NET: 220 mL            PHYSICAL EXAM:    GENERAL: NAD, on high flow 50/80, desaturates with movement  EYES: EOMI, PERRLA  NECK: Supple, No JVD; Normal thyroid; Trachea midline: No LAD   NERVOUS SYSTEM:  Alert & Oriented X3,  Motor Strength 5/5 B/L upper and lower extremities; DTRs 2+ intact and symmetric  CHEST/LUNG: No rales, rhonchi, wheezing, breath sounds present bilaterally  HEART: Regular rate and rhythm; No murmurs, no gallops  ABDOMEN: Soft, Nontender, Nondistended; Bowel sounds present, no pain or masses on palpation  : voiding well  EXTREMITIES:  2+ Peripheral Pulses, No clubbing, cyanosis, or edema  SKIN: warm, intact, no lesions          LABS:  CBC Full  -  ( 30 Apr 2021 05:17 )  WBC Count : 16.56 K/uL  RBC Count : 5.23 M/uL  Hemoglobin : 14.8 g/dL  Hematocrit : 46.2 %  Platelet Count - Automated : 103 K/uL  Mean Cell Volume : 88.3 fl  Mean Cell Hemoglobin : 28.3 pg  Mean Cell Hemoglobin Concentration : 32.0 gm/dL  Auto Neutrophil # : x  Auto Lymphocyte # : x  Auto Monocyte # : x  Auto Eosinophil # : x  Auto Basophil # : x  Auto Neutrophil % : x  Auto Lymphocyte % : x  Auto Monocyte % : x  Auto Eosinophil % : x  Auto Basophil % : x    04-30    144  |  112<H>  |  33<H>  ----------------------------<  148<H>  4.7   |  26  |  1.23    Ca    8.5      30 Apr 2021 05:17  Phos  3.5     04-30  Mg     2.0     04-30              RADIOLOGY & ADDITIONAL STUDIES REVIEWED:  yes    [ ]GOALS OF CARE DISCUSSION WITH PATIENT/FAMILY/PROXY:    CRITICAL CARE TIME SPENT: 35 minutes INTERVAL HPI/OVERNIGHT EVENTS: No acute events overnight, patient remains on HF 40/60, OOB to chair, poor appetite even after encouraging to eat home made food or glucerna shakes.     PRESSORS: [ ] YES [ x] NO    Antimicrobial:    Cardiovascular:    Pulmonary:  ALBUTerol    90 MICROgram(s) HFA Inhaler 2 Puff(s) Inhalation every 6 hours PRN  montelukast 10 milliGRAM(s) Oral daily    Hematalogic:  apixaban 2.5 milliGRAM(s) Oral every 12 hours    Other:  ascorbic acid 500 milliGRAM(s) Oral daily  atorvastatin 40 milliGRAM(s) Oral at bedtime  chlorhexidine 2% Cloths 1 Application(s) Topical <User Schedule>  cholecalciferol 400 Unit(s) Oral daily  famotidine    Tablet 20 milliGRAM(s) Oral two times a day  insulin glargine Injectable (LANTUS) 12 Unit(s) SubCutaneous at bedtime  insulin lispro (ADMELOG) corrective regimen sliding scale   SubCutaneous three times a day before meals  insulin lispro Injectable (ADMELOG) 4 Unit(s) SubCutaneous three times a day before meals  OLANZapine 2.5 milliGRAM(s) Oral two times a day PRN  polyethylene glycol 3350 17 Gram(s) Oral daily  senna 2 Tablet(s) Oral at bedtime  zinc sulfate 220 milliGRAM(s) Oral daily    ALBUTerol    90 MICROgram(s) HFA Inhaler 2 Puff(s) Inhalation every 6 hours PRN  apixaban 2.5 milliGRAM(s) Oral every 12 hours  ascorbic acid 500 milliGRAM(s) Oral daily  atorvastatin 40 milliGRAM(s) Oral at bedtime  chlorhexidine 2% Cloths 1 Application(s) Topical <User Schedule>  cholecalciferol 400 Unit(s) Oral daily  famotidine    Tablet 20 milliGRAM(s) Oral two times a day  insulin glargine Injectable (LANTUS) 12 Unit(s) SubCutaneous at bedtime  insulin lispro (ADMELOG) corrective regimen sliding scale   SubCutaneous three times a day before meals  insulin lispro Injectable (ADMELOG) 4 Unit(s) SubCutaneous three times a day before meals  montelukast 10 milliGRAM(s) Oral daily  OLANZapine 2.5 milliGRAM(s) Oral two times a day PRN  polyethylene glycol 3350 17 Gram(s) Oral daily  senna 2 Tablet(s) Oral at bedtime  zinc sulfate 220 milliGRAM(s) Oral daily    Drug Dosing Weight  Height (cm): 172.7 (16 Apr 2021 13:26)  Weight (kg): 78.9 (16 Apr 2021 13:26)  BMI (kg/m2): 26.5 (16 Apr 2021 13:26)  BSA (m2): 1.93 (16 Apr 2021 13:26)    CENTRAL LINE: [ ] YES [x ] NO  LOCATION:   DATE INSERTED:  REMOVE: [ ] YES [ ] NO  EXPLAIN:    GUPTA: [ ] YES [x ] NO    DATE INSERTED:  REMOVE:  [ ] YES [ ] NO  EXPLAIN:    A-LINE:  [ ] YES [x ] NO  LOCATION:   DATE INSERTED:  REMOVE:  [ ] YES [ ] NO  EXPLAIN:    PMH -reviewed admission note, no change since admission  PAST MEDICAL & SURGICAL HISTORY:  HTN (hypertension)    DM (diabetes mellitus)    No significant past surgical history        ICU Vital Signs Last 24 Hrs  T(C): 36.9 (30 Apr 2021 23:00), Max: 36.9 (30 Apr 2021 12:00)  T(F): 98.4 (30 Apr 2021 23:00), Max: 98.5 (30 Apr 2021 12:00)  HR: 95 (01 May 2021 02:00) (93 - 116)  BP: 145/87 (01 May 2021 02:00) (90/64 - 159/84)  BP(mean): 103 (01 May 2021 02:00) (70 - 110)  RR: 23 (01 May 2021 02:00) (13 - 31)  SpO2: 98% (01 May 2021 02:00) (76% - 100%)            04-29 @ 07:01  -  04-30 @ 07:00  --------------------------------------------------------  IN: 520 mL / OUT: 300 mL / NET: 220 mL            PHYSICAL EXAM:    GENERAL: NAD, on high flow 40/60, desaturates with movement  EYES: EOMI, PERRLA  NECK: Supple, No JVD; Normal thyroid; Trachea midline: No LAD   NERVOUS SYSTEM:  Alert & Oriented X3,  Motor Strength 5/5 B/L upper and lower extremities; DTRs 2+ intact and symmetric  CHEST/LUNG: No rales, rhonchi, wheezing, breath sounds present bilaterally  HEART: Regular rate and rhythm; No murmurs, no gallops  ABDOMEN: Soft, Nontender, Nondistended; Bowel sounds present, no pain or masses on palpation  : voiding well  EXTREMITIES:  2+ Peripheral Pulses, No clubbing, cyanosis, or edema  SKIN: warm, intact, no lesions          LABS:  CBC Full  -  ( 30 Apr 2021 05:17 )  WBC Count : 16.56 K/uL  RBC Count : 5.23 M/uL  Hemoglobin : 14.8 g/dL  Hematocrit : 46.2 %  Platelet Count - Automated : 103 K/uL  Mean Cell Volume : 88.3 fl  Mean Cell Hemoglobin : 28.3 pg  Mean Cell Hemoglobin Concentration : 32.0 gm/dL  Auto Neutrophil # : x  Auto Lymphocyte # : x  Auto Monocyte # : x  Auto Eosinophil # : x  Auto Basophil # : x  Auto Neutrophil % : x  Auto Lymphocyte % : x  Auto Monocyte % : x  Auto Eosinophil % : x  Auto Basophil % : x    04-30    144  |  112<H>  |  33<H>  ----------------------------<  148<H>  4.7   |  26  |  1.23    Ca    8.5      30 Apr 2021 05:17  Phos  3.5     04-30  Mg     2.0     04-30              RADIOLOGY & ADDITIONAL STUDIES REVIEWED:  yes    [ ]GOALS OF CARE DISCUSSION WITH PATIENT/FAMILY/PROXY:    CRITICAL CARE TIME SPENT: 35 minutes

## 2021-05-01 NOTE — PROGRESS NOTE ADULT - ASSESSMENT
75 y/o M from home with PMH of HTN and DM initially presented to ED complaint of hiccups on 4/16/2021, associated with cough, fever,chills and myalgias. Pt was admitted to Medicine floor for COVID 19 infection requiring supplemental O2 with NC 6L then NRB and still desaturating.  ICU was consulted for acute hypoxic respiratory failure 2/2 COVID-19 now requiring HFNC 50/100, saturation at 91%. He is s/p bicarb gtt for metabolic acidosis, s/p PO bicarb    Assessment:  1. Acute Hypoxic Respiratory Failure secondary to COVID19 infection    2. INOCENCIA   3. Mixed AG +  NAGMA   4. Uncontrolled DM-2   5. Hyponatremia/Hypernatremia   6. HTN     Plan:    =====================[CNS ]==============================  # No issues:   - Mental status at baseline    - Off Seroquel  - C/w Zyprexa PRN for agitation   - Off Precedex drip     =====================[CVS ]===============================  # HTN :   - Pt was on losartan 50mg at home, last dose 4/23   - Holding Losartan due INOCENCIA; Cr 1.93>2.0>1.6> 1.23  - Monitor BP and adjust meds as needed      =====================[RESP ]==============================  # Acute Hypoxic Resp Failure 2/2 COVID-19 infection :   - Hypoxia worsening, now on HFNC 50/80 wean off as tolerated    - Low threshold for intubation, pt's grand-daughter Owen SMYTH, agrees for intubation if the need arises  - Completed Remdesivir 4/20  - Completed Decadron 4/26  - CXR (4/23) Bilateral perihilar and bibasilar multifocal and diffuse ill-defined airspace opacities  - S/p heparin drip for elevated D-dimer  - D- dimer trending down 11k > 3k  - C/w Eliquis 2.5 mg BID    - C/w Vit c, Zn, Vit D supplementation   - US doppler of LE no DVT    =====================[ GI ]================================  # No issues :   - C/w Soft diabetic diet + Glucerna shakes   - S/p one dose modafinil on 4/28    ====================[ RENAL ]=============================   # INOCENCIA : resolved **  - likely 2/2 COVID19 vs pre -renal (less likely given no response to IVF)   - S/p bicarb drip  - D/c PO bicarb TID as per nephro   - Losartan on hold (last dose 4/23)   - Monitor urine output, monitor I &O s closely   - Monitor and supplement electrolytes as needed   - Renal US ordered (pending) **  - Nephro Dr Brown following     # Mixed AG + NAGMA : resolved **  - HCO3 19, could be due to hyperchloremic MA due to NS vs worsening INOCENCIA   - D/c sodium bicarb 650 mg TID as per nephrology     # Hyponatremia/Hypernatremia :   - Initially pt was hyponatremic, resolved s/p IVF   - Na 144  - Monitor BMP     =====================[ ID ]================================  # COVID-19 :  - Plan as above     ===================[ HEME/ONC ]===========================  # Thrombocytopenia :  - Platelets 115 > 95 > 103  today   - F/u HIT panel   - No signs of bleeding   - Monitor cbc daily     =====================[ ENDO ]=============================  # Uncontrolled DM-2   - A1c 13.6   - BS controlled on current regimen   - C/w Lantus 12 units at bedtime and Admelog 4 units before meals   - Endo Dr Hall  following   - Monitor BS   - Encouraged po intake and home meals     ==================[ SKIN/ CATHETERS ]======================  # No skin breaks  OOB to chair    ==================[ PROPHYLAXIS ]==========================   # DVT: Lovenox   # GI: Pepcid     ====================[ DISPO ]==============================   - Patient remains acute, will continue monitoring in ICU     ===================[ PROGNOSIS ]===========================  - Guarded      75 y/o M from home with PMH of HTN and DM initially presented to ED complaint of hiccups on 4/16/2021, associated with cough, fever,chills and myalgias. Pt was admitted to Medicine floor for COVID 19 infection requiring supplemental O2 with NC 6L then NRB and still desaturating.  ICU was consulted for acute hypoxic respiratory failure 2/2 COVID-19 now requiring HFNC 50/100, saturation at 91%. He is s/p bicarb gtt for metabolic acidosis, s/p PO bicarb    Assessment:  1. Acute Hypoxic Respiratory Failure secondary to COVID19 infection    2. INOCENCIA   3. Mixed AG +  NAGMA   4. Uncontrolled DM-2   5. Hyponatremia/Hypernatremia   6. HTN     Plan:    =====================[CNS ]==============================  # No issues:   - Mental status at baseline    - Off Seroquel  - C/w Zyprexa PRN for agitation   - Off Precedex drip     =====================[CVS ]===============================  # HTN :   - Pt was on losartan 50mg at home, last dose 4/23   - Holding Losartan due INOCENCIA; Cr 1.93>2.0>1.6> 1.23  - Monitor BP and adjust meds as needed      =====================[RESP ]==============================  # Acute Hypoxic Resp Failure 2/2 COVID-19 infection :   - Hypoxia worsening, now on HFNC 50/80 wean off as tolerated    - Low threshold for intubation, pt's grand-daughter Owen SMYTH, agrees for intubation if the need arises  - Completed Remdesivir 4/20  - Completed Decadron 4/26  - CXR (4/23) Bilateral perihilar and bibasilar multifocal and diffuse ill-defined airspace opacities  - S/p heparin drip for elevated D-dimer  - D- dimer trending down 11k > 3k > 1k  - C/w Eliquis 2.5 mg BID    - C/w Vit c, Zn, Vit D supplementation   - US doppler of LE no DVT    =====================[ GI ]================================  # No issues :   - C/w Soft diabetic diet + Glucerna shakes   - S/p one dose modafinil on 4/28    ====================[ RENAL ]=============================   # INOCENCIA : resolved **  - likely 2/2 COVID19 vs pre -renal (less likely given no response to IVF)   - S/p bicarb drip  - D/c PO bicarb TID as per nephro   - Losartan on hold (last dose 4/23)   - Monitor urine output, monitor I &O s closely   - Monitor and supplement electrolytes as needed   - Renal US ordered (pending) **  - Nephro Dr Brown following     # Mixed AG + NAGMA : resolved **  - HCO3 19, could be due to hyperchloremic MA due to NS vs worsening INOCENCIA   - Off sodium bicarb 650 mg TID as per nephrology     # Hyponatremia/Hypernatremia :   - Initially pt was hyponatremic, resolved s/p IVF   - Na 144  - Monitor BMP     =====================[ ID ]================================  # COVID-19 :  - Plan as above     ===================[ HEME/ONC ]===========================  # Thrombocytopenia :  - Platelets 115 > 95 > 103  today   - F/u HIT panel, heparin abx negative   - No signs of bleeding   - Monitor cbc daily     =====================[ ENDO ]=============================  # Uncontrolled DM-2   - A1c 13.6   - BS controlled on current regimen   - C/w Lantus 12 units at bedtime and Admelog 4 units before meals   - Endo Dr Hall  following   - Monitor BS   - Encouraged po intake and home meals     ==================[ SKIN/ CATHETERS ]======================  # No skin breaks  OOB to chair    ==================[ PROPHYLAXIS ]==========================   # DVT: Lovenox   # GI: Pepcid     ====================[ DISPO ]==============================   - Patient remains acute, will continue monitoring in ICU     ===================[ PROGNOSIS ]===========================  - Guarded

## 2021-05-01 NOTE — PROGRESS NOTE ADULT - SUBJECTIVE AND OBJECTIVE BOX
MAYOHILARIO MEHRDAD  76y  Patient is a 76y old  Male who presents with a chief complaint of sob (2021 18:09)    HPI:  This is a patient followed for INOCENCIA in the setting of Respiratory Failure. Stable and is passing urine at this time. No new complaints.    HEALTH ISSUES - PROBLEM Dx:  Acute respiratory failure with hypoxia  Acute respiratory failure with hypoxia    Hyponatremia  Hyponatremia    DM (diabetes mellitus)  DM (diabetes mellitus)    HTN (hypertension)  HTN (hypertension)    Need for prophylactic measure  Need for prophylactic measure    Bilateral pneumonia  Bilateral pneumonia    Insulin dependent type 2 diabetes mellitus, uncontrolled  Insulin dependent type 2 diabetes mellitus, uncontrolled    Discharge planning issues  Discharge planning issues    Uncontrolled diabetes mellitus  Uncontrolled diabetes mellitus    Acute hypoxemic respiratory failure due to COVID-19  Acute hypoxemic respiratory failure due to COVID-19    Hypokalemia  Hypokalemia          MEDICATIONS  (STANDING):  apixaban 2.5 milliGRAM(s) Oral every 12 hours  ascorbic acid 500 milliGRAM(s) Oral daily  atorvastatin 40 milliGRAM(s) Oral at bedtime  chlorhexidine 2% Cloths 1 Application(s) Topical <User Schedule>  cholecalciferol 400 Unit(s) Oral daily  famotidine    Tablet 20 milliGRAM(s) Oral two times a day  insulin glargine Injectable (LANTUS) 12 Unit(s) SubCutaneous at bedtime  insulin lispro (ADMELOG) corrective regimen sliding scale   SubCutaneous three times a day before meals  insulin lispro Injectable (ADMELOG) 4 Unit(s) SubCutaneous three times a day before meals  polyethylene glycol 3350 17 Gram(s) Oral daily  senna 2 Tablet(s) Oral at bedtime  zinc sulfate 220 milliGRAM(s) Oral daily    MEDICATIONS  (PRN):  ALBUTerol    90 MICROgram(s) HFA Inhaler 2 Puff(s) Inhalation every 6 hours PRN Bronchospasm  OLANZapine 2.5 milliGRAM(s) Oral two times a day PRN agitation    Vital Signs Last 24 Hrs  T(C): 36 (01 May 2021 08:00), Max: 36.9 (2021 12:00)  T(F): 96.8 (01 May 2021 08:00), Max: 98.5 (2021 12:00)  HR: 105 (01 May 2021 10:00) (85 - 114)  BP: 93/67 (01 May 2021 10:00) (90/64 - 159/84)  BP(mean): 73 (01 May 2021 10:00) (70 - 110)  RR: 22 (01 May 2021 10:00) (13 - 26)  SpO2: 97% (01 May 2021 10:00) (92% - 100%)  Daily     Daily Weight in k.6 (01 May 2021 07:00)    PHYSICAL EXAM:  Constitutional:  He appears comfortable and not distressed. Not diaphoretic.    Neck:  The thyroid is normal. Trachea is midline.     Respiratory: The lungs are clear to auscultation. No dullness and expansion is normal.    Cardiovascular: S1 and S2 are normal. No mummurs, rubs or gallops are present.    Gastrointestinal: The abdomen is soft. No tenderness is present. No masses are present. Bowel sounds are normal.    Genitourinary: The bladder is not distended. No CVA tenderness is present.    Extremities: No edema is noted. No deformities are present.    Neurological: Tone, power and sensation are normal.     Skin: No lesions are seen  or palpated.    Lymph Nodes: No lymphadenopathy is present.    Psychiatric: Mood is appropriate. No hallucinations or flight of ideas are noted.                              14.1   14.31 )-----------( 105      ( 01 May 2021 05:20 )             43.4     05-01    144  |  112<H>  |  27<H>  ----------------------------<  149<H>  3.8   |  28  |  1.15    Ca    8.6      01 May 2021 05:20  Phos  3.5     05-01  Mg     2.0     05-01

## 2021-05-02 NOTE — PROGRESS NOTE ADULT - ASSESSMENT
INOCENCIA:  Likely pre-renal improved with IVF.  Renal function is improving now.  - Optimize BP  - Renal sonogram once stable if hematuria persists.  - HOLD losartan  (last dose on 4/23)  - Monitor BMP.    Hypernatremia  -due to free water loss  -improved  -Monitor serum Na        CKD 2/3:  Has risk (Diabetes) and minimal proteinuria.  - Monitor BMP.    Metabolic Acidosis:  Mixed AG and NAG. He may have some Tubulo-interstitial Disease  - Monitor for now.  -Hold PO NaHCO3    COVID   MOnitor temp/ O2  COntinue current care    Hypokalemia  repelted by team   monitor serum K

## 2021-05-02 NOTE — PROGRESS NOTE ADULT - SUBJECTIVE AND OBJECTIVE BOX
MAYOHILARIO MEHRDAD  76y  Patient is a 76y old  Male who presents with a chief complaint of sob (2021 18:09)    HPI:  INOCENCIA in the setting of COVID. He is on HFNC now.    HEALTH ISSUES - PROBLEM Dx:  Acute respiratory failure with hypoxia  Acute respiratory failure with hypoxia    Hyponatremia  Hyponatremia    DM (diabetes mellitus)  DM (diabetes mellitus)    HTN (hypertension)  HTN (hypertension)    Need for prophylactic measure  Need for prophylactic measure    Bilateral pneumonia  Bilateral pneumonia    Insulin dependent type 2 diabetes mellitus, uncontrolled  Insulin dependent type 2 diabetes mellitus, uncontrolled    Discharge planning issues  Discharge planning issues    Uncontrolled diabetes mellitus  Uncontrolled diabetes mellitus    Acute hypoxemic respiratory failure due to COVID-19  Acute hypoxemic respiratory failure due to COVID-19    Hypokalemia  Hypokalemia          MEDICATIONS  (STANDING):  apixaban 2.5 milliGRAM(s) Oral every 12 hours  ascorbic acid 500 milliGRAM(s) Oral daily  atorvastatin 40 milliGRAM(s) Oral at bedtime  chlorhexidine 2% Cloths 1 Application(s) Topical <User Schedule>  cholecalciferol 400 Unit(s) Oral daily  famotidine    Tablet 20 milliGRAM(s) Oral two times a day  insulin glargine Injectable (LANTUS) 12 Unit(s) SubCutaneous at bedtime  insulin lispro (ADMELOG) corrective regimen sliding scale   SubCutaneous three times a day before meals  insulin lispro Injectable (ADMELOG) 4 Unit(s) SubCutaneous three times a day before meals  polyethylene glycol 3350 17 Gram(s) Oral daily  senna 2 Tablet(s) Oral at bedtime  zinc sulfate 220 milliGRAM(s) Oral daily    MEDICATIONS  (PRN):  ALBUTerol    90 MICROgram(s) HFA Inhaler 2 Puff(s) Inhalation every 6 hours PRN Bronchospasm  OLANZapine 2.5 milliGRAM(s) Oral two times a day PRN agitation    Vital Signs Last 24 Hrs  T(C): 35.8 (02 May 2021 12:00), Max: 36.7 (02 May 2021 06:00)  T(F): 96.5 (02 May 2021 12:00), Max: 98 (02 May 2021 06:00)  HR: 98 (02 May 2021 15:00) (85 - 104)  BP: 107/68 (02 May 2021 15:00) (93/59 - 147/82)  BP(mean): 77 (02 May 2021 15:00) (67 - 100)  RR: 19 (02 May 2021 15:00) (14 - 23)  SpO2: 99% (02 May 2021 15:00) (93% - 100%)  Daily     Daily Weight in k.7 (02 May 2021 08:00)    PHYSICAL EXAM:  Constitutional:  He appears comfortable and not distressed. Not diaphoretic.    Respiratory: The lungs are clear to auscultation. No dullness and expansion is normal.    Cardiovascular: S1 and S2 are normal. No mummurs, rubs or gallops are present.    Gastrointestinal: The abdomen is soft. No tenderness is present. No masses are present. Bowel sounds are normal.    Genitourinary: The bladder is not distended. No CVA tenderness is present.    Extremities: No edema is noted. No deformities are present.    Neurological: Tone, power and sensation are normal.     Skin: No lesions are seen  or palpated.                          13.7   12.64 )-----------( 113      ( 02 May 2021 06:39 )             42.6     05-02    144  |  108  |  23<H>  ----------------------------<  142<H>  3.8   |  28  |  0.92    Ca    8.5      02 May 2021 06:39  Phos  3.3     05-02  Mg     2.0

## 2021-05-02 NOTE — PROGRESS NOTE ADULT - ASSESSMENT
77 y/o M from home with PMH of HTN and DM initially presented to ED complaint of hiccups on 4/16/2021, associated with cough, fever,chills and myalgias. Pt was admitted to Medicine floor for COVID 19 infection requiring supplemental O2 with NC 6L then NRB and still desaturating.  ICU was consulted for acute hypoxic respiratory failure 2/2 COVID-19 now requiring HFNC 50/100, saturation at 91%. He is s/p bicarb gtt for metabolic acidosis, s/p PO bicarb    Assessment:  1. Acute Hypoxic Respiratory Failure secondary to COVID19 infection    2. INOCENCIA   3. Mixed AG +  NAGMA   4. Uncontrolled DM-2   5. Hyponatremia/Hypernatremia   6. HTN     Plan:    =====================[CNS ]==============================  # No issues:   - Mental status at baseline    - Off Seroquel  - C/w Zyprexa PRN for agitation   - Off Precedex drip     =====================[CVS ]===============================  # HTN :   - Pt was on losartan 50mg at home, last dose 4/23   - Holding Losartan due INOCENCIA; Cr 1.93>2.0>1.6> 1.23  - Monitor BP and adjust meds as needed      =====================[RESP ]==============================  # Acute Hypoxic Resp Failure 2/2 COVID-19 infection :   - Hypoxia worsening, now on HFNC 50/80 wean off as tolerated    - Low threshold for intubation, pt's grand-daughter Owen SMYTH, agrees for intubation if the need arises  - Completed Remdesivir 4/20  - Completed Decadron 4/26  - CXR (4/23) Bilateral perihilar and bibasilar multifocal and diffuse ill-defined airspace opacities  - S/p heparin drip for elevated D-dimer  - D- dimer trending down 11k > 3k > 1k  - C/w Eliquis 2.5 mg BID    - C/w Vit c, Zn, Vit D supplementation   - US doppler of LE no DVT    =====================[ GI ]================================  # No issues :   - C/w Soft diabetic diet + Glucerna shakes   - S/p one dose modafinil on 4/28    ====================[ RENAL ]=============================   # INOCENCIA : resolved **  - likely 2/2 COVID19 vs pre -renal (less likely given no response to IVF)   - S/p bicarb drip  - D/c PO bicarb TID as per nephro   - Losartan on hold (last dose 4/23)   - Monitor urine output, monitor I &O s closely   - Monitor and supplement electrolytes as needed   - Renal US ordered (pending) **  - Nephro Dr Brown following     # Mixed AG + NAGMA : resolved **  - HCO3 19, could be due to hyperchloremic MA due to NS vs worsening INOCENCIA   - Off sodium bicarb 650 mg TID as per nephrology     # Hyponatremia/Hypernatremia :   - Initially pt was hyponatremic, resolved s/p IVF   - Na 144  - Monitor BMP     =====================[ ID ]================================  # COVID-19 :  - Plan as above     ===================[ HEME/ONC ]===========================  # Thrombocytopenia :  - Platelets 115 > 95 > 103  today   - F/u HIT panel, heparin abx negative   - No signs of bleeding   - Monitor cbc daily     =====================[ ENDO ]=============================  # Uncontrolled DM-2   - A1c 13.6   - BS controlled on current regimen   - C/w Lantus 12 units at bedtime and Admelog 4 units before meals   - Endo Dr Hall  following   - Monitor BS   - Encouraged po intake and home meals     ==================[ SKIN/ CATHETERS ]======================  # No skin breaks  OOB to chair    ==================[ PROPHYLAXIS ]==========================   # DVT: Lovenox   # GI: Pepcid     ====================[ DISPO ]==============================   - Patient remains acute, will continue monitoring in ICU     ===================[ PROGNOSIS ]===========================  - Guarded

## 2021-05-02 NOTE — PROGRESS NOTE ADULT - SUBJECTIVE AND OBJECTIVE BOX
INTERVAL HPI/OVERNIGHT EVENTS: Patient remained on high flow over night.    PRESSORS: [ ] YES [ ] NO  WHICH:    ANTIBIOTICS:                      Antimicrobial:    Cardiovascular:    Pulmonary:  ALBUTerol    90 MICROgram(s) HFA Inhaler 2 Puff(s) Inhalation every 6 hours PRN    Hematalogic:  apixaban 2.5 milliGRAM(s) Oral every 12 hours    Other:  ascorbic acid 500 milliGRAM(s) Oral daily  atorvastatin 40 milliGRAM(s) Oral at bedtime  chlorhexidine 2% Cloths 1 Application(s) Topical <User Schedule>  cholecalciferol 400 Unit(s) Oral daily  famotidine    Tablet 20 milliGRAM(s) Oral two times a day  insulin glargine Injectable (LANTUS) 12 Unit(s) SubCutaneous at bedtime  insulin lispro (ADMELOG) corrective regimen sliding scale   SubCutaneous three times a day before meals  insulin lispro Injectable (ADMELOG) 4 Unit(s) SubCutaneous three times a day before meals  OLANZapine 2.5 milliGRAM(s) Oral two times a day PRN  polyethylene glycol 3350 17 Gram(s) Oral daily  senna 2 Tablet(s) Oral at bedtime  zinc sulfate 220 milliGRAM(s) Oral daily    ALBUTerol    90 MICROgram(s) HFA Inhaler 2 Puff(s) Inhalation every 6 hours PRN  apixaban 2.5 milliGRAM(s) Oral every 12 hours  ascorbic acid 500 milliGRAM(s) Oral daily  atorvastatin 40 milliGRAM(s) Oral at bedtime  chlorhexidine 2% Cloths 1 Application(s) Topical <User Schedule>  cholecalciferol 400 Unit(s) Oral daily  famotidine    Tablet 20 milliGRAM(s) Oral two times a day  insulin glargine Injectable (LANTUS) 12 Unit(s) SubCutaneous at bedtime  insulin lispro (ADMELOG) corrective regimen sliding scale   SubCutaneous three times a day before meals  insulin lispro Injectable (ADMELOG) 4 Unit(s) SubCutaneous three times a day before meals  OLANZapine 2.5 milliGRAM(s) Oral two times a day PRN  polyethylene glycol 3350 17 Gram(s) Oral daily  senna 2 Tablet(s) Oral at bedtime  zinc sulfate 220 milliGRAM(s) Oral daily    Drug Dosing Weight  Height (cm): 172.7 (16 Apr 2021 13:26)  Weight (kg): 78.9 (16 Apr 2021 13:26)  BMI (kg/m2): 26.5 (16 Apr 2021 13:26)  BSA (m2): 1.93 (16 Apr 2021 13:26)    CENTRAL LINE: [ ] YES [ ] NO  LOCATION:   DATE INSERTED:  REMOVE: [ ] YES [ ] NO  EXPLAIN:    GUPTA: [ ] YES [ ] NO    DATE INSERTED:  REMOVE:  [ ] YES [ ] NO  EXPLAIN:    A-LINE:  [ ] YES [ ] NO  LOCATION:   DATE INSERTED:  REMOVE:  [ ] YES [ ] NO  EXPLAIN:    PMH -reviewed admission note, no change since admission    ICU Vital Signs Last 24 Hrs  T(C): 36.4 (01 May 2021 20:00), Max: 36.6 (01 May 2021 04:00)  T(F): 97.5 (01 May 2021 20:00), Max: 97.8 (01 May 2021 04:00)  HR: 88 (02 May 2021 00:47) (85 - 105)  BP: 116/74 (01 May 2021 22:00) (91/62 - 156/92)  BP(mean): 85 (01 May 2021 22:00) (68 - 104)  ABP: --  ABP(mean): --  RR: 17 (01 May 2021 22:00) (15 - 24)  SpO2: 97% (02 May 2021 00:47) (92% - 100%)            04-30 @ 07:01  -  05-01 @ 07:00  --------------------------------------------------------  IN: 920 mL / OUT: 550 mL / NET: 370 mL            PHYSICAL EXAM:    GENERAL: elderly male  HEAD:  Atraumatic, Normocephalic  EYES: EOMI, PERRLA, conjunctiva and sclera clear  ENMT: No tonsillar erythema, exudates, or enlargement; Moist mucous membranes,   NECK: Supple, normal appearance, No JVD; Normal thyroid; Trachea midline  NERVOUS SYSTEM:  Alert & Oriented X3, no new deficits.  CHEST/LUNG: No chest deformity; Normal percussion bilaterally; No rales, rhonchi, wheezing   HEART: Regular rate and rhythm; No murmurs, rubs, or gallops  ABDOMEN: Soft, Nontender, Nondistended; Bowel sounds present  EXTREMITIES:  2+ Peripheral Pulses, No clubbing, cyanosis, or edema  LYMPH: No lymphadenopathy noted  SKIN: No rashes or lesions; Good capillary refill      LABS:  CBC Full  -  ( 01 May 2021 05:20 )  WBC Count : 14.31 K/uL  RBC Count : 4.88 M/uL  Hemoglobin : 14.1 g/dL  Hematocrit : 43.4 %  Platelet Count - Automated : 105 K/uL  Mean Cell Volume : 88.9 fl  Mean Cell Hemoglobin : 28.9 pg  Mean Cell Hemoglobin Concentration : 32.5 gm/dL  Auto Neutrophil # : x  Auto Lymphocyte # : x  Auto Monocyte # : x  Auto Eosinophil # : x  Auto Basophil # : x  Auto Neutrophil % : x  Auto Lymphocyte % : x  Auto Monocyte % : x  Auto Eosinophil % : x  Auto Basophil % : x    05-01    144  |  112<H>  |  27<H>  ----------------------------<  149<H>  3.8   |  28  |  1.15    Ca    8.6      01 May 2021 05:20  Phos  3.5     05-01  Mg     2.0     05-01              RADIOLOGY & ADDITIONAL STUDIES REVIEWED:  ***    GOALS OF CARE DISCUSSION WITH PATIENT/FAMILY/PROXY:    CRITICAL CARE TIME SPENT: 35 minutes

## 2021-05-03 NOTE — PROGRESS NOTE ADULT - SUBJECTIVE AND OBJECTIVE BOX
[facility]   MD BART FLANNERY MD RUORU WONG, PA    TEL:  OFFICE: [phone]  DR AYERS CELL: [phone]  JOSÉ MIGUEL ABDI CELL: [phone]  DR. AGUIRRE CELL: [phone]  DR. MCCLAIN CELL: [phone]    FROM 5 PM - 7 AM PLEASE CALL ANSWERING SERVICE: [phone]    RENAL FOLLOW UP NOTE--Date of Service 05-03-21 @ 10:38  --------------------------------------------------------------------------------  HPI:      Pt covid 19+    PAST HISTORY  --------------------------------------------------------------------------------  No significant changes to PMH, PSH, FHx, SHx, unless otherwise noted    ALLERGIES & MEDICATIONS  --------------------------------------------------------------------------------  Allergies    No Known Allergies    Intolerances      Standing Inpatient Medications  apixaban 2.5 milliGRAM(s) Oral every 12 hours  ascorbic acid 500 milliGRAM(s) Oral daily  atorvastatin 40 milliGRAM(s) Oral at bedtime  chlorhexidine 2% Cloths 1 Application(s) Topical <User Schedule>  cholecalciferol 400 Unit(s) Oral daily  famotidine    Tablet 20 milliGRAM(s) Oral two times a day  insulin glargine Injectable (LANTUS) 12 Unit(s) SubCutaneous at bedtime  insulin lispro (ADMELOG) corrective regimen sliding scale   SubCutaneous three times a day before meals  insulin lispro Injectable (ADMELOG) 4 Unit(s) SubCutaneous three times a day before meals  polyethylene glycol 3350 17 Gram(s) Oral daily  senna 2 Tablet(s) Oral at bedtime  zinc sulfate 220 milliGRAM(s) Oral daily    PRN Inpatient Medications  ALBUTerol    90 MICROgram(s) HFA Inhaler 2 Puff(s) Inhalation every 6 hours PRN  OLANZapine 2.5 milliGRAM(s) Oral two times a day PRN      REVIEW OF SYSTEMS  --------------------------------------------------------------------------------  General: no fever  MSK: no edema     VITALS/PHYSICAL EXAM  --------------------------------------------------------------------------------  T(C): 36.4 (05-03-21 @ 07:00), Max: 37 (05-03-21 @ 00:00)  HR: 97 (05-03-21 @ 10:00) (77 - 104)  BP: 103/68 (05-03-21 @ 10:00) (88/55 - 991/54)  RR: 26 (05-03-21 @ 10:00) (15 - 26)  SpO2: 92% (05-03-21 @ 10:00) (91% - 100%)  Wt(kg): --        05-02-21 @ 07:01  -  05-03-21 @ 07:00  --------------------------------------------------------  IN: 1420 mL / OUT: 770 mL / NET: 650 mL    05-03-21 @ 07:01  -  05-03-21 @ 10:38  --------------------------------------------------------  IN: 200 mL / OUT: 250 mL / NET: -50 mL      LABS/STUDIES  --------------------------------------------------------------------------------              12.5   11.47 >-----------<  120      [05-03-21 @ 04:26]              39.2     139  |  106  |  21  ----------------------------<  185      [05-03-21 @ 04:26]  3.7   |  28  |  1.06        Ca     8.2     [05-03-21 @ 04:26]      Mg     2.0     [05-03-21 @ 04:26]      Phos  3.0     [05-03-21 @ 04:26]                [05-03-21 @ 04:26]    Creatinine Trend:  SCr 1.06 [05-03 @ 04:26]  SCr 0.92 [05-02 @ 06:39]  SCr 1.15 [05-01 @ 05:20]  SCr 1.23 [04-30 @ 05:17]  SCr 1.33 [04-29 @ 04:42]    Urinalysis - [04-22-21 @ 14:32]      Color Yellow / Appearance Clear / SG 1.015 / pH 6.5      Gluc 1000 / Ketone Negative  / Bili Negative / Urobili Negative       Blood Small / Protein 30 / Leuk Est Negative / Nitrite Negative      RBC 5-10 / WBC 0-2 / Hyaline  / Gran  / Sq Epi  / Non Sq Epi Few / Bacteria Few      Ferritin 1437      [05-03-21 @ 09:16]  PTH -- (Ca 8.5)      [04-30-21 @ 16:54]   54  TSH 0.96      [04-17-21 @ 06:26]  Lipid: chol 96, TG 77, HDL 42, LDL --      [04-17-21 @ 06:26]

## 2021-05-03 NOTE — PROGRESS NOTE ADULT - SUBJECTIVE AND OBJECTIVE BOX
INTERVAL HPI/OVERNIGHT EVENTS: ***    PRESSORS: [ ] YES [ ] NO  WHICH:    ANTIBIOTICS:                      Antimicrobial:    Cardiovascular:    Pulmonary:  ALBUTerol    90 MICROgram(s) HFA Inhaler 2 Puff(s) Inhalation every 6 hours PRN    Hematalogic:  apixaban 2.5 milliGRAM(s) Oral every 12 hours    Other:  ascorbic acid 500 milliGRAM(s) Oral daily  atorvastatin 40 milliGRAM(s) Oral at bedtime  chlorhexidine 2% Cloths 1 Application(s) Topical <User Schedule>  cholecalciferol 400 Unit(s) Oral daily  famotidine    Tablet 20 milliGRAM(s) Oral two times a day  insulin glargine Injectable (LANTUS) 12 Unit(s) SubCutaneous at bedtime  insulin lispro (ADMELOG) corrective regimen sliding scale   SubCutaneous three times a day before meals  insulin lispro Injectable (ADMELOG) 4 Unit(s) SubCutaneous three times a day before meals  OLANZapine 2.5 milliGRAM(s) Oral two times a day PRN  polyethylene glycol 3350 17 Gram(s) Oral daily  senna 2 Tablet(s) Oral at bedtime  zinc sulfate 220 milliGRAM(s) Oral daily    ALBUTerol    90 MICROgram(s) HFA Inhaler 2 Puff(s) Inhalation every 6 hours PRN  apixaban 2.5 milliGRAM(s) Oral every 12 hours  ascorbic acid 500 milliGRAM(s) Oral daily  atorvastatin 40 milliGRAM(s) Oral at bedtime  chlorhexidine 2% Cloths 1 Application(s) Topical <User Schedule>  cholecalciferol 400 Unit(s) Oral daily  famotidine    Tablet 20 milliGRAM(s) Oral two times a day  insulin glargine Injectable (LANTUS) 12 Unit(s) SubCutaneous at bedtime  insulin lispro (ADMELOG) corrective regimen sliding scale   SubCutaneous three times a day before meals  insulin lispro Injectable (ADMELOG) 4 Unit(s) SubCutaneous three times a day before meals  OLANZapine 2.5 milliGRAM(s) Oral two times a day PRN  polyethylene glycol 3350 17 Gram(s) Oral daily  senna 2 Tablet(s) Oral at bedtime  zinc sulfate 220 milliGRAM(s) Oral daily    Drug Dosing Weight  Height (cm): 172.7 (16 Apr 2021 13:26)  Weight (kg): 78.9 (16 Apr 2021 13:26)  BMI (kg/m2): 26.5 (16 Apr 2021 13:26)  BSA (m2): 1.93 (16 Apr 2021 13:26)    CENTRAL LINE: [ ] YES [ ] NO  LOCATION:   DATE INSERTED:  REMOVE: [ ] YES [ ] NO  EXPLAIN:    GUPTA: [ ] YES [ ] NO    DATE INSERTED:  REMOVE:  [ ] YES [ ] NO  EXPLAIN:    A-LINE:  [ ] YES [ ] NO  LOCATION:   DATE INSERTED:  REMOVE:  [ ] YES [ ] NO  EXPLAIN:    PMH -reviewed admission note, no change since admission    ICU Vital Signs Last 24 Hrs  T(C): 36.4 (03 May 2021 07:00), Max: 37 (03 May 2021 00:00)  T(F): 97.6 (03 May 2021 07:00), Max: 98.6 (03 May 2021 00:00)  HR: 87 (03 May 2021 07:00) (77 - 104)  BP: 117/80 (03 May 2021 07:00) (88/55 - 991/54)  BP(mean): 89 (03 May 2021 07:00) (61 - 103)  ABP: --  ABP(mean): --  RR: 15 (03 May 2021 07:00) (15 - 25)  SpO2: 96% (03 May 2021 07:00) (91% - 100%)            05-02 @ 07:01  -  05-03 @ 07:00  --------------------------------------------------------  IN: 1420 mL / OUT: 770 mL / NET: 650 mL            PHYSICAL EXAM:    GENERAL: NAD, well-groomed, well-developed  HEAD:  Atraumatic, Normocephalic  EYES: EOMI, PERRLA, conjunctiva and sclera clear  ENMT: No tonsillar erythema, exudates, or enlargement; Moist mucous membranes, Good dentition, No lesions  NECK: Supple, normal appearance, No JVD; Normal thyroid; Trachea midline  NERVOUS SYSTEM:  Alert & Oriented X3, Good concentration; Motor Strength 5/5 B/L upper and lower extremities; DTRs 2+ intact and symmetric  CHEST/LUNG: No chest deformity; Normal percussion bilaterally; No rales, rhonchi, wheezing   HEART: Regular rate and rhythm; No murmurs, rubs, or gallops  ABDOMEN: Soft, Nontender, Nondistended; Bowel sounds present  EXTREMITIES:  2+ Peripheral Pulses, No clubbing, cyanosis, or edema  LYMPH: No lymphadenopathy noted  SKIN: No rashes or lesions; Good capillary refill      LABS:  CBC Full  -  ( 03 May 2021 04:26 )  WBC Count : 11.47 K/uL  RBC Count : 4.39 M/uL  Hemoglobin : 12.5 g/dL  Hematocrit : 39.2 %  Platelet Count - Automated : 120 K/uL  Mean Cell Volume : 89.3 fl  Mean Cell Hemoglobin : 28.5 pg  Mean Cell Hemoglobin Concentration : 31.9 gm/dL  Auto Neutrophil # : x  Auto Lymphocyte # : x  Auto Monocyte # : x  Auto Eosinophil # : x  Auto Basophil # : x  Auto Neutrophil % : x  Auto Lymphocyte % : x  Auto Monocyte % : x  Auto Eosinophil % : x  Auto Basophil % : x    05-03    139  |  106  |  21<H>  ----------------------------<  185<H>  3.7   |  28  |  1.06    Ca    8.2<L>      03 May 2021 04:26  Phos  3.0     05-03  Mg     2.0     05-03              RADIOLOGY & ADDITIONAL STUDIES REVIEWED:  ***    GOALS OF CARE DISCUSSION WITH PATIENT/FAMILY/PROXY:    CRITICAL CARE TIME SPENT: 35 minutes INTERVAL HPI/OVERNIGHT EVENTS:   Tried tapering down oxygen to NC, but patient couldn't tolerate      PRESSORS: [ ] YES [ ] NO  WHICH:    ANTIBIOTICS:                      Antimicrobial:    Cardiovascular:    Pulmonary:  ALBUTerol    90 MICROgram(s) HFA Inhaler 2 Puff(s) Inhalation every 6 hours PRN    Hematalogic:  apixaban 2.5 milliGRAM(s) Oral every 12 hours    Other:  ascorbic acid 500 milliGRAM(s) Oral daily  atorvastatin 40 milliGRAM(s) Oral at bedtime  chlorhexidine 2% Cloths 1 Application(s) Topical <User Schedule>  cholecalciferol 400 Unit(s) Oral daily  famotidine    Tablet 20 milliGRAM(s) Oral two times a day  insulin glargine Injectable (LANTUS) 12 Unit(s) SubCutaneous at bedtime  insulin lispro (ADMELOG) corrective regimen sliding scale   SubCutaneous three times a day before meals  insulin lispro Injectable (ADMELOG) 4 Unit(s) SubCutaneous three times a day before meals  OLANZapine 2.5 milliGRAM(s) Oral two times a day PRN  polyethylene glycol 3350 17 Gram(s) Oral daily  senna 2 Tablet(s) Oral at bedtime  zinc sulfate 220 milliGRAM(s) Oral daily    ALBUTerol    90 MICROgram(s) HFA Inhaler 2 Puff(s) Inhalation every 6 hours PRN  apixaban 2.5 milliGRAM(s) Oral every 12 hours  ascorbic acid 500 milliGRAM(s) Oral daily  atorvastatin 40 milliGRAM(s) Oral at bedtime  chlorhexidine 2% Cloths 1 Application(s) Topical <User Schedule>  cholecalciferol 400 Unit(s) Oral daily  famotidine    Tablet 20 milliGRAM(s) Oral two times a day  insulin glargine Injectable (LANTUS) 12 Unit(s) SubCutaneous at bedtime  insulin lispro (ADMELOG) corrective regimen sliding scale   SubCutaneous three times a day before meals  insulin lispro Injectable (ADMELOG) 4 Unit(s) SubCutaneous three times a day before meals  OLANZapine 2.5 milliGRAM(s) Oral two times a day PRN  polyethylene glycol 3350 17 Gram(s) Oral daily  senna 2 Tablet(s) Oral at bedtime  zinc sulfate 220 milliGRAM(s) Oral daily    Drug Dosing Weight  Height (cm): 172.7 (16 Apr 2021 13:26)  Weight (kg): 78.9 (16 Apr 2021 13:26)  BMI (kg/m2): 26.5 (16 Apr 2021 13:26)  BSA (m2): 1.93 (16 Apr 2021 13:26)    CENTRAL LINE: [ ] YES [x ] NO  LOCATION:   DATE INSERTED:  REMOVE: [ ] YES [ ] NO  EXPLAIN:    GUPTA: [ ] YES [ ] NO    DATE INSERTED:  REMOVE:  [ ] YES [ ] NO  EXPLAIN:    A-LINE:  [ ] YES [x ] NO  LOCATION:   DATE INSERTED:  REMOVE:  [ ] YES [ ] NO  EXPLAIN:    PMH -reviewed admission note, no change since admission    ICU Vital Signs Last 24 Hrs  T(C): 36.4 (03 May 2021 07:00), Max: 37 (03 May 2021 00:00)  T(F): 97.6 (03 May 2021 07:00), Max: 98.6 (03 May 2021 00:00)  HR: 87 (03 May 2021 07:00) (77 - 104)  BP: 117/80 (03 May 2021 07:00) (88/55 - 991/54)  BP(mean): 89 (03 May 2021 07:00) (61 - 103)  ABP: --  ABP(mean): --  RR: 15 (03 May 2021 07:00) (15 - 25)  SpO2: 96% (03 May 2021 07:00) (91% - 100%)            05-02 @ 07:01  -  05-03 @ 07:00  --------------------------------------------------------  IN: 1420 mL / OUT: 770 mL / NET: 650 mL            PHYSICAL EXAM:    GENERAL: NAD, well-groomed, well-developed  HEAD:  Atraumatic, Normocephalic  EYES: EOMI, PERRLA, conjunctiva and sclera clear  ENMT: No tonsillar erythema, exudates, or enlargement; Moist mucous membranes, Good dentition, No lesions  NECK: Supple, normal appearance, No JVD; Normal thyroid; Trachea midline  NERVOUS SYSTEM:  Alert & Oriented X3, Good concentration; Motor Strength 5/5 B/L upper and lower extremities; DTRs 2+ intact and symmetric  CHEST/LUNG: diffuse ronchi , on HFNC   HEART: Regular rate and rhythm; No murmurs, rubs, or gallops  ABDOMEN: Soft, Nontender, Nondistended; Bowel sounds present  EXTREMITIES:  2+ Peripheral Pulses, No clubbing, cyanosis, or edema  LYMPH: No lymphadenopathy noted  SKIN: No rashes or lesions; Good capillary refill      LABS:  CBC Full  -  ( 03 May 2021 04:26 )  WBC Count : 11.47 K/uL  RBC Count : 4.39 M/uL  Hemoglobin : 12.5 g/dL  Hematocrit : 39.2 %  Platelet Count - Automated : 120 K/uL  Mean Cell Volume : 89.3 fl  Mean Cell Hemoglobin : 28.5 pg  Mean Cell Hemoglobin Concentration : 31.9 gm/dL  Auto Neutrophil # : x  Auto Lymphocyte # : x  Auto Monocyte # : x  Auto Eosinophil # : x  Auto Basophil # : x  Auto Neutrophil % : x  Auto Lymphocyte % : x  Auto Monocyte % : x  Auto Eosinophil % : x  Auto Basophil % : x    05-03    139  |  106  |  21<H>  ----------------------------<  185<H>  3.7   |  28  |  1.06    Ca    8.2<L>      03 May 2021 04:26  Phos  3.0     05-03  Mg     2.0     05-03              RADIOLOGY & ADDITIONAL STUDIES REVIEWED:  ***    GOALS OF CARE DISCUSSION WITH PATIENT/FAMILY/PROXY:    CRITICAL CARE TIME SPENT: 35 minutes

## 2021-05-04 NOTE — PROGRESS NOTE ADULT - ASSESSMENT
77 y/o M from home with PMH of HTN and DM initially presented to ED complaint of hiccups on 4/16/2021, associated with cough, fever,chills and myalgias. Pt was admitted to Medicine floor for COVID 19 infection requiring supplemental O2 with NC 6L then NRB and still desaturating.  ICU was consulted for acute hypoxic respiratory failure 2/2 COVID-19 now requiring HFNC 50/100, saturation at 91%. He is s/p bicarb gtt for metabolic acidosis, s/p PO bicarb    Assessment:  1. Acute Hypoxic Respiratory Failure secondary to COVID19 infection    2. INOCENCIA   3. Mixed AG +  NAGMA   4. Uncontrolled DM-2   5. Hyponatremia/Hypernatremia   6. HTN     Plan:    =====================[CNS ]==============================  # No issues:   - Mental status at baseline    - Off Seroquel  - C/w Zyprexa PRN for agitation   - Off Precedex drip     =====================[CVS ]===============================  # HTN :   - Pt was on losartan 50mg at home, last dose 4/23   - Holding Losartan due INOCENCIA; Cr 1.93>2.0>1.6> 1.23  - Monitor BP and adjust meds as needed      =====================[RESP ]==============================  # Acute Hypoxic Resp Failure 2/2 COVID-19 infection :   - Hypoxia worsening, now on HFNC 50/80 weaned off     - Low threshold for intubation, pt's grand-daughter Owen SMYTH, agrees for intubation if the need arises  - Completed Remdesivir 4/20  - Completed Decadron 4/26  - CXR (4/23) Bilateral perihilar and bibasilar multifocal and diffuse ill-defined airspace opacities  - S/p heparin drip for elevated D-dimer  - D- dimer trending down 11k > 3k > 1k  - C/w Eliquis 2.5 mg BID    - C/w Vit c, Zn, Vit D supplementation   - US doppler of LE no DVT  - On NRB at present    =====================[ GI ]================================  # No issues :   - C/w Soft diabetic diet + Glucerna shakes   - S/p one dose modafinil on 4/28    ====================[ RENAL ]=============================   # INOCENCIA : resolved **  - likely 2/2 COVID19 vs pre -renal (less likely given no response to IVF)   - S/p bicarb drip  - D/c PO bicarb TID as per nephro   - Losartan on hold (last dose 4/23)   - Monitor urine output, monitor I &O s closely   - Monitor and supplement electrolytes as needed   - Renal US ordered (pending) **  - Nephro Dr Brown following     # Mixed AG + NAGMA : resolved **  - HCO3 19, could be due to hyperchloremic MA due to NS vs worsening INOCENCIA   - Off sodium bicarb 650 mg TID as per nephrology     # Hyponatremia/Hypernatremia :   - Initially pt was hyponatremic, resolved s/p IVF   - Na 144  - Monitor BMP     =====================[ ID ]================================  # COVID-19 :  - Plan as above     ===================[ HEME/ONC ]===========================  # Thrombocytopenia :  - Platelets 115 > 95 > 103  today   - F/u HIT panel, heparin abx negative   - No signs of bleeding   - Monitor cbc daily     =====================[ ENDO ]=============================  # Uncontrolled DM-2   - A1c 13.6   - BS controlled on current regimen   - C/w Lantus 12 units at bedtime and Admelog 4 units before meals   - Endo Dr Hall  following   - Monitor BS   - Encouraged po intake and home meals     ==================[ SKIN/ CATHETERS ]======================  # No skin breaks  OOB to chair    ==================[ PROPHYLAXIS ]==========================   # DVT: Lovenox   # GI: Pepcid     ====================[ DISPO ]==============================   - Patient remains acute, will continue monitoring in ICU     ===================[ PROGNOSIS ]===========================  - Guarded

## 2021-05-04 NOTE — CHART NOTE - NSCHARTNOTEFT_GEN_A_CORE
Assessment:   Patient is a 76y old  Male who presents with a chief complaint of sob (2021 18:09). Covid/ Isolation. RN reports pt gets food from home, and eats some hospital food. Reports pt takes all 3 Glucerna shakes.        Diet Prescription: Diet, Soft:   Consistent Carbohydrate {No Snacks}  DASH/TLC {Sodium & Cholesterol Restricted}  Lacto Veg (Accepts Milk & Milk Products)  Supplement Feeding Modality:  Oral  Glucerna Shake Cans or Servings Per Day:  1       Frequency:  Three Times a day (21 @ 11:55)        Daily     Daily Weight in k (04 May 2021 08:00)  Weight in k.9 (03 May 2021 07:00)  Weight in k.7 (02 May 2021 08:00)  Weight in k.6 (01 May 2021 07:00)  Weight in k.8 (2021 07:05)  Weight in k.1 (2021 09:00)  Weight in k.9 (2021 08:00)  Weight in k.8 (2021 07:00)  Weight in k.2 (2021 09:00)      Pertinent Medications: MEDICATIONS  (STANDING):  albumin human 25% IVPB 50 milliLiter(s) IV Intermittent every 8 hours  apixaban 2.5 milliGRAM(s) Oral every 12 hours  ascorbic acid 500 milliGRAM(s) Oral daily  atorvastatin 40 milliGRAM(s) Oral at bedtime  chlorhexidine 2% Cloths 1 Application(s) Topical <User Schedule>  cholecalciferol 400 Unit(s) Oral daily  famotidine    Tablet 20 milliGRAM(s) Oral two times a day  insulin glargine Injectable (LANTUS) 12 Unit(s) SubCutaneous at bedtime  insulin lispro (ADMELOG) corrective regimen sliding scale   SubCutaneous three times a day before meals  insulin lispro Injectable (ADMELOG) 4 Unit(s) SubCutaneous three times a day before meals  polyethylene glycol 3350 17 Gram(s) Oral daily  senna 2 Tablet(s) Oral at bedtime  zinc sulfate 220 milliGRAM(s) Oral daily    MEDICATIONS  (PRN):  ALBUTerol    90 MICROgram(s) HFA Inhaler 2 Puff(s) Inhalation every 6 hours PRN Bronchospasm  OLANZapine 2.5 milliGRAM(s) Oral two times a day PRN agitation    Pertinent Labs:  Na138 mmol/L Glu 99 mg/dL K+ 3.3 mmol/L<L> Cr  0.90 mg/dL BUN 18 mg/dL  Phos 3.5 mg/dL  Alb 1.5 g/dL<L>  Chol 96 mg/dL LDL --    HDL 42 mg/dL Trig 77 mg/dL     CAPILLARY BLOOD GLUCOSE      POCT Blood Glucose.: 156 mg/dL (04 May 2021 10:59)  POCT Blood Glucose.: 107 mg/dL (04 May 2021 05:52)  POCT Blood Glucose.: 106 mg/dL (03 May 2021 21:21)  POCT Blood Glucose.: 153 mg/dL (03 May 2021 15:34)      Interventions:   Recommend  [ ] Change Diet To:  [x ] Nutrition Supplement: On Glucerna shakes TID  [ ] Nutrition Support  [x ] Other: MD to monitor. RD available.     Monitoring and Evaluation:   [x ] PO intake [ x ] Tolerance to diet prescription [ x ] weights [ x ] labs[ x ] follow up per protocol  [ ] other:

## 2021-05-04 NOTE — PROGRESS NOTE ADULT - ASSESSMENT
_________________________________________________________________________________________  ========>>  M E D I C A L   A T T E N D I N G    F O L L O W  U P  N O T E  <<=========  -----------------------------------------------------------------------------------------------------    - Patient seen and examined by me earlier today.  (covering today)   - In summary,  MEHRDAD SUTHERLAND is a 76y year old man admitted with COVID  - Patient today overall doing ok, comfortable, eating fairly    pt remains in ICU, now on regular nasal canula O2     ==================>> REVIEW OF SYSTEM <<=================    limited as pt sleepy, weak appearing.. poor historian     ==================>> PHYSICAL EXAM <<=================    GEN: awake and responsive, NAD , comfortable, pleasant, calm , cachectic, weak   HEENT: NCAT, PERRL, MMM, hearing intact  Neck: supple , no JVD appreciated  CVS: S1S2 , regular , No M/R/G appreciated  PULM: CTA B/L,  limited exam as not taking deep breaths   ABD.: soft. non tender, non distended  Extrem: intact pulses , no edema   PSYCH : normal mood,  not anxious                            ( Note Written / Date of service :  05-04-21 )    ==================>> MEDICATIONS <<====================    MEDICATIONS  (STANDING):  albumin human 25% IVPB 50 milliLiter(s) IV Intermittent every 8 hours  apixaban 2.5 milliGRAM(s) Oral every 12 hours  ascorbic acid 500 milliGRAM(s) Oral daily  atorvastatin 40 milliGRAM(s) Oral at bedtime  chlorhexidine 2% Cloths 1 Application(s) Topical <User Schedule>  cholecalciferol 400 Unit(s) Oral daily  famotidine    Tablet 20 milliGRAM(s) Oral two times a day  insulin glargine Injectable (LANTUS) 12 Unit(s) SubCutaneous at bedtime  insulin lispro (ADMELOG) corrective regimen sliding scale   SubCutaneous three times a day before meals  insulin lispro Injectable (ADMELOG) 4 Unit(s) SubCutaneous three times a day before meals  polyethylene glycol 3350 17 Gram(s) Oral daily  senna 2 Tablet(s) Oral at bedtime  zinc sulfate 220 milliGRAM(s) Oral daily    MEDICATIONS  (PRN):  ALBUTerol    90 MICROgram(s) HFA Inhaler 2 Puff(s) Inhalation every 6 hours PRN Bronchospasm  OLANZapine 2.5 milliGRAM(s) Oral two times a day PRN agitation    ___________  Active diet:  Diet, Soft:   Consistent Carbohydrate No Snacks  DASH/TLC Sodium & Cholesterol Restricted  Lacto Veg (Accepts Milk & Milk Products)  Supplement Feeding Modality:  Oral  Glucerna Shake Cans or Servings Per Day:  1       Frequency:  Three Times a day  ___________________    ==================>> VITAL SIGNS <<==================    T(C): 36.8 (05-04-21 @ 20:00), Max: 37.1 (05-04-21 @ 04:00)  HR: 91 (05-04-21 @ 22:00) (85 - 100)  BP: 143/77 (05-04-21 @ 22:00) (90/45 - 153/89)  BP(mean): 94 (05-04-21 @ 22:00)  RR: 21 (05-04-21 @ 22:00) (11 - 27)  SpO2: 99% (05-04-21 @ 22:00) (90% - 100%)     POCT Blood Glucose.: 103 mg/dL (04 May 2021 21:06)  POCT Blood Glucose.: 102 mg/dL (04 May 2021 15:14)  POCT Blood Glucose.: 156 mg/dL (04 May 2021 10:59)  POCT Blood Glucose.: 107 mg/dL (04 May 2021 05:52)    I&O's Summary    03 May 2021 07:01  -  04 May 2021 07:00  --------------------------------------------------------  IN: 400 mL / OUT: 700 mL / NET: -300 mL    04 May 2021 07:01  -  04 May 2021 23:01  --------------------------------------------------------  IN: 500 mL / OUT: 650 mL / NET: -150 mL       ==================>> LAB AND IMAGING <<==================                        12.6   11.54 )-----------( 130      ( 04 May 2021 04:01 )             37.9        05-04    138  |  105  |  18  ----------------------------<  99  3.3<L>   |  27  |  0.90    Ca    8.1<L>      04 May 2021 04:01  Phos  3.5     05-04  Mg     1.9     05-04    TPro  6.5  /  Alb  1.5<L>  /  TBili  0.6  /  DBili  x   /  AST  38  /  ALT  41  /  AlkPhos  100  05-04                TSH:      0.96   (04-17-21)           Lipid profile:  (04-17-21)     Total: 96     LDL  : (p)     HDL  :42     TG   :77     HgA1C:   (04-17-21)          (04-17-21)      13.8,   (04-17-21)          (04-17-21)      13.4,   (01-10-21)          (01-10-21)      14.0    ___________________________________________________________________________________  ===============>>  A S S E S S M E N T   A N D   P L A N <<===============  ------------------------------------------------------------------------------------------    · Assessment	  76 M with PMH of HTN and DM presented to ED with complaint of hiccups.  Found to be COVID positive on admission. admitted to medicine for acute hypoxemic respiratory failure due to bilateral pneumonia in setting of COVID 19. Started on Remdesivir, dexamethasone and supplemental oxygen. Pt was also found to have Hyponatremia likely due to hypovolemia, Followed by Nephro, started on IVF with improvement.  Pt was also found to have uncontrolled type 2 diabetes worsening with steroid use. Endo following  as well    Problem/Plan - 1:  ·  Problem: Acute respiratory failure with hypoxia.  Plan: -bilateral pneumonia in setting of COVID 19  - post Remdesivir / Dexamethasone per protocol    -cont  with oxygen and titrated down as tolerated   - ICU care appreciated   -continuous pulse ox monitoring  -cont prophylactic Lovenox   -cont supportive care   -daily labs CBC/CMP/CRP...   - Continue Current medications otherwise and monitor.  supportive care  - encourage PO intake   -nutrition / PT / OOB  - DC Zinc and vitamin C    Multivitamins    Problem/Plan - 2:  ·  Problem: Insulin dependent type 2 diabetes mellitus, uncontrolled, in setting of infection and steroids home   -endo consulted and appreciated recom and mgmt   -diabetic diet   -a1c 13.8  - Continue Current medications per endo otherwise and monitor FS    Problem/Plan - 3:  ·  Problem: HTN (hypertension).    -controlled, cont home dose of Losartan   -mon BP.     Problem/Plan - 4:  ·  Problem: electrolyte abnormalities    encourage PO inteka    keep K~4, Mag ~2    GI/DVT Prophylaxis per protocol.   OOB to chair encouraged  PO intake and hydration   ___________________________  H. MARILEE Gayle.  (covering today)   Pager: [phone]

## 2021-05-04 NOTE — PROGRESS NOTE ADULT - CRITICAL CARE SERVICES PROVIDED
Critical care services provided

## 2021-05-04 NOTE — PROGRESS NOTE ADULT - SUBJECTIVE AND OBJECTIVE BOX
INTERVAL HPI/OVERNIGHT EVENTS:   Patient was saturating well on NRB, no acute events    PRESSORS: [ ] YES [ ] NO  WHICH:    ANTIBIOTICS:                      Antimicrobial:    Cardiovascular:    Pulmonary:  ALBUTerol    90 MICROgram(s) HFA Inhaler 2 Puff(s) Inhalation every 6 hours PRN    Hematalogic:  apixaban 2.5 milliGRAM(s) Oral every 12 hours    Other:  albumin human 25% IVPB 50 milliLiter(s) IV Intermittent every 8 hours  ascorbic acid 500 milliGRAM(s) Oral daily  atorvastatin 40 milliGRAM(s) Oral at bedtime  chlorhexidine 2% Cloths 1 Application(s) Topical <User Schedule>  cholecalciferol 400 Unit(s) Oral daily  famotidine    Tablet 20 milliGRAM(s) Oral two times a day  insulin glargine Injectable (LANTUS) 12 Unit(s) SubCutaneous at bedtime  insulin lispro (ADMELOG) corrective regimen sliding scale   SubCutaneous three times a day before meals  insulin lispro Injectable (ADMELOG) 4 Unit(s) SubCutaneous three times a day before meals  OLANZapine 2.5 milliGRAM(s) Oral two times a day PRN  polyethylene glycol 3350 17 Gram(s) Oral daily  senna 2 Tablet(s) Oral at bedtime  zinc sulfate 220 milliGRAM(s) Oral daily    albumin human 25% IVPB 50 milliLiter(s) IV Intermittent every 8 hours  ALBUTerol    90 MICROgram(s) HFA Inhaler 2 Puff(s) Inhalation every 6 hours PRN  apixaban 2.5 milliGRAM(s) Oral every 12 hours  ascorbic acid 500 milliGRAM(s) Oral daily  atorvastatin 40 milliGRAM(s) Oral at bedtime  chlorhexidine 2% Cloths 1 Application(s) Topical <User Schedule>  cholecalciferol 400 Unit(s) Oral daily  famotidine    Tablet 20 milliGRAM(s) Oral two times a day  insulin glargine Injectable (LANTUS) 12 Unit(s) SubCutaneous at bedtime  insulin lispro (ADMELOG) corrective regimen sliding scale   SubCutaneous three times a day before meals  insulin lispro Injectable (ADMELOG) 4 Unit(s) SubCutaneous three times a day before meals  OLANZapine 2.5 milliGRAM(s) Oral two times a day PRN  polyethylene glycol 3350 17 Gram(s) Oral daily  senna 2 Tablet(s) Oral at bedtime  zinc sulfate 220 milliGRAM(s) Oral daily    Drug Dosing Weight  Height (cm): 172.7 (16 Apr 2021 13:26)  Weight (kg): 78.9 (16 Apr 2021 13:26)  BMI (kg/m2): 26.5 (16 Apr 2021 13:26)  BSA (m2): 1.93 (16 Apr 2021 13:26)    CENTRAL LINE: [ ] YES [ ] NO  LOCATION:   DATE INSERTED:  REMOVE: [ ] YES [ ] NO  EXPLAIN:    GUPTA: [ ] YES [ ] NO    DATE INSERTED:  REMOVE:  [ ] YES [ ] NO  EXPLAIN:    A-LINE:  [ ] YES [ ] NO  LOCATION:   DATE INSERTED:  REMOVE:  [ ] YES [ ] NO  EXPLAIN:    PMH -reviewed admission note, no change since admission    ICU Vital Signs Last 24 Hrs  T(C): 36.9 (04 May 2021 08:00), Max: 37.1 (04 May 2021 04:00)  T(F): 98.4 (04 May 2021 08:00), Max: 98.8 (04 May 2021 04:00)  HR: 89 (04 May 2021 10:00) (80 - 98)  BP: 128/78 (04 May 2021 10:00) (91/57 - 153/89)  BP(mean): 91 (04 May 2021 10:00) (63 - 105)    RR: 19 (04 May 2021 10:00) (11 - 26)  SpO2: 100% (04 May 2021 10:00) (90% - 100%)            05-03 @ 07:01  -  05-04 @ 07:00  --------------------------------------------------------  IN: 400 mL / OUT: 700 mL / NET: -300 mL            PHYSICAL EXAM:    GENERAL: NAD, well-groomed, well-developed  HEAD:  Atraumatic, Normocephalic  EYES: EOMI, PERRLA, conjunctiva and sclera clear  ENMT: No tonsillar erythema, exudates, or enlargement; Moist mucous membranes, Good dentition, No lesions  NECK: Supple, normal appearance, No JVD; Normal thyroid; Trachea midline  NERVOUS SYSTEM:  Alert & Oriented X3, Good concentration; Motor Strength 5/5 B/L upper and lower extremities; DTRs 2+ intact and symmetric  CHEST/LUNG: mild rhonchi, wheezing   HEART: Regular rate and rhythm; No murmurs, rubs, or gallops  ABDOMEN: Soft, Nontender, Nondistended; Bowel sounds present  EXTREMITIES:  2+ Peripheral Pulses, No clubbing, cyanosis, or edema  LYMPH: No lymphadenopathy noted  SKIN: No rashes or lesions; Good capillary refill      LABS:  CBC Full  -  ( 04 May 2021 04:01 )  WBC Count : 11.54 K/uL  RBC Count : 4.37 M/uL  Hemoglobin : 12.6 g/dL  Hematocrit : 37.9 %  Platelet Count - Automated : 130 K/uL  Mean Cell Volume : 86.7 fl  Mean Cell Hemoglobin : 28.8 pg  Mean Cell Hemoglobin Concentration : 33.2 gm/dL  Auto Neutrophil # : 9.42 K/uL  Auto Lymphocyte # : 1.01 K/uL  Auto Monocyte # : 0.73 K/uL  Auto Eosinophil # : 0.28 K/uL  Auto Basophil # : 0.03 K/uL  Auto Neutrophil % : 81.6 %  Auto Lymphocyte % : 8.8 %  Auto Monocyte % : 6.3 %  Auto Eosinophil % : 2.4 %  Auto Basophil % : 0.3 %    05-04    138  |  105  |  18  ----------------------------<  99  3.3<L>   |  27  |  0.90    Ca    8.1<L>      04 May 2021 04:01  Phos  3.5     05-04  Mg     1.9     05-04    TPro  6.5  /  Alb  1.5<L>  /  TBili  0.6  /  DBili  x   /  AST  38  /  ALT  41  /  AlkPhos  100  05-04            RADIOLOGY & ADDITIONAL STUDIES REVIEWED:  ***    GOALS OF CARE DISCUSSION WITH PATIENT/FAMILY/PROXY:    CRITICAL CARE TIME SPENT: 35 minutes

## 2021-05-04 NOTE — PROGRESS NOTE ADULT - SUBJECTIVE AND OBJECTIVE BOX
[facility] [place]   MD BART FLANNERY MD RUORU WONG, PA    TEL:  OFFICE: [phone]  DR AYERS CELL: [phone]  JOSÉ MIGUEL ABDI CELL: [phone]  DR. AGUIRRE CELL: [phone]  DR. MCCLAIN CELL: [phone]    FROM 5 PM - 7 AM PLEASE CALL ANSWERING SERVICE: [phone]    RENAL FOLLOW UP NOTE--Date of Service 05-04-21 @ 09:33  --------------------------------------------------------------------------------  HPI:      Pt covid 19+    PAST HISTORY  --------------------------------------------------------------------------------  No significant changes to PMH, PSH, FHx, SHx, unless otherwise noted    ALLERGIES & MEDICATIONS  --------------------------------------------------------------------------------  Allergies    No Known Allergies    Intolerances      Standing Inpatient Medications  apixaban 2.5 milliGRAM(s) Oral every 12 hours  ascorbic acid 500 milliGRAM(s) Oral daily  atorvastatin 40 milliGRAM(s) Oral at bedtime  chlorhexidine 2% Cloths 1 Application(s) Topical <User Schedule>  cholecalciferol 400 Unit(s) Oral daily  famotidine    Tablet 20 milliGRAM(s) Oral two times a day  insulin glargine Injectable (LANTUS) 12 Unit(s) SubCutaneous at bedtime  insulin lispro (ADMELOG) corrective regimen sliding scale   SubCutaneous three times a day before meals  insulin lispro Injectable (ADMELOG) 4 Unit(s) SubCutaneous three times a day before meals  polyethylene glycol 3350 17 Gram(s) Oral daily  potassium chloride   Powder 40 milliEquivalent(s) Oral every 4 hours  senna 2 Tablet(s) Oral at bedtime  zinc sulfate 220 milliGRAM(s) Oral daily    PRN Inpatient Medications  ALBUTerol    90 MICROgram(s) HFA Inhaler 2 Puff(s) Inhalation every 6 hours PRN  OLANZapine 2.5 milliGRAM(s) Oral two times a day PRN      REVIEW OF SYSTEMS  --------------------------------------------------------------------------------  General: no fever    MSK: no edema     VITALS/PHYSICAL EXAM  --------------------------------------------------------------------------------  T(C): 36.9 (05-04-21 @ 08:00), Max: 37.1 (05-04-21 @ 04:00)  HR: 89 (05-04-21 @ 09:00) (80 - 98)  BP: 128/73 (05-04-21 @ 09:00) (91/57 - 153/89)  RR: 21 (05-04-21 @ 09:00) (11 - 26)  SpO2: 100% (05-04-21 @ 09:00) (71% - 100%)  Wt(kg): --        05-03-21 @ 07:01  -  05-04-21 @ 07:00  --------------------------------------------------------  IN: 400 mL / OUT: 700 mL / NET: -300 mL    05-04-21 @ 07:01  -  05-04-21 @ 09:33  --------------------------------------------------------  IN: 200 mL / OUT: 250 mL / NET: -50 mL      	  	  LABS/STUDIES  --------------------------------------------------------------------------------              12.6   11.54 >-----------<  130      [05-04-21 @ 04:01]              37.9     138  |  105  |  18  ----------------------------<  99      [05-04-21 @ 04:01]  3.3   |  27  |  0.90        Ca     8.1     [05-04-21 @ 04:01]      Mg     1.9     [05-04-21 @ 04:01]      Phos  3.5     [05-04-21 @ 04:01]    TPro  6.5  /  Alb  1.5  /  TBili  0.6  /  DBili  x   /  AST  38  /  ALT  41  /  AlkPhos  100  [05-04-21 @ 04:01]              [05-04-21 @ 04:01]    Creatinine Trend:  SCr 0.90 [05-04 @ 04:01]  SCr 1.06 [05-03 @ 04:26]  SCr 0.92 [05-02 @ 06:39]  SCr 1.15 [05-01 @ 05:20]  SCr 1.23 [04-30 @ 05:17]    Urinalysis - [04-22-21 @ 14:32]      Color Yellow / Appearance Clear / SG 1.015 / pH 6.5      Gluc 1000 / Ketone Negative  / Bili Negative / Urobili Negative       Blood Small / Protein 30 / Leuk Est Negative / Nitrite Negative      RBC 5-10 / WBC 0-2 / Hyaline  / Gran  / Sq Epi  / Non Sq Epi Few / Bacteria Few      Ferritin 1437      [05-03-21 @ 09:16]  PTH -- (Ca 8.5)      [04-30-21 @ 16:54]   54  TSH 0.96      [04-17-21 @ 06:26]  Lipid: chol 96, TG 77, HDL 42, LDL --      [04-17-21 @ 06:26]

## 2021-05-05 NOTE — CHART NOTE - NSCHARTNOTEFT_GEN_A_CORE
76 M from home with PMH of HTN and DM initially presented to ED  complaint of hiccups on 4/16/2021, associated with cough, fever,chills and myalgias. Tmax at home was 100.3F.He denies having had the COVID-19 vaccination or having had COVID-19 in the past. Per daughter patient has had poor appetite and weakness  She also reports noticing that he becomes short of breath on minimal exertion. Patient was at PMD office last week and was advised to go to ED for hyperglycemia at that time, but refused. Pt was admitted to [facility] in January for DKA.   In ED, /76, , saO2 89% on RA, improved to 95% on 5L NC  (16 Apr 2021 16:51)    Interval history : Pt was admitted to Medicine floor for COVid 19 infection requiring supplemental O2 with NC 6L . Pt completed Remdesivir course and is on D8 of Decadron. As per NP , Pt desaturated yesterday so was placed on NRB for brief period ,then switched back to nasal cannula 6L .Today pt desaturated again so was placed back on 10 L NRB which was increased to 15L after pt finished eating breakfast . Blood sugars were initially high now well controlled on basal-bolus regimen. Hyponatremia on admission resolved with gentle hydration now became hypernatremic. Cr has worsen since last 2-3 days with metabolic acidosis and pt is started on HCO3 drip by nephro. Pt currently states he feels ok , denies any CP, nausea, vomiting ,diarrhea , constipation or any urinary complaints   Patient was transferred to ICU for acute hypoxic respiratory failure. He was on HFNC. He improved and was slowly transitioned to NC. He is saturating well on 5 L NC.  His metabolic acidosis improved. Patient is stable to be downgraded to medicine.    Sign out:  Monitor Oxygen saturation  Supplement oxygen as needed    Signed out ot Dr Gayle and NP on 4N 76 M from home with PMH of HTN and DM initially presented to ED  complaint of hiccups on 4/16/2021, associated with cough, fever,chills and myalgias. Tmax at home was 100.3F.He denies having had the COVID-19 vaccination or having had COVID-19 in the past. Per daughter patient has had poor appetite and weakness  She also reports noticing that he becomes short of breath on minimal exertion. Patient was at PMD office last week and was advised to go to ED for hyperglycemia at that time, but refused. Pt was admitted to [facility] in January for DKA.   In ED, /76, , saO2 89% on RA, improved to 95% on 5L NC  (16 Apr 2021 16:51)    Interval history : Pt was admitted to Medicine floor for COVid 19 infection requiring supplemental O2 with NC 6L . Pt completed Remdesivir course and is on D8 of Decadron. As per NP , Pt desaturated yesterday so was placed on NRB for brief period ,then switched back to nasal cannula 6L .Today pt desaturated again so was placed back on 10 L NRB which was increased to 15L after pt finished eating breakfast . Blood sugars were initially high now well controlled on basal-bolus regimen. Hyponatremia on admission resolved with gentle hydration now became hypernatremic. Cr has worsen with metabolic acidosis and pt was started on HCO3 drip by nephro.   Patient was transferred to ICU for acute hypoxic respiratory failure. He was on HFNC. He improved and was slowly transitioned to NC. He is saturating well on 5 L NC.  His metabolic acidosis improved. Patient is stable to be downgraded to medicine.    Sign out:  Monitor Oxygen saturation  Supplement oxygen as needed    Signed out ot Dr Gayle and NP on 4N

## 2021-05-05 NOTE — PROGRESS NOTE ADULT - ASSESSMENT
INOCENCIA:  Likely pre-renal improved with IVF.  Renal function is improving now.  - Optimize BP  - Renal sonogram once stable if hematuria persists.  - HOLD losartan  (last dose on 4/23)  - Monitor BMP.    Hypernatremia/Hyponatremia  -Monitor serum Na  -Check Urine na, urine os is worsens        CKD 2/3:  Has risk (Diabetes) and minimal proteinuria.  - Monitor BMP.    Metabolic Acidosis:  Mixed AG and NAG. He may have some Tubulo-interstitial Disease  - Monitor for now.  -Hold PO NaHCO3    COVID   MOnitor temp/ O2  COntinue current care    Hypokalemia  repelted by team   monitor serum K

## 2021-05-05 NOTE — PROGRESS NOTE ADULT - ASSESSMENT
_________________________________________________________________________________________  ========>>  M E D I C A L   A T T E N D I N G    F O L L O W  U P  N O T E  <<=========  -----------------------------------------------------------------------------------------------------    - Patient seen and examined by me earlier today.  (covering today)   - In summary,  MEHRDAD SUTHERLAND is a 76y year old man admitted with COVID  - Patient today overall doing ok, comfortable, eating fairly    pt in ICU, being transferred back to the floor,  on regular nasal canula O2     ==================>> REVIEW OF SYSTEM <<=================    limited as pt sleepy, weak appearing.. poor historian   poor appetite     ==================>> PHYSICAL EXAM <<=================    GEN: awake and responsive, NAD , comfortable, pleasant, calm , cachectic, weak   HEENT: NCAT, PERRL, MMM, hearing intact  Neck: supple , no JVD appreciated  CVS: S1S2 , regular , No M/R/G appreciated  PULM: CTA B/L,  limited exam as not taking deep breaths   ABD.: soft. non tender, non distended  Extrem: intact pulses , no edema   PSYCH : normal mood,  not anxious                             ( Note written / Date of service 05-05-21 )    ==================>> MEDICATIONS <<====================    albumin human 25% IVPB 50 milliLiter(s) IV Intermittent every 8 hours  apixaban 2.5 milliGRAM(s) Oral every 12 hours  ascorbic acid 500 milliGRAM(s) Oral daily  atorvastatin 40 milliGRAM(s) Oral at bedtime  chlorhexidine 2% Cloths 1 Application(s) Topical <User Schedule>  cholecalciferol 400 Unit(s) Oral daily  famotidine    Tablet 20 milliGRAM(s) Oral two times a day  insulin glargine Injectable (LANTUS) 10 Unit(s) SubCutaneous at bedtime  insulin lispro (ADMELOG) corrective regimen sliding scale   SubCutaneous three times a day before meals  insulin lispro Injectable (ADMELOG) 4 Unit(s) SubCutaneous three times a day before meals  polyethylene glycol 3350 17 Gram(s) Oral daily  senna 2 Tablet(s) Oral at bedtime  zinc sulfate 220 milliGRAM(s) Oral daily    MEDICATIONS  (PRN):  ALBUTerol    90 MICROgram(s) HFA Inhaler 2 Puff(s) Inhalation every 6 hours PRN Bronchospasm  OLANZapine 2.5 milliGRAM(s) Oral two times a day PRN agitation    ___________  Active diet:  Diet, Soft:   Consistent Carbohydrate No Snacks  DASH/TLC Sodium & Cholesterol Restricted  Lacto Veg (Accepts Milk & Milk Products)  Supplement Feeding Modality:  Oral  Glucerna Shake Cans or Servings Per Day:  1       Frequency:  Three Times a day  ___________________    ==================>> VITAL SIGNS <<==================    Vital Signs Last 24 HrsT(C): 36.2 (05-05-21 @ 20:21)  T(F): 97.2 (05-05-21 @ 20:21), Max: 98.6 (05-05-21 @ 18:29)  HR: 110 (05-05-21 @ 20:21) (85 - 110)  BP: 108/61 (05-05-21 @ 20:21)  RR: 26 (05-05-21 @ 20:21) (18 - 27)  SpO2: 93% (05-05-21 @ 20:21) (87% - 100%)      POCT Blood Glucose.: 107 mg/dL (05 May 2021 21:12)  POCT Blood Glucose.: 81 mg/dL (05 May 2021 16:05)  POCT Blood Glucose.: 207 mg/dL (05 May 2021 11:42)  POCT Blood Glucose.: 78 mg/dL (05 May 2021 05:21)     ==================>> LAB AND IMAGING <<==================                        11.9   11.43 )-----------( 147      ( 05 May 2021 03:49 )             36.2        05-05    134<L>  |  104  |  13  ----------------------------<  67<L>  3.6   |  25  |  0.76    Ca    8.2<L>      05 May 2021 03:50  Phos  2.9     05-05  Mg     1.8     05-05    TPro  6.5  /  Alb  1.5<L>  /  TBili  0.6  /  DBili  x   /  AST  38  /  ALT  41  /  AlkPhos  100  05-04    WBC count:   11.43 <<== ,  11.54 <<== ,  11.47 <<== ,  12.64 <<== ,  14.31 <<==   Hemoglobin:   11.9 <<==,  12.6 <<==,  12.5 <<==,  13.7 <<==,  14.1 <<==  platelets:  147 <==, 130 <==, 120 <==, 113 <==, 105 <==, 103 <==    Creatinine:  0.76  <<==, 0.90  <<==, 1.06  <<==, 0.92  <<==, 1.15  <<==, 1.23  <<==  Sodium:   134  <==, 138  <==, 139  <==, 144  <==, 144  <==, 144  <==      ___________________________________________________________________________________  ===============>>  A S S E S S M E N T   A N D   P L A N <<===============  ------------------------------------------------------------------------------------------    · Assessment	  76 M with PMH of HTN and DM presented to ED with complaint of hiccups.  Found to be COVID positive on admission. admitted to medicine for acute hypoxemic respiratory failure due to bilateral pneumonia in setting of COVID 19. Started on Remdesivir, dexamethasone and supplemental oxygen. Pt was also found to have Hyponatremia likely due to hypovolemia, Followed by Nephro, started on IVF with improvement.  Pt was also found to have uncontrolled type 2 diabetes worsening with steroid use. Endo following  as well    Problem/Plan - 1:  ·  Problem: Acute respiratory failure with hypoxia.  Plan: -bilateral pneumonia in setting of COVID 19  - post Remdesivir / Dexamethasone per protocol    -cont  with oxygen and titrated down as tolerated   - ICU care appreciated   -continuous pulse ox monitoring  -cont prophylactic Lovenox   -cont supportive care   -daily labs CBC/CMP/CRP...   - Continue Current medications otherwise and monitor.  supportive care  - encourage PO intake         nutrition concult / f/u and supplements    PT / OOB    Multivitamins    Problem/Plan - 2:  ·  Problem: Insulin dependent type 2 diabetes mellitus, uncontrolled, in setting of infection and steroids home   -endo consulted and appreciated recom and mgmt   -diabetic diet   -a1c 13.8  - Continue Current medications per endo otherwise and monitor FS    Problem/Plan - 3:  ·  Problem: HTN (hypertension).    -controlled, cont home dose of Losartan   -mon BP.     Problem/Plan - 4:  ·  Problem: electrolyte abnormalities    encourage PO inteka    keep K~4, Mag ~2    GI/DVT Prophylaxis per protocol.   OOB to chair encouraged  PO intake and hydration   ___________________________  H. JUVE Gayle  (covering today)   Pager: [phone]

## 2021-05-05 NOTE — PROGRESS NOTE ADULT - ASSESSMENT
75 y/o M from home with PMH of HTN and DM initially presented to ED complaint of hiccups on 4/16/2021, associated with cough, fever,chills and myalgias. Pt was admitted to Medicine floor for COVID 19 infection requiring supplemental O2 with NC 6L then NRB and still desaturating.  ICU was consulted for acute hypoxic respiratory failure 2/2 COVID-19 now requiring HFNC 50/100, saturation at 91%. He is s/p bicarb gtt for metabolic acidosis, s/p PO bicarb    Assessment:  1. Acute Hypoxic Respiratory Failure secondary to COVID19 infection    2. INOCENCIA   3. Mixed AG +  NAGMA   4. Uncontrolled DM-2   5. Hyponatremia/Hypernatremia   6. HTN     Plan:    =====================[CNS ]==============================  # No issues:   - Mental status at baseline    - Off Seroquel  - C/w Zyprexa PRN for agitation   - Off Precedex drip     =====================[CVS ]===============================  # HTN :   - Pt was on losartan 50mg at home, last dose 4/23   - Holding Losartan due INOCENCIA; Cr 1.93>2.0>1.6> 1.23  - Monitor BP and adjust meds as needed      =====================[RESP ]==============================  # Acute Hypoxic Resp Failure 2/2 COVID-19 infection :   - Hypoxia worsening, now on HFNC 50/80 weaned off     - Low threshold for intubation, pt's grand-daughter Owen SMYTH, agrees for intubation if the need arises  - Completed Remdesivir 4/20  - Completed Decadron 4/26  - CXR (4/23) Bilateral perihilar and bibasilar multifocal and diffuse ill-defined airspace opacities  - S/p heparin drip for elevated D-dimer  - D- dimer trending down 11k > 3k > 1k  - C/w Eliquis 2.5 mg BID    - C/w Vit c, Zn, Vit D supplementation   - US doppler of LE no DVT  - On NRB at present    =====================[ GI ]================================  # No issues :   - C/w Soft diabetic diet + Glucerna shakes   - S/p one dose modafinil on 4/28    ====================[ RENAL ]=============================   # INOCENCIA : resolved **  - likely 2/2 COVID19 vs pre -renal (less likely given no response to IVF)   - S/p bicarb drip  - D/c PO bicarb TID as per nephro   - Losartan on hold (last dose 4/23)   - Monitor urine output, monitor I &O s closely   - Monitor and supplement electrolytes as needed   - Renal US ordered (pending) **  - Nephro Dr Brown following     # Mixed AG + NAGMA : resolved **  - HCO3 19, could be due to hyperchloremic MA due to NS vs worsening INOCENCIA   - Off sodium bicarb 650 mg TID as per nephrology     # Hyponatremia/Hypernatremia :   - Initially pt was hyponatremic, resolved s/p IVF   - Na 144  - Monitor BMP     =====================[ ID ]================================  # COVID-19 :  - Plan as above     ===================[ HEME/ONC ]===========================  # Thrombocytopenia :  - Platelets 115 > 95 > 103  today   - F/u HIT panel, heparin abx negative   - No signs of bleeding   - Monitor cbc daily     =====================[ ENDO ]=============================  # Uncontrolled DM-2   - A1c 13.6   - BS controlled on current regimen   - C/w Lantus 12 units at bedtime and Admelog 4 units before meals   - Endo Dr Hall  following   - Monitor BS   - Encouraged po intake and home meals     ==================[ SKIN/ CATHETERS ]======================  # No skin breaks  OOB to chair    ==================[ PROPHYLAXIS ]==========================   # DVT: Lovenox   # GI: Pepcid     ====================[ DISPO ]==============================   - Patient remains acute, will continue monitoring in ICU     ===================[ PROGNOSIS ]===========================  - Guarded      77 y/o M from home with PMH of HTN and DM initially presented to ED complaint of hiccups on 4/16/2021, associated with cough, fever,chills and myalgias. Pt was admitted to Medicine floor for COVID 19 infection requiring supplemental O2 with NC 6L then NRB and still desaturating.  ICU was consulted for acute hypoxic respiratory failure 2/2 COVID-19 now requiring HFNC 50/100, saturation at 91%. He is s/p bicarb gtt for metabolic acidosis, s/p PO bicarb    Assessment:  1. Acute Hypoxic Respiratory Failure secondary to COVID19 infection    2. INOCENCIA   3. Mixed AG +  NAGMA   4. Uncontrolled DM-2   5. Hyponatremia/Hypernatremia   6. HTN     Plan:    =====================[CNS ]==============================  # No issues:   - Mental status at baseline    - Off Seroquel  - C/w Zyprexa PRN for agitation   - Off Precedex drip     =====================[CVS ]===============================  # HTN :   - Pt was on losartan 50mg at home, last dose 4/23   - Holding Losartan due INOCENCIA; Cr 1.93>2.0>1.6> 1.23  - Monitor BP and adjust meds as needed      =====================[RESP ]==============================  # Acute Hypoxic Resp Failure 2/2 COVID-19 infection :   - Hypoxia worsening, now on HFNC 50/80 weaned off     - Low threshold for intubation, pt's grand-daughter Owen SMYTH, agrees for intubation if the need arises  - Completed Remdesivir 4/20  - Completed Decadron 4/26  - CXR (4/23) Bilateral perihilar and bibasilar multifocal and diffuse ill-defined airspace opacities  - S/p heparin drip for elevated D-dimer  - D- dimer trending down 11k > 3k > 1k  - C/w Eliquis 2.5 mg BID    - C/w Vit c, Zn, Vit D supplementation   - US doppler of LE no DVT  - Switched to NC 5L    =====================[ GI ]================================  # No issues :   - C/w Soft diabetic diet + Glucerna shakes   - S/p one dose modafinil on 4/28    ====================[ RENAL ]=============================   # INOCENCIA : resolved **  - likely 2/2 COVID19 vs pre -renal (less likely given no response to IVF)   - S/p bicarb drip  - D/c PO bicarb TID as per nephro   - Losartan on hold (last dose 4/23)   - Monitor urine output, monitor I &O s closely   - Monitor and supplement electrolytes as needed   - Nephro Dr Brown following     # Mixed AG + NAGMA : resolved **  - HCO3 19, could be due to hyperchloremic MA due to NS vs worsening INOCENCIA   - Off sodium bicarb 650 mg TID as per nephrology     # Hyponatremia/Hypernatremia :   - Initially pt was hyponatremic, resolved s/p IVF   - Na 144  - Monitor BMP     =====================[ ID ]================================  # COVID-19 :  - Plan as above     ===================[ HEME/ONC ]===========================  # Thrombocytopenia :  - Platelets 115 > 95 > 103  today   - F/u HIT panel, heparin abx negative   - No signs of bleeding   - Monitor cbc daily     =====================[ ENDO ]=============================  # Uncontrolled DM-2   - A1c 13.6   - BS controlled on current regimen   - C/w Lantus 12 units at bedtime and Admelog 4 units before meals   - Endo Dr Hall  following   - Monitor BS   - Encouraged po intake and home meals     ==================[ SKIN/ CATHETERS ]======================  # No skin breaks  OOB to chair    ==================[ PROPHYLAXIS ]==========================   # DVT: Lovenox   # GI: Pepcid     ====================[ DISPO ]==============================   - Patient remains acute, will continue monitoring in ICU     ===================[ PROGNOSIS ]===========================  - Guarded

## 2021-05-05 NOTE — PROGRESS NOTE ADULT - SUBJECTIVE AND OBJECTIVE BOX
[facility] [place] [facility]   MD BART FLANNERY MD RUORU WONG, PA    TEL:  OFFICE: [phone]  DR AYERS CELL: [phone]  JOSÉ MIGUEL ABDI CELL: [phone]  DR. AGUIRRE CELL: [phone]  DR. MCCLAIN CELL: [phone]    FROM 5 PM - 7 AM PLEASE CALL ANSWERING SERVICE: [phone]    RENAL FOLLOW UP NOTE--Date of Service 05-05-21 @ 09:51  --------------------------------------------------------------------------------  HPI:      Pt covid 19+    PAST HISTORY  --------------------------------------------------------------------------------  No significant changes to PMH, PSH, FHx, SHx, unless otherwise noted    ALLERGIES & MEDICATIONS  --------------------------------------------------------------------------------  Allergies    No Known Allergies    Intolerances      Standing Inpatient Medications  albumin human 25% IVPB 50 milliLiter(s) IV Intermittent every 8 hours  apixaban 2.5 milliGRAM(s) Oral every 12 hours  ascorbic acid 500 milliGRAM(s) Oral daily  atorvastatin 40 milliGRAM(s) Oral at bedtime  chlorhexidine 2% Cloths 1 Application(s) Topical <User Schedule>  cholecalciferol 400 Unit(s) Oral daily  famotidine    Tablet 20 milliGRAM(s) Oral two times a day  insulin glargine Injectable (LANTUS) 10 Unit(s) SubCutaneous at bedtime  insulin lispro (ADMELOG) corrective regimen sliding scale   SubCutaneous three times a day before meals  insulin lispro Injectable (ADMELOG) 4 Unit(s) SubCutaneous three times a day before meals  polyethylene glycol 3350 17 Gram(s) Oral daily  senna 2 Tablet(s) Oral at bedtime  zinc sulfate 220 milliGRAM(s) Oral daily    PRN Inpatient Medications  ALBUTerol    90 MICROgram(s) HFA Inhaler 2 Puff(s) Inhalation every 6 hours PRN  OLANZapine 2.5 milliGRAM(s) Oral two times a day PRN      REVIEW OF SYSTEMS  --------------------------------------------------------------------------------  General: no fever    MSK: no edema     VITALS/PHYSICAL EXAM  --------------------------------------------------------------------------------  T(C): 36.4 (05-05-21 @ 08:00), Max: 36.8 (05-04-21 @ 11:00)  HR: 96 (05-05-21 @ 09:00) (85 - 100)  BP: 99/56 (05-05-21 @ 09:00) (87/49 - 143/77)  RR: 25 (05-05-21 @ 09:00) (18 - 27)  SpO2: 96% (05-05-21 @ 09:00) (87% - 100%)  Wt(kg): --        05-04-21 @ 07:01  -  05-05-21 @ 07:00  --------------------------------------------------------  IN: 600 mL / OUT: 850 mL / NET: -250 mL        LABS/STUDIES  --------------------------------------------------------------------------------              11.9   11.43 >-----------<  147      [05-05-21 @ 03:49]              36.2     134  |  104  |  13  ----------------------------<  67      [05-05-21 @ 03:50]  3.6   |  25  |  0.76        Ca     8.2     [05-05-21 @ 03:50]      Mg     1.8     [05-05-21 @ 03:50]      Phos  2.9     [05-05-21 @ 03:50]    TPro  6.5  /  Alb  1.5  /  TBili  0.6  /  DBili  x   /  AST  38  /  ALT  41  /  AlkPhos  100  [05-04-21 @ 04:01]              [05-05-21 @ 03:50]    Creatinine Trend:  SCr 0.76 [05-05 @ 03:50]  SCr 0.90 [05-04 @ 04:01]  SCr 1.06 [05-03 @ 04:26]  SCr 0.92 [05-02 @ 06:39]  SCr 1.15 [05-01 @ 05:20]    Urinalysis - [04-22-21 @ 14:32]      Color Yellow / Appearance Clear / SG 1.015 / pH 6.5      Gluc 1000 / Ketone Negative  / Bili Negative / Urobili Negative       Blood Small / Protein 30 / Leuk Est Negative / Nitrite Negative      RBC 5-10 / WBC 0-2 / Hyaline  / Gran  / Sq Epi  / Non Sq Epi Few / Bacteria Few      Ferritin 1376      [05-04-21 @ 09:29]  PTH -- (Ca 8.5)      [04-30-21 @ 16:54]   54  TSH 0.96      [04-17-21 @ 06:26]  Lipid: chol 96, TG 77, HDL 42, LDL --      [04-17-21 @ 06:26]

## 2021-05-05 NOTE — PROGRESS NOTE ADULT - ATTENDING SUPERVISION STATEMENT
Resident
Resident
Resident/Fellow
Resident/Fellow
Resident
Resident/Fellow
Resident

## 2021-05-05 NOTE — PROGRESS NOTE ADULT - NSREFPHYEXREFTO_GEN_ALL_CORE
Inpatient Physical Exam

## 2021-05-05 NOTE — PROGRESS NOTE ADULT - ATTENDING COMMENTS
Assessment:  1. Acute Hypoxic Respiratory Failure   2. COVID19 infection    3. INOCENCIA   4. Mixed AG +  NAGMA   5. Uncontrolled DM-2   6. Hypernatremia   7. HTN   8. Thrombocytopenia    Plan  - Now tolerating NC 6 L/min   - Completed remdesivir  - Cont. Dexamethasone   - Sodium bicarb po   - Nephrology follow up  - Close glucose monitoring   - Monitor fingerstick glucose   - Eliquis for prophylaxis, monitor for signs of bleeding  - Change diet to soft add supplements  - DVT and stress ulcer prophylaxis  - OOB to chair  - Prognosis is guarded .
Assessment:  1. Acute Hypoxic Respiratory Failure   2. COVID19 infection    3. INOCENCIA   4. Mixed AG +  NAGMA   5. Uncontrolled DM-2   6. Hypernatremia   7. HTN     Plan  - Continue HFNC support  - Completed remdesivir  - Cont. Dexamethasone   - Sodium bicarb po   - Nephrology follow up  - Close glucose monitoring   - Monitor fingerstick glucose   - DVT and stress ulcer prophylaxis  - Prognosis is guarded .
Assessment:  1. Acute Hypoxic Respiratory Failure   2. COVID19 infection    3. INOCENCIA   4. Mixed AG +  NAGMA   5. Uncontrolled DM-2   6. Hypernatremia   7. HTN   8. Thrombocytopenia    Plan  - Now tolerating NC 6 L/min   - Completed remdesivir  - Cont. Dexamethasone   - Sodium bicarb po   - Nephrology follow up  - Close glucose monitoring   - Monitor fingerstick glucose   - Eliquis for prophylaxis, monitor for signs of bleeding  - Change diet to soft add supplements  - DVT and stress ulcer prophylaxis  - OOB to chair  - Prognosis is guarded .
Assessment:  1. Acute Hypoxic Respiratory Failure   2. COVID19 infection    3. INOCENCIA   4. Mixed AG +  NAGMA   5. Uncontrolled DM-2   6. Hypernatremia   7. HTN     Plan  - Continue HFNC support  - Completed remdesivir  - Cont. Dexamethasone   - Sodium bicarb po   - Nephrology follow up  - Close glucose monitoring   - Monitor fingerstick glucose   - Eliquis for prophylaxis  - DVT and stress ulcer prophylaxis  - Prognosis is guarded .
Assessment:  1. Acute Hypoxic Respiratory Failure   2. COVID19 infection    3. INOCENCIA   4. Mixed AG +  NAGMA   5. Uncontrolled DM-2   6. Hypernatremia   7. HTN     Plan  - Continue HFNC support  - Completed remdesivir  - Cont. Dexamethasone   - Sodium bicarb po   - Nephrology follow up  - Close glucose monitoring   - Monitor fingerstick glucose   - Eliquis for prophylaxis  - Encourage oral intake as remains poor  - Change diet to soft add supplements  - DVT and stress ulcer prophylaxis  - Prognosis is guarded .
Assessment:  1. Acute Hypoxic Respiratory Failure   2. COVID19 infection    3. INOCENCIA   4. Mixed AG +  NAGMA   5. Uncontrolled DM-2   6. Hypernatremia   7. HTN   8. Thrombocytopenia    Plan  - Now tolerating NC 5 L/min   - Completed remdesivir  - Cont. Dexamethasone   - Sodium bicarb po   - Nephrology follow up  - Close glucose monitoring   - Monitor fingerstick glucose   - Eliquis for prophylaxis, monitor for signs of bleeding  - Change diet to soft add supplements  - DVT and stress ulcer prophylaxis  - OOB to chair  - Prognosis is guarded  - Transfer out of ICu today
Assessment:  1. Acute Hypoxic Respiratory Failure   2. COVID19 infection    3. INOCENCIA   4. Mixed AG +  NAGMA   5. Uncontrolled DM-2   6. Hypernatremia   7. HTN   8. Thrombocytopenia    Plan  - Remains with poor oral intake, encouraged about increasing intake as patient with minimal intake. Encourage family to bring in food as well based on patient's tastes  - Continue HFNC support with NRB mask  - Completed remdesivir  - Cont. Dexamethasone   - Sodium bicarb po   - Nephrology follow up  - Close glucose monitoring   - Monitor fingerstick glucose   - Eliquis for prophylaxis, monitor for signs of bleeding  - D/c seroquel  - Change diet to soft add supplements  - DVT and stress ulcer prophylaxis  - OOB to chair  - Prognosis is guarded
Assessment:  1. Acute Hypoxic Respiratory Failure   2. COVID19 infection    3. INOCENCIA   4. Mixed AG +  NAGMA   5. Uncontrolled DM-2   6. Hypernatremia   7. HTN   8. Thrombocytopenia    Plan  - Continue HFNC support  - Completed remdesivir  - Cont. Dexamethasone   - Sodium bicarb po   - Nephrology follow up  - Close glucose monitoring   - Monitor fingerstick glucose   - Eliquis for prophylaxis, monitor for signs of bleeding  - Change diet to soft add supplements  - DVT and stress ulcer prophylaxis  - OOB to chair  - Prognosis is guarded .
Assessment:  1. Acute Hypoxic Respiratory Failure   2. COVID19 infection    3. INOCENCIA   4. Mixed AG +  NAGMA   5. Uncontrolled DM-2   6. Hypernatremia   7. HTN   8. Thrombocytopenia    Plan  - Now tolerating NC 6 L/min   - Completed remdesivir  - Cont. Dexamethasone   - Sodium bicarb po   - Nephrology follow up  - Close glucose monitoring   - Monitor fingerstick glucose   - Eliquis for prophylaxis, monitor for signs of bleeding  - Change diet to soft add supplements  - DVT and stress ulcer prophylaxis  - OOB to chair  - Prognosis is guarded .
Assessment:  1. Acute Hypoxic Respiratory Failure   2. COVID19 infection    3. INOCENCIA   4. Mixed AG +  NAGMA   5. Uncontrolled DM-2   6. Hypernatremia   7. HTN     Plan  - CXR worsening with worsening hypoxia  - Continue HFNC support  - IV fluid resuscitation   - Completed remdesivir  - Cont. Dexamethasone   - Sodium bicarb po   - Nephrology follow up  - Close glucose monitoring   - Monitor fingerstick glucose   - DVT and stress ulcer prophylaxis  - Prognosis is guarded .
Assessment:  1. Acute Hypoxic Respiratory Failure   2. COVID19 infection    3. INOCENCIA   4. Mixed AG +  NAGMA   5. Uncontrolled DM-2   6. Hypernatremia   7. HTN   8. Thrombocytopenia    Plan  - Continue HFNC support  - Completed remdesivir  - Cont. Dexamethasone   - Sodium bicarb po   - Nephrology follow up  - Close glucose monitoring   - Monitor fingerstick glucose   - Eliquis for prophylaxis, monitor for signs of bleeding  - Change diet to soft add supplements  - DVT and stress ulcer prophylaxis  - OOB to chair  - Prognosis is guarded
Assessment:  1. Acute Hypoxic Respiratory Failure   2. COVID19 infection    3. INOCENCIA   4. Mixed AG +  NAGMA   5. Uncontrolled DM-2   6. Hypernatremia   7. HTN     Plan  - CXR worsening with worsening hypoxia  - Will admit to ICU for close observation  - Start HFNC support  - IV fluid resuscitation   - Completed remdesivir  - Cont. Dexamethasone   - Sodium bicarb infusion  - Nephrology follow up  - Close glucose monitoring   - Monitor fingerstick glucose   - DVT and stress ulcer prophylaxis  - Prognosis is guarded

## 2021-05-05 NOTE — PROGRESS NOTE ADULT - SUBJECTIVE AND OBJECTIVE BOX
INTERVAL HPI/OVERNIGHT EVENTS: ***    PRESSORS: [ ] YES [ ] NO  WHICH:    ANTIBIOTICS:                      Antimicrobial:    Cardiovascular:    Pulmonary:  ALBUTerol    90 MICROgram(s) HFA Inhaler 2 Puff(s) Inhalation every 6 hours PRN    Hematalogic:  apixaban 2.5 milliGRAM(s) Oral every 12 hours    Other:  albumin human 25% IVPB 50 milliLiter(s) IV Intermittent every 8 hours  ascorbic acid 500 milliGRAM(s) Oral daily  atorvastatin 40 milliGRAM(s) Oral at bedtime  chlorhexidine 2% Cloths 1 Application(s) Topical <User Schedule>  cholecalciferol 400 Unit(s) Oral daily  famotidine    Tablet 20 milliGRAM(s) Oral two times a day  insulin glargine Injectable (LANTUS) 10 Unit(s) SubCutaneous at bedtime  insulin lispro (ADMELOG) corrective regimen sliding scale   SubCutaneous three times a day before meals  insulin lispro Injectable (ADMELOG) 4 Unit(s) SubCutaneous three times a day before meals  OLANZapine 2.5 milliGRAM(s) Oral two times a day PRN  polyethylene glycol 3350 17 Gram(s) Oral daily  senna 2 Tablet(s) Oral at bedtime  zinc sulfate 220 milliGRAM(s) Oral daily    albumin human 25% IVPB 50 milliLiter(s) IV Intermittent every 8 hours  ALBUTerol    90 MICROgram(s) HFA Inhaler 2 Puff(s) Inhalation every 6 hours PRN  apixaban 2.5 milliGRAM(s) Oral every 12 hours  ascorbic acid 500 milliGRAM(s) Oral daily  atorvastatin 40 milliGRAM(s) Oral at bedtime  chlorhexidine 2% Cloths 1 Application(s) Topical <User Schedule>  cholecalciferol 400 Unit(s) Oral daily  famotidine    Tablet 20 milliGRAM(s) Oral two times a day  insulin glargine Injectable (LANTUS) 10 Unit(s) SubCutaneous at bedtime  insulin lispro (ADMELOG) corrective regimen sliding scale   SubCutaneous three times a day before meals  insulin lispro Injectable (ADMELOG) 4 Unit(s) SubCutaneous three times a day before meals  OLANZapine 2.5 milliGRAM(s) Oral two times a day PRN  polyethylene glycol 3350 17 Gram(s) Oral daily  senna 2 Tablet(s) Oral at bedtime  zinc sulfate 220 milliGRAM(s) Oral daily    Drug Dosing Weight  Height (cm): 172.7 (16 Apr 2021 13:26)  Weight (kg): 78.9 (16 Apr 2021 13:26)  BMI (kg/m2): 26.5 (16 Apr 2021 13:26)  BSA (m2): 1.93 (16 Apr 2021 13:26)    CENTRAL LINE: [ ] YES [ ] NO  LOCATION:   DATE INSERTED:  REMOVE: [ ] YES [ ] NO  EXPLAIN:    GUPTA: [ ] YES [ ] NO    DATE INSERTED:  REMOVE:  [ ] YES [ ] NO  EXPLAIN:    A-LINE:  [ ] YES [ ] NO  LOCATION:   DATE INSERTED:  REMOVE:  [ ] YES [ ] NO  EXPLAIN:    PMH -reviewed admission note, no change since admission    ICU Vital Signs Last 24 Hrs  T(C): 36.6 (05 May 2021 12:00), Max: 36.8 (04 May 2021 20:00)  T(F): 97.9 (05 May 2021 12:00), Max: 98.3 (04 May 2021 20:00)  HR: 106 (05 May 2021 12:00) (85 - 106)  BP: 85/54 (05 May 2021 11:00) (85/54 - 143/77)  BP(mean): 61 (05 May 2021 11:00) (57 - 94)  ABP: --  ABP(mean): --  RR: 25 (05 May 2021 12:00) (18 - 27)  SpO2: 98% (05 May 2021 12:00) (87% - 100%)            05-04 @ 07:01  -  05-05 @ 07:00  --------------------------------------------------------  IN: 600 mL / OUT: 850 mL / NET: -250 mL            PHYSICAL EXAM:    GENERAL: NAD, well-groomed, well-developed  HEAD:  Atraumatic, Normocephalic  EYES: EOMI, PERRLA, conjunctiva and sclera clear  ENMT: No tonsillar erythema, exudates, or enlargement; Moist mucous membranes, Good dentition, No lesions  NECK: Supple, normal appearance, No JVD; Normal thyroid; Trachea midline  NERVOUS SYSTEM:  Alert & Oriented X3, Good concentration; Motor Strength 5/5 B/L upper and lower extremities; DTRs 2+ intact and symmetric  CHEST/LUNG: No chest deformity; Normal percussion bilaterally; No rales, rhonchi, wheezing   HEART: Regular rate and rhythm; No murmurs, rubs, or gallops  ABDOMEN: Soft, Nontender, Nondistended; Bowel sounds present  EXTREMITIES:  2+ Peripheral Pulses, No clubbing, cyanosis, or edema  LYMPH: No lymphadenopathy noted  SKIN: No rashes or lesions; Good capillary refill      LABS:  CBC Full  -  ( 05 May 2021 03:49 )  WBC Count : 11.43 K/uL  RBC Count : 4.17 M/uL  Hemoglobin : 11.9 g/dL  Hematocrit : 36.2 %  Platelet Count - Automated : 147 K/uL  Mean Cell Volume : 86.8 fl  Mean Cell Hemoglobin : 28.5 pg  Mean Cell Hemoglobin Concentration : 32.9 gm/dL  Auto Neutrophil # : x  Auto Lymphocyte # : x  Auto Monocyte # : x  Auto Eosinophil # : x  Auto Basophil # : x  Auto Neutrophil % : x  Auto Lymphocyte % : x  Auto Monocyte % : x  Auto Eosinophil % : x  Auto Basophil % : x    05-05    134<L>  |  104  |  13  ----------------------------<  67<L>  3.6   |  25  |  0.76    Ca    8.2<L>      05 May 2021 03:50  Phos  2.9     05-05  Mg     1.8     05-05    TPro  6.5  /  Alb  1.5<L>  /  TBili  0.6  /  DBili  x   /  AST  38  /  ALT  41  /  AlkPhos  100  05-04            RADIOLOGY & ADDITIONAL STUDIES REVIEWED:  ***    GOALS OF CARE DISCUSSION WITH PATIENT/FAMILY/PROXY:    CRITICAL CARE TIME SPENT: 35 minutes INTERVAL HPI/OVERNIGHT EVENTS: has been saturating well on 5L NC. Will downgrade to medicine    PRESSORS: [ ] YES [x ] NO  WHICH:    ANTIBIOTICS:                      Antimicrobial:    Cardiovascular:    Pulmonary:  ALBUTerol    90 MICROgram(s) HFA Inhaler 2 Puff(s) Inhalation every 6 hours PRN    Hematalogic:  apixaban 2.5 milliGRAM(s) Oral every 12 hours    Other:  albumin human 25% IVPB 50 milliLiter(s) IV Intermittent every 8 hours  ascorbic acid 500 milliGRAM(s) Oral daily  atorvastatin 40 milliGRAM(s) Oral at bedtime  chlorhexidine 2% Cloths 1 Application(s) Topical <User Schedule>  cholecalciferol 400 Unit(s) Oral daily  famotidine    Tablet 20 milliGRAM(s) Oral two times a day  insulin glargine Injectable (LANTUS) 10 Unit(s) SubCutaneous at bedtime  insulin lispro (ADMELOG) corrective regimen sliding scale   SubCutaneous three times a day before meals  insulin lispro Injectable (ADMELOG) 4 Unit(s) SubCutaneous three times a day before meals  OLANZapine 2.5 milliGRAM(s) Oral two times a day PRN  polyethylene glycol 3350 17 Gram(s) Oral daily  senna 2 Tablet(s) Oral at bedtime  zinc sulfate 220 milliGRAM(s) Oral daily    albumin human 25% IVPB 50 milliLiter(s) IV Intermittent every 8 hours  ALBUTerol    90 MICROgram(s) HFA Inhaler 2 Puff(s) Inhalation every 6 hours PRN  apixaban 2.5 milliGRAM(s) Oral every 12 hours  ascorbic acid 500 milliGRAM(s) Oral daily  atorvastatin 40 milliGRAM(s) Oral at bedtime  chlorhexidine 2% Cloths 1 Application(s) Topical <User Schedule>  cholecalciferol 400 Unit(s) Oral daily  famotidine    Tablet 20 milliGRAM(s) Oral two times a day  insulin glargine Injectable (LANTUS) 10 Unit(s) SubCutaneous at bedtime  insulin lispro (ADMELOG) corrective regimen sliding scale   SubCutaneous three times a day before meals  insulin lispro Injectable (ADMELOG) 4 Unit(s) SubCutaneous three times a day before meals  OLANZapine 2.5 milliGRAM(s) Oral two times a day PRN  polyethylene glycol 3350 17 Gram(s) Oral daily  senna 2 Tablet(s) Oral at bedtime  zinc sulfate 220 milliGRAM(s) Oral daily    Drug Dosing Weight  Height (cm): 172.7 (16 Apr 2021 13:26)  Weight (kg): 78.9 (16 Apr 2021 13:26)  BMI (kg/m2): 26.5 (16 Apr 2021 13:26)  BSA (m2): 1.93 (16 Apr 2021 13:26)    CENTRAL LINE: [ ] YES [ x] NO  LOCATION:   DATE INSERTED:  REMOVE: [ ] YES [ ] NO  EXPLAIN:    GUPTA: [ ] YES [x NO    DATE INSERTED:  REMOVE:  [ ] YES [ ] NO  EXPLAIN:    A-LINE:  [ ] YES [ x] NO  LOCATION:   DATE INSERTED:  REMOVE:  [ ] YES [ ] NO  EXPLAIN:    PMH -reviewed admission note, no change since admission    ICU Vital Signs Last 24 Hrs  T(C): 36.6 (05 May 2021 12:00), Max: 36.8 (04 May 2021 20:00)  T(F): 97.9 (05 May 2021 12:00), Max: 98.3 (04 May 2021 20:00)  HR: 106 (05 May 2021 12:00) (85 - 106)  BP: 85/54 (05 May 2021 11:00) (85/54 - 143/77)  BP(mean): 61 (05 May 2021 11:00) (57 - 94)  ABP: --  ABP(mean): --  RR: 25 (05 May 2021 12:00) (18 - 27)  SpO2: 98% (05 May 2021 12:00) (87% - 100%)            05-04 @ 07:01  -  05-05 @ 07:00  --------------------------------------------------------  IN: 600 mL / OUT: 850 mL / NET: -250 mL            PHYSICAL EXAM:  GENERAL: NAD, well-groomed, well-developed  HEAD:  Atraumatic, Normocephalic  EYES: EOMI, PERRLA, conjunctiva and sclera clear  ENMT: No tonsillar erythema, exudates, or enlargement; Moist mucous membranes, Good dentition, No lesions  NECK: Supple, normal appearance, No JVD; Normal thyroid; Trachea midline  NERVOUS SYSTEM:  Alert & Oriented X3, Good concentration; Motor Strength 5/5 B/L upper and lower extremities; DTRs 2+ intact and symmetric  CHEST/LUNG: mild rhonchi, wheezing   HEART: Regular rate and rhythm; No murmurs, rubs, or gallops  ABDOMEN: Soft, Nontender, Nondistended; Bowel sounds present  EXTREMITIES:  2+ Peripheral Pulses, No clubbing, cyanosis, or edema  LYMPH: No lymphadenopathy noted  SKIN: No rashes or lesions; Good capillary refill      LABS:  CBC Full  -  ( 05 May 2021 03:49 )  WBC Count : 11.43 K/uL  RBC Count : 4.17 M/uL  Hemoglobin : 11.9 g/dL  Hematocrit : 36.2 %  Platelet Count - Automated : 147 K/uL  Mean Cell Volume : 86.8 fl  Mean Cell Hemoglobin : 28.5 pg  Mean Cell Hemoglobin Concentration : 32.9 gm/dL  Auto Neutrophil # : x  Auto Lymphocyte # : x  Auto Monocyte # : x  Auto Eosinophil # : x  Auto Basophil # : x  Auto Neutrophil % : x  Auto Lymphocyte % : x  Auto Monocyte % : x  Auto Eosinophil % : x  Auto Basophil % : x    05-05    134<L>  |  104  |  13  ----------------------------<  67<L>  3.6   |  25  |  0.76    Ca    8.2<L>      05 May 2021 03:50  Phos  2.9     05-05  Mg     1.8     05-05    TPro  6.5  /  Alb  1.5<L>  /  TBili  0.6  /  DBili  x   /  AST  38  /  ALT  41  /  AlkPhos  100  05-04            RADIOLOGY & ADDITIONAL STUDIES REVIEWED:  ***    GOALS OF CARE DISCUSSION WITH PATIENT/FAMILY/PROXY:    CRITICAL CARE TIME SPENT: 35 minutes

## 2021-05-05 NOTE — CHART NOTE - NSCHARTNOTEFT_GEN_A_CORE
EVENT:     HPI:  76 M with PMH of HTN and DM presents to ED with complaint of hiccups since this morning. Patient reports hiccups are associated with cough, fever and chills x 1 day. He also endorses myalgias. Tmax at home was 100.3F.He denies having had the COVID-19 vaccination or having had COVID-19 in the past. Per daughter patient has had poor appetite and weakness x1 week. She also reports noticing that he becomes short of breath on minimal exertion. Patient was at PMD office last week and was advised to go to ED for hyperglycemia at that time, but refused. Pt was admitted to [facility] in January for DKA. Pt denies any chest pain, palpitations, rinary symptoms or any other acute complaints     In ED, /76, , saO2 89% on RA, improved to 95% on 5L NC  (16 Apr 2021 16:51)    SUBJECTIVE:    OBJECTIVE:  Vital Signs Last 24 Hrs  T(C): 37 (05 May 2021 18:29), Max: 37 (05 May 2021 18:29)  T(F): 98.6 (05 May 2021 18:29), Max: 98.6 (05 May 2021 18:29)  HR: 95 (05 May 2021 18:29) (85 - 109)  BP: 117/55 (05 May 2021 18:29) (81/36 - 143/77)  BP(mean): 66 (05 May 2021 17:00) (47 - 94)  RR: 24 (05 May 2021 18:29) (18 - 27)  SpO2: 97% (05 May 2021 18:29) (87% - 100%)    PHYSICAL EXAM:  Neuro: Awake and alert, oriented to person, place, and time  Cardiovascular: + S1, S2, no murmurs, rubs, or bruits  Respiratory: clear to auscultation bilaterally with good air entry   GI: Abdomen soft, non-tender, bowel sounds present   : Non distended;   Skin: warm and dry; no rash      LABS:                        11.9   11.43 )-----------( 147      ( 05 May 2021 03:49 )             36.2     05-05    134<L>  |  104  |  13  ----------------------------<  67<L>  3.6   |  25  |  0.76    Ca    8.2<L>      05 May 2021 03:50  Phos  2.9     05-05  Mg     1.8     05-05    TPro  6.5  /  Alb  1.5<L>  /  TBili  0.6  /  DBili  x   /  AST  38  /  ALT  41  /  AlkPhos  100  05-04        EKG:   IMAGING:    ASSESSMENT:    PLAN:     FOLLOW UP / RESULT: EVENT: Paged by RN, pt desaturated to 70s on 5L NC.     HPI:  76 M from home with PMH of HTN and DM initially presented to ED  complaint of hiccups on 4/16/2021, associated with cough, fever,chills and myalgias. Tmax at home was 100.3F.He denies having had the COVID-19 vaccination or having had COVID-19 in the past. Per daughter patient has had poor appetite and weakness  She also reports noticing that he becomes short of breath on minimal exertion. Patient was at PMD office last week and was advised to go to ED for hyperglycemia at that time, but refused. Pt was admitted to [facility] in January for DKA.   In ED, /76, , saO2 89% on RA, improved to 95% on 5L NC  (16 Apr 2021 16:51)    Interval history : Pt was admitted to Medicine floor for COVid 19 infection requiring supplemental O2 with NC 6L . Pt completed Remdesivir course and is on D8 of Decadron. As per NP , Pt desaturated yesterday so was placed on NRB for brief period ,then switched back to nasal cannula 6L .Today pt desaturated again so was placed back on 10 L NRB which was increased to 15L after pt finished eating breakfast . Blood sugars were initially high now well controlled on basal-bolus regimen. Hyponatremia on admission resolved with gentle hydration now became hypernatremic. Cr has worsen since last 2-3 days with metabolic acidosis and pt is started on HCO3 drip by nephro. Pt currently states he feels ok , denies any CP, nausea, vomiting ,diarrhea , constipation or any urinary complaints Patient was transferred to ICU for acute hypoxic respiratory failure. He was on HFNC. He improved and was slowly transitioned to NC. He is saturating well on 5 L NC.His metabolic acidosis improved. Patient was downgraded to medicine today.     SUBJECTIVE: Pt seen and examined at bedside, conversive appears in NAD- able to speak in complete sentences. Currently on 15L NRB mask. He reports feeling short of breath while on nasal cannula, and reports improvement when placed on NRB mask.     OBJECTIVE:  Vital Signs Last 24 Hrs  T(C): 37 (05 May 2021 18:29), Max: 37 (05 May 2021 18:29)  T(F): 98.6 (05 May 2021 18:29), Max: 98.6 (05 May 2021 18:29)  HR: 95 (05 May 2021 18:29) (85 - 109)  BP: 117/55 (05 May 2021 18:29) (81/36 - 143/77)  BP(mean): 66 (05 May 2021 17:00) (47 - 94)  RR: 24 (05 May 2021 18:29) (18 - 27)  SpO2: 97% (05 May 2021 18:29) (87% - 100%)    PHYSICAL EXAM:  Neuro: Awake and alert, oriented to person, place, and time  Cardiovascular: + S1, S2, no murmurs, rubs, or bruits  Respiratory: slightly diminished breath sounds bilaterally  GI: Abdomen soft, non-tender, bowel sounds present   : Non distended;   Skin: warm and dry; no rash      LABS:                        11.9   11.43 )-----------( 147      ( 05 May 2021 03:49 )             36.2     05-05    134<L>  |  104  |  13  ----------------------------<  67<L>  3.6   |  25  |  0.76    Ca    8.2<L>      05 May 2021 03:50  Phos  2.9     05-05  Mg     1.8     05-05    TPro  6.5  /  Alb  1.5<L>  /  TBili  0.6  /  DBili  x   /  AST  38  /  ALT  41  /  AlkPhos  100  05-04        EKG:   IMAGING:    ASSESSMENT: Acute hypoxic respiratory failure secondary to covid with increased O2 requirement    PLAN:     -Keep on NRB mask, as ordered  -Monitor O2 sat closely  -ICU     FOLLOW UP / RESULT: EVENT: Paged by RN, pt desaturated to 70s on 5L NC.     HPI:  76 M from home with PMH of HTN and DM initially presented to ED  complaint of hiccups on 4/16/2021, associated with cough, fever,chills and myalgias. Tmax at home was 100.3F.He denies having had the COVID-19 vaccination or having had COVID-19 in the past. Per daughter patient has had poor appetite and weakness  She also reports noticing that he becomes short of breath on minimal exertion. Patient was at PMD office last week and was advised to go to ED for hyperglycemia at that time, but refused. Pt was admitted to [facility] in January for DKA.   In ED, /76, , saO2 89% on RA, improved to 95% on 5L NC  (16 Apr 2021 16:51)    Interval history : Pt was admitted to Medicine floor for COVid 19 infection requiring supplemental O2 with NC 6L . Pt completed Remdesivir course and is on D8 of Decadron. As per NP , Pt desaturated yesterday so was placed on NRB for brief period ,then switched back to nasal cannula 6L .Today pt desaturated again so was placed back on 10 L NRB which was increased to 15L after pt finished eating breakfast . Blood sugars were initially high now well controlled on basal-bolus regimen. Hyponatremia on admission resolved with gentle hydration now became hypernatremic. Cr has worsen since last 2-3 days with metabolic acidosis and pt is started on HCO3 drip by nephro. Pt currently states he feels ok , denies any CP, nausea, vomiting ,diarrhea , constipation or any urinary complaints Patient was transferred to ICU for acute hypoxic respiratory failure. He was on HFNC. He improved and was slowly transitioned to NC. He is saturating well on 5 L NC.His metabolic acidosis improved. Patient was downgraded to medicine today.     SUBJECTIVE: Pt seen and examined at bedside, conversive appears in NAD- able to speak in complete sentences. Currently on 15L NRB mask. He reports feeling short of breath while on nasal cannula, and reports improvement when placed on NRB mask.     OBJECTIVE:  Vital Signs Last 24 Hrs  T(C): 37 (05 May 2021 18:29), Max: 37 (05 May 2021 18:29)  T(F): 98.6 (05 May 2021 18:29), Max: 98.6 (05 May 2021 18:29)  HR: 95 (05 May 2021 18:29) (85 - 109)  BP: 117/55 (05 May 2021 18:29) (81/36 - 143/77)  BP(mean): 66 (05 May 2021 17:00) (47 - 94)  RR: 24 (05 May 2021 18:29) (18 - 27)  SpO2: 97% (05 May 2021 18:29) (87% - 100%)    PHYSICAL EXAM:  Neuro: Awake and alert, oriented to person, place, and time  Cardiovascular: + S1, S2, no murmurs, rubs, or bruits  Respiratory: slightly diminished breath sounds bilaterally  GI: Abdomen soft, non-tender, bowel sounds present   : Non distended;   Skin: warm and dry; no rash      LABS:                        11.9   11.43 )-----------( 147      ( 05 May 2021 03:49 )             36.2     05-05    134<L>  |  104  |  13  ----------------------------<  67<L>  3.6   |  25  |  0.76    Ca    8.2<L>      05 May 2021 03:50  Phos  2.9     05-05  Mg     1.8     05-05    TPro  6.5  /  Alb  1.5<L>  /  TBili  0.6  /  DBili  x   /  AST  38  /  ALT  41  /  AlkPhos  100  05-04        EKG:   IMAGING:    ASSESSMENT: Acute hypoxic respiratory failure secondary to covid with increased O2 requirement    PLAN:     -Keep on NRB mask, as ordered  -Monitor O2 sat closely  -Consider titrating back to nasal cannula when appropriate  -ICU consult made aware of above due to increased O2 requirement   -Will monitor on medical floor    FOLLOW UP / RESULT:    -Monitor effectiveness of above intervention EVENT: Paged by RN, pt desaturated to 70s on 5L NC.     HPI:  76 M from home with PMH of HTN and DM initially presented to ED  complaint of hiccups on 4/16/2021, associated with cough, fever,chills and myalgias. Tmax at home was 100.3F.He denies having had the COVID-19 vaccination or having had COVID-19 in the past. Per daughter patient has had poor appetite and weakness  She also reports noticing that he becomes short of breath on minimal exertion. Patient was at PMD office last week and was advised to go to ED for hyperglycemia at that time, but refused. Pt was admitted to [facility] in January for DKA.   In ED, /76, , saO2 89% on RA, improved to 95% on 5L NC  (16 Apr 2021 16:51)    Interval history : Pt was admitted to Medicine floor for COVid 19 infection requiring supplemental O2 with NC 6L . Pt completed Remdesivir course and is on D8 of Decadron. As per NP , Pt desaturated yesterday so was placed on NRB for brief period ,then switched back to nasal cannula 6L .Today pt desaturated again so was placed back on 10 L NRB which was increased to 15L after pt finished eating breakfast . Blood sugars were initially high now well controlled on basal-bolus regimen. Hyponatremia on admission resolved with gentle hydration now became hypernatremic. Cr has worsen since last 2-3 days with metabolic acidosis and pt is started on HCO3 drip by nephro. Pt currently states he feels ok , denies any CP, nausea, vomiting ,diarrhea , constipation or any urinary complaints Patient was transferred to ICU for acute hypoxic respiratory failure. He was on HFNC. He improved and was slowly transitioned to NC. He is saturating well on 5 L NC.His metabolic acidosis improved. Patient was downgraded to medicine today.     SUBJECTIVE: Pt seen and examined at bedside, conversive appears in NAD- able to speak in complete sentences. Currently on 15L NRB mask. He reports feeling short of breath while on nasal cannula, and reports improvement when placed on NRB mask.     OBJECTIVE:  Vital Signs Last 24 Hrs  T(C): 37 (05 May 2021 18:29), Max: 37 (05 May 2021 18:29)  T(F): 98.6 (05 May 2021 18:29), Max: 98.6 (05 May 2021 18:29)  HR: 95 (05 May 2021 18:29) (85 - 109)  BP: 117/55 (05 May 2021 18:29) (81/36 - 143/77)  BP(mean): 66 (05 May 2021 17:00) (47 - 94)  RR: 24 (05 May 2021 18:29) (18 - 27)  SpO2: 97% (05 May 2021 18:29) (87% - 100%)    PHYSICAL EXAM:  Neuro: Awake and alert, oriented to person, place, and time  Cardiovascular: + S1, S2, no murmurs, rubs, or bruits  Respiratory: slightly diminished breath sounds bilaterally  GI: Abdomen soft, non-tender, bowel sounds present   : Non distended;   Skin: warm and dry; no rash      LABS:                        11.9   11.43 )-----------( 147      ( 05 May 2021 03:49 )             36.2     05-05    134<L>  |  104  |  13  ----------------------------<  67<L>  3.6   |  25  |  0.76    Ca    8.2<L>      05 May 2021 03:50  Phos  2.9     05-05  Mg     1.8     05-05    TPro  6.5  /  Alb  1.5<L>  /  TBili  0.6  /  DBili  x   /  AST  38  /  ALT  41  /  AlkPhos  100  05-04        EKG:   IMAGING:    ASSESSMENT: Acute hypoxic respiratory failure secondary to covid with increased O2 requirement    PLAN:     -Keep on NRB mask, as ordered  -Monitor O2 sat closely  -Consider titrating back to nasal cannula when appropriate  -ICU consult made aware of above due to increased O2 requirement   -Will monitor on medical floor    FOLLOW UP / RESULT:    -Monitor effectiveness of above intervention    Addendum    -Pt transitioned back to 5L nasal cannula this morning by RN, after found with NRB under his nose. sPO2 is 96%.    PLAN:    -Continue to monitor respiratory status and spO2 on nasal cannula  -Continue present/current care

## 2021-05-06 NOTE — PROGRESS NOTE ADULT - SUBJECTIVE AND OBJECTIVE BOX
NP Note discussed with  Primary Attending    Patient is a 76y old  Male who presents with a chief complaint of Acute Hypoxic Respiratory Failure (06 May 2021 11:19)      INTERVAL HPI/OVERNIGHT EVENTS: no new complaints    MEDICATIONS  (STANDING):  apixaban 2.5 milliGRAM(s) Oral every 12 hours  atorvastatin 40 milliGRAM(s) Oral at bedtime  chlorhexidine 2% Cloths 1 Application(s) Topical <User Schedule>  cholecalciferol 400 Unit(s) Oral daily  famotidine    Tablet 20 milliGRAM(s) Oral two times a day  insulin glargine Injectable (LANTUS) 10 Unit(s) SubCutaneous at bedtime  insulin lispro (ADMELOG) corrective regimen sliding scale   SubCutaneous three times a day before meals  insulin lispro Injectable (ADMELOG) 4 Unit(s) SubCutaneous three times a day before meals  multivitamin 1 Tablet(s) Oral daily  polyethylene glycol 3350 17 Gram(s) Oral daily  senna 2 Tablet(s) Oral at bedtime    MEDICATIONS  (PRN):  ALBUTerol    90 MICROgram(s) HFA Inhaler 2 Puff(s) Inhalation every 6 hours PRN Bronchospasm  OLANZapine 2.5 milliGRAM(s) Oral two times a day PRN agitation      __________________________________________________  REVIEW OF SYSTEMS:    CONSTITUTIONAL: No fever,   EYES: no acute visual disturbances  NECK: No pain or stiffness  RESPIRATORY: No cough; No shortness of breath  CARDIOVASCULAR: No chest pain, no palpitations  GASTROINTESTINAL: No pain. No nausea or vomiting; No diarrhea   NEUROLOGICAL: No headache or numbness, no tremors  MUSCULOSKELETAL: No joint pain, no muscle pain  GENITOURINARY: no dysuria, no frequency, no hesitancy  PSYCHIATRY: no depression , no anxiety  ALL OTHER  ROS negative        Vital Signs Last 24 Hrs  T(C): 36.8 (06 May 2021 13:00), Max: 38 (06 May 2021 05:00)  T(F): 98.2 (06 May 2021 13:00), Max: 100.4 (06 May 2021 05:00)  HR: 95 (06 May 2021 12:56) (93 - 112)  BP: 98/56 (06 May 2021 12:56) (84/61 - 117/55)  BP(mean): --  RR: 17 (06 May 2021 12:56) (16 - 26)  SpO2: 91% (06 May 2021 12:56) (90% - 98%)    ________________________________________________  PHYSICAL EXAM:  GENERAL: NAD  HEENT: Normocephalic;  conjunctivae and sclerae clear; moist mucous membranes;   NECK : supple  CHEST/LUNG: Clear to auscultation bilaterally with good air entry   HEART: S1 S2  regular; no murmurs, gallops or rubs  ABDOMEN: Soft, Nontender, Nondistended; Bowel sounds present  EXTREMITIES: no cyanosis; no edema; no calf tenderness  SKIN: warm and dry; no rash  NERVOUS SYSTEM:  Awake and alert; Oriented  to place, person and time ; no new deficits    _________________________________________________  LABS:                        12.2   12.00 )-----------( 158      ( 06 May 2021 07:42 )             37.6     05-06    135  |  100  |  18  ----------------------------<  108<H>  3.5   |  24  |  1.16    Ca    8.4      06 May 2021 07:42  Phos  3.2     05-06  Mg     1.9     05-06    TPro  7.3  /  Alb  2.6<L>  /  TBili  1.3<H>  /  DBili  x   /  AST  42<H>  /  ALT  42  /  AlkPhos  111  05-06        CAPILLARY BLOOD GLUCOSE      POCT Blood Glucose.: 176 mg/dL (06 May 2021 16:30)  POCT Blood Glucose.: 220 mg/dL (06 May 2021 11:59)  POCT Blood Glucose.: 124 mg/dL (06 May 2021 08:11)  POCT Blood Glucose.: 107 mg/dL (05 May 2021 21:12)        RADIOLOGY & ADDITIONAL TESTS:    Imaging Personally Reviewed:  YES/NO    Consultant(s) Notes Reviewed:   YES/ No    Care Discussed with Consultants :     Plan of care was discussed with patient and /or primary care giver; all questions and concerns were addressed and care was aligned with patient's wishes.     NP Note discussed with  Primary Attending    Patient is a 76y old  Male who presents with a chief complaint of Acute Hypoxic Respiratory Failure (06 May 2021 11:19)    INTERVAL HPI/OVERNIGHT EVENTS: hypotension, hypoxic     MEDICATIONS  (STANDING):  apixaban 2.5 milliGRAM(s) Oral every 12 hours  atorvastatin 40 milliGRAM(s) Oral at bedtime  chlorhexidine 2% Cloths 1 Application(s) Topical <User Schedule>  cholecalciferol 400 Unit(s) Oral daily  famotidine    Tablet 20 milliGRAM(s) Oral two times a day  insulin glargine Injectable (LANTUS) 10 Unit(s) SubCutaneous at bedtime  insulin lispro (ADMELOG) corrective regimen sliding scale   SubCutaneous three times a day before meals  insulin lispro Injectable (ADMELOG) 4 Unit(s) SubCutaneous three times a day before meals  multivitamin 1 Tablet(s) Oral daily  polyethylene glycol 3350 17 Gram(s) Oral daily  senna 2 Tablet(s) Oral at bedtime    MEDICATIONS  (PRN):  ALBUTerol    90 MICROgram(s) HFA Inhaler 2 Puff(s) Inhalation every 6 hours PRN Bronchospasm  OLANZapine 2.5 milliGRAM(s) Oral two times a day PRN agitation      __________________________________________________  REVIEW OF SYSTEMS:    CONSTITUTIONAL: No fever,   EYES: no acute visual disturbances  NECK: No pain or stiffness  RESPIRATORY: No cough; No shortness of breath  CARDIOVASCULAR: No chest pain, no palpitations  GASTROINTESTINAL: No pain. No nausea or vomiting; No diarrhea   NEUROLOGICAL: No headache or numbness, no tremors  MUSCULOSKELETAL: No joint pain, no muscle pain  GENITOURINARY: no dysuria, no frequency, no hesitancy  PSYCHIATRY: no depression , no anxiety  ALL OTHER  ROS negative        Vital Signs Last 24 Hrs  T(C): 36.8 (06 May 2021 13:00), Max: 38 (06 May 2021 05:00)  T(F): 98.2 (06 May 2021 13:00), Max: 100.4 (06 May 2021 05:00)  HR: 95 (06 May 2021 12:56) (93 - 112)  BP: 98/56 (06 May 2021 12:56) (84/61 - 117/55)  BP(mean): --  RR: 17 (06 May 2021 12:56) (16 - 26)  SpO2: 91% (06 May 2021 12:56) (90% - 98%)    ________________________________________________  PHYSICAL EXAM:  GENERAL: NAD, well-groomed, well-developed  HEAD:  Atraumatic, Normocephalic  EYES: EOMI, PERRLA, conjunctiva and sclera clear  ENMT: No tonsillar erythema, exudates, or enlargement; Moist mucous membranes, Good dentition, No lesions  NECK: Supple, normal appearance, No JVD; Normal thyroid; Trachea midline  NERVOUS SYSTEM:  Alert & Oriented X3, Good concentration; Motor Strength 5/5 B/L upper and lower extremities; DTRs 2+ intact and symmetric  CHEST/LUNG: mild rhonchi, wheezing   HEART: Regular rate and rhythm; No murmurs, rubs, or gallops  ABDOMEN: Soft, Nontender, Nondistended; Bowel sounds present  EXTREMITIES:  2+ Peripheral Pulses, No clubbing, cyanosis, or edema  LYMPH: No lymphadenopathy noted  SKIN: No rashes or lesions; Good capillary refill  _________________________________________________  LABS:                        12.2   12.00 )-----------( 158      ( 06 May 2021 07:42 )             37.6     05-06    135  |  100  |  18  ----------------------------<  108<H>  3.5   |  24  |  1.16    Ca    8.4      06 May 2021 07:42  Phos  3.2     05-06  Mg     1.9     05-06    TPro  7.3  /  Alb  2.6<L>  /  TBili  1.3<H>  /  DBili  x   /  AST  42<H>  /  ALT  42  /  AlkPhos  111  05-06        CAPILLARY BLOOD GLUCOSE      POCT Blood Glucose.: 176 mg/dL (06 May 2021 16:30)  POCT Blood Glucose.: 220 mg/dL (06 May 2021 11:59)  POCT Blood Glucose.: 124 mg/dL (06 May 2021 08:11)  POCT Blood Glucose.: 107 mg/dL (05 May 2021 21:12)        RADIOLOGY & ADDITIONAL TESTS: < from: Xray Chest 1 View- PORTABLE-Urgent (Xray Chest 1 View- PORTABLE-Urgent .) (05.06.21 @ 11:23) >  INTERPRETATION:  AP erect chest on May 6, 2021 at 10:38 AM. Patient is short of breath and Covid positive.    Heart size is difficult to assess.    There fairly advanced dense infiltrates concentrated in the mid lower lung fields. There is also a component of infiltrate in the right upper lobe.    Infiltrates have increased from May 2.    IMPRESSION: Advanced infiltrates somewhat increased from prior.    < end of copied text >      Imaging Personally Reviewed:  YES      Plan of care was discussed with patient and /or primary care giver; all questions and concerns were addressed and care was aligned with patient's wishes.

## 2021-05-06 NOTE — PROGRESS NOTE ADULT - PROBLEM SELECTOR PLAN 1
Episode of hypoxia this AM  on NRB mask  Repeat CXR found b/l worsening infiltrates   S/P Remdesivir & Decadron   - C/w Eliquis 2.5 mg BID    - C/w Vit c, Zn, Vit D supplementation   - US doppler of LE no DVT  Pending CTA   contact/airborne precautions

## 2021-05-06 NOTE — PROGRESS NOTE ADULT - SUBJECTIVE AND OBJECTIVE BOX
[facility]   MD BART FLANNERY MD RUORU WONG, PA    TEL:  OFFICE: [phone]  DR AYERS CELL: [phone]  JOSÉ MIGUEL ABDI CELL: [phone]  DR. AGUIRRE CELL: [phone]  DR. MCCLAIN CELL: [phone]    FROM 5 PM - 7 AM PLEASE CALL ANSWERING SERVICE: [phone]    RENAL FOLLOW UP NOTE--Date of Service 05-06-21 @ 10:37  --------------------------------------------------------------------------------  HPI:      Pt seen and examined at bedside.       PAST HISTORY  --------------------------------------------------------------------------------  No significant changes to PMH, PSH, FHx, SHx, unless otherwise noted    ALLERGIES & MEDICATIONS  --------------------------------------------------------------------------------  Allergies    No Known Allergies    Intolerances      Standing Inpatient Medications  apixaban 2.5 milliGRAM(s) Oral every 12 hours  atorvastatin 40 milliGRAM(s) Oral at bedtime  chlorhexidine 2% Cloths 1 Application(s) Topical <User Schedule>  cholecalciferol 400 Unit(s) Oral daily  famotidine    Tablet 20 milliGRAM(s) Oral two times a day  insulin glargine Injectable (LANTUS) 10 Unit(s) SubCutaneous at bedtime  insulin lispro (ADMELOG) corrective regimen sliding scale   SubCutaneous three times a day before meals  insulin lispro Injectable (ADMELOG) 4 Unit(s) SubCutaneous three times a day before meals  multivitamin 1 Tablet(s) Oral daily  polyethylene glycol 3350 17 Gram(s) Oral daily  senna 2 Tablet(s) Oral at bedtime    PRN Inpatient Medications  ALBUTerol    90 MICROgram(s) HFA Inhaler 2 Puff(s) Inhalation every 6 hours PRN  OLANZapine 2.5 milliGRAM(s) Oral two times a day PRN      REVIEW OF SYSTEMS  --------------------------------------------------------------------------------  General: no fever    MSK: no edema     VITALS/PHYSICAL EXAM  --------------------------------------------------------------------------------  T(C): 38 (05-06-21 @ 05:00), Max: 38 (05-06-21 @ 05:00)  HR: 107 (05-06-21 @ 05:00) (94 - 112)  BP: 84/61 (05-06-21 @ 05:00) (81/36 - 117/55)  RR: 16 (05-06-21 @ 04:57) (16 - 26)  SpO2: 90% (05-06-21 @ 05:00) (90% - 99%)  Wt(kg): --        05-05-21 @ 07:01  -  05-06-21 @ 07:00  --------------------------------------------------------  IN: 100 mL / OUT: 150 mL / NET: -50 mL      Physical Exam:  	Gen: NAD, on NRM  	HEENT: MMM  	Pulm: CTA B/L  	CV: S1S2  	Abd: Soft, +BS  	Ext: No LE edema B/L                      Neuro: lethargic  	Skin: Warm and Dry   	Vascular access: no HD catheter            no flores  LABS/STUDIES  --------------------------------------------------------------------------------              12.2   12.00 >-----------<  158      [05-06-21 @ 07:42]              37.6     135  |  100  |  18  ----------------------------<  108      [05-06-21 @ 07:42]  3.5   |  24  |  1.16        Ca     8.4     [05-06-21 @ 07:42]      Mg     1.9     [05-06-21 @ 07:42]      Phos  3.2     [05-06-21 @ 07:42]    TPro  7.3  /  Alb  2.6  /  TBili  1.3  /  DBili  x   /  AST  42  /  ALT  42  /  AlkPhos  111  [05-06-21 @ 07:42]              [05-06-21 @ 07:42]    Creatinine Trend:  SCr 1.16 [05-06 @ 07:42]  SCr 0.76 [05-05 @ 03:50]  SCr 0.90 [05-04 @ 04:01]  SCr 1.06 [05-03 @ 04:26]  SCr 0.92 [05-02 @ 06:39]    Urinalysis - [04-22-21 @ 14:32]      Color Yellow / Appearance Clear / SG 1.015 / pH 6.5      Gluc 1000 / Ketone Negative  / Bili Negative / Urobili Negative       Blood Small / Protein 30 / Leuk Est Negative / Nitrite Negative      RBC 5-10 / WBC 0-2 / Hyaline  / Gran  / Sq Epi  / Non Sq Epi Few / Bacteria Few      Ferritin 1309      [05-05-21 @ 09:39]  PTH -- (Ca 8.5)      [04-30-21 @ 16:54]   54  TSH 0.96      [04-17-21 @ 06:26]  Lipid: chol 96, TG 77, HDL 42, LDL --      [04-17-21 @ 06:26]

## 2021-05-06 NOTE — PROGRESS NOTE ADULT - ASSESSMENT
76 M from home with PMH of HTN and DM initially presented to ED  complaint of hiccups on 4/16/2021, associated with cough, fever,chills and myalgias. Admitted to Medicine floor for COVid 19 infection requiring supplemental O2 with NC 6L . Pt completed Remdesivir and Decadron. Episode of hypoxia, requiring 15L NRB subsequentlly transferred to ICU for acute hypoxic respiratory failure. Placed on HFNC and improved and was slowly transitioned to NC. Downgraded to medicine on 5L N/C 5/5, continues SOB placed back on NRB. Given elevated d dimer, tachycardia, sob and COVID status would benefit from CTA.

## 2021-05-06 NOTE — CHART NOTE - NSCHARTNOTEFT_GEN_A_CORE
EVENT: Pt found on floor        OBJECTIVE:  Vital Signs Last 24 Hrs  T(C): 36.4 (06 May 2021 20:00), Max: 38 (06 May 2021 05:00)  T(F): 97.6 (06 May 2021 20:00), Max: 100.4 (06 May 2021 05:00)  HR: 95 (06 May 2021 20:00) (93 - 107)  BP: 77/47 (06 May 2021 20:00) (77/47 - 98/56)  BP(mean): --  RR: 26 (06 May 2021 20:00) (16 - 26)  SpO2: 93% (06 May 2021 20:00) (90% - 98%)    FOCUSED PHYSICAL EXAM:    LABS:                        12.2   12.00 )-----------( 158      ( 06 May 2021 07:42 )             37.6     05-06    135  |  100  |  18  ----------------------------<  108<H>  3.5   |  24  |  1.16    Ca    8.4      06 May 2021 07:42  Phos  3.2     05-06  Mg     1.9     05-06    TPro  7.3  /  Alb  2.6<L>  /  TBili  1.3<H>  /  DBili  x   /  AST  42<H>  /  ALT  42  /  AlkPhos  111  05-06      EKG:   IMGAGING:    ASSESSMENT:  HPI:  76 M with PMH of HTN and DM presents to ED with complaint of hiccups since this morning. Patient reports hiccups are associated with cough, fever and chills x 1 day. He also endorses myalgias. Tmax at home was 100.3F.He denies having had the COVID-19 vaccination or having had COVID-19 in the past. Per daughter patient has had poor appetite and weakness x1 week. She also reports noticing that he becomes short of breath on minimal exertion. Patient was at PMD office last week and was advised to go to ED for hyperglycemia at that time, but refused. Pt was admitted to [facility] in January for DKA. Pt denies any chest pain, palpitations, rinary symptoms or any other acute complaints     In ED, /76, , saO2 89% on RA, improved to 95% on 5L NC  (16 Apr 2021 16:51)      PLAN:     FOLLOW UP / RESULT: EVENT: Pt found on floor            OBJECTIVE:  Vital Signs Last 24 Hrs  T(C): 36.4 (06 May 2021 20:00), Max: 38 (06 May 2021 05:00)  T(F): 97.6 (06 May 2021 20:00), Max: 100.4 (06 May 2021 05:00)  HR: 95 (06 May 2021 20:00) (93 - 107)  BP: 77/47 (06 May 2021 20:00) (77/47 - 98/56)  BP(mean): --  RR: 26 (06 May 2021 20:00) (16 - 26)  SpO2: 93% (06 May 2021 20:00) (90% - 98%)    FOCUSED PHYSICAL EXAM:    LABS:                        12.2   12.00 )-----------( 158      ( 06 May 2021 07:42 )             37.6     05-06    135  |  100  |  18  ----------------------------<  108<H>  3.5   |  24  |  1.16    Ca    8.4      06 May 2021 07:42  Phos  3.2     05-06  Mg     1.9     05-06    TPro  7.3  /  Alb  2.6<L>  /  TBili  1.3<H>  /  DBili  x   /  AST  42<H>  /  ALT  42  /  AlkPhos  111  05-06      EKG:   IMGAGING:    ASSESSMENT:  HPI:  76 M with PMH of HTN and DM presents to ED with complaint of hiccups since this morning. Patient reports hiccups are associated with cough, fever and chills x 1 day. He also endorses myalgias. Tmax at home was 100.3F.He denies having had the COVID-19 vaccination or having had COVID-19 in the past. Per daughter patient has had poor appetite and weakness x1 week. She also reports noticing that he becomes short of breath on minimal exertion. Patient was at PMD office last week and was advised to go to ED for hyperglycemia at that time, but refused. Pt was admitted to [facility] in January for DKA. Pt denies any chest pain, palpitations, rinary symptoms or any other acute complaints     In ED, /76, , saO2 89% on RA, improved to 95% on 5L NC  (16 Apr 2021 16:51)      PLAN:     FOLLOW UP / RESULT: EVENT: Pt found on floor. Granddaughter  Owen Becker [phone] informed.    BRIEF HPI: 76 M from home with PMH of HTN and DM initially presented to ED  complaint of hiccups on 4/16/2021, associated with cough, fever,chills and myalgias. Admitted to Medicine floor for COVid 19 infection requiring supplemental O2 with NC 6L . Pt completed Remdesivir and Decadron. Episode of hypoxia, requiring 15L NRB subsequentlly transferred to ICU for acute hypoxic respiratory failure. Placed on HFNC and improved and was slowly transitioned to NC. Downgraded to medicine on 5L N/C 5/5, continues SOB placed back on NRB. Given elevated d dimer, tachycardia, sob and COVID status would benefit from CTA. s/p fall.         OBJECTIVE:  Vital Signs Last 24 Hrs  T(C): 36.4 (06 May 2021 20:00), Max: 38 (06 May 2021 05:00)  T(F): 97.6 (06 May 2021 20:00), Max: 100.4 (06 May 2021 05:00)  HR: 95 (06 May 2021 20:00) (93 - 107)  BP: 77/47 (06 May 2021 20:00) (77/47 - 98/56)  BP(mean): --  RR: 26 (06 May 2021 20:00) (16 - 26)  SpO2: 93% (06 May 2021 20:00) (90% - 98%)    FOCUSED PHYSICAL EXAM:    LABS:                        12.2   12.00 )-----------( 158      ( 06 May 2021 07:42 )             37.6     05-06    135  |  100  |  18  ----------------------------<  108<H>  3.5   |  24  |  1.16    Ca    8.4      06 May 2021 07:42  Phos  3.2     05-06  Mg     1.9     05-06    TPro  7.3  /  Alb  2.6<L>  /  TBili  1.3<H>  /  DBili  x   /  AST  42<H>  /  ALT  42  /  AlkPhos  111  05-06      EKG:   IMGAGING:    ASSESSMENT:  HPI:  76 M with PMH of HTN and DM presents to ED with complaint of hiccups since this morning. Patient reports hiccups are associated with cough, fever and chills x 1 day. He also endorses myalgias. Tmax at home was 100.3F.He denies having had the COVID-19 vaccination or having had COVID-19 in the past. Per daughter patient has had poor appetite and weakness x1 week. She also reports noticing that he becomes short of breath on minimal exertion. Patient was at PMD office last week and was advised to go to ED for hyperglycemia at that time, but refused. Pt was admitted to [facility] in January for DKA. Pt denies any chest pain, palpitations, rinary symptoms or any other acute complaints     In ED, /76, , saO2 89% on RA, improved to 95% on 5L NC  (16 Apr 2021 16:51)      PLAN:     FOLLOW UP / RESULT: EVENT: Pt found on floor. Granddaughter  Owen Tolbert [phone] informed.    BRIEF HPI: 76 M from home with PMH of HTN and DM initially presented to ED  complaint of hiccups on 4/16/2021, associated with cough, fever,chills and myalgias. Admitted to Medicine floor for COVid 19 infection requiring supplemental O2 with NC 6L . Pt completed Remdesivir and Decadron. Episode of hypoxia, requiring 15L NRB subsequentlly transferred to ICU for acute hypoxic respiratory failure. Placed on HFNC and improved and was slowly transitioned to NC. Downgraded to medicine on 5L N/C 5/5, continues SOB placed back on NRB. Given elevated d dimer, tachycardia, sob and COVID status would benefit from CTA. Now s/p fall.     OBJECTIVE:  Vital Signs Last 24 Hrs  T(C): 36.4 (06 May 2021 20:00), Max: 38 (06 May 2021 05:00)  T(F): 97.6 (06 May 2021 20:00), Max: 100.4 (06 May 2021 05:00)  HR: 95 (06 May 2021 20:00) (93 - 107)  BP: 77/47 (06 May 2021 20:00) (77/47 - 98/56)  BP(mean): --  RR: 26 (06 May 2021 20:00) (16 - 26)  SpO2: 93% (06 May 2021 20:00) (90% - 98%)    FOCUSED PHYSICAL EXAM:  NEURO: Alert saying IM OK to [facility] staff, states he wanted to use the bathroom, denies pain  RESP: Labored  EXT: AROM, denies pain    LABS:                        12.2   12.00 )-----------( 158      ( 06 May 2021 07:42 )             37.6     05-06    135  |  100  |  18  ----------------------------<  108<H>  3.5   |  24  |  1.16    Ca    8.4      06 May 2021 07:42  Phos  3.2     05-06  Mg     1.9     05-06    TPro  7.3  /  Alb  2.6<L>  /  TBili  1.3<H>  /  DBili  x   /  AST  42<H>  /  ALT  42  /  AlkPhos  111  05-06     IMAGING   CT HEAD (p)    PROBLEM: S/p fall, found on back on floor    PLAN:   1. Head CT stat  2. CTA not done due to lack of 18guage IV access  3. Verbal consent from grand daughter Owen Tolbert, denies any allergies to dye    FOLLOW UP / RESULT: CT Head results

## 2021-05-06 NOTE — PROGRESS NOTE ADULT - SUBJECTIVE AND OBJECTIVE BOX
Time of Visit:  Patient seen and examined.     MEDICATIONS  (STANDING):  apixaban 2.5 milliGRAM(s) Oral every 12 hours  atorvastatin 40 milliGRAM(s) Oral at bedtime  chlorhexidine 2% Cloths 1 Application(s) Topical <User Schedule>  cholecalciferol 400 Unit(s) Oral daily  famotidine    Tablet 20 milliGRAM(s) Oral two times a day  insulin glargine Injectable (LANTUS) 10 Unit(s) SubCutaneous at bedtime  insulin lispro (ADMELOG) corrective regimen sliding scale   SubCutaneous three times a day before meals  insulin lispro Injectable (ADMELOG) 4 Unit(s) SubCutaneous three times a day before meals  multivitamin 1 Tablet(s) Oral daily  polyethylene glycol 3350 17 Gram(s) Oral daily  senna 2 Tablet(s) Oral at bedtime      MEDICATIONS  (PRN):  ALBUTerol    90 MICROgram(s) HFA Inhaler 2 Puff(s) Inhalation every 6 hours PRN Bronchospasm  OLANZapine 2.5 milliGRAM(s) Oral two times a day PRN agitation       Medications up to date at time of exam.      PHYSICAL EXAMINATION:      Vital Signs Last 24 Hrs  T(C): 37.2 (06 May 2021 12:03), Max: 38 (06 May 2021 05:00)  T(F): 99 (06 May 2021 12:03), Max: 100.4 (06 May 2021 05:00)  HR: 95 (06 May 2021 12:56) (93 - 112)  BP: 98/56 (06 May 2021 12:56) (83/56 - 117/55)  BP(mean): 66 (05 May 2021 17:00) (61 - 72)  RR: 17 (06 May 2021 12:56) (16 - 26)  SpO2: 91% (06 May 2021 12:56) (90% - 99%)   (if applicable)    General: Alert and oriented. Mild SOB at rest and exertion. No acute distress.     HEENT: Head is normocephalic and atraumatic. No nasal tenderness. Extraocular muscles are intact. Mucous membranes are moist.     NECK: Supple, no palpable adenopathy.    LUNGS: Fair air entrance. Decreased breath sounds. No use of accessory muscle.     HEART: S1 S2 Regular rate and  no click/ rub.     ABDOMEN: Soft, nontender, and nondistended.  No hepatosplenomegaly is noted. Active bowel sounds.     EXTREMITIES: Without any cyanosis, clubbing, rash, lesions or edema.    NEUROLOGIC: Awake, alert, oriented.     SKIN: Warm and moist. Non diaphoretic.       LABS:                        12.2   12.00 )-----------( 158      ( 06 May 2021 07:42 )             37.6     05-06    135  |  100  |  18  ----------------------------<  108<H>  3.5   |  24  |  1.16    Ca    8.4      06 May 2021 07:42  Phos  3.2     05-06  Mg     1.9     05-06    TPro  7.3  /  Alb  2.6<L>  /  TBili  1.3<H>  /  DBili  x   /  AST  42<H>  /  ALT  42  /  AlkPhos  111  05-06              D-Dimer Assay, Quantitative: 6387 ng/mL DDU (05-06-21 @ 07:42)            MICROBIOLOGY: (if applicable)    RADIOLOGY & ADDITIONAL STUDIES:  EKG:   CXR:  ECHO:    IMPRESSION: 76y Male PAST MEDICAL & SURGICAL HISTORY:  HTN (hypertension)    DM (diabetes mellitus)    No significant past surgical history     Impression: This is a 76 Yr old man  presented with Hiccups associated with cough , fever, chills x 1 Day due to Acute hypoxic respiratory failure secondary to Multifocal  Pneumonia. from covid-19 infection   Positive Covid 19 PCR on 04-16-21. Had an episode of increase work of breathing requiring Non rebreather Mask and was on Oxygen supplementation NC and still dessaturating and now on NRB at 10 L O2.    Suggestion:   O2 saturation 97% on NRB decreased to 10 L and maintain a O2 saturation 94% to 97%     s/p Dexamethasone Daily x 10 Days .   s/p remdesivir   Continue Singulair 10 mg Daily.   Continue PRN Albuterol 2 puffs Q 6 hours.  Monitor biomarkers TIW  DVT/ GI prophylactic.   Airborne and contact precautions.       Time of Visit:  Patient seen and examined.     MEDICATIONS  (STANDING):  apixaban 2.5 milliGRAM(s) Oral every 12 hours  atorvastatin 40 milliGRAM(s) Oral at bedtime  chlorhexidine 2% Cloths 1 Application(s) Topical <User Schedule>  cholecalciferol 400 Unit(s) Oral daily  famotidine    Tablet 20 milliGRAM(s) Oral two times a day  insulin glargine Injectable (LANTUS) 10 Unit(s) SubCutaneous at bedtime  insulin lispro (ADMELOG) corrective regimen sliding scale   SubCutaneous three times a day before meals  insulin lispro Injectable (ADMELOG) 4 Unit(s) SubCutaneous three times a day before meals  multivitamin 1 Tablet(s) Oral daily  polyethylene glycol 3350 17 Gram(s) Oral daily  senna 2 Tablet(s) Oral at bedtime      MEDICATIONS  (PRN):  ALBUTerol    90 MICROgram(s) HFA Inhaler 2 Puff(s) Inhalation every 6 hours PRN Bronchospasm  OLANZapine 2.5 milliGRAM(s) Oral two times a day PRN agitation       Medications up to date at time of exam.      PHYSICAL EXAMINATION:      Vital Signs Last 24 Hrs  T(C): 37.2 (06 May 2021 12:03), Max: 38 (06 May 2021 05:00)  T(F): 99 (06 May 2021 12:03), Max: 100.4 (06 May 2021 05:00)  HR: 95 (06 May 2021 12:56) (93 - 112)  BP: 98/56 (06 May 2021 12:56) (83/56 - 117/55)  BP(mean): 66 (05 May 2021 17:00) (61 - 72)  RR: 17 (06 May 2021 12:56) (16 - 26)  SpO2: 91% (06 May 2021 12:56) (90% - 99%)   (if applicable)    General: Alert and oriented. Mild SOB at rest and exertion. No acute distress.     HEENT: Head is normocephalic and atraumatic. No nasal tenderness. Extraocular muscles are intact. Mucous membranes are moist.     NECK: Supple, no palpable adenopathy.    LUNGS: Fair air entrance. Decreased breath sounds. No use of accessory muscle.     HEART: S1 S2 Regular rate and  no click/ rub.     ABDOMEN: Soft, nontender, and nondistended.  No hepatosplenomegaly is noted. Active bowel sounds.     EXTREMITIES: Without any cyanosis, clubbing, rash, lesions or edema.    NEUROLOGIC: Awake, alert, oriented.     SKIN: Warm and moist. Non diaphoretic.       LABS:                        12.2   12.00 )-----------( 158      ( 06 May 2021 07:42 )             37.6     05-06    135  |  100  |  18  ----------------------------<  108<H>  3.5   |  24  |  1.16    Ca    8.4      06 May 2021 07:42  Phos  3.2     05-06  Mg     1.9     05-06    TPro  7.3  /  Alb  2.6<L>  /  TBili  1.3<H>  /  DBili  x   /  AST  42<H>  /  ALT  42  /  AlkPhos  111  05-06              D-Dimer Assay, Quantitative: 6387 ng/mL DDU (05-06-21 @ 07:42)            MICROBIOLOGY: (if applicable)    RADIOLOGY & ADDITIONAL STUDIES:  EKG:   CXR:  ECHO:    IMPRESSION: 76y Male PAST MEDICAL & SURGICAL HISTORY:  HTN (hypertension)    DM (diabetes mellitus)    No significant past surgical history     Impression: This is a 76 Yr old man  presented with Hiccups associated with cough , fever, chills x 1 Day due to Acute hypoxic respiratory failure secondary to Multifocal  Pneumonia. from covid-19 infection   Positive Covid 19 PCR on 04-16-21. Had an episode of increase work of breathing requiring Non rebreather Mask and was on Oxygen supplementation NC and still dessaturating and now on NRB at 10 L O2.    Suggestion:   O2 saturation 97% on NRB decreased to 10 L and maintain a O2 saturation 94% to 97%     s/p Dexamethasone Daily x 10 Days .   s/p remdesivir   Continue Singulair 10 mg Daily.   Continue PRN Albuterol 2 puffs Q 6 hours.  Monitor biomarkers TIW  DVT/ GI prophylactic.   Airborne and contact precautions.        Agree with above assessment and plan as transcribed.

## 2021-05-06 NOTE — PROGRESS NOTE ADULT - PROBLEM SELECTOR PLAN 4
A1c 13.6   - BS controlled on current regimen   - Endo Dr Hall  following   Reccs appreciated   - Monitor BS   - Encouraged po intake and home meals

## 2021-05-06 NOTE — CHART NOTE - NSCHARTNOTEFT_GEN_A_CORE
EVENT:  CT brain 5/6/21 10:06 pm  IMPRESSION: No acute intracranial hemorrhage or displaced skull fracture. If clinically indicated, short-term follow-up or MRI may be obtained for further evaluation.  Granddaughter Owen Tolbert informed about CT results.    Vital Signs Last 24 Hrs  T(C): 36.4 (06 May 2021 20:00), Max: 38 (06 May 2021 05:00)  T(F): 97.6 (06 May 2021 20:00), Max: 100.4 (06 May 2021 05:00)  HR: 113 (06 May 2021 20:47) (93 - 113)  BP: 164/77 (06 May 2021 20:47) (77/47 - 164/77)  BP(mean): --  RR: 26 (06 May 2021 20:00) (16 - 26)  SpO2: 93% (06 May 2021 20:47) (90% - 98%)    FOLLOW UP: Ongoing physical assessment to elicit pain report.

## 2021-05-06 NOTE — PROGRESS NOTE ADULT - PROBLEM SELECTOR PLAN 3
all antihypertensives on hold due to hypotension and INOCENCIA  - Monitor BP and adjust meds as needed

## 2021-05-06 NOTE — PROGRESS NOTE ADULT - ASSESSMENT
( Note written / Date of service 05-06-21 )    ==================>> MEDICATIONS <<====================    apixaban 2.5 milliGRAM(s) Oral every 12 hours  atorvastatin 40 milliGRAM(s) Oral at bedtime  chlorhexidine 2% Cloths 1 Application(s) Topical <User Schedule>  cholecalciferol 400 Unit(s) Oral daily  famotidine    Tablet 20 milliGRAM(s) Oral two times a day  insulin glargine Injectable (LANTUS) 10 Unit(s) SubCutaneous at bedtime  insulin lispro (ADMELOG) corrective regimen sliding scale   SubCutaneous three times a day before meals  insulin lispro Injectable (ADMELOG) 4 Unit(s) SubCutaneous three times a day before meals  multivitamin 1 Tablet(s) Oral daily  polyethylene glycol 3350 17 Gram(s) Oral daily  senna 2 Tablet(s) Oral at bedtime    MEDICATIONS  (PRN):  ALBUTerol    90 MICROgram(s) HFA Inhaler 2 Puff(s) Inhalation every 6 hours PRN Bronchospasm  OLANZapine 2.5 milliGRAM(s) Oral two times a day PRN agitation    ___________  Active diet:  Diet, Soft:   Consistent Carbohydrate No Snacks  DASH/TLC Sodium & Cholesterol Restricted  Lacto Veg (Accepts Milk & Milk Products)  Supplement Feeding Modality:  Oral  Glucerna Shake Cans or Servings Per Day:  1       Frequency:  Three Times a day  ___________________    ==================>> VITAL SIGNS <<==================    Vital Signs Last 24 HrsT(C): 36.8 (05-06-21 @ 13:00)  T(F): 98.2 (05-06-21 @ 13:00), Max: 100.4 (05-06-21 @ 05:00)  HR: 95 (05-06-21 @ 12:56) (93 - 112)  BP: 98/56 (05-06-21 @ 12:56)  RR: 17 (05-06-21 @ 12:56) (16 - 26)  SpO2: 95% (05-06-21 @ 15:00) (90% - 98%)      CAPILLARY BLOOD GLUCOSE      POCT Blood Glucose.: 176 mg/dL (06 May 2021 16:30)  POCT Blood Glucose.: 220 mg/dL (06 May 2021 11:59)  POCT Blood Glucose.: 124 mg/dL (06 May 2021 08:11)  POCT Blood Glucose.: 107 mg/dL (05 May 2021 21:12)     ==================>> LAB AND IMAGING <<==================                        12.2   12.00 )-----------( 158      ( 06 May 2021 07:42 )             37.6        05-06    135  |  100  |  18  ----------------------------<  108<H>  3.5   |  24  |  1.16    Ca    8.4      06 May 2021 07:42  Phos  3.2     05-06  Mg     1.9     05-06    TPro  7.3  /  Alb  2.6<L>  /  TBili  1.3<H>  /  DBili  x   /  AST  42<H>  /  ALT  42  /  AlkPhos  111  05-06    WBC count:   12.00 <<== ,  11.43 <<== ,  11.54 <<== ,  11.47 <<== ,  12.64 <<==   Hemoglobin:   12.2 <<==,  11.9 <<==,  12.6 <<==,  12.5 <<==,  13.7 <<==  platelets:  158 <==, 147 <==, 130 <==, 120 <==, 113 <==, 105 <==    Creatinine:  1.16  <<==, 0.76  <<==, 0.90  <<==, 1.06  <<==, 0.92  <<==, 1.15  <<==  Sodium:   135  <==, 134  <==, 138  <==, 139  <==, 144  <==, 144  <==       AST:          42 <== , 38 <==      ALT:        42  <== , 41  <==      AP:        111  <=, 100  <=     Bili:        1.3  <=, 0.6  <=     _________________________________________________________________________________________  ========>>  M E D I C A L   A T T E N D I N G    F O L L O W  U P  N O T E  <<=========  -----------------------------------------------------------------------------------------------------    - Patient seen and examined by me earlier today.  (covering today)   - In summary,  MEHRDAD SUTHERLAND is a 76y year old man admitted with COVID  - Patient today overall doing ok, comfortable, eating fairly    pt transferred back to the floor,  on regular nasal canula O2     ==================>> REVIEW OF SYSTEM <<=================    limited as pt sleepy, weak appearing.. poor historian   poor appetite     ==================>> PHYSICAL EXAM <<=================    GEN: awake and responsive, NAD , comfortable, pleasant, calm , cachectic, weak   HEENT: NCAT, PERRL, MMM, hearing intact  Neck: supple , no JVD appreciated  CVS: S1S2 , regular , No M/R/G appreciated  PULM: CTA B/L,  limited exam as not taking deep breaths   ABD.: soft. non tender, non distended  Extrem: intact pulses , no edema   PSYCH : normal mood,  not anxious                             ( Note written / Date of service 05-06-21 )    ==================>> MEDICATIONS <<====================    apixaban 2.5 milliGRAM(s) Oral every 12 hours  atorvastatin 40 milliGRAM(s) Oral at bedtime  chlorhexidine 2% Cloths 1 Application(s) Topical <User Schedule>  cholecalciferol 400 Unit(s) Oral daily  famotidine    Tablet 20 milliGRAM(s) Oral two times a day  insulin glargine Injectable (LANTUS) 10 Unit(s) SubCutaneous at bedtime  insulin lispro (ADMELOG) corrective regimen sliding scale   SubCutaneous three times a day before meals  insulin lispro Injectable (ADMELOG) 4 Unit(s) SubCutaneous three times a day before meals  multivitamin 1 Tablet(s) Oral daily  polyethylene glycol 3350 17 Gram(s) Oral daily  senna 2 Tablet(s) Oral at bedtime    MEDICATIONS  (PRN):  ALBUTerol    90 MICROgram(s) HFA Inhaler 2 Puff(s) Inhalation every 6 hours PRN Bronchospasm  OLANZapine 2.5 milliGRAM(s) Oral two times a day PRN agitation    ___________  Active diet:  Diet, Soft:   Consistent Carbohydrate No Snacks  DASH/TLC Sodium & Cholesterol Restricted  Lacto Veg (Accepts Milk & Milk Products)  Supplement Feeding Modality:  Oral  Glucerna Shake Cans or Servings Per Day:  1       Frequency:  Three Times a day  ___________________    ==================>> VITAL SIGNS <<==================    Vital Signs Last 24 HrsT(C): 36.8 (05-06-21 @ 13:00)  T(F): 98.2 (05-06-21 @ 13:00), Max: 100.4 (05-06-21 @ 05:00)  HR: 95 (05-06-21 @ 12:56) (93 - 112)  BP: 98/56 (05-06-21 @ 12:56)  RR: 17 (05-06-21 @ 12:56) (16 - 26)  SpO2: 95% (05-06-21 @ 15:00) (90% - 98%)        POCT Blood Glucose.: 176 mg/dL (06 May 2021 16:30)  POCT Blood Glucose.: 220 mg/dL (06 May 2021 11:59)  POCT Blood Glucose.: 124 mg/dL (06 May 2021 08:11)  POCT Blood Glucose.: 107 mg/dL (05 May 2021 21:12)     ==================>> LAB AND IMAGING <<==================                        12.2   12.00 )-----------( 158      ( 06 May 2021 07:42 )             37.6        05-06    135  |  100  |  18  ----------------------------<  108<H>  3.5   |  24  |  1.16    Ca    8.4      06 May 2021 07:42  Phos  3.2     05-06  Mg     1.9     05-06    TPro  7.3  /  Alb  2.6<L>  /  TBili  1.3<H>  /  DBili  x   /  AST  42<H>  /  ALT  42  /  AlkPhos  111  05-06    WBC count:   12.00 <<== ,  11.43 <<== ,  11.54 <<== ,  11.47 <<== ,  12.64 <<==   Hemoglobin:   12.2 <<==,  11.9 <<==,  12.6 <<==,  12.5 <<==,  13.7 <<==  platelets:  158 <==, 147 <==, 130 <==, 120 <==, 113 <==, 105 <==    Creatinine:  1.16  <<==, 0.76  <<==, 0.90  <<==, 1.06  <<==, 0.92  <<==, 1.15  <<==  Sodium:   135  <==, 134  <==, 138  <==, 139  <==, 144  <==, 144  <==       AST:          42 <== , 38 <==      ALT:        42  <== , 41  <==      AP:        111  <=, 100  <=     Bili:        1.3  <=, 0.6  <=    ^^^ Inflammatory markers :  ^^^  C R P :          175 (05-06-21)  <<--, 140 (05-05-21)  <<--, 122 (05-04-21)  <<--, 102 (05-03-21)  <<--, 113 (05-02-21)  <<--  P C T :         0.16 (04-17-21) <<--  E S R :        42 (04-25-21)   Ferritin :         1410 (05-06-21) <<--, 1309 (05-05-21) <<--, 1376 (05-04-21) <<--, 1437 (05-03-21) <<--, 1699 (05-02-21) <<--, 2509 (05-01-21) <<--, 2520 (04-30-21) <<--, 1878 (04-29-21) <<--, 1384 (04-28-21) <<--, 973 (04-25-21) <<--    D-Dimer :          6387 (05-06-21) <<--, 3458 (05-05-21) <<--, 1862 (05-04-21) <<--, 1717 (05-03-21) <<--, 1903 (05-02-21) <<--      ___________________________________________________________________________________  ===============>>  A S S E S S M E N T   A N D   P L A N <<===============  ------------------------------------------------------------------------------------------    · Assessment	  76 M with PMH of HTN and DM presented to ED with complaint of hiccups.  Found to be COVID positive on admission. admitted to medicine for acute hypoxemic respiratory failure due to bilateral pneumonia in setting of COVID 19. Started on Remdesivir, dexamethasone and supplemental oxygen. Pt was also found to have Hyponatremia likely due to hypovolemia, Followed by Nephro, started on IVF with improvement.  Pt was also found to have uncontrolled type 2 diabetes worsening with steroid use. Endo following  as well    Problem/Plan - 1:  ·  Problem: Acute respiratory failure with hypoxia.  Plan: -bilateral pneumonia in setting of COVID 19  - post Remdesivir / Dexamethasone per protocol    -cont  with oxygen and titrated down as tolerated   -pulm following   -continuous pulse ox monitoring  -cont prophylactic Lovenox   -cont supportive care   -daily labs CBC/CMP/CRP...   - Continue Current medications otherwise and monitor.  supportive care  - encourage PO intake         nutrition  f/u and supplements    PT / OOB    Multivitamins    Problem/Plan - 2:  ·  Problem: Insulin dependent type 2 diabetes mellitus, uncontrolled, in setting of infection and steroids home   -endo consulted and appreciated recom and mgmt   -diabetic diet   -a1c 13.8  - Continue Current medications per endo otherwise and monitor FS    Problem/Plan - 3:  ·  Problem: HTN (hypertension).    -controlled, cont home dose of Losartan   -mon BP.     Problem/Plan - 4:  ·  Problem: electrolyte abnormalities    encourage PO inteka    keep K~4, Mag ~2    GI/DVT Prophylaxis per protocol.   OOB to chair as able   PO intake and hydration   Physical therapy    ___________________________  H. JUVE Gayle  (covering today)   Pager: [phone]

## 2021-05-06 NOTE — CHART NOTE - NSCHARTNOTEFT_GEN_A_CORE
EVENT: BP     BRIEF HPI:  76 M from home with PMH of HTN and DM initially presented to ED  complaint of hiccups on 4/16/2021, associated with cough, fever,chills and myalgias. Admitted to Medicine floor for COVid 19 infection requiring supplemental O2 with NC 6L . Pt completed Remdesivir and Decadron. Episode of hypoxia, requiring 15L NRB subsequentlly transferred to ICU for acute hypoxic respiratory failure. Placed on HFNC and improved and was slowly transitioned to NC. Downgraded to medicine on 5L N/C 5/5, continues SOB placed back on NRB. Given elevated d dimer, tachycardia, sob and COVID status would benefit from CTA. Now BP trending low.      OBJECTIVE:  Vital Signs Last 24 Hrs  T(C): 36.8 (06 May 2021 13:00), Max: 38 (06 May 2021 05:00)  T(F): 98.2 (06 May 2021 13:00), Max: 100.4 (06 May 2021 05:00)  HR: 95 (06 May 2021 12:56) (93 - 110)  BP: 98/56 (06 May 2021 12:56) (84/61 - 108/61)  BP(mean): --  RR: 17 (06 May 2021 16:00) (16 - 26)  SpO2: 96% (06 May 2021 16:00) (90% - 98%)    FOCUSED PHYSICAL EXAM:  NEURO:  LABS:                        12.2   12.00 )-----------( 158      ( 06 May 2021 07:42 )             37.6     05-06    135  |  100  |  18  ----------------------------<  108<H>  3.5   |  24  |  1.16    Ca    8.4      06 May 2021 07:42  Phos  3.2     05-06  Mg     1.9     05-06    TPro  7.3  /  Alb  2.6<L>  /  TBili  1.3<H>  /  DBili  x   /  AST  42<H>  /  ALT  42  /  AlkPhos  111  05-06      EKG:   IMGAGING:    ASSESSMENT:  HPI:  76 M with PMH of HTN and DM presents to ED with complaint of hiccups since this morning. Patient reports hiccups are associated with cough, fever and chills x 1 day. He also endorses myalgias. Tmax at home was 100.3F.He denies having had the COVID-19 vaccination or having had COVID-19 in the past. Per daughter patient has had poor appetite and weakness x1 week. She also reports noticing that he becomes short of breath on minimal exertion. Patient was at PMD office last week and was advised to go to ED for hyperglycemia at that time, but refused. Pt was admitted to [facility] in January for DKA. Pt denies any chest pain, palpitations, rinary symptoms or any other acute complaints     In ED, /76, , saO2 89% on RA, improved to 95% on 5L NC  (16 Apr 2021 16:51)    PROBLEM: Hypotension probably due to  PLAN:   MEDICATIONS  (STANDING):  apixaban 2.5 milliGRAM(s) Oral every 12 hours  atorvastatin 40 milliGRAM(s) Oral at bedtime  chlorhexidine 2% Cloths 1 Application(s) Topical <User Schedule>  cholecalciferol 400 Unit(s) Oral daily  famotidine    Tablet 20 milliGRAM(s) Oral two times a day  insulin glargine Injectable (LANTUS) 10 Unit(s) SubCutaneous at bedtime  insulin lispro (ADMELOG) corrective regimen sliding scale   SubCutaneous three times a day before meals  insulin lispro Injectable (ADMELOG) 4 Unit(s) SubCutaneous three times a day before meals  multivitamin 1 Tablet(s) Oral daily  polyethylene glycol 3350 17 Gram(s) Oral daily  senna 2 Tablet(s) Oral at bedtime  sodium chloride 0.9%. 500 milliLiter(s) (250 mL/Hr) IV Continuous <Continuous>    MEDICATIONS  (PRN):  ALBUTerol    90 MICROgram(s) HFA Inhaler 2 Puff(s) Inhalation every 6 hours PRN Bronchospasm  OLANZapine 2.5 milliGRAM(s) Oral two times a day PRN agitation    FOLLOW UP / RESULT: EVENT: BP     BRIEF HPI:  76 M from home with PMH of HTN and DM initially presented to ED  complaint of hiccups on 4/16/2021, associated with cough, fever,chills and myalgias. Admitted to Medicine floor for COVid 19 infection requiring supplemental O2 with NC 6L . Pt completed Remdesivir and Decadron. Episode of hypoxia, requiring 15L NRB subsequentlly transferred to ICU for acute hypoxic respiratory failure. Placed on HFNC and improved and was slowly transitioned to NC. Downgraded to medicine on 5L N/C 5/5, continues SOB placed back on NRB. Given elevated d dimer, tachycardia, sob and COVID status would benefit from CTA. Now BP trending low.      OBJECTIVE:  Vital Signs Last 24 Hrs  T(C): 36.4 (06 May 2021 20:00), Max: 38 (06 May 2021 05:00)  T(F): 97.6 (06 May 2021 20:00), Max: 100.4 (06 May 2021 05:00)  HR: 113 (06 May 2021 20:47) (93 - 113)  BP: 164/77 (06 May 2021 20:47) (77/47 - 164/77)  BP(mean): --  RR: 26 (06 May 2021 20:00) (16 - 26)  SpO2: 93% (06 May 2021 20:47) (90% - 98%)    FOCUSED PHYSICAL EXAM:  NEURO:  LABS:                        12.2   12.00 )-----------( 158      ( 06 May 2021 07:42 )             37.6     05-06    135  |  100  |  18  ----------------------------<  108<H>  3.5   |  24  |  1.16    Ca    8.4      06 May 2021 07:42  Phos  3.2     05-06  Mg     1.9     05-06    TPro  7.3  /  Alb  2.6<L>  /  TBili  1.3<H>  /  DBili  x   /  AST  42<H>  /  ALT  42  /  AlkPhos  111  05-06      EKG:   IMGAGING:    ASSESSMENT:  HPI:  76 M with PMH of HTN and DM presents to ED with complaint of hiccups since this morning. Patient reports hiccups are associated with cough, fever and chills x 1 day. He also endorses myalgias. Tmax at home was 100.3F.He denies having had the COVID-19 vaccination or having had COVID-19 in the past. Per daughter patient has had poor appetite and weakness x1 week. She also reports noticing that he becomes short of breath on minimal exertion. Patient was at PMD office last week and was advised to go to ED for hyperglycemia at that time, but refused. Pt was admitted to [facility] in January for DKA. Pt denies any chest pain, palpitations, rinary symptoms or any other acute complaints     In ED, /76, , saO2 89% on RA, improved to 95% on 5L NC  (16 Apr 2021 16:51)    PROBLEM: Hypotension probably due to  PLAN:   MEDICATIONS  (STANDING):  apixaban 2.5 milliGRAM(s) Oral every 12 hours  atorvastatin 40 milliGRAM(s) Oral at bedtime  chlorhexidine 2% Cloths 1 Application(s) Topical <User Schedule>  cholecalciferol 400 Unit(s) Oral daily  famotidine    Tablet 20 milliGRAM(s) Oral two times a day  insulin glargine Injectable (LANTUS) 10 Unit(s) SubCutaneous at bedtime  insulin lispro (ADMELOG) corrective regimen sliding scale   SubCutaneous three times a day before meals  insulin lispro Injectable (ADMELOG) 4 Unit(s) SubCutaneous three times a day before meals  multivitamin 1 Tablet(s) Oral daily  polyethylene glycol 3350 17 Gram(s) Oral daily  senna 2 Tablet(s) Oral at bedtime  sodium chloride 0.9%. 500 milliLiter(s) (250 mL/Hr) IV Continuous <Continuous>    MEDICATIONS  (PRN):  ALBUTerol    90 MICROgram(s) HFA Inhaler 2 Puff(s) Inhalation every 6 hours PRN Bronchospasm  OLANZapine 2.5 milliGRAM(s) Oral two times a day PRN agitation    FOLLOW UP / RESULT: EVENT: BP reported by nurse as 77/47 mmhg    BRIEF HPI: 76 M from home with PMH of HTN and DM initially presented to ED  complaint of hiccups on 4/16/2021, associated with cough, fever,chills and myalgias. Admitted to Medicine floor for COVid 19 infection requiring supplemental O2 with NC 6L . Pt completed Remdesivir and Decadron. Episode of hypoxia, requiring 15L NRB subsequentlly transferred to ICU for acute hypoxic respiratory failure. Placed on HFNC and improved and was slowly transitioned to NC. Downgraded to medicine on 5L N/C 5/5, continues SOB placed back on NRB. Given elevated d dimer, tachycardia, sob and COVID status would benefit from CTA. Now BP trending low.    OBJECTIVE:  Vital Signs Last 24 Hrs  T(C): 36.4 (06 May 2021 20:00), Max: 38 (06 May 2021 05:00)  T(F): 97.6 (06 May 2021 20:00), Max: 100.4 (06 May 2021 05:00)  HR: 113 (06 May 2021 20:47) (93 - 113)  BP: 164/77 (06 May 2021 20:47) (77/47 - 164/77)  BP(mean): --  RR: 26 (06 May 2021 20:00) (16 - 26)  SpO2: 93% (06 May 2021 20:47) (90% - 98%)    FOCUSED PHYSICAL EXAM:  NEURO: Awake, oriented to name, place, able to make needs known  RESP: NRM 15 l/min  CV: S1 S2 mild tach    LABS:                        12.2   12.00 )-----------( 158      ( 06 May 2021 07:42 )             37.6     05-06    135  |  100  |  18  ----------------------------<  108<H>  3.5   |  24  |  1.16    Ca    8.4      06 May 2021 07:42  Phos  3.2     05-06  Mg     1.9     05-06    TPro  7.3  /  Alb  2.6<L>  /  TBili  1.3<H>  /  DBili  x   /  AST  42<H>  /  ALT  42  /  AlkPhos  111  05-06    IMAGING:  EXAM:  XR CHEST PORTABLE URGENT 1V                        PROCEDURE DATE:  05/06/2021    INTERPRETATION:  AP erect chest on May 6, 2021 at 10:38 AM. Patient is short of breath and Covid positive.  Heart size is difficult to assess.  There fairly advanced dense infiltrates concentrated in the mid lower lung fields. There is also a component of infiltrate in the right upper lobe.  Infiltrates have increased from May 2.  IMPRESSION: Advanced infiltrates somewhat increased from prior.      PROBLEM: Hypotension probably due to     PLAN:   1. Sodium chloride 0.9%. 500 milliliter(s) (250 mL/Hr) IV Continuous  d/c after BP increased      MEDICATIONS  (STANDING):  apixaban 2.5 milliGRAM(s) Oral every 12 hours  atorvastatin 40 milliGRAM(s) Oral at bedtime  chlorhexidine 2% Cloths 1 Application(s) Topical <User Schedule>  cholecalciferol 400 Unit(s) Oral daily  famotidine    Tablet 20 milliGRAM(s) Oral two times a day  insulin glargine Injectable (LANTUS) 10 Unit(s) SubCutaneous at bedtime  insulin lispro (ADMELOG) corrective regimen sliding scale   SubCutaneous three times a day before meals  insulin lispro Injectable (ADMELOG) 4 Unit(s) SubCutaneous three times a day before meals  multivitamin 1 Tablet(s) Oral daily  polyethylene glycol 3350 17 Gram(s) Oral daily  senna 2 Tablet(s) Oral at bedtime      MEDICATIONS  (PRN):  ALBUTerol    90 MICROgram(s) HFA Inhaler 2 Puff(s) Inhalation every 6 hours PRN Bronchospasm  OLANZapine 2.5 milliGRAM(s) Oral two times a day PRN agitation    FOLLOW UP / RESULT: EVENT: BP reported by nurse as 77/47 mmhg    BRIEF HPI: 76 M from home with PMH of HTN and DM initially presented to ED  complaint of hiccups on 4/16/2021, associated with cough, fever,chills and myalgias. Admitted to Medicine floor for COVid 19 infection requiring supplemental O2 with NC 6L . Pt completed Remdesivir and Decadron. Episode of hypoxia, requiring 15L NRB subsequentlly transferred to ICU for acute hypoxic respiratory failure. Placed on HFNC and improved and was slowly transitioned to NC. Downgraded to medicine on 5L N/C 5/5, continues SOB placed back on NRB. Given elevated d dimer, tachycardia, sob and COVID status would benefit from CTA. Now BP trending low.    OBJECTIVE:  Vital Signs Last 24 Hrs  T(C): 36.4 (06 May 2021 20:00), Max: 38 (06 May 2021 05:00)  T(F): 97.6 (06 May 2021 20:00), Max: 100.4 (06 May 2021 05:00)  HR: 113 (06 May 2021 20:47) (93 - 113)  BP: 164/77 (06 May 2021 20:47) (77/47 - 164/77)  BP(mean): --  RR: 26 (06 May 2021 20:00) (16 - 26)  SpO2: 93% (06 May 2021 20:47) (90% - 98%)    FOCUSED PHYSICAL EXAM:  NEURO: Awake, oriented to name, place, able to make needs known  RESP: NRM 15 l/min  CV: S1 S2 mild tach    LABS:                        12.2   12.00 )-----------( 158      ( 06 May 2021 07:42 )             37.6     05-06    135  |  100  |  18  ----------------------------<  108<H>  3.5   |  24  |  1.16    Ca    8.4      06 May 2021 07:42  Phos  3.2     05-06  Mg     1.9     05-06    TPro  7.3  /  Alb  2.6<L>  /  TBili  1.3<H>  /  DBili  x   /  AST  42<H>  /  ALT  42  /  AlkPhos  111  05-06    IMAGING:  EXAM:  XR CHEST PORTABLE URGENT 1V                        PROCEDURE DATE:  05/06/2021    INTERPRETATION:  AP erect chest on May 6, 2021 at 10:38 AM. Patient is short of breath and Covid positive.  Heart size is difficult to assess.  There fairly advanced dense infiltrates concentrated in the mid lower lung fields. There is also a component of infiltrate in the right upper lobe.  Infiltrates have increased from May 2.  IMPRESSION: Advanced infiltrates somewhat increased from prior.    PROBLEM: Hypotension probably due to dehydration from poor oral intake  PLAN:   1. Sodium chloride 0.9%. 500 milliliter(s) (250 mL/Hr) IV Continuous  d/c after BP increased so order d/c    FOLLOW UP / RESULT: Monitor BP trend EVENT: BP  77/47 mmHg    BRIEF HPI: 76 M from home with PMH of HTN and DM initially presented to ED  complaint of hiccups on 4/16/2021, associated with cough, fever,chills and myalgias. Admitted to Medicine floor for COVid 19 infection requiring supplemental O2 with NC 6L . Pt completed Remdesivir and Decadron. Episode of hypoxia, requiring 15L NRB subsequentlly transferred to ICU for acute hypoxic respiratory failure. Placed on HFNC and improved and was slowly transitioned to NC. Downgraded to medicine on 5L N/C 5/5, continues SOB placed back on NRB. Given elevated d dimer, tachycardia, sob and COVID status would benefit from CTA. Now BP trending low.    OBJECTIVE:  Vital Signs Last 24 Hrs  T(C): 36.4 (06 May 2021 20:00), Max: 38 (06 May 2021 05:00)  T(F): 97.6 (06 May 2021 20:00), Max: 100.4 (06 May 2021 05:00)  HR: 95 (06 May 2021 20:00) (93 - 107)  BP: 77/47 (06 May 2021 20:00) (77/47 - 98/56)  BP(mean): --  RR: 26 (06 May 2021 20:00) (16 - 26)  SpO2: 93% (06 May 2021 20:00) (90% - 98%)    FOCUSED PHYSICAL EXAM:  NEURO: Awake, oriented to name, place, able to make needs known  RESP: NRM 15 l/min  CV: S1 S2     LABS:                        12.2   12.00 )-----------( 158      ( 06 May 2021 07:42 )             37.6     05-06    135  |  100  |  18  ----------------------------<  108<H>  3.5   |  24  |  1.16    Ca    8.4      06 May 2021 07:42  Phos  3.2     05-06  Mg     1.9     05-06    TPro  7.3  /  Alb  2.6<L>  /  TBili  1.3<H>  /  DBili  x   /  AST  42<H>  /  ALT  42  /  AlkPhos  111  05-06    IMAGING:  EXAM:  XR CHEST PORTABLE URGENT 1V                        PROCEDURE DATE:  05/06/2021    INTERPRETATION:  AP erect chest on May 6, 2021 at 10:38 AM. Patient is short of breath and Covid positive.  Heart size is difficult to assess.  There fairly advanced dense infiltrates concentrated in the mid lower lung fields. There is also a component of infiltrate in the right upper lobe.  Infiltrates have increased from May 2.  IMPRESSION: Advanced infiltrates somewhat increased from prior.    PROBLEM: Hypotension probably due to dehydration from poor oral intake  PLAN:   1. Sodium chloride 0.9%. 500 milliliter(s) (250 mL/Hr) IV Continuous. BP increased so order d/c (see next note)    FOLLOW UP / RESULT: Monitor BP trend

## 2021-05-07 NOTE — PROGRESS NOTE ADULT - PROBLEM SELECTOR PLAN 7
Will need medical optimization prior to d/c planning
-discharge disposition home pending improvement in oxygen requirements
Pt will be medically cleared for discharge when oxygen demands have decreased
-discharge disposition home pending improvement in oxygen requirements
-discharge disposition home pending improvement in oxygen requirements

## 2021-05-07 NOTE — PROGRESS NOTE ADULT - SUBJECTIVE AND OBJECTIVE BOX
[facility]   MD BART FLANNERY MD RUORU WONG, PA    TEL:  OFFICE: [phone]  DR AYERS CELL: [phone]  JOSÉ MIGUEL ABDI CELL: [phone]  DR. AGUIRRE CELL: [phone]  DR. MCCLAIN CELL: [phone]    FROM 5 PM - 7 AM PLEASE CALL ANSWERING SERVICE: [phone]    RENAL FOLLOW UP NOTE--Date of Service 05-07-21 @ 05:57  --------------------------------------------------------------------------------  HPI:      Pt seen and examined at bedside.   Denies SOB, chest pain   PAST HISTORY  --------------------------------------------------------------------------------  No significant changes to PMH, PSH, FHx, SHx, unless otherwise noted    ALLERGIES & MEDICATIONS  --------------------------------------------------------------------------------  Allergies    No Known Allergies    Intolerances      Standing Inpatient Medications  apixaban 2.5 milliGRAM(s) Oral every 12 hours  atorvastatin 40 milliGRAM(s) Oral at bedtime  chlorhexidine 2% Cloths 1 Application(s) Topical <User Schedule>  cholecalciferol 400 Unit(s) Oral daily  famotidine    Tablet 20 milliGRAM(s) Oral two times a day  insulin glargine Injectable (LANTUS) 10 Unit(s) SubCutaneous at bedtime  insulin lispro (ADMELOG) corrective regimen sliding scale   SubCutaneous three times a day before meals  insulin lispro Injectable (ADMELOG) 4 Unit(s) SubCutaneous three times a day before meals  multivitamin 1 Tablet(s) Oral daily  polyethylene glycol 3350 17 Gram(s) Oral daily  senna 2 Tablet(s) Oral at bedtime  sodium chloride 0.9%. 500 milliLiter(s) IV Continuous <Continuous>    PRN Inpatient Medications  ALBUTerol    90 MICROgram(s) HFA Inhaler 2 Puff(s) Inhalation every 6 hours PRN  OLANZapine 2.5 milliGRAM(s) Oral two times a day PRN      REVIEW OF SYSTEMS  --------------------------------------------------------------------------------  General: no fever    MSK: no edema     VITALS/PHYSICAL EXAM  --------------------------------------------------------------------------------  T(C): 36.7 (05-07-21 @ 05:00), Max: 37.9 (05-06-21 @ 12:56)  HR: 109 (05-07-21 @ 05:00) (93 - 113)  BP: 118/76 (05-07-21 @ 05:00) (77/47 - 164/77)  RR: 22 (05-07-21 @ 05:00) (17 - 26)  SpO2: 95% (05-07-21 @ 05:00) (91% - 98%)  Wt(kg): --        05-05-21 @ 07:01  -  05-06-21 @ 07:00  --------------------------------------------------------  IN: 100 mL / OUT: 150 mL / NET: -50 mL      Physical Exam:  	Gen: NAD  	HEENT: MMM  	Pulm: CTA B/L  	CV: S1S2  	Abd: Soft, +BS  	Ext: No LE edema B/L                      Neuro: lethargic  	Skin: Warm and Dry   	Vascular access: no HD catheter           no  flores  LABS/STUDIES  --------------------------------------------------------------------------------              12.2   12.00 >-----------<  158      [05-06-21 @ 07:42]              37.6     135  |  100  |  18  ----------------------------<  108      [05-06-21 @ 07:42]  3.5   |  24  |  1.16        Ca     8.4     [05-06-21 @ 07:42]      Mg     1.9     [05-06-21 @ 07:42]      Phos  3.2     [05-06-21 @ 07:42]    TPro  7.3  /  Alb  2.6  /  TBili  1.3  /  DBili  x   /  AST  42  /  ALT  42  /  AlkPhos  111  [05-06-21 @ 07:42]              [05-06-21 @ 07:42]    Creatinine Trend:  SCr 1.16 [05-06 @ 07:42]  SCr 0.76 [05-05 @ 03:50]  SCr 0.90 [05-04 @ 04:01]  SCr 1.06 [05-03 @ 04:26]  SCr 0.92 [05-02 @ 06:39]    Urinalysis - [04-22-21 @ 14:32]      Color Yellow / Appearance Clear / SG 1.015 / pH 6.5      Gluc 1000 / Ketone Negative  / Bili Negative / Urobili Negative       Blood Small / Protein 30 / Leuk Est Negative / Nitrite Negative      RBC 5-10 / WBC 0-2 / Hyaline  / Gran  / Sq Epi  / Non Sq Epi Few / Bacteria Few      Ferritin 1410      [05-06-21 @ 10:20]  PTH -- (Ca 8.5)      [04-30-21 @ 16:54]   54  TSH 0.96      [04-17-21 @ 06:26]  Lipid: chol 96, TG 77, HDL 42, LDL --      [04-17-21 @ 06:26]

## 2021-05-07 NOTE — PROGRESS NOTE ADULT - SUBJECTIVE AND OBJECTIVE BOX
NP Note discussed with  Primary Attending    Patient is a 76y old  Male who presents with a chief complaint of AHRF 2/2 COVID (06 May 2021 17:35)      INTERVAL HPI/OVERNIGHT EVENTS: fall overnight     MEDICATIONS  (STANDING):  apixaban 2.5 milliGRAM(s) Oral every 12 hours  atorvastatin 40 milliGRAM(s) Oral at bedtime  chlorhexidine 2% Cloths 1 Application(s) Topical <User Schedule>  cholecalciferol 400 Unit(s) Oral daily  dronabinol 2.5 milliGRAM(s) Oral three times a day before meals  famotidine    Tablet 20 milliGRAM(s) Oral two times a day  insulin glargine Injectable (LANTUS) 10 Unit(s) SubCutaneous at bedtime  insulin lispro (ADMELOG) corrective regimen sliding scale   SubCutaneous three times a day before meals  insulin lispro Injectable (ADMELOG) 4 Unit(s) SubCutaneous three times a day before meals  multivitamin 1 Tablet(s) Oral daily  polyethylene glycol 3350 17 Gram(s) Oral daily  senna 2 Tablet(s) Oral at bedtime    MEDICATIONS  (PRN):  ALBUTerol    90 MICROgram(s) HFA Inhaler 2 Puff(s) Inhalation every 6 hours PRN Bronchospasm  OLANZapine 2.5 milliGRAM(s) Oral two times a day PRN agitation      __________________________________________________  REVIEW OF SYSTEMS:    CONSTITUTIONAL: No fever,   EYES: no acute visual disturbances  NECK: No pain or stiffness  RESPIRATORY: No cough; No shortness of breath  CARDIOVASCULAR: No chest pain, no palpitations  GASTROINTESTINAL: No pain. No nausea or vomiting; No diarrhea   NEUROLOGICAL: No headache or numbness, no tremors  MUSCULOSKELETAL: No joint pain, no muscle pain  GENITOURINARY: no dysuria, no frequency, no hesitancy  PSYCHIATRY: no depression , no anxiety  ALL OTHER  ROS negative        Vital Signs Last 24 Hrs  T(C): 37.2 (07 May 2021 14:57), Max: 37.2 (07 May 2021 14:57)  T(F): 99 (07 May 2021 14:57), Max: 99 (07 May 2021 14:57)  HR: 127 (07 May 2021 14:57) (95 - 127)  BP: 104/74 (07 May 2021 14:57) (77/47 - 164/77)  BP(mean): --  RR: 19 (07 May 2021 14:57) (19 - 26)  SpO2: 98% (07 May 2021 14:57) (93% - 98%)    ________________________________________________  GEN: asleep arousable and responsive, NAD, cachectic, weak   HEENT: NCAT, PERRL, MMM, hearing intact  Neck: supple , no JVD appreciated  CVS: S1S2 , regular , No M/R/G appreciated  PULM: diminished poor effort  ABD.: soft. non tender, non distended  Extrem: intact pulses , no edema   PSYCH : normal mood,  not anxious    _________________________________________________  LABS:                        11.8   10.91 )-----------( 169      ( 07 May 2021 05:55 )             35.1     05-07    133<L>  |  100  |  17  ----------------------------<  235<H>  3.2<L>   |  22  |  0.84    Ca    7.9<L>      07 May 2021 05:55  Phos  3.2     05-06  Mg     1.9     05-06    TPro  6.5  /  Alb  2.0<L>  /  TBili  1.0  /  DBili  x   /  AST  52<H>  /  ALT  51  /  AlkPhos  128<H>  05-07        CAPILLARY BLOOD GLUCOSE      POCT Blood Glucose.: 109 mg/dL (07 May 2021 16:57)  POCT Blood Glucose.: 123 mg/dL (07 May 2021 12:15)  POCT Blood Glucose.: 211 mg/dL (07 May 2021 08:35)  POCT Blood Glucose.: 249 mg/dL (06 May 2021 21:54)      Plan of care was discussed with patient and /or primary care giver; all questions and concerns were addressed and care was aligned with patient's wishes.

## 2021-05-07 NOTE — PROGRESS NOTE ADULT - ASSESSMENT
INOCENCIA:  Likely pre-renal improved with IVF.  Renal function is improving now.  - Optimize BP  - Renal sonogram once stable if hematuria persists.  - HOLD losartan  (last dose on 4/23)  - Monitor BMP.    Hypernatremia/Hyponatremia  -Monitor serum Na  -Check Urine na, urine os is worsens        CKD 2/3:  Has risk (Diabetes) and minimal proteinuria.  - Monitor BMP.    Metabolic Acidosis:  Mixed AG and NAG. He may have some Tubulo-interstitial Disease  - Monitor for now.  -Hold PO NaHCO3    COVID   MOnitor temp/ O2  COntinue current care    Hypokalemia  repelte as needed  monitor serum K

## 2021-05-07 NOTE — PROGRESS NOTE ADULT - PROBLEM SELECTOR PLAN 3
resolved  - likely 2/2 COVID19 vs pre -renal  - S/p bicarb drip  - D/c PO bicarb TID as per nephro   - Losartan on hold (last dose 4/23)   - Monitor urine output, monitor I &O s closely   - Monitor and supplement electrolytes as needed   - Nephro Dr Brown following

## 2021-05-07 NOTE — PROGRESS NOTE ADULT - PROBLEM SELECTOR PROBLEM 2
Fall
Hyponatremia
Bilateral pneumonia
INOCENCIA (acute kidney injury)
Insulin dependent type 2 diabetes mellitus, uncontrolled
Insulin dependent type 2 diabetes mellitus, uncontrolled
Hyponatremia
Bilateral pneumonia

## 2021-05-07 NOTE — CHART NOTE - NSCHARTNOTEFT_GEN_A_CORE
EVENT: Vitals reviewed. Temp is 100.7    HPI: 76 M from home with PMH of HTN and DM initially presented to ED  complaint of hiccups on 4/16/2021, associated with cough, fever,chills and myalgias. Tmax at home was 100.3F. He denies having had the COVID-19 vaccination or having had COVID-19 in the past. Per daughter patient has had poor appetite and weakness  She also reports noticing that he becomes short of breath on minimal exertion. Patient was at PMD office last week and was advised to go to ED for hyperglycemia at that time, but refused. Pt was admitted to [facility] in January for DKA.   In ED, /76, , saO2 89% on RA, improved to 95% on 5L NC  (16 Apr 2021 16:51). Pt was admitted to Medicine floor for COVid 19 infection requiring supplemental O2 with NC 6L .    Subjective: "I feel warm"    OBJECTIVE:  Vital Signs Last 24 Hrs  T(C): 38.2 (07 May 2021 21:06), Max: 38.2 (07 May 2021 21:06)  T(F): 100.7 (07 May 2021 21:06), Max: 100.7 (07 May 2021 21:06)  HR: 108 (07 May 2021 21:06) (100 - 127)  BP: 103/63 (07 May 2021 21:06) (80/47 - 118/76)  BP(mean): --  RR: 18 (07 May 2021 21:06) (18 - 22)  SpO2: 93% (07 May 2021 21:06) (93% - 98%)    FOCUSED PHYSICAL EXAM:  Neuro: awake, alert, oriented x 3. No neuro deficit  Cardiovascular: Pulses +2 B/L in lower and upper extremities, HR regular, BP stable, No edema.  Respiratory: Respirations regular, unlabored, breath sounds clear B/L.   GI: Abdomen soft, non-tender, positive bowel sounds.  : no bladder distention noted. No complaints at this time.  Skin: Dry, intact, no bruising, no diaphoresis.    LABS:                        11.8   10.91 )-----------( 169      ( 07 May 2021 05:55 )             35.1     05-07    133<L>  |  100  |  17  ----------------------------<  235<H>  3.2<L>   |  22  |  0.84    Ca    7.9<L>      07 May 2021 05:55  Phos  3.2     05-06  Mg     1.9     05-06    TPro  6.5  /  Alb  2.0<L>  /  TBili  1.0  /  DBili  x   /  AST  52<H>  /  ALT  51  /  AlkPhos  128<H>  05-07      EKG:   IMAGING:    ASSESSMENT/PROBLEM: Fever of 100.7 most likely 2/2 to COVID 19 infection      PLAN:   1. Tylenol 650mg, PO, Q6hrs, PRN, for temp >100.4 & for moderate pain  2. Monitor response to Tylenol  3. Recheck temperature  4. Cont current care/treatment  5. Supportive care    FOLLOW UP / RESULT:

## 2021-05-07 NOTE — PROGRESS NOTE ADULT - PROBLEM SELECTOR PLAN 1
Improving   weaned off NRB now on 5 L N/C and maintaing 02sat at 95%  Repeat CXR found b/l worsening infiltrates   S/P Remdesivir & Decadron   - C/w Eliquis 2.5 mg BID    - C/w Vit c, Zn, Vit D supplementation   - US doppler of LE no DVT  Pending CTA   contact/airborne precautions

## 2021-05-07 NOTE — PROGRESS NOTE ADULT - ASSESSMENT
76 M from home with PMH of HTN and DM initially presented to ED  complaint of hiccups on 4/16/2021, associated with cough, fever,chills and myalgias. Admitted to Medicine floor for COVid 19 infection requiring supplemental O2 with NC 6L . Pt completed Remdesivir and Decadron. Episode of hypoxia, requiring 15L NRB subsequentlly transferred to ICU for acute hypoxic respiratory failure. Placed on HFNC and improved and was slowly transitioned to NC. Downgraded to medicine successfully weaned of NRB mask and now on 5 L NC and maintaining 02sat at 95%. Episodes of hypotension, likely due to dehydration responsive to fluid bolus. Given elevated d dimer, tachycardia, sob and COVID status would benefit from CTA.

## 2021-05-07 NOTE — PROGRESS NOTE ADULT - ASSESSMENT
_________________________________________________________________________________________  ========>>  M E D I C A L   A T T E N D I N G    F O L L O W  U P  N O T E  <<=========  -----------------------------------------------------------------------------------------------------    - Patient seen and examined by me earlier today.  (covering today)   - In summary,  MEHRDAD SUTHERLAND is a 76y year old man admitted with COVID  - Patient today overall doing ok, comfortable, eating poorly- fairly    pt on  nasal canula O2      noted events night with low BP and later fall ( I was not informed! )     ==================>> REVIEW OF SYSTEM <<=================    limited as pt sleepy, weak appearing.. poor historian   poor appetite  >> need ALOT of encouragement to take PO ( took one spoon of Mac/cheese and one of spinach ans a little supplement)     ==================>> PHYSICAL EXAM <<=================    GEN: awake and responsive, NAD , comfortable, pleasant, calm , cachectic, weak   HEENT: NCAT, PERRL, MMM, hearing intact  Neck: supple , no JVD appreciated  CVS: S1S2 , regular , No M/R/G appreciated  PULM: CTA B/L,  limited exam as not taking deep breaths   ABD.: soft. non tender, non distended  Extrem: intact pulses , no edema   PSYCH : normal mood,  not anxious                             ( Note written / Date of service 05-07-21 )    ==================>> MEDICATIONS <<====================    apixaban 2.5 milliGRAM(s) Oral every 12 hours  atorvastatin 40 milliGRAM(s) Oral at bedtime  chlorhexidine 2% Cloths 1 Application(s) Topical <User Schedule>  cholecalciferol 400 Unit(s) Oral daily  dronabinol 2.5 milliGRAM(s) Oral three times a day before meals  famotidine    Tablet 20 milliGRAM(s) Oral two times a day  insulin glargine Injectable (LANTUS) 10 Unit(s) SubCutaneous at bedtime  insulin lispro (ADMELOG) corrective regimen sliding scale   SubCutaneous three times a day before meals  insulin lispro Injectable (ADMELOG) 4 Unit(s) SubCutaneous three times a day before meals  multivitamin 1 Tablet(s) Oral daily  polyethylene glycol 3350 17 Gram(s) Oral daily  senna 2 Tablet(s) Oral at bedtime    MEDICATIONS  (PRN):  ALBUTerol    90 MICROgram(s) HFA Inhaler 2 Puff(s) Inhalation every 6 hours PRN Bronchospasm  OLANZapine 2.5 milliGRAM(s) Oral two times a day PRN agitation    ___________  Active diet:  Diet, Soft:   Consistent Carbohydrate No Snacks  DASH/TLC Sodium & Cholesterol Restricted  Lacto Veg (Accepts Milk & Milk Products)  Supplement Feeding Modality:  Oral  Glucerna Shake Cans or Servings Per Day:  1       Frequency:  Three Times a day  ___________________    ==================>> VITAL SIGNS <<==================    Vital Signs Last 24 HrsT(C): 37.2 (05-07-21 @ 14:57)  T(F): 99 (05-07-21 @ 14:57), Max: 99 (05-07-21 @ 14:57)  HR: 127 (05-07-21 @ 14:57) (95 - 127)  BP: 104/74 (05-07-21 @ 14:57)  RR: 19 (05-07-21 @ 14:57) (19 - 26)  SpO2: 98% (05-07-21 @ 14:57) (93% - 98%)      POCT Blood Glucose.: 109 mg/dL (07 May 2021 16:57)  POCT Blood Glucose.: 123 mg/dL (07 May 2021 12:15)  POCT Blood Glucose.: 211 mg/dL (07 May 2021 08:35)  POCT Blood Glucose.: 249 mg/dL (06 May 2021 21:54)     ==================>> LAB AND IMAGING <<==================                        11.8   10.91 )-----------( 169      ( 07 May 2021 05:55 )             35.1        05-07    133<L>  |  100  |  17  ----------------------------<  235<H>  3.2<L>   |  22  |  0.84    Ca    7.9<L>      07 May 2021 05:55  Phos  3.2     05-06  Mg     1.9     05-06    TPro  6.5  /  Alb  2.0<L>  /  TBili  1.0  /  DBili  x   /  AST  52<H>  /  ALT  51  /  AlkPhos  128<H>  05-07    WBC count:   10.91 <<== ,  12.00 <<== ,  11.43 <<== ,  11.54 <<== ,  11.47 <<==   Hemoglobin:   11.8 <<==,  12.2 <<==,  11.9 <<==,  12.6 <<==,  12.5 <<==  platelets:  169 <==, 158 <==, 147 <==, 130 <==, 120 <==, 113 <==    Creatinine:  0.84  <<==, 1.16  <<==, 0.76  <<==, 0.90  <<==, 1.06  <<==, 0.92  <<==  Sodium:   133  <==, 135  <==, 134  <==, 138  <==, 139  <==, 144  <==       AST:          52 <== , 42 <== , 38 <==      ALT:        51  <== , 42  <== , 41  <==      AP:        128  <=, 111  <=, 100  <=     Bili:        1.0  <=, 1.3  <=, 0.6  <=  ___________________________________________________________________________________  ===============>>  A S S E S S M E N T   A N D   P L A N <<===============  ------------------------------------------------------------------------------------------    · Assessment	  76 M with PMH of HTN and DM presented to ED with complaint of hiccups.  Found to be COVID positive on admission. admitted to medicine for acute hypoxemic respiratory failure due to bilateral pneumonia in setting of COVID 19. Started on Remdesivir, dexamethasone and supplemental oxygen. Pt was also found to have Hyponatremia likely due to hypovolemia, Followed by Nephro, started on IVF with improvement.  Pt was also found to have uncontrolled type 2 diabetes worsening with steroid use. Endo following  as well    Problem/Plan - 1:  ·  Problem: Acute respiratory failure with hypoxia.  Plan: -bilateral pneumonia in setting of COVID 19  - post Remdesivir / Dexamethasone per protocol    -cont  with oxygen and titrated down as tolerated   -pulm following   -continuous pulse ox monitoring  -cont prophylactic Lovenox   -cont supportive care   -daily labs CBC/CMP/CRP...   - Continue Current medications otherwise and monitor.  supportive care  - encourage PO intake         nutrition  f/u and supplements          added Marinol > monitor    PT / OOB      fall precautions     Multivitamins    Problem/Plan - 2:  ·  Problem: Insulin dependent type 2 diabetes mellitus, uncontrolled, in setting of infection and steroids home   -endo consulted and appreciated recom and mgmt   -diabetic diet   -a1c 13.8  - Continue Current medications per endo otherwise and monitor FS    Problem/Plan - 3:  ·  Problem: HTN (hypertension).    -controlled, cont home dose of Losartan   -mon BP.     Problem/Plan - 4:  ·  Problem: electrolyte abnormalities    encourage PO inteka    keep K~4, Mag ~2    GI/DVT Prophylaxis per protocol.   OOB to chair as able  with fall precautions   PO intake and hydration   Physical therapy    ___________________________  H. JUVE Gayle  (covering today)   Pager: [phone]

## 2021-05-07 NOTE — PROGRESS NOTE ADULT - PROBLEM SELECTOR PROBLEM 1
Acute respiratory failure with hypoxia
Acute hypoxemic respiratory failure due to COVID-19
Acute hypoxemic respiratory failure due to COVID-19
Acute respiratory failure with hypoxia
Acute respiratory failure with hypoxia
Acute hypoxemic respiratory failure due to COVID-19
Acute respiratory failure with hypoxia
Acute hypoxemic respiratory failure due to COVID-19

## 2021-05-07 NOTE — PROGRESS NOTE ADULT - SUBJECTIVE AND OBJECTIVE BOX
Time of Visit:  Patient seen and examined.     MEDICATIONS  (STANDING):  apixaban 2.5 milliGRAM(s) Oral every 12 hours  atorvastatin 40 milliGRAM(s) Oral at bedtime  chlorhexidine 2% Cloths 1 Application(s) Topical <User Schedule>  cholecalciferol 400 Unit(s) Oral daily  dronabinol 2.5 milliGRAM(s) Oral three times a day before meals  famotidine    Tablet 20 milliGRAM(s) Oral two times a day  insulin glargine Injectable (LANTUS) 10 Unit(s) SubCutaneous at bedtime  insulin lispro (ADMELOG) corrective regimen sliding scale   SubCutaneous three times a day before meals  insulin lispro Injectable (ADMELOG) 4 Unit(s) SubCutaneous three times a day before meals  multivitamin 1 Tablet(s) Oral daily  polyethylene glycol 3350 17 Gram(s) Oral daily  senna 2 Tablet(s) Oral at bedtime      MEDICATIONS  (PRN):  ALBUTerol    90 MICROgram(s) HFA Inhaler 2 Puff(s) Inhalation every 6 hours PRN Bronchospasm  OLANZapine 2.5 milliGRAM(s) Oral two times a day PRN agitation       Medications up to date at time of exam.      PHYSICAL EXAMINATION:  Patient has no new complaints.  GENERAL: The patient is a well-developed, well-nourished, in no apparent distress.     Vital Signs Last 24 Hrs  T(C): 37.2 (07 May 2021 14:57), Max: 37.2 (07 May 2021 14:57)  T(F): 99 (07 May 2021 14:57), Max: 99 (07 May 2021 14:57)  HR: 127 (07 May 2021 14:57) (95 - 127)  BP: 104/74 (07 May 2021 14:57) (77/47 - 164/77)  BP(mean): --  RR: 19 (07 May 2021 14:57) (19 - 26)  SpO2: 98% (07 May 2021 14:57) (93% - 98%)   (if applicable)    Chest Tube (if applicable)    HEENT: Head is normocephalic and atraumatic. Extraocular muscles are intact. Mucous membranes are moist.     NECK: Supple, no palpable adenopathy.    LUNGS: Clear to auscultation, no wheezing, rales, or rhonchi.    HEART: Regular rate and rhythm without murmur.    ABDOMEN: Soft, nontender, and nondistended.  No hepatosplenomegaly is noted.    : No painful voiding, no pelvic pain    EXTREMITIES: Without any cyanosis, clubbing, rash, lesions or edema.    NEUROLOGIC: Awake, alert, oriented, grossly intact    SKIN: Warm, dry, good turgor.      LABS:                        11.8   10.91 )-----------( 169      ( 07 May 2021 05:55 )             35.1     05-07    133<L>  |  100  |  17  ----------------------------<  235<H>  3.2<L>   |  22  |  0.84    Ca    7.9<L>      07 May 2021 05:55  Phos  3.2     05-06  Mg     1.9     05-06    TPro  6.5  /  Alb  2.0<L>  /  TBili  1.0  /  DBili  x   /  AST  52<H>  /  ALT  51  /  AlkPhos  128<H>  05-07                        MICROBIOLOGY: (if applicable)    RADIOLOGY & ADDITIONAL STUDIES:  EKG:   CXR:  ECHO:    IMPRESSION: 76y Male PAST MEDICAL & SURGICAL HISTORY:  HTN (hypertension)    DM (diabetes mellitus)    No significant past surgical history     p/w           RECOMMENDATIONS:   Time of Visit:  Patient seen and examined.     MEDICATIONS  (STANDING):  apixaban 2.5 milliGRAM(s) Oral every 12 hours  atorvastatin 40 milliGRAM(s) Oral at bedtime  chlorhexidine 2% Cloths 1 Application(s) Topical <User Schedule>  cholecalciferol 400 Unit(s) Oral daily  dronabinol 2.5 milliGRAM(s) Oral three times a day before meals  famotidine    Tablet 20 milliGRAM(s) Oral two times a day  insulin glargine Injectable (LANTUS) 10 Unit(s) SubCutaneous at bedtime  insulin lispro (ADMELOG) corrective regimen sliding scale   SubCutaneous three times a day before meals  insulin lispro Injectable (ADMELOG) 4 Unit(s) SubCutaneous three times a day before meals  multivitamin 1 Tablet(s) Oral daily  polyethylene glycol 3350 17 Gram(s) Oral daily  senna 2 Tablet(s) Oral at bedtime      MEDICATIONS  (PRN):  ALBUTerol    90 MICROgram(s) HFA Inhaler 2 Puff(s) Inhalation every 6 hours PRN Bronchospasm  OLANZapine 2.5 milliGRAM(s) Oral two times a day PRN agitation       Medications up to date at time of exam.      PHYSICAL EXAMINATION:  Patient has no new complaints.  GENERAL: The patient is a well-developed, well-nourished, in no apparent distress.     Vital Signs Last 24 Hrs  T(C): 37.2 (07 May 2021 14:57), Max: 37.2 (07 May 2021 14:57)  T(F): 99 (07 May 2021 14:57), Max: 99 (07 May 2021 14:57)  HR: 127 (07 May 2021 14:57) (95 - 127)  BP: 104/74 (07 May 2021 14:57) (77/47 - 164/77)  BP(mean): --  RR: 19 (07 May 2021 14:57) (19 - 26)  SpO2: 98% (07 May 2021 14:57) (93% - 98%)   (if applicable)    Chest Tube (if applicable)    HEENT: Head is normocephalic and atraumatic. Extraocular muscles are intact. Mucous membranes are moist.     NECK: Supple, no palpable adenopathy.    LUNGS: Clear to auscultation, no wheezing, rales, or rhonchi.    HEART: Regular rate and rhythm without murmur.    ABDOMEN: Soft, nontender, and nondistended.  No hepatosplenomegaly is noted.    : No painful voiding, no pelvic pain    EXTREMITIES: Without any cyanosis, clubbing, rash, lesions or edema.    NEUROLOGIC: Awake, alert, oriented, grossly intact    SKIN: Warm, dry, good turgor.      LABS:                        11.8   10.91 )-----------( 169      ( 07 May 2021 05:55 )             35.1     05-07    133<L>  |  100  |  17  ----------------------------<  235<H>  3.2<L>   |  22  |  0.84    Ca    7.9<L>      07 May 2021 05:55  Phos  3.2     05-06  Mg     1.9     05-06    TPro  6.5  /  Alb  2.0<L>  /  TBili  1.0  /  DBili  x   /  AST  52<H>  /  ALT  51  /  AlkPhos  128<H>  05-07                        MICROBIOLOGY: (if applicable)    RADIOLOGY & ADDITIONAL STUDIES:  EKG:   CXR:  ECHO:    IMPRESSION: 76y Male PAST MEDICAL & SURGICAL HISTORY:  HTN (hypertension)    DM (diabetes mellitus)    No significant past surgical history     p/w             Impression: This is a 76 Yr old man  presented with Hiccups associated with cough , fever, chills x 1 Day due to Acute hypoxic respiratory failure secondary to Multifocal  Pneumonia. from covid-19 infection   Positive Covid 19 PCR on 04-16-21. Had an episode of increase work of breathing requiring Non rebreather Mask and was on Oxygen supplementation NC and still dessaturating and now on NRB at 10 L O2.    Suggestion:   O2 saturation 97% on NRB decreased to 10 L and maintain a O2 saturation 94% to 97%     s/p Dexamethasone Daily x 10 Days .   s/p remdesivir   Continue Singulair 10 mg Daily.   Continue PRN Albuterol 2 puffs Q 6 hours.  Monitor biomarkers TIW  DVT/ GI prophylactic.   Airborne and contact precautions.

## 2021-05-07 NOTE — PROGRESS NOTE ADULT - PROBLEM SELECTOR PLAN 2
-plan as above
p/w Na 125 , corrected for blood glucose is 127   can be SIADH due to COVID  Will fluid restrict 1200 cc for now   f/u Serum osm, urine osm and urine Na levels  Monitor Na q6-8 hrs   Renal consult called.   Goal Na is 130-132 by tomorrow 3 PM
-plan as above
resolved  - likely 2/2 COVID19 vs pre -renal  - S/p bicarb drip  - D/c PO bicarb TID as per nephro   - Losartan on hold (last dose 4/23)   - Monitor urine output, monitor I &O s closely   - Monitor and supplement electrolytes as needed   - Nephro Dr Brown following
-insulin dependent type 2 diabetes with hyperglycemia, worsening with steroid use. - non compliant with PO meds and Insulin at home   - HbA1c 13.8  - add premeal Admelog and increase Lantus to 20 units   - ISS per protocol  - accuchcarole AC, HS  - diabetic diet   - Endo Dr. Hall
p/w Na 125 , corrected for blood glucose is 127   can be SIADH due to COVID  Will fluid restrict 1200 cc for now   f/u Serum osm, urine osm and urine Na levels  Monitor Na q6-8 hrs   Renal consult called.   Goal Na is 130-132 by tomorrow 3 PM
Controlled  Pt takes Metformin at home  Decrease standing insulin to 4 units AC secondary to decreased PO intake  Continue with Lantus coverage QHS  Continue with sliding scale insulin coverage  Continue with glucose monitoring  Continue with low carb diet   Dr. Hall, Endocrine, following
fall overnight   CTH negative   sustained no injuries   placed on fall risk precautions   bed alarm

## 2021-05-08 NOTE — CHART NOTE - NSCHARTNOTEFT_GEN_A_CORE
Event: RN Called to bedside to assess patient on Non-rebreather mask; Patient hypoxic, non responsive and hypotensive. RRT was called.      Brief HPI: 76 M with PMH of HTN and DM presents to ED on April 16. 2021 with complaints of hiccups associated with cough, fever and chills x 1 day. He also endorsed myalgias. Tmax at home was 100.3F. He denied having had the COVID-19 vaccination or having had COVID-19 in the past. Per daughter patient has had poor appetite and weakness x1 week. She also reports noticing that he became short of breath on minimal exertion. Patient was at PMD office last week and was advised to go to ED for hyperglycemia at that time, but refused. Pt was admitted to [facility] in January for DKA. In ED, /76, , saO2 89% on RA, improved to 95% on 5L NC  (16 Apr 2021 16:51).     Medicine Course  Pt was admitted to Medicine floor for COVid 19 infection requiring supplemental O2 with NC 6L . Pt completed Remdesevir and Decadron. Pt desaturated and was placed on NRB for brief period ,then switched back to nasal cannula 6L with ongoing oxygen titrations requiring 15L NRB . Blood sugars were initially high controlled with insulin basal-bolus regimen. Hyponatremia on admission resolved with gentle hydration now became hypernatremic. Cr has worsen with metabolic acidosis and pt was started on HCO3 drip by nephro. Patient remained hypoxic and was transferred to the ICU   Patient was transferred to ICU for acute hypoxic respiratory failure. He was on HFNC. He improved and was slowly transitioned to NC. He is saturating well on 5 L NC.  His metabolic acidosis improved. Patient is stable to be downgraded to medicine.      RRT was called at 12:49 PM for concerns of Hypoxia and AMS. Pt spo2 not improved with NRB and continued to be hypoxic. Pt was intubated during the RRT.       Objective: Vital signs last  24hrs  ICU Vital Signs Last 24 Hrs  T(C): 37.7 (08 May 2021 13:00), Max: 38.2 (07 May 2021 21:06)  T(F): 99.9 (08 May 2021 13:00), Max: 100.7 (07 May 2021 21:06)  HR: 143 (08 May 2021 14:30) (106 - 144)  BP: 114/76 (08 May 2021 14:30) (103/63 - 146/92)  BP(mean): 84 (08 May 2021 14:30) (82 - 95)  ABP: --  ABP(mean): --  RR: 18 (08 May 2021 14:30) (16 - 45)  SpO2: 93% (08 May 2021 14:30) (81% - 98%)    Focus PE:  Constitutional: in bed on non-rebreather mask, non responsive, hypoxic,   Neuro- alert and oriented x 0, nonverbal, unable to perform further neuro exam  Head: NCAT; long white beard  Eyes-  PERRLA  Neck- supple, NT, no cervical LAD  CV-tachycardic; irregular heart beat  Resp-  increased WOB on NRB mask at 15L saturating in SPO2 60%-70%    Labs:                         12.4   13.51 )-----------( 153      ( 08 May 2021 08:28 )             37.9   05-08    138  |  106  |  15  ----------------------------<  149<H>  4.1   |  17<L>  |  0.89    Ca    8.2<L>      08 May 2021 08:28    TPro  6.5  /  Alb  2.0<L>  /  TBili  1.0  /  DBili  x   /  AST  52<H>  /  ALT  51  /  AlkPhos  128<H>  05-07        < from: CT Angio Chest w/ IV Cont (05.08.21 @ 13:48) >  EXAM:  CT ANGIO CHEST (W)AW                         PROCEDURE DATE:  05/08/2021    INTERPRETATION:  EXAMINATION: CT ANGIO CHEST WITHOUT AND OR WITH IV CONTRAST  CLINICAL INDICATION: sob tachy elevated dimer covid +  TECHNIQUE: CTA of the chest was performed for evaluation of pulmonary embolism after administration of 70 ml of Omnipaque-350, 30 ml discarded.  MIP images were reconstructed.  COMPARISON: None.  FINDINGS:  PULMONARY ARTERIES: Right upper and lower lobe segmental/subsegmental pulmonary emboli.  AIRWAYS AND LUNGS: Endotracheal tube tip mid trachea.  Extensive bilateral lower lobe consolidations with patchy bilateral upper lobe opacity and consolidations with a peripheral predominance. Bronchial dilatation.  MEDIASTINUM AND PLEURA: There are no enlarged mediastinal, hilar or axillary lymph nodes. The visualized portion of the thyroid gland is unremarkable. There are small bilateral pleural effusions.  There is no pneumothorax.  HEART AND VESSELS: There is mild cardiomegaly. Prominent right heart.  There are atherosclerotic calcifications of the aorta and coronary arteries.  There is no pericardial effusion.  UPPER ABDOMEN: Images of the upper abdomen demonstrate left renal cyst.  BONES AND SOFT TISSUES: There are mild degenerative changes of the spine.  The soft tissues are unremarkable.  TUBES/LINES: None.  IMPRESSION:  Right upper and lower lobe segmental/subsegmental pulmonary emboli. Prominent right heart. Echo recommended for complete evaluation.  Covid pneumonia with small bilateral pleural effusions.  These findings were discussed with Dr. Warren at 5/8/2021 2:06 PM by Dr. Dimitrios Vargas with read back confirmation.  < end of copied text >    `< from: CT Head No Cont (05.06.21 @ 21:20) >  EXAM:  CT BRAIN                        PROCEDURE DATE:  05/06/2021    INTERPRETATION:  CLINICAL INDICATION: Status post fall, acute hypoxic failure due to Covid.  TECHNIQUE: CT axial images of the head were obtained without intravenous contrast. Computer-reconstructed coronal and sagittal images were obtained.  COMPARISON: None.  FINDINGS: There is no acute intracranial hemorrhage, large cortical infarct, mass effect or midline shift. Nonspecific mild periventricular and subcortical white matter lucencies likely represent chronic microvascular ischemic changes. Prominent bilateral convexity extra axial space is felt to be due to atrophy. There is mild to moderate cerebral volume loss with ventricular dilatation. Partially empty, expanded sella.  There is no depressed skull fracture. There is sinus mucosal thickening with opacification of the right maxillary sinus. The tympanomastoid region is unremarkable.  IMPRESSION:  No acute intracranial hemorrhage or displacedskull fracture. If clinically indicated, short-term follow-up or MRI may be obtained for further evaluation.  ERICK BEACH MD; Attending Radiologist  This document has been electronically signed. May  6 2021 10:06PM  < end of copied text >        A/P:  Pt is a     Plan:    1.             2.              3. Event: RN Called to bedside to assess patient on Non-rebreather mask; Patient hypoxic, non responsive and hypotensive. RRT was called.      Brief HPI: 76 M with PMH of HTN and DM presents to ED on April 16. 2021 with complaints of hiccups associated with cough, fever and chills x 1 day. He also endorsed myalgias. Tmax at home was 100.3F. He denied having had the COVID-19 vaccination or having had COVID-19 in the past. Per daughter patient has had poor appetite and weakness x1 week. She also reports noticing that he became short of breath on minimal exertion. Patient was at PMD office last week and was advised to go to ED for hyperglycemia at that time, but refused. Pt was admitted to [facility] in January for DKA. In ED, /76, , saO2 89% on RA, improved to 95% on 5L NC  (16 Apr 2021 16:51).     Medicine Course  Pt was admitted to Medicine floor for Covid 19 infection requiring supplemental O2 with NC 6L . Pt completed Remdesevir and Decadron. Pt desaturated and was placed on NRB for brief period ,then switched back to nasal cannula 6L with ongoing oxygen titrations requiring 15L NRB . Blood sugars were initially high controlled with insulin basal-bolus regimen. Hyponatremia on admission resolved with gentle hydration now became hypernatremic. Cr has worsen with metabolic acidosis and pt was started on HCO3 drip by nephro. Patient remained hypoxic and was transferred to the ICU.     ICU Course  Patient was transferred to ICU for acute hypoxic respiratory failure. He was transitioned from NRB to HFNC and with improvement he transitioned to NC on 5 L NC. His metabolic acidosis improved with bicarb drip, which was discontinued after improvement. Patient was downgraded to medicine on 5/6/2021    INTERVAL EVENTS: This morning patient was on 4L NC saturating at 92%. At 12:49 PM RN called to assess patient for concerns of Hypoxia and AMS. Patient was put on a non-rebreather mask with oxygen saturation in 60-70%. A rapid response was called for acute change in mental status and hypoxia. GOC were reviewed with Owen Becker, patient was made FULL CODE, intubated, and transferred to ICU care. Patient then received a CT Angio and CT Head then transferred to -ICU.       Objective: Vital signs last  24hrs  ICU Vital Signs Last 24 Hrs  T(C): 37.7 (08 May 2021 13:00), Max: 38.2 (07 May 2021 21:06)  T(F): 99.9 (08 May 2021 13:00), Max: 100.7 (07 May 2021 21:06)  HR: 143 (08 May 2021 14:30) (106 - 144)  BP: 114/76 (08 May 2021 14:30) (103/63 - 146/92)  BP(mean): 84 (08 May 2021 14:30) (82 - 95)  ABP: --  ABP(mean): --  RR: 18 (08 May 2021 14:30) (16 - 45)  SpO2: 93% (08 May 2021 14:30) (81% - 98%)    Focus PE:  Constitutional: in bed on non-rebreather mask, non responsive, hypoxic,   Neuro- alert and oriented x 0, nonverbal, unable to perform further neuro exam  Head: NCAT; long white beard  Eyes-  PERRLA  Neck- supple, NT, no cervical LAD  CV-tachycardic; irregular heart beat  Resp-  increased WOB on NRB mask at 15L saturating in SPO2 60%-70%    Labs:                         12.4   13.51 )-----------( 153      ( 08 May 2021 08:28 )             37.9   05-08    138  |  106  |  15  ----------------------------<  149<H>  4.1   |  17<L>  |  0.89    Ca    8.2<L>      08 May 2021 08:28    TPro  6.5  /  Alb  2.0<L>  /  TBili  1.0  /  DBili  x   /  AST  52<H>  /  ALT  51  /  AlkPhos  128<H>  05-07        < from: CT Angio Chest w/ IV Cont (05.08.21 @ 13:48) >  EXAM:  CT ANGIO CHEST (W)AW IC                        PROCEDURE DATE:  05/08/2021    INTERPRETATION:  EXAMINATION: CT ANGIO CHEST WITHOUT AND OR WITH IV CONTRAST  CLINICAL INDICATION: sob tachy elevated dimer covid +  TECHNIQUE: CTA of the chest was performed for evaluation of pulmonary embolism after administration of 70 ml of Omnipaque-350, 30 ml discarded.  MIP images were reconstructed.  COMPARISON: None.  FINDINGS:  PULMONARY ARTERIES: Right upper and lower lobe segmental/subsegmental pulmonary emboli.  AIRWAYS AND LUNGS: Endotracheal tube tip mid trachea.  Extensive bilateral lower lobe consolidations with patchy bilateral upper lobe opacity and consolidations with a peripheral predominance. Bronchial dilatation.  MEDIASTINUM AND PLEURA: There are no enlarged mediastinal, hilar or axillary lymph nodes. The visualized portion of the thyroid gland is unremarkable. There are small bilateral pleural effusions.  There is no pneumothorax.  HEART AND VESSELS: There is mild cardiomegaly. Prominent right heart.  There are atherosclerotic calcifications of the aorta and coronary arteries.  There is no pericardial effusion.  UPPER ABDOMEN: Images of the upper abdomen demonstrate left renal cyst.  BONES AND SOFT TISSUES: There are mild degenerative changes of the spine.  The soft tissues are unremarkable.  TUBES/LINES: None.  IMPRESSION:  Right upper and lower lobe segmental/subsegmental pulmonary emboli. Prominent right heart. Echo recommended for complete evaluation.  Covid pneumonia with small bilateral pleural effusions.  These findings were discussed with Dr. Warren at 5/8/2021 2:06 PM by Dr. Dimitrios Vargas with read back confirmation.  < end of copied text >    `< from: CT Head No Cont (05.06.21 @ 21:20) >  EXAM:  CT BRAIN                        PROCEDURE DATE:  05/06/2021    INTERPRETATION:  CLINICAL INDICATION: Status post fall, acute hypoxic failure due to Covid.  TECHNIQUE: CT axial images of the head were obtained without intravenous contrast. Computer-reconstructed coronal and sagittal images were obtained.  COMPARISON: None.  FINDINGS: There is no acute intracranial hemorrhage, large cortical infarct, mass effect or midline shift. Nonspecific mild periventricular and subcortical white matter lucencies likely represent chronic microvascular ischemic changes. Prominent bilateral convexity extra axial space is felt to be due to atrophy. There is mild to moderate cerebral volume loss with ventricular dilatation. Partially empty, expanded sella.  There is no depressed skull fracture. There is sinus mucosal thickening with opacification of the right maxillary sinus. The tympanomastoid region is unremarkable.  IMPRESSION:  No acute intracranial hemorrhage or displaced skull fracture. If clinically indicated, short-term follow-up or MRI may be obtained for further evaluation.  ERICK BEACH MD; Attending Radiologist  This document has been electronically signed. May  6 2021 10:06PM  < end of copied text >        A/P:  Pt is a 76 M with PMH of HTN and DM presents with acute hypoxic respiratory failure secondary to covid-19. s/p decadron, remdesevir now with acute hypoxia on NRB mask    Plan:    1. RRT Called; Patient transferred to ICU service.              2. CT head and CT Angio ordered, results returned above, ICU to follow             3. Dr. Gayle notified; Covering attending notified. Plan per ICU team, Dr. Lila Forrest following; Appreciate recs

## 2021-05-08 NOTE — PROGRESS NOTE ADULT - SUBJECTIVE AND OBJECTIVE BOX
Time of Visit:  Patient seen and examined.  sedatated and intubated     MEDICATIONS  (STANDING):  apixaban 5 milliGRAM(s) Oral every 12 hours  atorvastatin 40 milliGRAM(s) Oral at bedtime  cefepime   IVPB      cefepime   IVPB 2000 milliGRAM(s) IV Intermittent every 8 hours  chlorhexidine 2% Cloths 1 Application(s) Topical <User Schedule>  cholecalciferol 400 Unit(s) Oral daily  dexMEDEtomidine Infusion 0.2 MICROgram(s)/kG/Hr (3.95 mL/Hr) IV Continuous <Continuous>  dronabinol 2.5 milliGRAM(s) Oral three times a day before meals  famotidine    Tablet 20 milliGRAM(s) Oral two times a day  insulin glargine Injectable (LANTUS) 10 Unit(s) SubCutaneous at bedtime  insulin lispro (ADMELOG) corrective regimen sliding scale   SubCutaneous every 6 hours  multivitamin 1 Tablet(s) Oral daily  phenylephrine    Infusion 0.2 MICROgram(s)/kG/Min (5.92 mL/Hr) IV Continuous <Continuous>  polyethylene glycol 3350 17 Gram(s) Oral daily  propofol Infusion 10 MICROgram(s)/kG/Min (4.73 mL/Hr) IV Continuous <Continuous>  senna 2 Tablet(s) Oral at bedtime  sodium chloride 0.9%. 1000 milliLiter(s) (100 mL/Hr) IV Continuous <Continuous>      MEDICATIONS  (PRN):  acetaminophen   Tablet .. 650 milliGRAM(s) Oral every 6 hours PRN Temp greater or equal to 38C (100.4F), Moderate Pain (4 - 6)  ALBUTerol    90 MICROgram(s) HFA Inhaler 2 Puff(s) Inhalation every 6 hours PRN Bronchospasm       Medications up to date at time of exam.      PHYSICAL EXAMINATION:  Patient has no new complaints.  GENERAL: The patient is a well-developed, well-nourished, in no apparent distress.     Vital Signs Last 24 Hrs  T(C): 37.8 (08 May 2021 15:00), Max: 38.2 (07 May 2021 21:06)  T(F): 100 (08 May 2021 15:00), Max: 100.7 (07 May 2021 21:06)  HR: 136 (08 May 2021 16:28) (106 - 144)  BP: 106/67 (08 May 2021 15:00) (103/63 - 146/92)  BP(mean): 77 (08 May 2021 15:00) (77 - 95)  RR: 26 (08 May 2021 15:17) (16 - 45)  SpO2: 86% (08 May 2021 16:28) (81% - 98%)  Mode: AC/ CMV (Assist Control/ Continuous Mandatory Ventilation)  RR (machine): 24  TV (machine): 450  FiO2: 100  PEEP: 5  ITime: 1  MAP: 16  PIP: 39   (if applicable)    Chest Tube (if applicable)    HEENT: Head is normocephalic and atraumatic. Extraocular muscles are intact. Mucous membranes are moist.  +ETT    NECK: Supple, no palpable adenopathy.    LUNGS: Clear to auscultation, no wheezing, rales, or rhonchi.    HEART: Regular rate and rhythm without murmur.    ABDOMEN: Soft, nontender, and nondistended.  No hepatosplenomegaly is noted.    : No painful voiding, no pelvic pain    EXTREMITIES: Without any cyanosis, clubbing, rash, lesions or edema.    NEUROLOGIC: + sedated     SKIN: Warm, dry, good turgor.      LABS:                        12.4   13.51 )-----------( 153      ( 08 May 2021 08:28 )             37.9     05-08    138  |  106  |  15  ----------------------------<  149<H>  4.1   |  17<L>  |  0.89    Ca    8.2<L>      08 May 2021 08:28    TPro  6.5  /  Alb  2.0<L>  /  TBili  1.0  /  DBili  x   /  AST  52<H>  /  ALT  51  /  AlkPhos  128<H>  05-07        ABG - ( 08 May 2021 14:38 )  pH, Arterial: 7.29  pH, Blood: x     /  pCO2: 46    /  pO2: 76    / HCO3: 12    / Base Excess: -4.5  /  SaO2: 94                              MICROBIOLOGY: (if applicable)    RADIOLOGY & ADDITIONAL STUDIES:  EKG:   CTPA:< from: CT Angio Chest w/ IV Cont (05.08.21 @ 13:48) >    EXAM:  CT ANGIO CHEST (W)AW IC                            PROCEDURE DATE:  05/08/2021          INTERPRETATION:  EXAMINATION: CT ANGIO CHEST WITHOUT AND OR WITH IV CONTRAST    CLINICAL INDICATION: sob tachy elevated dimer covid +    TECHNIQUE: CTA of the chest was performed for evaluation of pulmonary embolism after administration of 70 ml of Omnipaque-350, 30 ml discarded.  MIP images were reconstructed.    COMPARISON: None.    FINDINGS:    PULMONARY ARTERIES: Right upper and lower lobe segmental/subsegmental pulmonary emboli.    AIRWAYS AND LUNGS: Endotracheal tube tip mid trachea.  Extensive bilateral lower lobe consolidations with patchy bilateral upper lobe opacity and consolidations with a peripheral predominance. Bronchial dilatation.    MEDIASTINUM AND PLEURA: There are no enlarged mediastinal, hilar or axillary lymph nodes. The visualized portion of the thyroid gland is unremarkable. There are small bilateral pleural effusions.  There is no pneumothorax.    HEART AND VESSELS: There is mild cardiomegaly. Prominent right heart.  There are atherosclerotic calcifications of the aorta and coronary arteries.  There is no pericardial effusion.    UPPER ABDOMEN: Images of the upper abdomen demonstrate left renal cyst.    BONES AND SOFT TISSUES: There are mild degenerative changes of the spine.  The soft tissues are unremarkable.    TUBES/LINES: None.    IMPRESSION:  Right upper and lower lobe segmental/subsegmental pulmonary emboli. Prominent right heart. Echo recommended for complete evaluation.    Covid pneumonia with small bilateral pleural effusions.    These findings were discussed with Dr. Warren at 5/8/2021 2:06 PM by Dr. Tushar Vargas with read back confirmation.            TUSHAR VARGAS MD; Attending Radiologist  This document has been electronically signed. May  8 2021  2:08PM    < end of copied text >    CT head:< from: CT Head No Cont (05.08.21 @ 13:46) >    EXAM:  CT BRAIN                            PROCEDURE DATE:  05/08/2021          INTERPRETATION:  Noncontrast CT of the brain.    CLINICAL INDICATION:  Covid. Altered mental status.    TECHNIQUE : Axial CT scanning of the brain was obtained from theskull base to the vertex without the administration of intravenous contrast. Sagittal and coronal reformats were provided.    COMPARISON: CT brain 5/6/2021    FINDINGS:    There is subtle decreased attenuation and effacement of sulci in the right MCAterritory. There is decreased attenuation in the region of the right basal ganglia and insular ribbon. Findings suspicious for the presence of an acute right MCA territory infarct.    No CT evidence for hemorrhagic transformation.    No hydrocephalus, midline shift, or effacement of the basal cisterns.    Air-fluid level in the right maxillary sinus. Remaining visualized paranasal sinuses and mastoid air cells are clear.    IMPRESSION:    Decreased attenuation and effacement of sulci in the right MCA territory. Decreased attenuation in the region of the right basal ganglia and insular ribbon. Findings are suspicious for the presence of an acute right MCA territory infarct.    No CT evidence for hemorrhagic transformation.    No midline shift or effacement of basal cisterns.    Dr. Mallory discussed these findings with Dr. Warren on 5/8/2021 2:18 PM with read back.            JON MALLORY MD; Attending Radiologist  This document has been electronically signed. May  8 2021  2:25PM    < end of copied text >      IMPRESSION: 76y Male PAST MEDICAL & SURGICAL HISTORY:  HTN (hypertension)    DM (diabetes mellitus)    No significant past surgical history     p/w         76M with PMH of HTN DM admitted for AHRF 2/2 Covid pna. Pt was admitted in ICU on 4/23 for AHRF  and INOCENCIA with metabolic acidosis. Pt was started on  High flow and was noted to be symptomatically improving. Pt was downgraded to the regular floor with 5L NC ON 5/5.    Pt was called for RRT today for Acute hypoxic resp failure due to covid-19 pna and  increased lethargy due to acute R MCA stroke while on anticoagulation . . Pt was put on NRB that showed no improvement. Pt was Intubated during the RRT after discussion with family .   FULL CODE      ASSESSMENT:  -Acute hypoxic Respiratory failure 2/2 Covid-19 infection   -Pna b/l   -Acute CVA   -Altered mental status   -Leucocytosis  -DM  uncontrol     Sugg  -continue management as per ICU team   -cardiac echo with bubble studies when appropriate ( pat is in isolation due covid-19 infection )  -hemodynamic support  -continue vent support   -Neuro eval  -neuro checks   -not a candidate for tPA since pat was already on Eliquis   -monitor blood sugar   -PPI   -NGT feed    d/c with icu attend

## 2021-05-08 NOTE — CONSULT NOTE ADULT - SUBJECTIVE AND OBJECTIVE BOX
Patient is a 76y old  Male who presents with a chief complaint of AHRF 2/2 COVID PNA (07 May 2021 11:55)      HPI:  76 M with PMH of HTN and DM presents to ED with complaint of hiccups since this morning. Patient reports hiccups are associated with cough, fever and chills x 1 day. He also endorses myalgias. Tmax at home was 100.3F.He denies having had the COVID-19 vaccination or having had COVID-19 in the past. Per daughter patient has had poor appetite and weakness x1 week. She also reports noticing that he becomes short of breath on minimal exertion. Patient was at PMD office last week and was advised to go to ED for hyperglycemia at that time, but refused. Pt was admitted to [facility] in January for DKA. Pt denies any chest pain, palpitations, rinary symptoms or any other acute complaints     RRT was called at 12:49 PM for concerns of Hypoxia and AMS. Pt spo2 not improved with NRB and continued to be hypoxic. Pt was intubated during the RRT.       Allergies    No Known Allergies    Intolerances        MEDICATIONS  (STANDING):  apixaban 2.5 milliGRAM(s) Oral every 12 hours  atorvastatin 40 milliGRAM(s) Oral at bedtime  chlorhexidine 2% Cloths 1 Application(s) Topical <User Schedule>  cholecalciferol 400 Unit(s) Oral daily  dronabinol 2.5 milliGRAM(s) Oral three times a day before meals  famotidine    Tablet 20 milliGRAM(s) Oral two times a day  insulin glargine Injectable (LANTUS) 10 Unit(s) SubCutaneous at bedtime  insulin lispro (ADMELOG) corrective regimen sliding scale   SubCutaneous three times a day before meals  insulin lispro Injectable (ADMELOG) 4 Unit(s) SubCutaneous three times a day before meals  multivitamin 1 Tablet(s) Oral daily  polyethylene glycol 3350 17 Gram(s) Oral daily  rocuronium Injectable 80 milliGRAM(s) IV Push once  senna 2 Tablet(s) Oral at bedtime    MEDICATIONS  (PRN):  acetaminophen   Tablet .. 650 milliGRAM(s) Oral every 6 hours PRN Temp greater or equal to 38C (100.4F), Moderate Pain (4 - 6)  ALBUTerol    90 MICROgram(s) HFA Inhaler 2 Puff(s) Inhalation every 6 hours PRN Bronchospasm  OLANZapine 2.5 milliGRAM(s) Oral two times a day PRN agitation      Daily     Daily     Drug Dosing Weight  Height (cm): 172.7 (16 Apr 2021 13:26)  Weight (kg): 78.9 (16 Apr 2021 13:26)  BMI (kg/m2): 26.5 (16 Apr 2021 13:26)  BSA (m2): 1.93 (16 Apr 2021 13:26)    PAST MEDICAL & SURGICAL HISTORY:  HTN (hypertension)    DM (diabetes mellitus)    No significant past surgical history        FAMILY HISTORY:  No pertinent family history in first degree relatives        SOCIAL HISTORY:    ADVANCE DIRECTIVES:    REVIEW OF SYSTEMS:    Could not be obtained as Pt is Lethargic.           ICU Vital Signs Last 24 Hrs  T(C): 37.3 (08 May 2021 12:36), Max: 38.2 (07 May 2021 21:06)  T(F): 99.1 (08 May 2021 12:36), Max: 100.7 (07 May 2021 21:06)  HR: 122 (08 May 2021 12:36) (106 - 127)  BP: 103/63 (08 May 2021 12:36) (103/63 - 146/92)  BP(mean): --  ABP: --  ABP(mean): --  RR: 22 (08 May 2021 12:36) (18 - 22)  SpO2: 81% (08 May 2021 12:36) (81% - 98%)          I&O's Detail      PHYSICAL EXAM:    GENERAL: lethargic, dull, Groaning   HEAD:  Atraumatic, Normocephalic  EYES: symmetrical pupillary reaction   ENMT: poor dental hygiene   NECK: Supple, No JVD, Normal thyroid  NERVOUS SYSTEM:  only responsive to painful stimulus. Groaning sounds,   CHEST/LUNG b/l diffuse crackles   HEART: sinus tachycardia   ABDOMEN: Soft, Nontender, Nondistended; Bowel sounds present  EXTREMITIES:  2+ Peripheral Pulses, No clubbing, cyanosis, or edema  LYMPH: No lymphadenopathy noted  SKIN: No rashes or lesions    LABS:  CBC Full  -  ( 08 May 2021 08:28 )  WBC Count : 13.51 K/uL  RBC Count : 4.45 M/uL  Hemoglobin : 12.4 g/dL  Hematocrit : 37.9 %  Platelet Count - Automated : 153 K/uL  Mean Cell Volume : 85.2 fl  Mean Cell Hemoglobin : 27.9 pg  Mean Cell Hemoglobin Concentration : 32.7 gm/dL  Auto Neutrophil # : x  Auto Lymphocyte # : x  Auto Monocyte # : x  Auto Eosinophil # : x  Auto Basophil # : x  Auto Neutrophil % : x  Auto Lymphocyte % : x  Auto Monocyte % : x  Auto Eosinophil % : x  Auto Basophil % : x    05-08    138  |  106  |  15  ----------------------------<  149<H>  4.1   |  17<L>  |  0.89    Ca    8.2<L>      08 May 2021 08:28    TPro  6.5  /  Alb  2.0<L>  /  TBili  1.0  /  DBili  x   /  AST  52<H>  /  ALT  51  /  AlkPhos  128<H>  05-07    CAPILLARY BLOOD GLUCOSE      POCT Blood Glucose.: 87 mg/dL (08 May 2021 12:08)                EKG:    ECHO, US:    RADIOLOGY: < from: Xray Chest 1 View- PORTABLE-Urgent (Xray Chest 1 View- PORTABLE-Urgent .) (05.06.21 @ 11:23) >  IMPRESSION: Advanced infiltrates somewhat increased from prior.        CRITICAL CARE TIME SPENT:

## 2021-05-08 NOTE — CONSULT NOTE ADULT - SUBJECTIVE AND OBJECTIVE BOX
Patient is a 76y old  Male who presents with a chief complaint of sob (08 May 2021 16:30)      HPI: Was admitted with SARS Coronavirus2 pneumonia and was briefly in ICU for oxygen desaturation, but improved and transferred to step down unit. Today at 12.49 was found to be poorly responsive; discovered to have a large right hemisphere stroke and pulmonary emboli (right). Had been on apixaban 2.5 mg BID.     PAST MEDICAL & SURGICAL HISTORY:  HTN (hypertension)    DM (diabetes mellitus)    No significant past surgical history        FAMILY HISTORY:  No pertinent family history in first degree relatives          Social Hx:  Nonsmoker, no drug or alcohol use    MEDICATIONS  (STANDING):  apixaban 5 milliGRAM(s) Oral every 12 hours  atorvastatin 40 milliGRAM(s) Oral at bedtime  cefepime   IVPB      cefepime   IVPB 2000 milliGRAM(s) IV Intermittent every 8 hours  chlorhexidine 2% Cloths 1 Application(s) Topical <User Schedule>  cholecalciferol 400 Unit(s) Oral daily  dexMEDEtomidine Infusion 0.2 MICROgram(s)/kG/Hr (3.95 mL/Hr) IV Continuous <Continuous>  dronabinol 2.5 milliGRAM(s) Oral three times a day before meals  famotidine    Tablet 20 milliGRAM(s) Oral two times a day  insulin glargine Injectable (LANTUS) 10 Unit(s) SubCutaneous at bedtime  insulin lispro (ADMELOG) corrective regimen sliding scale   SubCutaneous every 6 hours  multivitamin 1 Tablet(s) Oral daily  phenylephrine    Infusion 0.2 MICROgram(s)/kG/Min (5.92 mL/Hr) IV Continuous <Continuous>  polyethylene glycol 3350 17 Gram(s) Oral daily  senna 2 Tablet(s) Oral at bedtime  sodium chloride 0.9%. 1000 milliLiter(s) (100 mL/Hr) IV Continuous <Continuous>       Allergies    No Known Allergies    Intolerances        ROS: Pertinent positives in HPI, all other ROS were reviewed and are negative.      Vital Signs Last 24 Hrs  T(C): 37.7 (08 May 2021 17:00), Max: 38.2 (07 May 2021 21:06)  T(F): 99.9 (08 May 2021 17:00), Max: 100.7 (07 May 2021 21:06)  HR: 135 (08 May 2021 17:00) (106 - 144)  BP: 104/70 (08 May 2021 17:00) (86/62 - 146/92)  BP(mean): 75 (08 May 2021 17:00) (67 - 95)  RR: 46 (08 May 2021 17:00) (16 - 46)  SpO2: 89% (08 May 2021 17:00) (81% - 98%)        Constitutional: intubated ventilated on propofol  HEENT: PERRLA, EOMI,   Neck: Supple.  Respiratory: Breath sounds are clear bilaterally  Cardiovascular: S1 and S2, regular rhythm  Gastrointestinal: soft, nontender  Extremities:  no edema  Vascular: Carotid Bruit - no  Musculoskeletal: no joint swelling/tenderness, no abnormal movements  Skin: No rashes    Neurological exam:  HF: Intubated ventilated; unresponsive .   CN: STERLING, oculocephalic+  Motor: some movement right side - no movement left  Sens: no response   Reflexes: 2+   Coord:  unable  Gait/Balance: UNABLE  NIHSS: 32  1A: Level of consciousness       +2= Movements to pain  1B: Ask month and age       +1= Dysarthric/intubated/trauma/language barrier  1C: "Blink eyes" and "Squeeze Hands"       +2= Performs 0 tasks  2: Horizontal EOMs       0= Normal  3: Visual fields       +3= Patient is b/l blind  4: Facial palsy (use grimace if obtunded)       +3= B/l complete paralysis (upper/lower face)  5A: Left arm motor drift (count out loud and use fingers to show count)       +4= No movement  5B: Right arm motor drift       +4= No movement  6A: Left leg motor drift       +4= No movement  6B: Right leg motor drift       +4= No movement  7: Limb ataxia (FNF/heel-shin)       0= Paralyzed  8: Sensation       +2= Coma/unresponsive  9: Language/aphasia- describe the scene (on gaurav); name the items; read the sentences (on gaurav)       +3= coma/unresponsive  10: Dysarthria- read the words        0= Intubated/unable to test  11: Extinction/inattention       +2= extinction to > 1 modality    MRS 5      Labs:                        12.4   13.51 )-----------( 153      ( 08 May 2021 08:28 )             37.9     05-08    138  |  106  |  15  ----------------------------<  149<H>  4.1   |  17<L>  |  0.89    Ca    8.2<L>      08 May 2021 08:28    TPro  6.5  /  Alb  2.0<L>  /  TBili  1.0  /  DBili  x   /  AST  52<H>  /  ALT  51  /  AlkPhos  128<H>  05-07      04-17 Chol 96 LDL -- HDL 42 Trig 77      Radiology report:  - CT head:  < from: CT Brain Stroke Protocol (05.08.21 @ 17:44) >    EXAM:  CT BRAIN STROKE PROTOCOL                            PROCEDURE DATE:  05/08/2021          INTERPRETATION:  .    CLINICAL INFORMATION: code stroke    TECHNIQUE: Multiple axial CT images of the head were obtained without contrast. Sagittal and coronal reconstructed images were acquired from the source data.    COMPARISON: Prior CT examination from earlier today at 1:35 PM.    FINDINGS: There is redemonstration of hypoattenuation outlining the whole right MCA territory. Sulcal effacement is seen which is mild. Minimal mass effect is noted. There is no hemorrhagic transformation. A hyperdense appearing right-sided internal carotid artery is seen. There is no shift of the midline structures or herniation.    There is also hypoattenuation of the right paramedian frontal lobe in the PETER territory.    There is complete opacification of the right maxillary sinus which is small in volume. Otherwise, the paranasal sinuses and mastoid air cells are clear. An NG tube is in place. The calvariumappears intact. The orbits appear unremarkable.    IMPRESSION: Redemonstration of an evolving acute/subacute right-sided holohemispheric territory MCA infarction.    Suspicion of an evolving right-sided acute/subacute PETER distribution infarction as well with associated cytotoxic edema.    Hyperdense appearance to the right intracranial internal carotid artery. Further evaluation with a CT angiogram study of the head and neck can be performed for further evaluation.    No hemorrhagic transformation.            KATIE BEACH MD; Attending Radiologist  This document has been electronically signed. May  8 2021  5:52PM    < end of copied text >

## 2021-05-08 NOTE — CONSULT NOTE ADULT - CONSULT REASON
Hypoxia and AMS
Hypoxia with Positive Covid 19 .
endo
Hyponatremia.
Worsening Hypoxia
Stroke
uncont dm

## 2021-05-08 NOTE — CONSULT NOTE ADULT - ASSESSMENT
a/p  VICENTA occlusion acute right hemispheric infarct; cardiorespiratory status was unstable - now hypotensive   Risk of renal injury with CT perfusion scan and CTA head prevents this investigation  Discussed with transfer center-Attending on call stroke NS who agreed that, now,CTA would put at risk kidneys and may not show much salvageable brain tissue. Will discuss with family poor prognosis

## 2021-05-08 NOTE — DISCHARGE NOTE FOR THE EXPIRED PATIENT - HOSPITAL COURSE
76M with PMH of HTN DM admitted for AHRF 2/2 Covid pna. Pt was admitted in ICU on 4/23 for AHRF and INOCENCIA with metabolic acidosis. Pt was started on  High flow and was noted to be symptomatically improving. Pt was downgraded to the regular floor with 5L NC ON 5/5.    Pt was called for RRT for Acute hypoxic resp failure due to covid-19 pna and  increased lethargy due to acute R MCA stroke while on anticoagulation . Pt was put on NRB that showed no improvement. Pt was Intubated during the RRT after discussion with family. Repeat CT H showed evolving stroke. Neurology consulted - unlikely to benefit from intervention given poor clinical status.     Patient noted to be desaturating on high vent setting, hypotensive despite pressor support, and bradycardia. Patient went asystole. ACLS initiated. 2 rounds of epi given. Patient pronounced dead 10:09.

## 2021-05-08 NOTE — PROGRESS NOTE ADULT - REASON FOR ADMISSION
AHRF 2/2 COVID-19
AHRF 2/2 COVID-19
AHRF 2/2 COVID
AHRF 2/2 COVID-19
sob
sob
Acute HYpoxic Respiratory Failure secondary to Covid Infection.
sob
Acute Hypoxic Respiratory Failure
sob
AHRF 2/2 COVID-19
AHRF 2/2 COVID PNA

## 2021-05-08 NOTE — GOALS OF CARE CONVERSATION - ADVANCED CARE PLANNING - CONVERSATION DETAILS
RRT was called for patient this afternoon 12:47pm today  for AMS, on Nonrebreather mask unresponsive and hypoxic. Dr. Singh spoke with decision maker Owen Becker. Discussed risks and benefits of mechanical ventilation and declining clinical status. Decision was made to continue FULL CODE treatment. All questions and concerns addressed. Patient was intubated at the bedside and transferred to ICU for further treatment.

## 2021-05-08 NOTE — PROGRESS NOTE ADULT - NSICDXPILOT_GEN_ALL_CORE
[place]

## 2021-05-08 NOTE — CONSULT NOTE ADULT - ATTENDING COMMENTS
Assessment:  1. Acute Hypoxic Respiratory Failure   2. COVID19 infection    3. INOCENCIA   4. Mixed AG +  NAGMA   5. Uncontrolled DM-2   6. Hypernatremia   7. HTN     Plan  - CXR worsening with worsening hypoxia  - Will admit to ICU for close observation  - Start HFNC support  - IV fluid resuscitation   - Completed remdesivir  - Cont. Dexamethasone   - Sodium bicarb infusion  - Nephrology follow up  - Close glucose monitoring   - Monitor fingerstick glucose   - DVT and stress ulcer prophylaxis  - Prognosis is guarded  - Attempted to contact family (two grand daughters) listed to update about change in clinical status. Voice mail left for call back for Owen Becker, unable to leave voicemail for Waqar Villanueva
RRT called today for altered mentation and worsening hypoxia. Patient was intubated by anesthesiologist. CT scan of the head obtained showing Acute Right MCA territory infarct. CTA of the chest showing Segmental right sided PE. Patient subsequently brought to the ICU. Thrombolysis not given as patient was already of eliquis. Discussed with neurology but not accepted for transfer to tertiary center as patient clinically unstable. In the ICU noted with evolving neurologic exam with dilation of the right pupil. Though unable to transfer for repeat CT scan as patient with worsening hypoxia and hemodynamic instability.     Assessment:  1. Acute Hypoxic Respiratory Failure   2. COVID19 infection    3. INOCENCIA   4. Right pulmonary embolism  5. Uncontrolled DM-2   6. Hypernatremia   7. HTN   8. Thrombocytopenia  9. Right MCA infarct       Plan  - Admit to ICU for close observation  - Cont. mechanical ventilation  - Propofol for vent synchrony  - VAsopressor support to maintain cerebral perfusion pressures   - Repeat CT scan of the head  - Neurochecks q1 hours   - Aspiration precautions, will initiate empiric antibiotics for aspiration coverage  - Completed remdesivir  - Cont. Dexamethasone   - Nephrology follow up  - Close glucose monitoring with coverage for hyperglycemia  - Monitor fingerstick glucose   - Hold eliquis, will initiate heparin if no evidence of hemorrhage in CT head   - Stress ulcer prophylaxis  - Prognosis is guarded  - HCP Owen updated about clinical findings, at this time patient's prognosis is guarded given new stroke and PE despite being on anticoagulation. She expressed understanding. Emotional support provided.

## 2021-05-08 NOTE — PROGRESS NOTE ADULT - PROVIDER SPECIALTY LIST ADULT
Critical Care
Endocrinology
Internal Medicine
Nephrology
Pulmonology
Critical Care
Endocrinology
Endocrinology
Internal Medicine
Nephrology
Critical Care
Endocrinology
Hospitalist
Internal Medicine
Nephrology
Pulmonology
Pulmonology
Nephrology
Pulmonology
Nephrology
Nephrology
Pulmonology
Hospitalist
Nephrology
Internal Medicine

## 2021-05-08 NOTE — CONSULT NOTE ADULT - ASSESSMENT
76M with PMH of HTN DM admitted for AHRF 2/2 Covid pna. Pt was admitted in ICU on 4/23 for AHRF  and INOCENCIA with metabolic acidosis. Pt was started on  High flow and was noted to be symptomatically improving. Pt was downgraded to the regular floor with 5L NC ON 5/5.    Pt was called for RRT today for Hypoxia, spo2 88% and increased lethargy. Pt was put on NRB that showed no improvement. Pt was Intubated during the RRT after discussion with family  FULL CODE      ASSESSMENT:  Acute hypoxic Respiratory failure 2/2 Covid Pna  Altered mental status   Leucocytosis  DM        CNS:  Pt noted to be lethargic today, AAOX0, Groaning sounds   Pt was otherwise awake and alert in am as per Nursing staff   F/U CT head, to r/o Acute stroke   c/w Neuro checks   Pt sedated for ETT- propofol       CVS:  #HTN  BP noted to be softer 111/64  holding anti htn meds at this time   #Tachycardia  Pt was noted to be Tachycardic during the RRT   likely 2/2 Resp distress  C/W Remote telemetry     RESP:  Pt noted to have Acute hypoxic resp failure 2/2 covid pna   Pt was early admitted in ICU for HFNC and downgraded on 5/5   s/p completion on Remdesevir and Decadron course  Pt currently intubated VC-   F/u CXR and ABG post ETT       GI   Pt NPO now for ETT  c/w PPI   Pt needs NG tube      Renal:  Pt earlier noted to have INOCENCIA with Metabolic acidosis- Resolved   BUN/Cr- 15/0.89  FC to be placed   Monitor I/O     ENDO  #DM  Pt has PMH of DM   BS- 86( during the RRT)  Holding Lantus 10u. Can be resumed incase of hyperglycemia   C/W HSS   f/u Fingerstick      HEMO:  #Leucocytosis   Pt noted to have elevated WBC-13K   Likely reactive leucocytosis in setting of ARHF vs Recent steroid use   f/u cbc dialy     PPX:  on ELiquis ( started during previous ICU stay as  PPX for PE)    CODE:  FULL CODE     DISPO:  Pt to get transferred to ICU

## 2021-05-08 NOTE — CONSULT NOTE ADULT - CONSULT REQUESTED DATE/TIME
23-Apr-2021 11:28
17-Apr-2021 13:02
08-May-2021 13:29
20-Apr-2021 11:31
22-Apr-2021 12:40
08-May-2021 17:21
19-Apr-2021 10:08

## 2021-05-08 NOTE — CHART NOTE - NSCHARTNOTEFT_GEN_A_CORE
Called and updated Pt's HCP  Owen  about current clinical course and treatment plan including CT head with evolving stroke and poor prognosis and discussion with neurology. All questions and concerns were answered and addressed.

## 2021-05-08 NOTE — CHART NOTE - NSCHARTNOTEFT_GEN_A_CORE
Telestroke consult follow up from earlier today. Called with a question re: potential transfer for endovascular intervention for evolving holohemispheric R MCA and PETER infarct with now developing cytotoxic edema and hyperdense appearing R intracranial ICA. Of note, Patient was on Eliquis and also developed pulmonary emboli and hypoxic respiratory failure.     Given such a large territory stroke developing on non contrast CTH, at least a vessel imaging of head/neck and a perfusion study should be performed prior to potential endovascular intervention decision being made. It is likely that the core infarct is large and there is no role for intervention at this time. I recommend discussion with family regarding benefits and risks of further imaging studies and potential interventions.

## 2021-05-08 NOTE — PROGRESS NOTE ADULT - SUBJECTIVE AND OBJECTIVE BOX
[facility] [place] [facility]   MD Ector Arce MD, D.O. Ruoru Wong, PA    From 7 AM - 5 PM:  OFFICE: [phone]  Dr. Moss cell: [phone]  Dr. Brown cell: [phone]  Dr. Lee cell: [phone]  JULY Bryant cell: [phone]    From 5 PM - 7 AM: Answering Service: [phone]  Date of service: 05-08-21 @ 08:52    RENAL FOLLOW UP NOTE  --------------------------------------------------------------------------------  HPI:  Pt seen and examined at bedside.       PAST HISTORY  --------------------------------------------------------------------------------  No significant changes to PMH, PSH, FHx, SHx, unless otherwise noted    ALLERGIES & MEDICATIONS  --------------------------------------------------------------------------------  Allergies    No Known Allergies    Intolerances      Standing Inpatient Medications  apixaban 2.5 milliGRAM(s) Oral every 12 hours  atorvastatin 40 milliGRAM(s) Oral at bedtime  chlorhexidine 2% Cloths 1 Application(s) Topical <User Schedule>  cholecalciferol 400 Unit(s) Oral daily  dronabinol 2.5 milliGRAM(s) Oral three times a day before meals  famotidine    Tablet 20 milliGRAM(s) Oral two times a day  insulin glargine Injectable (LANTUS) 10 Unit(s) SubCutaneous at bedtime  insulin lispro (ADMELOG) corrective regimen sliding scale   SubCutaneous three times a day before meals  insulin lispro Injectable (ADMELOG) 4 Unit(s) SubCutaneous three times a day before meals  multivitamin 1 Tablet(s) Oral daily  polyethylene glycol 3350 17 Gram(s) Oral daily  senna 2 Tablet(s) Oral at bedtime    PRN Inpatient Medications  acetaminophen   Tablet .. 650 milliGRAM(s) Oral every 6 hours PRN  ALBUTerol    90 MICROgram(s) HFA Inhaler 2 Puff(s) Inhalation every 6 hours PRN  OLANZapine 2.5 milliGRAM(s) Oral two times a day PRN      REVIEW OF SYSTEMS  --------------------------------------------------------------------------------  General: no fever  CVS: no chest pain  RESP: no sob, no cough  ABD: no abdominal pain  : no dysuria,  MSK: no edema     VITALS/PHYSICAL EXAM  --------------------------------------------------------------------------------  T(C): 36.5 (05-08-21 @ 05:00), Max: 38.2 (05-07-21 @ 21:06)  HR: 106 (05-08-21 @ 05:00) (106 - 127)  BP: 146/92 (05-08-21 @ 05:00) (80/47 - 146/92)  RR: 18 (05-08-21 @ 05:00) (18 - 22)  SpO2: 92% (05-08-21 @ 05:00) (92% - 98%)  Wt(kg): --    Physical Exam:  	Gen: NAD  	HEENT: MMM  	Pulm: CTA B/L  	CV: S1S2  	Abd: Soft, +BS  	Ext: No LE edema B/L                      Neuro: Awake   	Skin: Warm and Dry       LABS/STUDIES  --------------------------------------------------------------------------------              12.4   13.51 >-----------<  153      [05-08-21 @ 08:28]              37.9     138  |  106  |  15  ----------------------------<  149      [05-08-21 @ 08:28]  4.1   |  17  |  0.89        Ca     8.2     [05-08-21 @ 08:28]    TPro  6.5  /  Alb  2.0  /  TBili  1.0  /  DBili  x   /  AST  52  /  ALT  51  /  AlkPhos  128  [05-07-21 @ 05:55]      Creatinine Trend:  SCr 0.89 [05-08 @ 08:28]  SCr 0.84 [05-07 @ 05:55]  SCr 1.16 [05-06 @ 07:42]  SCr 0.76 [05-05 @ 03:50]  SCr 0.90 [05-04 @ 04:01]    Urinalysis - [04-22-21 @ 14:32]      Color Yellow / Appearance Clear / SG 1.015 / pH 6.5      Gluc 1000 / Ketone Negative  / Bili Negative / Urobili Negative       Blood Small / Protein 30 / Leuk Est Negative / Nitrite Negative      RBC 5-10 / WBC 0-2 / Hyaline  / Gran  / Sq Epi  / Non Sq Epi Few / Bacteria Few      Ferritin 1410      [05-06-21 @ 10:20]  PTH -- (Ca 8.5)      [04-30-21 @ 16:54]   54  TSH 0.96      [04-17-21 @ 06:26]  Lipid: chol 96, TG 77, HDL 42, LDL --      [04-17-21 @ 06:26]

## 2021-05-08 NOTE — PROGRESS NOTE ADULT - ASSESSMENT
INOCENCIA:  Likely pre-renal improved with IVF.  Renal function is improving now. INOCENCIA resolved.   - Repeat UA. Renal sonogram once stable if hematuria persists  - HOLD losartan  (last dose on 4/23)  - Monitor BMP.    Hypernatremia/Hyponatremia  -serum sodium WNL. Monitor serum Na    CKD 2/3:  Has risk (Diabetes) and minimal proteinuria.  - Monitor BMP.    Metabolic Acidosis:  Mixed AG and NAG. He may have some Tubulo-interstitial Disease  - Monitor for now.  -Hold PO NaHCO3    COVID   MOnitor temp/ O2  COntinue current care    Hypokalemia  replete as needed  monitor serum K

## 2021-05-08 NOTE — RAPID RESPONSE TEAM SUMMARY - NSSITUATIONBACKGROUNDRRT_GEN_ALL_CORE
Pt was called for a RRT for hypoxia SPO2 88% and was noted to be lethargic and AMS  Pt'S Family was called for address [facility]  Pt was intubated and being transferred to ICU service  ICU attending Dr. Sharp aware   Dr. Phillips informed

## 2021-05-08 NOTE — DISCHARGE NOTE FOR THE EXPIRED PATIENT - SECONDARY DIAGNOSIS.
Bilateral pneumonia INOCENCIA (acute kidney injury) HTN (hypertension) Uncontrolled diabetes mellitus

## 2021-05-16 NOTE — CHART NOTE - NSCHARTNOTEFT_GEN_A_CORE
.      /\\\///^\\\///\    Post discharge addendum / clarification    was asked to clarify about sepsis  patient was admitted with sepsis due to COVID based on Fever of 101.1 and tachycardia of 108 in the ER.    Dr. SHADY Gayle ([phone])

## 2021-05-16 NOTE — CHART NOTE - NSCHARTNOTESELECT_GEN_ALL_CORE
Event Note
Family Update/Event Note
Family Update/Event Note
Transfer Note
Event Note
Fam update/Event Note
Family Update/Event Note
Nutrition Services
Nutrition Services
RRT/Event Note
clarification
Event Note

## 2022-01-01 NOTE — PROGRESS NOTE ADULT - ASSESSMENT
_________________________________________________________________________________________  ========>>  M E D I C A L   A T T E N D I N G    F O L L O W  U P  N O T E  <<=========  -----------------------------------------------------------------------------------------------------    - Patient seen and examined by me earlier today.  (covering Dr WATSON today)   - In summary,  MEHRDAD SUTHERLAND is a 76y year old man admitted with COVID  - Patient has been in the ICU , on high flow, overall otherwise doing ok, comfortable, eating poorly / just water!!     ==================>> REVIEW OF SYSTEM <<=================    limited ROS as pt not very engaging   GEN: no fever,  no pain  RESP: no SOB, no sig cough  CVS: no chest pain  GI: no abdominal pain, no nausea  : no dysuria  Neuro: no headache, no dizziness  Derm : no itching    ==================>> PHYSICAL EXAM <<=================    GEN: A&O X 2 , NAD , comfortable, pleasant, calm . pm high flow , cachectic   HEENT: NCAT, PERRL, MMM, hearing intact  Neck: supple , no JVD appreciated  CVS: S1S2 , regular , No M/R/G appreciated  PULM: CTA B/L,  limited exam as not taking deep breaths   ABD.: soft. non tender, non distended  Extrem: intact pulses , no edema   PSYCH : normal mood,  not anxious                               ( Note written / Date of service 04-27-21 )    ==================>> MEDICATIONS <<====================    apixaban 2.5 milliGRAM(s) Oral every 12 hours  ascorbic acid 500 milliGRAM(s) Oral daily  atorvastatin 40 milliGRAM(s) Oral at bedtime  chlorhexidine 2% Cloths 1 Application(s) Topical <User Schedule>  cholecalciferol 400 Unit(s) Oral daily  dextrose 50% Injectable 25 Gram(s) IV Push once  famotidine    Tablet 20 milliGRAM(s) Oral two times a day  insulin glargine Injectable (LANTUS) 12 Unit(s) SubCutaneous at bedtime  insulin lispro (ADMELOG) corrective regimen sliding scale   SubCutaneous three times a day before meals  insulin lispro Injectable (ADMELOG) 4 Unit(s) SubCutaneous three times a day before meals  montelukast 10 milliGRAM(s) Oral daily  polyethylene glycol 3350 17 Gram(s) Oral daily  QUEtiapine 25 milliGRAM(s) Oral at bedtime  senna 2 Tablet(s) Oral at bedtime  sodium bicarbonate 650 milliGRAM(s) Oral three times a day  zinc sulfate 220 milliGRAM(s) Oral daily    MEDICATIONS  (PRN):  ALBUTerol    90 MICROgram(s) HFA Inhaler 2 Puff(s) Inhalation every 6 hours PRN Bronchospasm    ___________  Active diet:  Diet, Regular:   Consistent Carbohydrate Evening Snacks  DASH/TLC Sodium & Cholesterol Restricted  Lacto Veg (Accepts Milk & Milk Products)  ___________________    ==================>> VITAL SIGNS <<==================    Vital Signs Last 24 HrsT(C): 37 (04-27-21 @ 15:00)  T(F): 98.6 (04-27-21 @ 15:00), Max: 98.6 (04-27-21 @ 15:00)  HR: 110 (04-27-21 @ 15:00) (95 - 118)  BP: 96/62 (04-27-21 @ 15:00)  RR: 19 (04-27-21 @ 15:00) (16 - 28)  SpO2: 90% (04-27-21 @ 15:00) (86% - 96%)      POCT Blood Glucose.: 153 mg/dL (27 Apr 2021 10:55)  POCT Blood Glucose.: 140 mg/dL (27 Apr 2021 08:13)  POCT Blood Glucose.: 135 mg/dL (27 Apr 2021 06:26)  POCT Blood Glucose.: 155 mg/dL (26 Apr 2021 22:48)     ==================>> LAB AND IMAGING <<==================                        15.1   16.71 )-----------( 176      ( 27 Apr 2021 05:27 )             45.8        04-27    144  |  110<H>  |  38<H>  ----------------------------<  148<H>  3.5   |  23  |  1.38<H>    Ca    8.6      27 Apr 2021 05:27  Phos  3.5     04-27  Mg     2.0     04-27    TPro  6.9  /  Alb  2.1<L>  /  TBili  0.9  /  DBili  x   /  AST  33  /  ALT  29  /  AlkPhos  100  04-26    WBC count:   16.71 <<== ,  14.80 <<== ,  14.76 <<== ,  15.38 <<== ,  10.74 <<== ,  13.69 <<==   Hemoglobin:   15.1 <<==,  14.3 <<==,  14.1 <<==,  15.1 <<==,  14.2 <<==,  14.8 <<==  platelets:  176 <==, 183 <==, 323 <==, 374 <==, 390 <==, 385 <==, 399 <==    Creatinine:  1.38  <<==, 1.61  <<==, 2.00  <<==, 1.93  <<==, 1.93  <<==, 2.32  <<==  Sodium:   144  <==, 141  <==, 145  <==, 141  <==, 146  <==, 142  <==, 146  <==       AST:          33 <== , 35 <== , 36 <== , 63 <==      ALT:        29  <== , 29  <== , 33  <== , 33  <==      AP:        100  <=, 98  <=, 98  <=, 95  <=     Bili:        0.9  <=, 0.9  <=, 0.7  <=, 0.6  <=    ___________________________________________________________________________________  ===============>>  A S S E S S M E N T   A N D   P L A N <<===============  ------------------------------------------------------------------------------------------    · Assessment	  76 M with PMH of HTN and DM presented to ED with complaint of hiccups.  Found to be COVID positive on admission. admitted to medicine for acute hypoxemic respiratory failure due to bilateral pneumonia in setting of COVID 19. Started on Remdesivir, dexamethasone and supplemental oxygen. Pt was also found to have Hyponatremia likely due to hypovolemia, Followed by Nephro, started on IVF with improvement.  Pt was also found to have uncontrolled type 2 diabetes worsening with steroid use. Endo following  as well      Problem/Plan - 1:  ·  Problem: Acute respiratory failure with hypoxia.  Plan: -bilateral pneumonia in setting of COVID 19  - post Remdesivir / Dexamethasone per protocol    -cont  with oxygen and titrated down as tolerated   -pulm following   - ICU care appreciated   -continuous pulse ox monitoring  -cont prophylactic Lovenox   -cont supportive care   -daily labs CBC/CMP/CRP...   - Continue Current medications otherwise and monitor.  supportive care  - encourage PO intake   -nutrition / PTOOB    Problem/Plan - 2:  ·  Problem: Insulin dependent type 2 diabetes mellitus, uncontrolled, in setting of infection and steroids home   -endo consulted and appreciated recom and mgmt   -diabetic diet   -a1c 13.8  - Continue Current medications per endo otherwise and monitor FS    Problem/Plan - 3:  ·  Problem: HTN (hypertension).    -controlled, cont home dose of Losartan   -mon BP.     Problem/Plan - 4:  ·  Problem: Hyponatremia.    -likely due to hypovolemia, better   -Nephro following.   - IVH as needed     Avoid nephrotoxic medications.     Problem/Plan - 5:  ·  Problem: UTI   post Rocephin X 3 days     -GI/DVT Prophylaxis per protocol.   OOB to chair encouraged  PO intake and hydration encouraged     ___________________________  H. MARILEE Gayle.  (covering today)   Pager: [phone]

## 2022-05-29 NOTE — PROGRESS NOTE ADULT - SUBJECTIVE AND OBJECTIVE BOX
29-May-2022 08:00 NP Note discussed with  Primary Attending    Patient is a 76y old  Male who presents with a chief complaint of     INTERVAL HPI/OVERNIGHT EVENTS: no acute events overnight     MEDICATIONS  (STANDING):  atorvastatin 40 milliGRAM(s) Oral at bedtime  cefTRIAXone   IVPB 1000 milliGRAM(s) IV Intermittent every 24 hours  dexAMETHasone  Injectable 6 milliGRAM(s) IV Push daily  dextrose 40% Gel 15 Gram(s) Oral once  dextrose 5%. 1000 milliLiter(s) (50 mL/Hr) IV Continuous <Continuous>  dextrose 5%. 1000 milliLiter(s) (100 mL/Hr) IV Continuous <Continuous>  dextrose 50% Injectable 25 Gram(s) IV Push once  dextrose 50% Injectable 12.5 Gram(s) IV Push once  dextrose 50% Injectable 25 Gram(s) IV Push once  enoxaparin Injectable 40 milliGRAM(s) SubCutaneous daily  glucagon  Injectable 1 milliGRAM(s) IntraMuscular once  insulin glargine Injectable (LANTUS) 20 Unit(s) SubCutaneous at bedtime  insulin lispro (ADMELOG) corrective regimen sliding scale   SubCutaneous three times a day before meals  insulin lispro Injectable (ADMELOG) 6 Unit(s) SubCutaneous three times a day before meals  losartan 50 milliGRAM(s) Oral daily  remdesivir  IVPB 100 milliGRAM(s) IV Intermittent every 24 hours  remdesivir  IVPB   IV Intermittent     MEDICATIONS  (PRN):      __________________________________________________  REVIEW OF SYSTEMS:    CONSTITUTIONAL: No fever,   EYES: no acute visual disturbances  NECK: No pain or stiffness  RESPIRATORY: No cough; No shortness of breath  CARDIOVASCULAR: No chest pain, no palpitations  GASTROINTESTINAL: No pain. No nausea or vomiting; No diarrhea   NEUROLOGICAL: No headache or numbness, no tremors  MUSCULOSKELETAL: No joint pain, no muscle pain  GENITOURINARY: no dysuria, no frequency, no hesitancy  PSYCHIATRY: no depression , no anxiety  ALL OTHER  ROS negative        Vital Signs Last 24 Hrs  T(C): 36.6 (20 Apr 2021 14:22), Max: 36.6 (20 Apr 2021 14:22)  T(F): 97.9 (20 Apr 2021 14:22), Max: 97.9 (20 Apr 2021 14:22)  HR: 100 (20 Apr 2021 14:22) (96 - 103)  BP: 142/84 (20 Apr 2021 14:22) (141/82 - 147/91)  BP(mean): --  RR: 18 (20 Apr 2021 14:22) (18 - 20)  SpO2: 91% (20 Apr 2021 14:22) (90% - 92%)    ________________________________________________  PHYSICAL EXAM:  GENERAL: NAD  HEENT: Normocephalic;  conjunctivae and sclerae clear; moist mucous membranes;   NECK : supple  CHEST/LUNG: + rhonchi   HEART: S1 S2  regular; no murmurs, gallops or rubs  ABDOMEN: Soft, Nontender, Nondistended; Bowel sounds present  EXTREMITIES: no cyanosis; no edema; no calf tenderness  SKIN: warm and dry; no rash  NERVOUS SYSTEM:  Awake and alert; Oriented  to place, person and time ; no new deficits    _________________________________________________  LABS:                        14.8   14.73 )-----------( 408      ( 20 Apr 2021 07:44 )             42.3     04-20    138  |  106  |  35<H>  ----------------------------<  258<H>  3.7   |  20<L>  |  0.84    Ca    8.8      20 Apr 2021 07:44  Phos  3.0     04-19  Mg     2.5     04-19    TPro  7.4  /  Alb  2.5<L>  /  TBili  0.7  /  DBili  x   /  AST  43<H>  /  ALT  31  /  AlkPhos  86  04-20        CAPILLARY BLOOD GLUCOSE      POCT Blood Glucose.: 200 mg/dL (20 Apr 2021 11:51)  POCT Blood Glucose.: 248 mg/dL (20 Apr 2021 08:01)  POCT Blood Glucose.: 166 mg/dL (19 Apr 2021 21:17)  POCT Blood Glucose.: 221 mg/dL (19 Apr 2021 16:44)        RADIOLOGY & ADDITIONAL TESTS:    Imaging  Reviewed:  YES  < from: Xray Chest 1 View-PORTABLE IMMEDIATE (Xray Chest 1 View-PORTABLE IMMEDIATE .) (04.16.21 @ 14:50) >  FINDINGS/  IMPRESSION:  New bilateral airspace opacities lung bases. No pleural effusion    Heart size cannot be accurately assessed in this projection.        Consultant(s) Notes Reviewed:   YES      Plan of care was discussed with patient and /or primary care giver; all questions and concerns were addressed

## 2022-11-19 NOTE — DISCHARGE NOTE PROVIDER - NSDCHC_MEDRECSTATUS_GEN_ALL_CORE
Medical management as above, review of results/records, discussion with patient and primary team, documentation. Admission Reconciliation is Not Complete  Discharge Reconciliation is Not Complete Admission Reconciliation is Completed  Discharge Reconciliation is Completed

## 2023-06-28 NOTE — PROGRESS NOTE ADULT - ASSESSMENT
75 y/o M from home with PMH of HTN and DM initially presented to ED complaint of hiccups on 4/16/2021, associated with cough, fever,chills and myalgias. Pt was admitted to Medicine floor for COVID 19 infection requiring supplemental O2 with NC 6L then NRB and still desaturating.  ICU was consulted for acute hypoxic respiratory failure 2/2 COVID-19 now requiring HFNC 50/100, saturation at 91%. He is s/p bicarb gtt for metabolic acidosis, c/w PO TID bicarb as per Nephro.    Assessment:  1. Acute Hypoxic Respiratory Failure secondary to COVID19 infection    2. INOCENCIA   3. Mixed AG +  NAGMA   4. Uncontrolled DM-2   5. Hyponatremia/Hypernatremia   6. HTN     Plan:    =====================[CNS ]==============================  # No issues:   - Mental status at baseline    - Off Seroquel  - C/w Zyprexa PRN for agitation   - Off Precedex drip     =====================[CVS ]===============================  # HTN :   - Pt was on losartan 50mg at home, last dose 4/23   - Holding Losartan due INOCENCIA; Cr 1.93>2.0>1.6  - Monitor BP and adjust meds as needed      =====================[RESP ]==============================  # Acute Hypoxic Resp Failure 2/2 COVID-19 infection :   - Hypoxia worsening, now on HFNC 60/100 wean off as tolerated    - Low threshold for intubation, pt's grand-daughter Owen SMYTH, agrees for intubation if the need arises  - Completed Remdesivir 4/20  - Completed Decadron 4/26  - CXR (4/23) Bilateral perihilar and bibasilar multifocal and diffuse ill-defined airspace opacities  - S/p heparin drip for elevated D-dimer, it keeps rising, now 11k   - C/w Eliquis 2.5 mg BID    - C/w Vit c, Zn , Vit D supplementation   - US doppler of LE no DVT    =====================[ GI ]================================  # No issues :   - C/w Soft diabetic diet + Glucerna shakes   - S/p one dose modafinil  on 4/28    ====================[ RENAL ]=============================   # INOCENCIA :   - likely 2/2 COVID19 vs pre -renal (less likely given no response to IVF)   - S/p bicarb drip  - C/w PO bicarb TID as per nephro   - Losartan on hold (last dose 4/23)   - Monitor urine output, monitor I &O s closely   - Monitor and supplement electrolytes as needed   - Renal US ordered (pending) **  - Nephro Dr Brown following     # Mixed AG + NAGMA :   - HCO3 19, could be due to hyperchloremic MA due to NS vs worsening INOCENCIA   - C/w sodium bicarb 650 mg TID as per nephrology     # Hyponatremia/Hypernatremia :   - Initially pt was hyponatremic, resolved s/p IVF   - Na 146  - Monitor BMP     =====================[ ID ]================================  # COVID-19 :  - Plan as above     ===================[ HEME/ONC ]===========================  # Thrombocytopenia :  - Platelets 115 > 95  today   - F/u HIT panel   - No signs of bleeding   - Monitor cbc daily     =====================[ ENDO ]=============================  # Uncontrolled DM-2   - A1c 13.6   - BS controlled on current regimen   - C/w Lantus 12 units at bedtime and Admelog 4 units before meals   - Endo Dr Hall  following   - Monitor BS   - Encouraged po intake and home meals     ==================[ SKIN/ CATHETERS ]======================  # no skin breaks  OOB to chair    ==================[ PROPHYLAXIS ]==========================   # DVT: Lovenox   # GI: Pepcid     ====================[ DISPO ]==============================   - Patient remains acute, will continue monitoring in ICU     ===================[ PROGNOSIS ]===========================  - Guarded      75 y/o M from home with PMH of HTN and DM initially presented to ED complaint of hiccups on 4/16/2021, associated with cough, fever,chills and myalgias. Pt was admitted to Medicine floor for COVID 19 infection requiring supplemental O2 with NC 6L then NRB and still desaturating.  ICU was consulted for acute hypoxic respiratory failure 2/2 COVID-19 now requiring HFNC 50/100, saturation at 91%. He is s/p bicarb gtt for metabolic acidosis, s/p PO bicarb    Assessment:  1. Acute Hypoxic Respiratory Failure secondary to COVID19 infection    2. INOCENCIA   3. Mixed AG +  NAGMA   4. Uncontrolled DM-2   5. Hyponatremia/Hypernatremia   6. HTN     Plan:    =====================[CNS ]==============================  # No issues:   - Mental status at baseline    - Off Seroquel  - C/w Zyprexa PRN for agitation   - Off Precedex drip     =====================[CVS ]===============================  # HTN :   - Pt was on losartan 50mg at home, last dose 4/23   - Holding Losartan due INOCENCIA; Cr 1.93>2.0>1.6> 1.23  - Monitor BP and adjust meds as needed      =====================[RESP ]==============================  # Acute Hypoxic Resp Failure 2/2 COVID-19 infection :   - Hypoxia worsening, now on HFNC 60/100 wean off as tolerated    - Low threshold for intubation, pt's grand-daughter Owen SMYTH, agrees for intubation if the need arises  - Completed Remdesivir 4/20  - Completed Decadron 4/26  - CXR (4/23) Bilateral perihilar and bibasilar multifocal and diffuse ill-defined airspace opacities  - S/p heparin drip for elevated D-dimer  - D- dimer trending down 11k > 3k  - C/w Eliquis 2.5 mg BID    - C/w Vit c, Zn , Vit D supplementation   - US doppler of LE no DVT    =====================[ GI ]================================  # No issues :   - C/w Soft diabetic diet + Glucerna shakes   - S/p one dose modafinil on 4/28    ====================[ RENAL ]=============================   # INOCENCIA : resolved **  - likely 2/2 COVID19 vs pre -renal (less likely given no response to IVF)   - S/p bicarb drip  - D/c PO bicarb TID as per nephro   - Losartan on hold (last dose 4/23)   - Monitor urine output, monitor I &O s closely   - Monitor and supplement electrolytes as needed   - Renal US ordered (pending) **  - Nephro Dr Brown following     # Mixed AG + NAGMA : resolved **  - HCO3 19, could be due to hyperchloremic MA due to NS vs worsening INOCENCIA   - D/c sodium bicarb 650 mg TID as per nephrology     # Hyponatremia/Hypernatremia :   - Initially pt was hyponatremic, resolved s/p IVF   - Na 144  - Monitor BMP     =====================[ ID ]================================  # COVID-19 :  - Plan as above     ===================[ HEME/ONC ]===========================  # Thrombocytopenia :  - Platelets 115 > 95 > 103  today   - F/u HIT panel   - No signs of bleeding   - Monitor cbc daily     =====================[ ENDO ]=============================  # Uncontrolled DM-2   - A1c 13.6   - BS controlled on current regimen   - C/w Lantus 12 units at bedtime and Admelog 4 units before meals   - Endo Dr Hall  following   - Monitor BS   - Encouraged po intake and home meals     ==================[ SKIN/ CATHETERS ]======================  # No skin breaks  OOB to chair    ==================[ PROPHYLAXIS ]==========================   # DVT: Lovenox   # GI: Pepcid     ====================[ DISPO ]==============================   - Patient remains acute, will continue monitoring in ICU     ===================[ PROGNOSIS ]===========================  - Guarded      75 y/o M from home with PMH of HTN and DM initially presented to ED complaint of hiccups on 4/16/2021, associated with cough, fever,chills and myalgias. Pt was admitted to Medicine floor for COVID 19 infection requiring supplemental O2 with NC 6L then NRB and still desaturating.  ICU was consulted for acute hypoxic respiratory failure 2/2 COVID-19 now requiring HFNC 50/100, saturation at 91%. He is s/p bicarb gtt for metabolic acidosis, s/p PO bicarb    Assessment:  1. Acute Hypoxic Respiratory Failure secondary to COVID19 infection    2. INOCENCIA   3. Mixed AG +  NAGMA   4. Uncontrolled DM-2   5. Hyponatremia/Hypernatremia   6. HTN     Plan:    =====================[CNS ]==============================  # No issues:   - Mental status at baseline    - Off Seroquel  - C/w Zyprexa PRN for agitation   - Off Precedex drip     =====================[CVS ]===============================  # HTN :   - Pt was on losartan 50mg at home, last dose 4/23   - Holding Losartan due INOCENCIA; Cr 1.93>2.0>1.6> 1.23  - Monitor BP and adjust meds as needed      =====================[RESP ]==============================  # Acute Hypoxic Resp Failure 2/2 COVID-19 infection :   - Hypoxia worsening, now on HFNC 60/100 wean off as tolerated    - Low threshold for intubation, pt's grand-daughter Owen SMYTH, agrees for intubation if the need arises  - Completed Remdesivir 4/20  - Completed Decadron 4/26  - CXR (4/23) Bilateral perihilar and bibasilar multifocal and diffuse ill-defined airspace opacities  - S/p heparin drip for elevated D-dimer  - D- dimer trending down 11k > 3k  - C/w Eliquis 2.5 mg BID    - C/w Vit c, Zn, Vit D supplementation   - US doppler of LE no DVT    =====================[ GI ]================================  # No issues :   - C/w Soft diabetic diet + Glucerna shakes   - S/p one dose modafinil on 4/28    ====================[ RENAL ]=============================   # INOCENCIA : resolved **  - likely 2/2 COVID19 vs pre -renal (less likely given no response to IVF)   - S/p bicarb drip  - D/c PO bicarb TID as per nephro   - Losartan on hold (last dose 4/23)   - Monitor urine output, monitor I &O s closely   - Monitor and supplement electrolytes as needed   - Renal US ordered (pending) **  - Nephro Dr Brown following     # Mixed AG + NAGMA : resolved **  - HCO3 19, could be due to hyperchloremic MA due to NS vs worsening INOCENCIA   - D/c sodium bicarb 650 mg TID as per nephrology     # Hyponatremia/Hypernatremia :   - Initially pt was hyponatremic, resolved s/p IVF   - Na 144  - Monitor BMP     =====================[ ID ]================================  # COVID-19 :  - Plan as above     ===================[ HEME/ONC ]===========================  # Thrombocytopenia :  - Platelets 115 > 95 > 103  today   - F/u HIT panel   - No signs of bleeding   - Monitor cbc daily     =====================[ ENDO ]=============================  # Uncontrolled DM-2   - A1c 13.6   - BS controlled on current regimen   - C/w Lantus 12 units at bedtime and Admelog 4 units before meals   - Endo Dr Hall  following   - Monitor BS   - Encouraged po intake and home meals     ==================[ SKIN/ CATHETERS ]======================  # No skin breaks  OOB to chair    ==================[ PROPHYLAXIS ]==========================   # DVT: Lovenox   # GI: Pepcid     ====================[ DISPO ]==============================   - Patient remains acute, will continue monitoring in ICU     ===================[ PROGNOSIS ]===========================  - Guarded      75 y/o M from home with PMH of HTN and DM initially presented to ED complaint of hiccups on 4/16/2021, associated with cough, fever,chills and myalgias. Pt was admitted to Medicine floor for COVID 19 infection requiring supplemental O2 with NC 6L then NRB and still desaturating.  ICU was consulted for acute hypoxic respiratory failure 2/2 COVID-19 now requiring HFNC 50/100, saturation at 91%. He is s/p bicarb gtt for metabolic acidosis, s/p PO bicarb    Assessment:  1. Acute Hypoxic Respiratory Failure secondary to COVID19 infection    2. INOCENCIA   3. Mixed AG +  NAGMA   4. Uncontrolled DM-2   5. Hyponatremia/Hypernatremia   6. HTN     Plan:    =====================[CNS ]==============================  # No issues:   - Mental status at baseline    - Off Seroquel  - C/w Zyprexa PRN for agitation   - Off Precedex drip     =====================[CVS ]===============================  # HTN :   - Pt was on losartan 50mg at home, last dose 4/23   - Holding Losartan due INOCENCIA; Cr 1.93>2.0>1.6> 1.23  - Monitor BP and adjust meds as needed      =====================[RESP ]==============================  # Acute Hypoxic Resp Failure 2/2 COVID-19 infection :   - Hypoxia worsening, now on HFNC 50/80 wean off as tolerated    - Low threshold for intubation, pt's grand-daughter Owen SMYTH, agrees for intubation if the need arises  - Completed Remdesivir 4/20  - Completed Decadron 4/26  - CXR (4/23) Bilateral perihilar and bibasilar multifocal and diffuse ill-defined airspace opacities  - S/p heparin drip for elevated D-dimer  - D- dimer trending down 11k > 3k  - C/w Eliquis 2.5 mg BID    - C/w Vit c, Zn, Vit D supplementation   - US doppler of LE no DVT    =====================[ GI ]================================  # No issues :   - C/w Soft diabetic diet + Glucerna shakes   - S/p one dose modafinil on 4/28    ====================[ RENAL ]=============================   # INOCENCIA : resolved **  - likely 2/2 COVID19 vs pre -renal (less likely given no response to IVF)   - S/p bicarb drip  - D/c PO bicarb TID as per nephro   - Losartan on hold (last dose 4/23)   - Monitor urine output, monitor I &O s closely   - Monitor and supplement electrolytes as needed   - Renal US ordered (pending) **  - Nephro Dr Brown following     # Mixed AG + NAGMA : resolved **  - HCO3 19, could be due to hyperchloremic MA due to NS vs worsening INOCENCIA   - D/c sodium bicarb 650 mg TID as per nephrology     # Hyponatremia/Hypernatremia :   - Initially pt was hyponatremic, resolved s/p IVF   - Na 144  - Monitor BMP     =====================[ ID ]================================  # COVID-19 :  - Plan as above     ===================[ HEME/ONC ]===========================  # Thrombocytopenia :  - Platelets 115 > 95 > 103  today   - F/u HIT panel   - No signs of bleeding   - Monitor cbc daily     =====================[ ENDO ]=============================  # Uncontrolled DM-2   - A1c 13.6   - BS controlled on current regimen   - C/w Lantus 12 units at bedtime and Admelog 4 units before meals   - Endo Dr Hall  following   - Monitor BS   - Encouraged po intake and home meals     ==================[ SKIN/ CATHETERS ]======================  # No skin breaks  OOB to chair    ==================[ PROPHYLAXIS ]==========================   # DVT: Lovenox   # GI: Pepcid     ====================[ DISPO ]==============================   - Patient remains acute, will continue monitoring in ICU     ===================[ PROGNOSIS ]===========================  - Guarded      Fall

## 2023-10-14 NOTE — PROGRESS NOTE ADULT - SUBJECTIVE AND OBJECTIVE BOX
INTERVAL HPI/OVERNIGHT EVENTS: ***    PRESSORS: [ ] YES [ ] NO  WHICH:    ANTIBIOTICS:                  DATE STARTED:  ANTIBIOTICS:                  DATE STARTED:  ANTIBIOTICS:                  DATE STARTED:    Antimicrobial:    Cardiovascular:    Pulmonary:  ALBUTerol    90 MICROgram(s) HFA Inhaler 2 Puff(s) Inhalation every 6 hours PRN  montelukast 10 milliGRAM(s) Oral daily    Hematalogic:  apixaban 2.5 milliGRAM(s) Oral every 12 hours    Other:  ascorbic acid 500 milliGRAM(s) Oral daily  atorvastatin 40 milliGRAM(s) Oral at bedtime  chlorhexidine 2% Cloths 1 Application(s) Topical <User Schedule>  cholecalciferol 400 Unit(s) Oral daily  famotidine    Tablet 20 milliGRAM(s) Oral two times a day  insulin glargine Injectable (LANTUS) 12 Unit(s) SubCutaneous at bedtime  insulin lispro (ADMELOG) corrective regimen sliding scale   SubCutaneous three times a day before meals  insulin lispro Injectable (ADMELOG) 4 Unit(s) SubCutaneous three times a day before meals  polyethylene glycol 3350 17 Gram(s) Oral daily  QUEtiapine 25 milliGRAM(s) Oral at bedtime  senna 2 Tablet(s) Oral at bedtime  sodium bicarbonate 650 milliGRAM(s) Oral three times a day  zinc sulfate 220 milliGRAM(s) Oral daily    ALBUTerol    90 MICROgram(s) HFA Inhaler 2 Puff(s) Inhalation every 6 hours PRN  apixaban 2.5 milliGRAM(s) Oral every 12 hours  ascorbic acid 500 milliGRAM(s) Oral daily  atorvastatin 40 milliGRAM(s) Oral at bedtime  chlorhexidine 2% Cloths 1 Application(s) Topical <User Schedule>  cholecalciferol 400 Unit(s) Oral daily  famotidine    Tablet 20 milliGRAM(s) Oral two times a day  insulin glargine Injectable (LANTUS) 12 Unit(s) SubCutaneous at bedtime  insulin lispro (ADMELOG) corrective regimen sliding scale   SubCutaneous three times a day before meals  insulin lispro Injectable (ADMELOG) 4 Unit(s) SubCutaneous three times a day before meals  montelukast 10 milliGRAM(s) Oral daily  polyethylene glycol 3350 17 Gram(s) Oral daily  QUEtiapine 25 milliGRAM(s) Oral at bedtime  senna 2 Tablet(s) Oral at bedtime  sodium bicarbonate 650 milliGRAM(s) Oral three times a day  zinc sulfate 220 milliGRAM(s) Oral daily    Drug Dosing Weight  Height (cm): 172.7 (16 Apr 2021 13:26)  Weight (kg): 78.9 (16 Apr 2021 13:26)  BMI (kg/m2): 26.5 (16 Apr 2021 13:26)  BSA (m2): 1.93 (16 Apr 2021 13:26)    CENTRAL LINE: [ ] YES [ ] NO  LOCATION:         GUPTA: [ ] YES [ ] NO          A-LINE:  [ ] YES [ ] NO  LOCATION:             ICU Vital Signs Last 24 Hrs  T(C): 36.4 (29 Apr 2021 06:00), Max: 36.8 (28 Apr 2021 15:16)  T(F): 97.6 (29 Apr 2021 06:00), Max: 98.2 (28 Apr 2021 15:16)  HR: 108 (29 Apr 2021 07:00) (93 - 115)  BP: 140/90 (29 Apr 2021 07:00) (90/68 - 140/90)  BP(mean): 101 (29 Apr 2021 07:00) (68 - 106)  ABP: --  ABP(mean): --  RR: 16 (29 Apr 2021 07:00) (16 - 26)  SpO2: 96% (29 Apr 2021 07:00) (86% - 99%)            04-28 @ 07:01  -  04-29 @ 07:00  --------------------------------------------------------  IN: 560 mL / OUT: 0 mL / NET: 560 mL              PHYSICAL EXAM:    GENERAL: NAD  EYES: EOMI, PERRLA  NECK: Supple, No JVD; Normal thyroid; Trachea midline: No LAD   NERVOUS SYSTEM:  Alert & Oriented X3,  Motor Strength 5/5 B/L upper and lower extremities; DTRs 2+ intact and symmetric  CHEST/LUNG: No rales, rhonchi, wheezing, breath sounds present bilaterally  HEART: Regular rate and rhythm; No murmurs, no gallops  ABDOMEN: Soft, Nontender, Nondistended; Bowel sounds present, no pain or masses on palpation  : voiding well, Gupta in place  EXTREMITIES:  2+ Peripheral Pulses, No clubbing, cyanosis, or edema  SKIN: warm, intact, no lesions         LABS:  CBC Full  -  ( 29 Apr 2021 04:42 )  WBC Count : 13.72 K/uL  RBC Count : 5.07 M/uL  Hemoglobin : 14.5 g/dL  Hematocrit : 44.8 %  Platelet Count - Automated : 95 K/uL  Mean Cell Volume : 88.4 fl  Mean Cell Hemoglobin : 28.6 pg  Mean Cell Hemoglobin Concentration : 32.4 gm/dL  Auto Neutrophil # : x  Auto Lymphocyte # : x  Auto Monocyte # : x  Auto Eosinophil # : x  Auto Basophil # : x  Auto Neutrophil % : x  Auto Lymphocyte % : x  Auto Monocyte % : x  Auto Eosinophil % : x  Auto Basophil % : x    04-29    146<H>  |  114<H>  |  34<H>  ----------------------------<  156<H>  4.0   |  26  |  1.33<H>    Ca    8.4      29 Apr 2021 04:42  Phos  3.6     04-29  Mg     2.0     04-29              RADIOLOGY & ADDITIONAL STUDIES REVIEWED:  ***    [ ]GOALS OF CARE DISCUSSION WITH PATIENT/FAMILY/PROXY:    CRITICAL CARE TIME SPENT: 35 minutes   INTERVAL HPI/OVERNIGHT EVENTS: patient remained on high flow 60/100, desaturates to 70% when moving on bed, denies any SOB or discomfort, afebrile, no signs of bleeding, refusing to eat even home made food. Patient is incontinent but making urine. Patient will be out of bed to chair, Zyprexa PRN.     PRESSORS: [ ] YES [x ] NO      Antimicrobial:    Cardiovascular:    Pulmonary:  ALBUTerol    90 MICROgram(s) HFA Inhaler 2 Puff(s) Inhalation every 6 hours PRN  montelukast 10 milliGRAM(s) Oral daily    Hematalogic:  apixaban 2.5 milliGRAM(s) Oral every 12 hours    Other:  ascorbic acid 500 milliGRAM(s) Oral daily  atorvastatin 40 milliGRAM(s) Oral at bedtime  chlorhexidine 2% Cloths 1 Application(s) Topical <User Schedule>  cholecalciferol 400 Unit(s) Oral daily  famotidine    Tablet 20 milliGRAM(s) Oral two times a day  insulin glargine Injectable (LANTUS) 12 Unit(s) SubCutaneous at bedtime  insulin lispro (ADMELOG) corrective regimen sliding scale   SubCutaneous three times a day before meals  insulin lispro Injectable (ADMELOG) 4 Unit(s) SubCutaneous three times a day before meals  polyethylene glycol 3350 17 Gram(s) Oral daily  QUEtiapine 25 milliGRAM(s) Oral at bedtime  senna 2 Tablet(s) Oral at bedtime  sodium bicarbonate 650 milliGRAM(s) Oral three times a day  zinc sulfate 220 milliGRAM(s) Oral daily    ALBUTerol    90 MICROgram(s) HFA Inhaler 2 Puff(s) Inhalation every 6 hours PRN  apixaban 2.5 milliGRAM(s) Oral every 12 hours  ascorbic acid 500 milliGRAM(s) Oral daily  atorvastatin 40 milliGRAM(s) Oral at bedtime  chlorhexidine 2% Cloths 1 Application(s) Topical <User Schedule>  cholecalciferol 400 Unit(s) Oral daily  famotidine    Tablet 20 milliGRAM(s) Oral two times a day  insulin glargine Injectable (LANTUS) 12 Unit(s) SubCutaneous at bedtime  insulin lispro (ADMELOG) corrective regimen sliding scale   SubCutaneous three times a day before meals  insulin lispro Injectable (ADMELOG) 4 Unit(s) SubCutaneous three times a day before meals  montelukast 10 milliGRAM(s) Oral daily  polyethylene glycol 3350 17 Gram(s) Oral daily  QUEtiapine 25 milliGRAM(s) Oral at bedtime  senna 2 Tablet(s) Oral at bedtime  sodium bicarbonate 650 milliGRAM(s) Oral three times a day  zinc sulfate 220 milliGRAM(s) Oral daily    Drug Dosing Weight  Height (cm): 172.7 (16 Apr 2021 13:26)  Weight (kg): 78.9 (16 Apr 2021 13:26)  BMI (kg/m2): 26.5 (16 Apr 2021 13:26)  BSA (m2): 1.93 (16 Apr 2021 13:26)    CENTRAL LINE: [ ] YES [ ] NO  LOCATION:         GUPTA: [ ] YES [ ] NO          A-LINE:  [ ] YES [ ] NO  LOCATION:             ICU Vital Signs Last 24 Hrs  T(C): 36.4 (29 Apr 2021 06:00), Max: 36.8 (28 Apr 2021 15:16)  T(F): 97.6 (29 Apr 2021 06:00), Max: 98.2 (28 Apr 2021 15:16)  HR: 108 (29 Apr 2021 07:00) (93 - 115)  BP: 140/90 (29 Apr 2021 07:00) (90/68 - 140/90)  BP(mean): 101 (29 Apr 2021 07:00) (68 - 106)  ABP: --  ABP(mean): --  RR: 16 (29 Apr 2021 07:00) (16 - 26)  SpO2: 96% (29 Apr 2021 07:00) (86% - 99%)            04-28 @ 07:01  -  04-29 @ 07:00  --------------------------------------------------------  IN: 560 mL / OUT: 0 mL / NET: 560 mL              PHYSICAL EXAM:    GENERAL: NAD,   EYES: EOMI, PERRLA  NECK: Supple, No JVD; Normal thyroid; Trachea midline: No LAD   NERVOUS SYSTEM:  Alert & Oriented X3,  Motor Strength 5/5 B/L upper and lower extremities; DTRs 2+ intact and symmetric  CHEST/LUNG: No rales, rhonchi, wheezing, breath sounds present bilaterally  HEART: Regular rate and rhythm; No murmurs, no gallops  ABDOMEN: Soft, Nontender, Nondistended; Bowel sounds present, no pain or masses on palpation  : voiding well, Gupta in place  EXTREMITIES:  2+ Peripheral Pulses, No clubbing, cyanosis, or edema  SKIN: warm, intact, no lesions         LABS:  CBC Full  -  ( 29 Apr 2021 04:42 )  WBC Count : 13.72 K/uL  RBC Count : 5.07 M/uL  Hemoglobin : 14.5 g/dL  Hematocrit : 44.8 %  Platelet Count - Automated : 95 K/uL  Mean Cell Volume : 88.4 fl  Mean Cell Hemoglobin : 28.6 pg  Mean Cell Hemoglobin Concentration : 32.4 gm/dL  Auto Neutrophil # : x  Auto Lymphocyte # : x  Auto Monocyte # : x  Auto Eosinophil # : x  Auto Basophil # : x  Auto Neutrophil % : x  Auto Lymphocyte % : x  Auto Monocyte % : x  Auto Eosinophil % : x  Auto Basophil % : x    04-29    146<H>  |  114<H>  |  34<H>  ----------------------------<  156<H>  4.0   |  26  |  1.33<H>    Ca    8.4      29 Apr 2021 04:42  Phos  3.6     04-29  Mg     2.0     04-29              RADIOLOGY & ADDITIONAL STUDIES REVIEWED:  ***    [ ]GOALS OF CARE DISCUSSION WITH PATIENT/FAMILY/PROXY:    CRITICAL CARE TIME SPENT: 35 minutes   INTERVAL HPI/OVERNIGHT EVENTS: patient remained on high flow 60/100, desaturates to 70% when moving on bed, denies any SOB or discomfort, afebrile, no signs of bleeding, refusing to eat even home made food. Patient is incontinent but making urine. Patient will be out of bed to chair, Zyprexa PRN.     PRESSORS: [ ] YES [x ] NO      Antimicrobial:    Cardiovascular:    Pulmonary:  ALBUTerol    90 MICROgram(s) HFA Inhaler 2 Puff(s) Inhalation every 6 hours PRN  montelukast 10 milliGRAM(s) Oral daily    Hematalogic:  apixaban 2.5 milliGRAM(s) Oral every 12 hours    Other:  ascorbic acid 500 milliGRAM(s) Oral daily  atorvastatin 40 milliGRAM(s) Oral at bedtime  chlorhexidine 2% Cloths 1 Application(s) Topical <User Schedule>  cholecalciferol 400 Unit(s) Oral daily  famotidine    Tablet 20 milliGRAM(s) Oral two times a day  insulin glargine Injectable (LANTUS) 12 Unit(s) SubCutaneous at bedtime  insulin lispro (ADMELOG) corrective regimen sliding scale   SubCutaneous three times a day before meals  insulin lispro Injectable (ADMELOG) 4 Unit(s) SubCutaneous three times a day before meals  polyethylene glycol 3350 17 Gram(s) Oral daily  QUEtiapine 25 milliGRAM(s) Oral at bedtime  senna 2 Tablet(s) Oral at bedtime  sodium bicarbonate 650 milliGRAM(s) Oral three times a day  zinc sulfate 220 milliGRAM(s) Oral daily    ALBUTerol    90 MICROgram(s) HFA Inhaler 2 Puff(s) Inhalation every 6 hours PRN  apixaban 2.5 milliGRAM(s) Oral every 12 hours  ascorbic acid 500 milliGRAM(s) Oral daily  atorvastatin 40 milliGRAM(s) Oral at bedtime  chlorhexidine 2% Cloths 1 Application(s) Topical <User Schedule>  cholecalciferol 400 Unit(s) Oral daily  famotidine    Tablet 20 milliGRAM(s) Oral two times a day  insulin glargine Injectable (LANTUS) 12 Unit(s) SubCutaneous at bedtime  insulin lispro (ADMELOG) corrective regimen sliding scale   SubCutaneous three times a day before meals  insulin lispro Injectable (ADMELOG) 4 Unit(s) SubCutaneous three times a day before meals  montelukast 10 milliGRAM(s) Oral daily  polyethylene glycol 3350 17 Gram(s) Oral daily  QUEtiapine 25 milliGRAM(s) Oral at bedtime  senna 2 Tablet(s) Oral at bedtime  sodium bicarbonate 650 milliGRAM(s) Oral three times a day  zinc sulfate 220 milliGRAM(s) Oral daily    Drug Dosing Weight  Height (cm): 172.7 (16 Apr 2021 13:26)  Weight (kg): 78.9 (16 Apr 2021 13:26)  BMI (kg/m2): 26.5 (16 Apr 2021 13:26)  BSA (m2): 1.93 (16 Apr 2021 13:26)    CENTRAL LINE: [ ] YES [ ] NO  LOCATION:         GUPTA: [ ] YES [ ] NO          A-LINE:  [ ] YES [ ] NO  LOCATION:             ICU Vital Signs Last 24 Hrs  T(C): 36.4 (29 Apr 2021 06:00), Max: 36.8 (28 Apr 2021 15:16)  T(F): 97.6 (29 Apr 2021 06:00), Max: 98.2 (28 Apr 2021 15:16)  HR: 108 (29 Apr 2021 07:00) (93 - 115)  BP: 140/90 (29 Apr 2021 07:00) (90/68 - 140/90)  BP(mean): 101 (29 Apr 2021 07:00) (68 - 106)  ABP: --  ABP(mean): --  RR: 16 (29 Apr 2021 07:00) (16 - 26)  SpO2: 96% (29 Apr 2021 07:00) (86% - 99%)            04-28 @ 07:01  -  04-29 @ 07:00  --------------------------------------------------------  IN: 560 mL / OUT: 0 mL / NET: 560 mL              PHYSICAL EXAM:    GENERAL: NAD, on high flow 60/100 + NRB sat >90%, desaturates with movement  EYES: EOMI, PERRLA  NECK: Supple, No JVD; Normal thyroid; Trachea midline: No LAD   NERVOUS SYSTEM:  Alert & Oriented X3,  Motor Strength 5/5 B/L upper and lower extremities; DTRs 2+ intact and symmetric  CHEST/LUNG: No rales, rhonchi, wheezing, breath sounds present bilaterally  HEART: Regular rate and rhythm; No murmurs, no gallops  ABDOMEN: Soft, Nontender, Nondistended; Bowel sounds present, no pain or masses on palpation  : voiding well  EXTREMITIES:  2+ Peripheral Pulses, No clubbing, cyanosis, or edema  SKIN: warm, intact, no lesions       LABS:  CBC Full  -  ( 29 Apr 2021 04:42 )  WBC Count : 13.72 K/uL  RBC Count : 5.07 M/uL  Hemoglobin : 14.5 g/dL  Hematocrit : 44.8 %  Platelet Count - Automated : 95 K/uL  Mean Cell Volume : 88.4 fl  Mean Cell Hemoglobin : 28.6 pg  Mean Cell Hemoglobin Concentration : 32.4 gm/dL  Auto Neutrophil # : x  Auto Lymphocyte # : x  Auto Monocyte # : x  Auto Eosinophil # : x  Auto Basophil # : x  Auto Neutrophil % : x  Auto Lymphocyte % : x  Auto Monocyte % : x  Auto Eosinophil % : x  Auto Basophil % : x    04-29    146<H>  |  114<H>  |  34<H>  ----------------------------<  156<H>  4.0   |  26  |  1.33<H>    Ca    8.4      29 Apr 2021 04:42  Phos  3.6     04-29  Mg     2.0     04-29              RADIOLOGY & ADDITIONAL STUDIES REVIEWED:  ***    [ ]GOALS OF CARE DISCUSSION WITH PATIENT/FAMILY/PROXY:    CRITICAL CARE TIME SPENT: 35 minutes   Call office to see if Dr. Leija desires an earlier visit due to anemia.

## 2024-03-06 NOTE — DIETITIAN INITIAL EVALUATION ADULT. - PROBLEM SELECTOR PLAN 3
Patient given 120cc of water and crackers for PO trial.   Pt is on metformin at home  started on SS  Monitor fingerstick  f/u HbA1C